# Patient Record
Sex: MALE | Race: WHITE
[De-identification: names, ages, dates, MRNs, and addresses within clinical notes are randomized per-mention and may not be internally consistent; named-entity substitution may affect disease eponyms.]

---

## 2017-01-20 NOTE — HP
HISTORY AND PHYSICAL:

 

DATE OF ADMISSION:  17

 

PROVIDER:  Padmini Bo NP

 

ATTENDING PHYSICIAN:  Dr. Maradiaga *(reported dictated by Padmini Bo NP).

 

PRIMARY CARE PHYSICIAN:  Dr. Bejarano.

 

CHIEF COMPLAINT:  Weakness and falls.

 

HISTORY OF PRESENT ILLNESS:  Mr. Johnson is an 83-year-old male with a past 
medical history of hypertension and GERD who presents to the emergency 
department with reports of falls and weakness starting yesterday.  The patient 
reports he fell 3 times a day due to lightheadedness.  He denies dizziness or 
numbness or tingling, but reports generalized weakness when he feels 
lightheaded.  The patient denies any speech difficulties or visual complaints.  
He denies arthralgias or myalgias.  The patient reports that yesterday, he had 
a headache and lightheadedness.  The patient reports he has a history of 
lightheadedness and when he goes from sitting to standing, he frequently gets 
dizzy.  He denies passing out.  Today, the patient reports that he went to see 
his primary care who sent him to the hospital for further evaluation.  The 
patient denies any fevers or chills.  No anorexia.  Denies cough, shortness of 
breath, or chest pain.  The patient now in the emergency department does feel 
like he has a slight fever for the first time.  No open wounds, rashes, or 
lesions.

 

Per wife, who is at the bedside, she reports that her  has had a long 
history of lightheadedness and falls, but he is not feeling well and this is 
not uncommon.

 

In the emergency department, the patient underwent a chest x-ray that is 
suspicious for bronchial pneumonia.  He is noted to be slightly tachycardic in 
the emergency department and he presents with the white blood cell count of 
27.2.  The patient is on prednisone at home.  However, he nor his wife know the 
reason why he is on the prednisone and states he thinks he has been on it for 
approximately 9 months.  His primary care provider has been tapering him down 
over the last several weeks.

 

PAST MEDICAL HISTORY:

1.  Hypertension.

2.  GERD.

3.  History of left hernia.

4.  Hip ORIF in .

5.  Laparoscopic Nissen fundoplication in .

 

HOME MEDICATIONS:

1.  Calcium plus D 600-200 mg-unit one tab p.o. daily.

2.  Aspirin 81 mg p.o. daily.

3.  Prednisone 10 mg p.o. b.i.d.

4.  Multivitamin/mineral one tab p.o. daily.

5.  Omeprazole 40 mg p.o. b.i.d.

6.  Losartan potassium and hydrochlorothiazide 50-12.5 mg one tab p.o. b.i.d.

 

ALLERGIES:  No known allergies.

 

FAMILY HISTORY:  His father  at a young age of a brain aneurysm.  His 
mother  at age 85 he believes of alcoholism and she was a heavy smoker.

 

SOCIAL HISTORY:  Distant history of smoking, quitting over 40 years ago.  No 
alcohol use.  He currently lives at home with his wife, who is his healthcare 
proxy.  He has 3 grown children.

 

REVIEW OF SYSTEMS:  A 14-point review of systems were performed.  All the 
pertinent positives and negatives are mentioned in the history of present 
illness.  All the remaining systems are negative.

 

                               PHYSICAL EXAMINATION

 

APPEARANCE:  An 83-year-old male, sitting on the emergency department stretcher
, alert and oriented x3, in no acute distress.  Appears to have a flushed face.

 

VITAL SIGNS:  Temperature 100.0, heart rate 102, respirations 18, O2 sat 96% on 
room air, blood pressure 132/67.

 

HEENT:  Head is normocephalic, atraumatic.  Pupils are equal and reactive to 
light. Oropharynx is clear.  No oropharyngeal edema or erythema noted.  No 
exudate noted. Dentures noted.

 

NECK:  No cervical or supraclavicular lymphadenopathy.

 

RESPIRATORY:  No accessory muscle use.  Right lower lobe crackles and it is 
diminished.  Left lung is clear throughout.  No accessory muscle use.

 

CARDIAC:  S1, S2.  No murmurs, rubs or gallops appreciated.  No lower extremity 
edema.  2+ DP pulses bilaterally.

 

ABDOMEN:  Soft, nontender, nondistended.  Normal bowel sounds x4.

 

MUSCULOSKELETAL:  Strength is 5/5 throughout.  No abnormalities.  Full range of 
motion in all extremities.

 

SKIN:  No rashes, lesions, or open wounds noted.  Warm, pink, dry.

 

NEUROLOGIC:  Cranial nerves II through XII are intact.  Moves all extremities 
equally.  Sensation to lower extremities is intact to light touch.  Tongue is 
midline.  No facial droop, no pronator drift.

 

PSYCH:  Alert and oriented x3, appropriate to situation.

 

 DIAGNOSTIC STUDIES/LABORATORY DATA:  WBC is 27.2, RBC is 6.08, Hgb 15.2, HCT 47
, MCV 77, MCH 25, MCHC 32, RDW 18, platelet count 389.  INR 1.02.  Sodium 132, 
potassium 3.7, chloride 98, carbon dioxide 26, anion gap 8, BUN 21, creatinine 
1.09, glucose 91, lactic acid 1.2, calcium 9.8, total bilirubin 0.70, AST 20, 
ALT 16, alkaline phosphatase 64, troponin 0.03, total protein 7.8, albumin 3.9.
  TSH 2.87.

 

Urinalysis negative.

 

Chest x-ray, impression.  The constellation of findings suspicious for 
bronchial pneumonia.

 

Brain CT, no evidence for gross acute infarct, mass effect, or hemorrhage. 
Findings consistent with mild chronic small vessel ischemic changes.

 

EKG:  Sinus rhythm with the rate of 86.  In comparison to prior EKG, no acute 
ischemic changes noted.

 

ASSESSMENT AND PLAN:  Mr. Johnson is an 83-year-old male with a past medical 
history of hypertension and gastroesophageal reflux disease who presents to the 
emergency department today with complaint of lightheadedness, headache, and 
falls, who was found to have a suspicion for right bronchial pneumonia.

 

1.  Weakness, falls, and lightheadedness.  I suspect this is secondary to 
infection.  It appears that he does have pneumonia as evidenced of the chest x-
ray as well as clinically, he does appear to possibly be mildly ill with a 
flushed face, mild tachycardia and presents with a leukocytosis of 27,000.  The 
patient does not have other symptoms of pneumonia such as coughing or sputum 
production.  I will add on procalcitonin level.  He received Zosyn in the 
emergency department. We will continue ceftriaxone, azithromycin.  We will 
continue gentle IV fluids overnight.  Per the patient and wife, the patient's 
symptoms of lightheadedness and dizzy and falls are not new for him.  Could 
consider Neuro consult.  We will add on neuro checks.  PT consult, check 
orthostatics.  Monitor on telemetry.  Obtain echocardiogram.

2.  Abnormal peripheral smear with noted metamyelocytes.  Please call 
Hematology tomorrow to review smear.

3.  Hypertension.  Controlled, continue losartan, hold hydrochlorothiazide.

4.  Prednisone therapy.  Unclear why the patient is chronically on prednisone, 
obtain records from PCP.  It may be difficulty to do that as it is the weekend.

5.  DVT prophylaxis.  Heparin subcu.

6.  Code status.  DNR.  MOLST has been signed and is on the chart.

 

TIME SPENT:  Approximately 60 minutes were spent on this admission.

 

This case is reviewed with Dr. Maradiaga, attending physician, who agrees with 
plan of care.

 

 ____________________________________ PADMINI BO NP



CC:  Dr. Bejarano *

 

[id number] #: [id number]

LAURA

## 2017-01-20 NOTE — RAD
INDICATION:  Dizziness, trauma.



COMPARISON:  Comparison is made with prior CT of the brain from November 19, 2003.



TECHNIQUE: Contiguous axial sections of the brain were obtained from the skull base to the

vertex without contrast.



FINDINGS: The ventricles, cisterns and sulci are enlarged consistent with age-related

atrophy.  There are small areas of decreased density in the subcortical and

periventricular white matter suggestive of mild chronic small vessel ischemic changes. In

addition there is a focal area of decreased density in the medial right temporal lobe

which is unchanged from the prior exam consistent with an old lacunar infarct or prominent

perivascular space. There is no evidence for hemorrhage.



There is a extra-axial calcification adjacent to the right frontal lobe suggestive of an

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



No significant focal osseous abnormality is seen. The visualized portion of the paranasal

sinuses and mastoid air cells appear clear.



IMPRESSION:  

1. NO EVIDENCE FOR GROSS ACUTE INFARCT, MASS EFFECT OR HEMORRHAGE.

2. FINDINGS CONSISTENT WITH MILD CHRONIC SMALL VESSEL ISCHEMIC CHANGES.

## 2017-01-20 NOTE — RAD
Indication: Multiple falls. Tachycardia, weakness. Assess for pneumonia.



Comparison: March 21, 2007



Technique: Upright AP 1542 hours



Report: Patchy bilateral pulmonary opacities greatest in the RIGHT infrahilar region

suspicious for bronchopneumonia given the clinical context. Mild linear RIGHT basilar

atelectasis. Costochondral calcification superimposed at the level of the LEFT lower lung

zone. Potential small LEFT pleural effusion. Negative for pneumothorax. The heart,

pulmonary vasculature, and mediastinal contours are unremarkable.



IMPRESSION: The constellation of findings is suspicious for bronchopneumonia.

## 2017-01-21 NOTE — PN
Subjective


Date of Service: 01/21/17


Interval History: 





Patient seen and examined at bedside. He still reports some lightheadedness but 

is feeling better. Denies fever/chills, chest pain, SOB, abd pain, n/v. He 

reports that he was started on prednisone for generalized muscle pain. His PCP 

has reportedly attempted to wean him in the past but his symptoms returned. He 

is now in the weaning process again, and he feels this may be contributing to 

his lightheadedness.





Telemetry: SR with PVCs, 80s


Family History: Unchanged from Admission


Social History: Unchanged from Admission


Past Medical History: Unchanged from Admission





Objective


Active Medications: 








Albuterol (Ventolin 2.5 Mg/3 Ml Neb.Sol*)  2.5 mg INH Q4H PRN


   PRN Reason: SOB/WHEEZING


Aspirin (Aspirin Low Dose Tab*)  81 mg PO DAILY [facility]


   Last Admin: 01/21/17 09:08 Dose:  81 mg


Sodium Chloride (Ns 0.9% 1000 Ml*)  1,000 mls @ 100 mls/hr IV PER RATE [facility]


   Stop: 01/22/17 04:29


   Last Admin: 01/21/17 09:04 Dose:  100 mls/hr


Ceftriaxone Sodium 1,000 mg/ (Sodium Chloride)  50 mls @ 200 mls/hr IVPB Q24H 

[facility]


   Last Admin: 01/20/17 21:18 Dose:  200 mls/hr


Azithromycin 500 mg/ Sodium (Chloride)  250 mls @ 250 mls/hr IVPB Q24H [facility]


   Last Admin: 01/20/17 21:56 Dose:  250 mls/hr


Losartan Potassium (Cozaar Tab*)  50 mg PO DAILY [facility]


   Last Admin: 01/21/17 09:08 Dose:  50 mg


Multivitamins/Minerals (Theragran/Minerals Tab*)  1 tab PO DAILY [facility]


   Last Admin: 01/21/17 09:07 Dose:  1 tab


Omeprazole (Prilosec Cap*)  40 mg PO BID@0730,1630 [facility]


   Last Admin: 01/21/17 09:07 Dose:  40 mg


Prednisone (Deltasone Tab*)  10 mg PO BID [facility]


   Last Admin: 01/21/17 09:08 Dose:  10 mg








 Vital Signs











  01/20/17 01/20/17 01/20/17





  19:55 22:50 23:38


 


Temperature 98.4 F  


 


Pulse Rate 83  


 


Respiratory 16 18 





Rate   


 


Blood Pressure 145/70  





(mmHg)   


 


O2 Sat by Pulse 93  96





Oximetry   














  01/21/17 01/21/17 01/21/17





  00:32 04:04 06:27


 


Temperature 98.3 F 98.0 F 


 


Pulse Rate 71 66 77


 


Respiratory 16 16 





Rate   


 


Blood Pressure 116/44 101/55 131/64





(mmHg)   


 


O2 Sat by Pulse 93 93 





Oximetry   














  01/21/17 01/21/17 01/21/17





  08:00 08:26 11:08


 


Temperature   98.0 F


 


Pulse Rate  76 64


 


Respiratory  14 16





Rate   


 


Blood Pressure   115/58





(mmHg)   


 


O2 Sat by Pulse 96 96 93





Oximetry   











Oxygen Devices in Use Now: None


Appearance: Male patient, lying in bed, in NAD


Eyes: PERRLA


Ears/Nose/Mouth/Throat: Clear Oropharnyx, Mucous Membranes Moist


Neck: NL Appearance and Movements; NL JVP


Respiratory: Symmetrical Chest Expansion and Respiratory Effort, Clear to 

Auscultation - RLL rales


Cardiovascular: NL Sounds; No Murmurs; No JVD, RRR


Abdominal: NL Sounds; No Tenderness; No Distention


Extremities: No Edema


Skin: No Rash or Ulcers


Neurological: Alert and Oriented x 3


Lines/Tubes/Other Access: Clean, Dry and Intact Peripheral IV


Nutrition: Taking PO's


Result Diagrams: 


 01/21/17 05:44





 01/21/17 05:44


Additional Lab and Data: 


 Lab Results











  01/20/17 01/20/17 01/20/17 Range/Units





  16:00 16:00 16:00 


 


WBC  27.2 H    (3.5-10.8)  10^3/ul


 


RBC  6.08 H    (4.0-5.4)  10^6/ul


 


Hgb  15.2    (14.0-18.0)  g/dl


 


Hct  47    (42-52)  %


 


MCV  77 L    (80-94)  fL


 


MCH  25 L    (27-31)  pg


 


MCHC  32    (31-36)  g/dl


 


RDW  18 H    (10.5-15)  %


 


Plt Count  389    (150-450)  10^3/ul


 


MPV  9    (7.4-10.4)  um3


 


Immature Gran % (Auto)  7    (0-9)  %


 


Neut % (Auto)  80.0    (38-83)  %


 


Lymph % (Auto)  5.5 L    (25-47)  %


 


Mono % (Auto)  13.1 H    (1-9)  %


 


Eos % (Auto)  0.7    (0-6)  %


 


Baso % (Auto)  0.7    (0-2)  %


 


Absolute Neuts (auto)  21.8 H    (1.5-7.7)  10^3/ul


 


Absolute Lymphs (auto)  1.5    (1.0-4.8)  10^3/ul


 


Absolute Monos (auto)  3.6 H    (0-0.8)  10^3/ul


 


Absolute Eos (auto)  0.2    (0-0.6)  10^3/ul


 


Absolute Basos (auto)  0.2    (0-0.2)  10^3/ul


 


Absolute Nucleated RBC  0    10^3/ul


 


Neutrophils %  81    (38-83)  %


 


Band Neutrophils %  3    (0-8)  %


 


Lymphocytes %  7 L    (25-47)  %


 


Monocytes %  5    (0-13)  %


 


Metamyelocytes %  3 H    (0-2)  %


 


Myelocytes %  1    (0-1)  %


 


Nucleated RBC %  0    


 


Normal RBC Morphology  Normal    (Normal)  


 


INR (Anticoag Therapy)   1.02   (0.89-1.11)  


 


APTT   26.8   (26.0-36.3)  seconds


 


Sodium     (133-145)  mmol/L


 


Potassium     (3.5-5.0)  mmol/L


 


Chloride     (101-111)  mmol/L


 


Carbon Dioxide     (22-32)  mmol/L


 


Anion Gap     (2-11)  mmol/L


 


BUN     (6-24)  mg/dL


 


Creatinine     (0.67-1.17)  mg/dL


 


Est GFR ( Amer)     (>60)  


 


Est GFR (Non-Af Amer)     (>60)  


 


BUN/Creatinine Ratio     (8-20)  


 


Glucose     ()  mg/dL


 


Lactic Acid     (0.5-2.0)  mmol/L


 


Calcium     (8.6-10.3)  mg/dL


 


Total Bilirubin     (0.2-1.0)  mg/dL


 


AST     (13-39)  U/L


 


ALT     (7-52)  U/L


 


Alkaline Phosphatase     ()  U/L


 


Troponin I     (<0.04)  ng/mL


 


Total Protein     (6.4-8.9)  g/dL


 


Albumin     (3.2-5.2)  g/dL


 


Globulin     (2-4)  g/dL


 


Albumin/Globulin Ratio     (1-3)  


 


Procalcitonin     (<0.6)  ng/mL


 


TSH     (0.34-5.60)  mcIU/mL


 


Urine Color    Yellow  


 


Urine Appearance    Clear  


 


Urine pH    6.0  (5-9)  


 


Ur Specific Gravity    1.014  (1.010-1.030)  


 


Urine Protein    Negative  (Negative)  


 


Urine Ketones    Negative  (Negative)  


 


Urine Blood    Negative  (Negative)  


 


Urine Nitrate    Negative  (Negative)  


 


Urine Bilirubin    Negative  (Negative)  


 


Urine Urobilinogen    Negative  (Negative)  


 


Ur Leukocyte Esterase    Negative  (Negative)  


 


Urine Glucose    Negative  (Negative)  














  01/20/17 01/20/17 01/20/17 Range/Units





  16:00 16:00 16:00 


 


WBC     (3.5-10.8)  10^3/ul


 


RBC     (4.0-5.4)  10^6/ul


 


Hgb     (14.0-18.0)  g/dl


 


Hct     (42-52)  %


 


MCV     (80-94)  fL


 


MCH     (27-31)  pg


 


MCHC     (31-36)  g/dl


 


RDW     (10.5-15)  %


 


Plt Count     (150-450)  10^3/ul


 


MPV     (7.4-10.4)  um3


 


Immature Gran % (Auto)     (0-9)  %


 


Neut % (Auto)     (38-83)  %


 


Lymph % (Auto)     (25-47)  %


 


Mono % (Auto)     (1-9)  %


 


Eos % (Auto)     (0-6)  %


 


Baso % (Auto)     (0-2)  %


 


Absolute Neuts (auto)     (1.5-7.7)  10^3/ul


 


Absolute Lymphs (auto)     (1.0-4.8)  10^3/ul


 


Absolute Monos (auto)     (0-0.8)  10^3/ul


 


Absolute Eos (auto)     (0-0.6)  10^3/ul


 


Absolute Basos (auto)     (0-0.2)  10^3/ul


 


Absolute Nucleated RBC     10^3/ul


 


Neutrophils %     (38-83)  %


 


Band Neutrophils %     (0-8)  %


 


Lymphocytes %     (25-47)  %


 


Monocytes %     (0-13)  %


 


Metamyelocytes %     (0-2)  %


 


Myelocytes %     (0-1)  %


 


Nucleated RBC %     


 


Normal RBC Morphology     (Normal)  


 


INR (Anticoag Therapy)     (0.89-1.11)  


 


APTT     (26.0-36.3)  seconds


 


Sodium  132 L    (133-145)  mmol/L


 


Potassium  3.7    (3.5-5.0)  mmol/L


 


Chloride  98 L    (101-111)  mmol/L


 


Carbon Dioxide  26    (22-32)  mmol/L


 


Anion Gap  8    (2-11)  mmol/L


 


BUN  21    (6-24)  mg/dL


 


Creatinine  1.09    (0.67-1.17)  mg/dL


 


Est GFR ( Amer)  83.1    (>60)  


 


Est GFR (Non-Af Amer)  64.6    (>60)  


 


BUN/Creatinine Ratio  19.3    (8-20)  


 


Glucose  91    ()  mg/dL


 


Lactic Acid   1.2   (0.5-2.0)  mmol/L


 


Calcium  9.8    (8.6-10.3)  mg/dL


 


Total Bilirubin  0.70    (0.2-1.0)  mg/dL


 


AST  20    (13-39)  U/L


 


ALT  16    (7-52)  U/L


 


Alkaline Phosphatase  64    ()  U/L


 


Troponin I  0.03    (<0.04)  ng/mL


 


Total Protein  7.8    (6.4-8.9)  g/dL


 


Albumin  3.9    (3.2-5.2)  g/dL


 


Globulin  3.9    (2-4)  g/dL


 


Albumin/Globulin Ratio  1.0    (1-3)  


 


Procalcitonin    0.1  (<0.6)  ng/mL


 


TSH  2.87    (0.34-5.60)  mcIU/mL


 


Urine Color     


 


Urine Appearance     


 


Urine pH     (5-9)  


 


Ur Specific Gravity     (1.010-1.030)  


 


Urine Protein     (Negative)  


 


Urine Ketones     (Negative)  


 


Urine Blood     (Negative)  


 


Urine Nitrate     (Negative)  


 


Urine Bilirubin     (Negative)  


 


Urine Urobilinogen     (Negative)  


 


Ur Leukocyte Esterase     (Negative)  


 


Urine Glucose     (Negative)  











Microbiology and Other Data: 


 Microbiology











 01/21/17 00:40 Legionella Urinary Antigen - Final





 Urine    Negative Legionella





 Streptococcus pneumoniae Ag Screen - Final





    Negative S. pneumo Antigen














Assess/Plan/Problems-Billing


Assessment: 





Mr. Johnson is an 82 yo male with a PMH of HTN, GERD, left hernia, and Nissen 

fundoplication who presented to the ED on 1/20/17 with weakness and falls 

secondary to dizziness.





- Patient Problems


(1) Community acquired pneumonia


Code(s): J18.9 - PNEUMONIA, UNSPECIFIED ORGANISM   Comment: 


Improvement in WBC today, procalcitonin negative


CXR shows right bronchopneumonia


Continue IVF, ceftriaxone and azithromycin


Tmax 100.0 in ED


Urine legionella and strep pneumo antigens negative   





(2) Falls


Comment: 


Unclear etiology


Orthostatic VS negative


Echocardiogram pending


Telemetry WNL


CT brain negative for acute processes


Continue PT - PT recommends 1-2 additional inpatient days and work on stairs   





(3) Hematologic abnormality


Code(s): R79.9 - ABNORMAL FINDING OF BLOOD CHEMISTRY, UNSPECIFIED   Comment: 


I discussed the results with Dr. Urena, who will review the smear


Per hematology, suspect it may be secondary to acute illness


Plan to trend CBC, check FISH BCR abl


Appreciate hematology guidance and input   





(4) HTN (hypertension)


Code(s): I10 - ESSENTIAL (PRIMARY) HYPERTENSION   Comment: 


Normotensive, continue losartan   





(5) GERD (gastroesophageal reflux disease)


Code(s): K21.9 - GASTRO-ESOPHAGEAL REFLUX DISEASE WITHOUT ESOPHAGITIS   Comment

: 


Stable, continue omeprazole.   





(6) Chronic use of steroids


Code(s): FAD8338 -    Comment: 


Continue prednisone at 10 mg.


Unclear as to why patient taking medication; awaiting progress notes from PCP; 

unable to obtain until Monday


Patient states he takes prednisone for chronic muscle pain and inflammation


He states his symptoms worsened when prednisone was tapered.


CT brain negative for acute processes.   





(7) DVT prophylaxis


Comment: 


SQ heparin   


Status and Disposition: 





OBV admit. Plan for d/c to home when medically stable.

## 2017-01-22 NOTE — ECHO
Patient:      BERTHA SOLIS 

Med Rec#:     [id number]            :          1933          

Date:         2017            Age:          83y                 

Account#:     [id number]          Height:       182.88 cm / 72.0 in

Accession#:   [id number]           Weight:       80.74 kg / 178.0 lbs

Sex:          M                     BSA:          2.03

Room#:        451                   

Admit Date#:  2017          

Type:         Inpatient

 

Referring:    Janine Sena

Reading:      Fantasma Lamas DO

Reading:      Fantasma Lamas DO

Sonographer:  Noemi Méndez RDCS

CC:           Keegan Bejarano MD

______________________________________________________________________

 

Transthoracic Echocardiogram

 

Indication:

Resp. Abn

BP:           138/66

HR:           95

Rhythm:       NSR with PVCs

 

Findings     

History:

Admitted with right pneumonia.  Dizziness,HTN,GERD. 

 

Technical Comments:

The study is technically limited due to poor parasternal windows.  

Completed at 1250. 

 

Left Ventricle:

The left ventricular chamber size is normal. Mild concentric left

ventricular hypertrophy is observed. Global left ventricular wall motion

and contractility are within normal limits. There is normal left

ventricular systolic function. The estimated ejection fraction is

60-65%.  Abnormal left ventricular diastolic function is observed. 

 

Left Atrium:

The left atrium is mildly dilated. 

 

Right Ventricle:

The right ventricular chamber size and systolic function are within

normal limits. 

 

Right Atrium:

The right atrium is slightly dilated.  

 

Aortic Valve:

The aortic valve structure is not well visualized.with regards to

bicupsid vs. tricupsid valve The aortic valve leaflets are mildly

thickened. Mild aortic leaflet calcification is visualized. Systolic

excursion of the aortic valve cusps is reduced. There is mild aortic

regurgitation.  The highest aortic valve velocity was obtained with the

standard probe from the A5C view. 

 

Mitral Valve:

The mitral valve leaflets are mildly thickened. There is a trace of

mitral regurgitation. There is no evidence of mitral stenosis. 

 

Tricuspid Valve:

The tricuspid valve leaflets are normal.  There is trace tricuspid

regurgitation.  Unable to estimate the right ventricular systolic

pressure.   There is no tricuspid stenosis. 

 

Pulmonic Valve:

The pulmonic valve appears normal. There is no evidence of pulmonic

regurgitation. There is no pulmonic stenosis.  

 

Pericardium:

There is no significant pericardial effusion. 

 

Aorta:

The ascending aorta is not well visualized. There is no dilatation of

the aortic arch. There is no dilation of the aortic root. 

 

Pulmonary Artery:

The main pulmonary artery is not well visualized. 

 

Venous:

The inferior vena cava is dilated.  There is a greater than 50%

respiratory change in the inferior vena cava dimension. 

 

Conclusions

The left ventricular chamber size is normal.

Mild concentric left ventricular hypertrophy is observed.

There is normal left ventricular systolic function with an estimated

ejection fraction of 60-65%. 

The left atrium is mildly dilated.

There is mild aortic regurgitation. 

Study is non-diagnostic to evaluate for aortic stenosis

Unable to estimate the right ventricular systolic pressure.  

 

Compared to prior study from 10/2015, this study is non-diagnostic to

evaluate the severity of aortic stenosis.  Previous study with similar

LVEF had a peak velocity of 3.1 m/s (calculated mild-moderate AS), this

study peak velocity is 2.3 m/s with similar LVEF suggestive of

non-parallel doppler alignment 

 

Measurements     

Name                    Value         Normal Range            

RVIDd (AP) 2D           3.1 cm        (0.9 - 2.6)             

RVDdMajor (2D)          3.5 cm        (2.2 - 4.4)             

RAd ISD 4CH             5.2 cm        (3.4 - 4.9)             

RA (A4C)W               3.7 cm        (2.9 - 4.6)             

IVSd (2D)               1.2 cm        (0.6 - 1)               

LVPWd (2D)              1.2 cm        (0.6 - 1)               

LVIDd (2D)              4.8 cm        (3.6 - 5.4)             

LVIDs (2D)              3.9 cm        -                        

LV FS (2D)              19 %          (25 - 45)               

Aortic Annulus          2.2 cm        (1.4 - 2.6)             

Ao root diameter (2D)   3.2 cm        (2.1 - 3.5)             

Aortic arch             3.3 cm        (1.8 - 3.4)             

Descending Ao           1 cm          -                        

LA dimension (AP) 2D    5.4 cm        (2.3 - 3.8)             

LAd ISD 4CH             5.9 cm        (2.9 - 5.3)             

LA ISD 4CH W            4.4 cm        (2.5 - 4.5)             

 

Name                    Value         Normal Range            

LA ESV SP 4CH (A/L)     54 ml         -                        

LA ESV SP 2CH (A/L)     52 ml         -                        

LA ESV BP (A/L)         53 ml         -                        

LA ESV BP (A/L) index   26.03 ml/m2   -                        

LA ESV SP 4CH (MOD)     51 ml         -                        

LA ESV SP 2CH (MOD)     48 ml         -                        

 

Name                    Value         Normal Range            

MV E-wave Vmax          0.8 m/sec     -                        

MV deceleration time    210 msec      -                        

MV A-wave Vmax          1 m/sec       -                        

MV E:A ratio            0.81 ratio    -                        

LV septal e' Vmax       0.1 m/sec     -                        

LV lateral e' Vmax      0.07 m/sec    -                        

LV E:e' septal ratio    8 ratio       -                        

LV E:e' lateral ratio   11.42 ratio   -                        

 

Name                    Value         Normal Range            

AV VTI                  54.3 cm       -                        

LVOT diameter           1.9 cm        -                        

LVOT Vmax               1.3 m/sec     -                        

LVOT VTI                32.1 cm       -                        

LVOT peak gradient      6.36 mmHg     -                        

LVOT mean gradient      3.17 mmHg     -                        

AR PHT                  549 msec      -                        

AR peak gradient        55.33 mmHg    -                        

 

Name                    Value         Normal Range            

TR peak gradient        36 mmHg       -                        

RAP                     8 mmHg        -                        

IVC diameter            2.8 cm        -                        

 

Name                    Value         Normal Range            

PV Vmax                 0.7 m/sec     -                        

PV peak gradient        1.85 mmHg     -                        

 

Electronically signed by: Fantasma Lamas DO on 2017 14:10:26

## 2017-01-22 NOTE — PN
Subjective


Date of Service: 01/22/17


Interval History: 





Patient seen and examined at bedside. He reports feeling better this morning 

and denies any dizziness. He reports waking up with a headache that was "mild" 

and now it is gone. He states that he feels that his headaches and dizziness 

worsened 2-3 weeks ago as his prednisone dose was cut down. He cannot determine 

if the headache and dizziness present together. He reports a remote history of 

dizziness and vertigo maybe 40 years ago while skiing; he recalls that this 

incident was preceded by a headache. Denies fever/chills, vomiting, abd pain, 

chest pain, SOB. Reports minimally productive cough.





Telemetry: SR 80s with PVCs


Family History: Unchanged from Admission


Social History: Unchanged from Admission


Past Medical History: Unchanged from Admission





Objective


Active Medications: 








Albuterol (Ventolin 2.5 Mg/3 Ml Neb.Sol*)  2.5 mg INH Q4H PRN


   PRN Reason: SOB/WHEEZING


Aspirin (Aspirin Low Dose Tab*)  81 mg PO DAILY [facility]


   Last Admin: 01/22/17 07:41 Dose:  81 mg


Heparin Sodium (Porcine) (Heparin Vial(*))  5,000 units SUBCUT Q8HR [facility]


   Last Admin: 01/22/17 05:09 Dose:  5,000 units


Ceftriaxone Sodium 1,000 mg/ (Sodium Chloride)  50 mls @ 200 mls/hr IVPB Q24H 

[facility]


   Last Admin: 01/21/17 21:46 Dose:  200 mls/hr


Azithromycin 500 mg/ Sodium (Chloride)  250 mls @ 250 mls/hr IVPB Q24H [facility]


   Last Admin: 01/21/17 22:25 Dose:  250 mls/hr


Losartan Potassium (Cozaar Tab*)  50 mg PO DAILY [facility]


   Last Admin: 01/22/17 07:40 Dose:  50 mg


Multivitamins/Minerals (Theragran/Minerals Tab*)  1 tab PO DAILY [facility]


   Last Admin: 01/22/17 07:40 Dose:  1 tab


Omeprazole (Prilosec Cap*)  40 mg PO BID@0730,1630 [facility]


   Last Admin: 01/22/17 07:40 Dose:  40 mg


Prednisone (Deltasone Tab*)  10 mg PO BID [facility]


   Last Admin: 01/22/17 07:41 Dose:  10 mg








 Vital Signs











  01/21/17 01/21/17 01/21/17





  11:08 16:14 19:25


 


Temperature 98.0 F 98.3 F 98.0 F


 


Pulse Rate 64 63 74


 


Respiratory 16 16 16





Rate   


 


Blood Pressure 115/58 126/62 126/52





(mmHg)   


 


O2 Sat by Pulse 93 95 93





Oximetry   














  01/22/17 01/22/17 01/22/17





  00:08 00:44 03:55


 


Temperature 97.7 F  97.9 F


 


Pulse Rate 65 54 66


 


Respiratory 20 20 20





Rate   


 


Blood Pressure 120/59  119/54





(mmHg)   


 


O2 Sat by Pulse 96 97 96





Oximetry   














  01/22/17 01/22/17





  07:24 07:28


 


Temperature 97.6 F 


 


Pulse Rate 55 


 


Respiratory 16 





Rate  


 


Blood Pressure 138/66 





(mmHg)  


 


O2 Sat by Pulse 94 95





Oximetry  











Oxygen Devices in Use Now: None


Appearance: Male patient, OOB to chair, in NAD


Eyes: PERRLA


Ears/Nose/Mouth/Throat: Clear Oropharnyx, Mucous Membranes Moist


Neck: NL Appearance and Movements; NL JVP


Respiratory: Symmetrical Chest Expansion and Respiratory Effort, Clear to 

Auscultation - RLL rales


Cardiovascular: NL Sounds; No Murmurs; No JVD - soft, 1/6 systolic murmur, RRR


Abdominal: NL Sounds; No Tenderness; No Distention


Extremities: No Edema


Skin: No Rash or Ulcers


Neurological: Alert and Oriented x 3


Lines/Tubes/Other Access: Clean, Dry and Intact Peripheral IV


Nutrition: Taking PO's


Result Diagrams: 


 01/22/17 05:24





 01/21/17 05:44


Additional Lab and Data: 


 Lab Results











  01/20/17 01/20/17 01/20/17 Range/Units





  16:00 16:00 16:00 


 


WBC  27.2 H    (3.5-10.8)  10^3/ul


 


RBC  6.08 H    (4.0-5.4)  10^6/ul


 


Hgb  15.2    (14.0-18.0)  g/dl


 


Hct  47    (42-52)  %


 


MCV  77 L    (80-94)  fL


 


MCH  25 L    (27-31)  pg


 


MCHC  32    (31-36)  g/dl


 


RDW  18 H    (10.5-15)  %


 


Plt Count  389    (150-450)  10^3/ul


 


MPV  9    (7.4-10.4)  um3


 


Immature Gran % (Auto)  7    (0-9)  %


 


Neut % (Auto)  80.0    (38-83)  %


 


Lymph % (Auto)  5.5 L    (25-47)  %


 


Mono % (Auto)  13.1 H    (1-9)  %


 


Eos % (Auto)  0.7    (0-6)  %


 


Baso % (Auto)  0.7    (0-2)  %


 


Absolute Neuts (auto)  21.8 H    (1.5-7.7)  10^3/ul


 


Absolute Lymphs (auto)  1.5    (1.0-4.8)  10^3/ul


 


Absolute Monos (auto)  3.6 H    (0-0.8)  10^3/ul


 


Absolute Eos (auto)  0.2    (0-0.6)  10^3/ul


 


Absolute Basos (auto)  0.2    (0-0.2)  10^3/ul


 


Absolute Nucleated RBC  0    10^3/ul


 


Neutrophils %  81    (38-83)  %


 


Band Neutrophils %  3    (0-8)  %


 


Lymphocytes %  7 L    (25-47)  %


 


Monocytes %  5    (0-13)  %


 


Metamyelocytes %  3 H    (0-2)  %


 


Myelocytes %  1    (0-1)  %


 


Nucleated RBC %  0    


 


Normal RBC Morphology  Normal    (Normal)  


 


INR (Anticoag Therapy)   1.02   (0.89-1.11)  


 


APTT   26.8   (26.0-36.3)  seconds


 


Sodium     (133-145)  mmol/L


 


Potassium     (3.5-5.0)  mmol/L


 


Chloride     (101-111)  mmol/L


 


Carbon Dioxide     (22-32)  mmol/L


 


Anion Gap     (2-11)  mmol/L


 


BUN     (6-24)  mg/dL


 


Creatinine     (0.67-1.17)  mg/dL


 


Est GFR ( Amer)     (>60)  


 


Est GFR (Non-Af Amer)     (>60)  


 


BUN/Creatinine Ratio     (8-20)  


 


Glucose     ()  mg/dL


 


Lactic Acid     (0.5-2.0)  mmol/L


 


Calcium     (8.6-10.3)  mg/dL


 


Total Bilirubin     (0.2-1.0)  mg/dL


 


AST     (13-39)  U/L


 


ALT     (7-52)  U/L


 


Alkaline Phosphatase     ()  U/L


 


Troponin I     (<0.04)  ng/mL


 


Total Protein     (6.4-8.9)  g/dL


 


Albumin     (3.2-5.2)  g/dL


 


Globulin     (2-4)  g/dL


 


Albumin/Globulin Ratio     (1-3)  


 


Procalcitonin     (<0.6)  ng/mL


 


TSH     (0.34-5.60)  mcIU/mL


 


Urine Color    Yellow  


 


Urine Appearance    Clear  


 


Urine pH    6.0  (5-9)  


 


Ur Specific Gravity    1.014  (1.010-1.030)  


 


Urine Protein    Negative  (Negative)  


 


Urine Ketones    Negative  (Negative)  


 


Urine Blood    Negative  (Negative)  


 


Urine Nitrate    Negative  (Negative)  


 


Urine Bilirubin    Negative  (Negative)  


 


Urine Urobilinogen    Negative  (Negative)  


 


Ur Leukocyte Esterase    Negative  (Negative)  


 


Urine Glucose    Negative  (Negative)  














  01/20/17 01/20/17 01/20/17 Range/Units





  16:00 16:00 16:00 


 


WBC     (3.5-10.8)  10^3/ul


 


RBC     (4.0-5.4)  10^6/ul


 


Hgb     (14.0-18.0)  g/dl


 


Hct     (42-52)  %


 


MCV     (80-94)  fL


 


MCH     (27-31)  pg


 


MCHC     (31-36)  g/dl


 


RDW     (10.5-15)  %


 


Plt Count     (150-450)  10^3/ul


 


MPV     (7.4-10.4)  um3


 


Immature Gran % (Auto)     (0-9)  %


 


Neut % (Auto)     (38-83)  %


 


Lymph % (Auto)     (25-47)  %


 


Mono % (Auto)     (1-9)  %


 


Eos % (Auto)     (0-6)  %


 


Baso % (Auto)     (0-2)  %


 


Absolute Neuts (auto)     (1.5-7.7)  10^3/ul


 


Absolute Lymphs (auto)     (1.0-4.8)  10^3/ul


 


Absolute Monos (auto)     (0-0.8)  10^3/ul


 


Absolute Eos (auto)     (0-0.6)  10^3/ul


 


Absolute Basos (auto)     (0-0.2)  10^3/ul


 


Absolute Nucleated RBC     10^3/ul


 


Neutrophils %     (38-83)  %


 


Band Neutrophils %     (0-8)  %


 


Lymphocytes %     (25-47)  %


 


Monocytes %     (0-13)  %


 


Metamyelocytes %     (0-2)  %


 


Myelocytes %     (0-1)  %


 


Nucleated RBC %     


 


Normal RBC Morphology     (Normal)  


 


INR (Anticoag Therapy)     (0.89-1.11)  


 


APTT     (26.0-36.3)  seconds


 


Sodium  132 L    (133-145)  mmol/L


 


Potassium  3.7    (3.5-5.0)  mmol/L


 


Chloride  98 L    (101-111)  mmol/L


 


Carbon Dioxide  26    (22-32)  mmol/L


 


Anion Gap  8    (2-11)  mmol/L


 


BUN  21    (6-24)  mg/dL


 


Creatinine  1.09    (0.67-1.17)  mg/dL


 


Est GFR ( Amer)  83.1    (>60)  


 


Est GFR (Non-Af Amer)  64.6    (>60)  


 


BUN/Creatinine Ratio  19.3    (8-20)  


 


Glucose  91    ()  mg/dL


 


Lactic Acid   1.2   (0.5-2.0)  mmol/L


 


Calcium  9.8    (8.6-10.3)  mg/dL


 


Total Bilirubin  0.70    (0.2-1.0)  mg/dL


 


AST  20    (13-39)  U/L


 


ALT  16    (7-52)  U/L


 


Alkaline Phosphatase  64    ()  U/L


 


Troponin I  0.03    (<0.04)  ng/mL


 


Total Protein  7.8    (6.4-8.9)  g/dL


 


Albumin  3.9    (3.2-5.2)  g/dL


 


Globulin  3.9    (2-4)  g/dL


 


Albumin/Globulin Ratio  1.0    (1-3)  


 


Procalcitonin    0.1  (<0.6)  ng/mL


 


TSH  2.87    (0.34-5.60)  mcIU/mL


 


Urine Color     


 


Urine Appearance     


 


Urine pH     (5-9)  


 


Ur Specific Gravity     (1.010-1.030)  


 


Urine Protein     (Negative)  


 


Urine Ketones     (Negative)  


 


Urine Blood     (Negative)  


 


Urine Nitrate     (Negative)  


 


Urine Bilirubin     (Negative)  


 


Urine Urobilinogen     (Negative)  


 


Ur Leukocyte Esterase     (Negative)  


 


Urine Glucose     (Negative)  











Microbiology and Other Data: 


 Microbiology











 01/21/17 00:40 Legionella Urinary Antigen - Final





 Urine    Negative Legionella





 Streptococcus pneumoniae Ag Screen - Final





    Negative S. pneumo Antigen














Assess/Plan/Problems-Billing


Assessment: 





Mr. Johnson is an 82 yo male with a PMH of HTN, GERD, left hernia, and Nissen 

fundoplication who presented to the ED on 1/20/17 with weakness and falls 

secondary to dizziness.





- Patient Problems


(1) Community acquired pneumonia


Code(s): J18.9 - PNEUMONIA, UNSPECIFIED ORGANISM   Comment: 


WBC trending down, procalcitonin negative, afebrile


CXR shows right bronchopneumonia


Continue IVF, ceftriaxone and azithromycin


Urine legionella and strep pneumo antigens negative   





(2) Falls


Comment: 


Unclear etiology, pt reports headache and dizzines that has increased since 

weaning down his prednisone


Check ESR 


Orthostatic VS negative


Echocardiogram shows normal LV function with EF 60-65%, mild AR, unable to 

evaluate AS. Discussed with Dr. Lamas - will repeat echo as limited study for 

evaluation of AS


Telemetry WNL


CT brain negative for acute processes


Continue PT - PT recommends 1-2 additional inpatient days and work on stairs   





(3) Hematologic abnormality


Code(s): R79.9 - ABNORMAL FINDING OF BLOOD CHEMISTRY, UNSPECIFIED   Comment: 


I discussed the results with Dr. Urena, who will review the smear


Per hematology, suspect it may be secondary to acute illness


Plan to trend CBC, check FISH BCR abl


Appreciate hematology guidance and input   





(4) HTN (hypertension)


Code(s): I10 - ESSENTIAL (PRIMARY) HYPERTENSION   Comment: 


Normotensive, continue losartan   





(5) GERD (gastroesophageal reflux disease)


Code(s): K21.9 - GASTRO-ESOPHAGEAL REFLUX DISEASE WITHOUT ESOPHAGITIS   Comment

: 


Stable, continue omeprazole.   





(6) Chronic use of steroids


Code(s): URG3503 -    Comment: 


Continue prednisone at 10 mg.


Unclear as to why patient taking medication; awaiting progress notes from PCP; 

unable to obtain until Monday


Patient states he takes prednisone for chronic muscle pain and inflammation


He states his symptoms worsened when prednisone was tapered.


CT brain negative for acute processes, check ESR.   





(7) DVT prophylaxis


Comment: 


SQ heparin   


Status and Disposition: 





OBV to inpatient admit. Anticipate d/c in 1-2 days.

## 2017-01-23 NOTE — ECHO
Patient:      BERTHA SOLIS 

Med Rec#:     [id number]            :          1933          

Date:         2017            Age:          83y                 

Account#:     [id number]          Height:       183 cm / 72.0 in

Accession#:   [id number]           Weight:       80.7 kg / 177.9 lbs

Sex:          M                     BSA:          2.03

Room#:        [facility]                   

Admit Date#:  2017          

Type:         Inpatient

 

Referring:    Katie Sierra

Reading:      Félix Adam MD

Sonographer:  Brad Villafana RDCS

______________________________________________________________________

 

Transthoracic Echocardiogram

 

Indication:

AS

BP:           138/66

HR:           79

Rhythm:       NSR

 

Findings     

History:

PNEUMONIA 

 

Technical Comments:

The study quality is fair.  

 

Aortic Valve:

The aortic valve is trileaflet. There is trace to mild aortic

regurgitation. There is mild aortic stenosis. The mean gradient of the

aortic valve is 17 mmHg.  The peak instantaneous gradient of the aortic

valve is 29 mmHg.  The aortic valve area, by peak velocities, is

calculated at 1.4 cm2.  

 

Conclusions

The aortic valve is trileaflet.

There is trace to mild aortic regurgitation.

There is mild aortic stenosis.

The mean gradient of the aortic valve is 17 mmHg.

Peak velocity 2.04 m/sec

 

Measurements     

Name                    Value         Normal Range            

AV Vmax                 2.7 m/sec     -                        

AV VTI                  61 cm         -                        

AV peak gradient        29 mmHg       -                        

AV mean gradient        17 mmHg       -                        

LVOT diameter           2 cm          -                        

LVOT Vmax               0.31 m/sec    -                        

CHUCK (continuity Vmax)   1.4 cm2       -                        

CHUCK (continuity VTI)    1.4 cm2       -                        

 

Electronically signed by: Félix Adam MD on 2017 16:09:46

## 2017-01-23 NOTE — PN
Subjective


Date of Service: 01/23/17


Interval History: 





Patient seen and examined at bedside. He reports less headaches and states the 

acetaminophen helps. He denies minimal dizziness while here. He denies pain 

over the temporal arteries, scalp tenderness, jaw claudication, visual changes, 

fever/cough, chest pain, SOB, abd pain, n/v. He feels steadier and is hopeful 

for discharge.








Family History: Unchanged from Admission


Social History: Unchanged from Admission


Past Medical History: Unchanged from Admission





Objective


Active Medications: 








Acetaminophen (Tylenol Tab*)  650 mg PO Q4H PRN


   PRN Reason: FEVER/PAIN


   Last Admin: 01/22/17 16:22 Dose:  650 mg


Albuterol (Ventolin 2.5 Mg/3 Ml Neb.Sol*)  2.5 mg INH Q4H PRN


   PRN Reason: SOB/WHEEZING


Aspirin (Aspirin Low Dose Tab*)  81 mg PO DAILY On license of [facility]


   Last Admin: 01/23/17 10:14 Dose:  81 mg


Azithromycin (Zithromax Tab*)  500 mg PO DAILY On license of [facility]


   Stop: 01/26/17 09:01


   Last Admin: 01/23/17 10:15 Dose:  500 mg


Cefuroxime Axetil (Ceftin Tab(*))  500 mg PO BID On license of [facility]


   Last Admin: 01/23/17 10:15 Dose:  500 mg


Heparin Sodium (Porcine) (Heparin Vial(*))  5,000 units SUBCUT Q8HR On license of [facility]


   Last Admin: 01/23/17 12:58 Dose:  5,000 units


Losartan Potassium (Cozaar Tab*)  50 mg PO DAILY On license of [facility]


   Last Admin: 01/23/17 10:16 Dose:  50 mg


Multivitamins/Minerals (Theragran/Minerals Tab*)  1 tab PO DAILY On license of [facility]


   Last Admin: 01/23/17 10:17 Dose:  1 tab


Omeprazole (Prilosec Cap*)  40 mg PO BID@0730,1630 On license of [facility]


   Last Admin: 01/23/17 15:34 Dose:  40 mg


Prednisone (Deltasone Tab*)  10 mg PO BID On license of [facility]


   Last Admin: 01/23/17 10:17 Dose:  10 mg








 Vital Signs











  01/22/17 01/22/17 01/22/17





  19:44 20:00 23:12


 


Temperature 98.1 F  97.7 F


 


Pulse Rate 69  68


 


Respiratory 20 20 16





Rate   


 


Blood Pressure 129/51  123/68





(mmHg)   


 


O2 Sat by Pulse 96 96 94





Oximetry   














  01/23/17 01/23/17 01/23/17





  00:36 03:41 07:33


 


Temperature  97.3 F 97.9 F


 


Pulse Rate 55 56 54


 


Respiratory 20 16 20





Rate   


 


Blood Pressure  128/75 144/72





(mmHg)   


 


O2 Sat by Pulse 94 96 95





Oximetry   














  01/23/17 01/23/17





  08:00 15:07


 


Temperature  97.7 F


 


Pulse Rate  66


 


Respiratory 17 20





Rate  


 


Blood Pressure  143/78





(mmHg)  


 


O2 Sat by Pulse  94





Oximetry  











Oxygen Devices in Use Now: None


Appearance: Older male patient, lying in bed, in NAD


Eyes: PERRLA


Ears/Nose/Mouth/Throat: Clear Oropharnyx, Mucous Membranes Moist


Neck: NL Appearance and Movements; NL JVP


Respiratory: Symmetrical Chest Expansion and Respiratory Effort, Clear to 

Auscultation - RLL rales


Cardiovascular: NL Sounds; No Murmurs; No JVD, RRR - 1-2/6 systolic murmur


Abdominal: NL Sounds; No Tenderness; No Distention


Extremities: No Edema


Skin: No Rash or Ulcers


Neurological: Alert and Oriented x 3, NL Gait


Lines/Tubes/Other Access: Clean, Dry and Intact Peripheral IV


Nutrition: Taking PO's


Result Diagrams: 


 01/23/17 04:57





 01/21/17 05:44


Additional Lab and Data: 


 Lab Results











  01/20/17 01/20/17 01/20/17 Range/Units





  16:00 16:00 16:00 


 


WBC  27.2 H    (3.5-10.8)  10^3/ul


 


RBC  6.08 H    (4.0-5.4)  10^6/ul


 


Hgb  15.2    (14.0-18.0)  g/dl


 


Hct  47    (42-52)  %


 


MCV  77 L    (80-94)  fL


 


MCH  25 L    (27-31)  pg


 


MCHC  32    (31-36)  g/dl


 


RDW  18 H    (10.5-15)  %


 


Plt Count  389    (150-450)  10^3/ul


 


MPV  9    (7.4-10.4)  um3


 


Immature Gran % (Auto)  7    (0-9)  %


 


Neut % (Auto)  80.0    (38-83)  %


 


Lymph % (Auto)  5.5 L    (25-47)  %


 


Mono % (Auto)  13.1 H    (1-9)  %


 


Eos % (Auto)  0.7    (0-6)  %


 


Baso % (Auto)  0.7    (0-2)  %


 


Absolute Neuts (auto)  21.8 H    (1.5-7.7)  10^3/ul


 


Absolute Lymphs (auto)  1.5    (1.0-4.8)  10^3/ul


 


Absolute Monos (auto)  3.6 H    (0-0.8)  10^3/ul


 


Absolute Eos (auto)  0.2    (0-0.6)  10^3/ul


 


Absolute Basos (auto)  0.2    (0-0.2)  10^3/ul


 


Absolute Nucleated RBC  0    10^3/ul


 


Neutrophils %  81    (38-83)  %


 


Band Neutrophils %  3    (0-8)  %


 


Lymphocytes %  7 L    (25-47)  %


 


Monocytes %  5    (0-13)  %


 


Metamyelocytes %  3 H    (0-2)  %


 


Myelocytes %  1    (0-1)  %


 


Nucleated RBC %  0    


 


Normal RBC Morphology  Normal    (Normal)  


 


INR (Anticoag Therapy)   1.02   (0.89-1.11)  


 


APTT   26.8   (26.0-36.3)  seconds


 


Sodium     (133-145)  mmol/L


 


Potassium     (3.5-5.0)  mmol/L


 


Chloride     (101-111)  mmol/L


 


Carbon Dioxide     (22-32)  mmol/L


 


Anion Gap     (2-11)  mmol/L


 


BUN     (6-24)  mg/dL


 


Creatinine     (0.67-1.17)  mg/dL


 


Est GFR ( Amer)     (>60)  


 


Est GFR (Non-Af Amer)     (>60)  


 


BUN/Creatinine Ratio     (8-20)  


 


Glucose     ()  mg/dL


 


Lactic Acid     (0.5-2.0)  mmol/L


 


Calcium     (8.6-10.3)  mg/dL


 


Total Bilirubin     (0.2-1.0)  mg/dL


 


AST     (13-39)  U/L


 


ALT     (7-52)  U/L


 


Alkaline Phosphatase     ()  U/L


 


Troponin I     (<0.04)  ng/mL


 


Total Protein     (6.4-8.9)  g/dL


 


Albumin     (3.2-5.2)  g/dL


 


Globulin     (2-4)  g/dL


 


Albumin/Globulin Ratio     (1-3)  


 


Procalcitonin     (<0.6)  ng/mL


 


TSH     (0.34-5.60)  mcIU/mL


 


Urine Color    Yellow  


 


Urine Appearance    Clear  


 


Urine pH    6.0  (5-9)  


 


Ur Specific Gravity    1.014  (1.010-1.030)  


 


Urine Protein    Negative  (Negative)  


 


Urine Ketones    Negative  (Negative)  


 


Urine Blood    Negative  (Negative)  


 


Urine Nitrate    Negative  (Negative)  


 


Urine Bilirubin    Negative  (Negative)  


 


Urine Urobilinogen    Negative  (Negative)  


 


Ur Leukocyte Esterase    Negative  (Negative)  


 


Urine Glucose    Negative  (Negative)  














  01/20/17 01/20/17 01/20/17 Range/Units





  16:00 16:00 16:00 


 


WBC     (3.5-10.8)  10^3/ul


 


RBC     (4.0-5.4)  10^6/ul


 


Hgb     (14.0-18.0)  g/dl


 


Hct     (42-52)  %


 


MCV     (80-94)  fL


 


MCH     (27-31)  pg


 


MCHC     (31-36)  g/dl


 


RDW     (10.5-15)  %


 


Plt Count     (150-450)  10^3/ul


 


MPV     (7.4-10.4)  um3


 


Immature Gran % (Auto)     (0-9)  %


 


Neut % (Auto)     (38-83)  %


 


Lymph % (Auto)     (25-47)  %


 


Mono % (Auto)     (1-9)  %


 


Eos % (Auto)     (0-6)  %


 


Baso % (Auto)     (0-2)  %


 


Absolute Neuts (auto)     (1.5-7.7)  10^3/ul


 


Absolute Lymphs (auto)     (1.0-4.8)  10^3/ul


 


Absolute Monos (auto)     (0-0.8)  10^3/ul


 


Absolute Eos (auto)     (0-0.6)  10^3/ul


 


Absolute Basos (auto)     (0-0.2)  10^3/ul


 


Absolute Nucleated RBC     10^3/ul


 


Neutrophils %     (38-83)  %


 


Band Neutrophils %     (0-8)  %


 


Lymphocytes %     (25-47)  %


 


Monocytes %     (0-13)  %


 


Metamyelocytes %     (0-2)  %


 


Myelocytes %     (0-1)  %


 


Nucleated RBC %     


 


Normal RBC Morphology     (Normal)  


 


INR (Anticoag Therapy)     (0.89-1.11)  


 


APTT     (26.0-36.3)  seconds


 


Sodium  132 L    (133-145)  mmol/L


 


Potassium  3.7    (3.5-5.0)  mmol/L


 


Chloride  98 L    (101-111)  mmol/L


 


Carbon Dioxide  26    (22-32)  mmol/L


 


Anion Gap  8    (2-11)  mmol/L


 


BUN  21    (6-24)  mg/dL


 


Creatinine  1.09    (0.67-1.17)  mg/dL


 


Est GFR ( Amer)  83.1    (>60)  


 


Est GFR (Non-Af Amer)  64.6    (>60)  


 


BUN/Creatinine Ratio  19.3    (8-20)  


 


Glucose  91    ()  mg/dL


 


Lactic Acid   1.2   (0.5-2.0)  mmol/L


 


Calcium  9.8    (8.6-10.3)  mg/dL


 


Total Bilirubin  0.70    (0.2-1.0)  mg/dL


 


AST  20    (13-39)  U/L


 


ALT  16    (7-52)  U/L


 


Alkaline Phosphatase  64    ()  U/L


 


Troponin I  0.03    (<0.04)  ng/mL


 


Total Protein  7.8    (6.4-8.9)  g/dL


 


Albumin  3.9    (3.2-5.2)  g/dL


 


Globulin  3.9    (2-4)  g/dL


 


Albumin/Globulin Ratio  1.0    (1-3)  


 


Procalcitonin    0.1  (<0.6)  ng/mL


 


TSH  2.87    (0.34-5.60)  mcIU/mL


 


Urine Color     


 


Urine Appearance     


 


Urine pH     (5-9)  


 


Ur Specific Gravity     (1.010-1.030)  


 


Urine Protein     (Negative)  


 


Urine Ketones     (Negative)  


 


Urine Blood     (Negative)  


 


Urine Nitrate     (Negative)  


 


Urine Bilirubin     (Negative)  


 


Urine Urobilinogen     (Negative)  


 


Ur Leukocyte Esterase     (Negative)  


 


Urine Glucose     (Negative)  











Microbiology and Other Data: 


 Microbiology











 01/21/17 00:40 Legionella Urinary Antigen - Final





 Urine    Negative Legionella





 Streptococcus pneumoniae Ag Screen - Final





    Negative S. pneumo Antigen














Assess/Plan/Problems-Billing


Assessment: 





Mr. Johnson is an 84 yo male with a PMH of HTN, GERD, left hernia, and Nissen 

fundoplication who presented to the ED on 1/20/17 with weakness and falls 

secondary to dizziness.





- Patient Problems


(1) Community acquired pneumonia


Code(s): J18.9 - PNEUMONIA, UNSPECIFIED ORGANISM   Comment: 


WBC trending down, procalcitonin negative, afebrile


CXR shows right bronchopneumonia


Switch to PO ceftin, azithromycin


Urine legionella and strep pneumo antigens negative   





(2) Falls


Comment: 


Unclear etiology, pt reports headache and dizziness that has increased since 

weaning down his prednisone


I spoke with Dr. Bejarano, who states she was treating him with prednisone for 

suspected PMR


Pt denies jaw claudication, scalp tenderness, temporal artery pain


ESR, orthostatic VS negative


Echocardiogram shows normal LV function with EF 60-65%, mild AR, unable to 

evaluate AS. Repeat echo show mild aortic stenosis.


Telemetry WNL


CT brain negative for acute processes   





(3) Hematologic abnormality


Code(s): R79.9 - ABNORMAL FINDING OF BLOOD CHEMISTRY, UNSPECIFIED   Comment: 


Suspect it may be secondary to acute illness


Improving CBC, FISH BCR abl pending


Recommend outpatient CBC on Friday to monitor for resolution of bandemia   





(4) HTN (hypertension)


Code(s): I10 - ESSENTIAL (PRIMARY) HYPERTENSION   Comment: 


Normotensive, continue losartan   





(5) GERD (gastroesophageal reflux disease)


Code(s): K21.9 - GASTRO-ESOPHAGEAL REFLUX DISEASE WITHOUT ESOPHAGITIS   Comment

: 


Stable, continue omeprazole.   





(6) Chronic use of steroids


Code(s): QTU6748 -    Comment: 


Continue prednisone at 10 mg.


Patient being treated for suspected PMR


Patient states he takes prednisone for chronic muscle pain and inflammation


CT brain negative for acute processes, ESR WNL.   





(7) DVT prophylaxis


Comment: 


SQ heparin   


Status and Disposition: 





Inpatient admission. D/c to home.

## 2017-01-24 NOTE — DS
MEDICINE DISCHARGE SUMMARY:

 

DATE OF ADMISSION:  01/20/17

 

DATE OF DISCHARGE:  01/23/17

 

PRIMARY CARE PHYSICIAN:  Dr. Keegan Bejarano

 

PROVIDER:  Lucien Sanchez NP

 

ATTENDING PHYSICIAN:  Ken Fuller MD *(as dictated by Lucien Sanchez NP)

 

PRIMARY DISCHARGE DIAGNOSES:

1.  Community-acquired pneumonia.

2.  Dizziness and fall.

 

SECONDARY DISCHARGE DIAGNOSES:

1.  Hypertension.

2.  Gastroesophageal reflux disease.

3.  History of left hernia.

4.  History of laparoscopic Nissen fundoplication in 2001.

5.  History of hip ORIF in 2007.

6.  Concern of polymyalgia rheumatica, on prednisone therapy, now being tapered 
by PCP.

 

MEDICATIONS AT DISCHARGE:

1.  Calcium with vitamin D supplement 1 tab daily.

2.  Aspirin 81 mg daily.

3.  Prednisone 10 mg b.i.d.

4.  Multivitamin 1 tab daily.

5.  Omeprazole 40 mg b.i.d.

6.  Hyzaar 50/12.5 mg 1 tab daily.

7.  Ceftin 500 mg b.i.d.  This is a new medication.

8.  Azithromycin 500 mg daily.  This is a new medication.

9.  Cefuroxime and azithromycin prescribed to finish a 7-day course of 
antibiotics.

 

DIAGNOSTIC TESTING DONE DURING THIS ADMISSION:  Chest x-ray on 01/20/17 shows a 
constellation of findings suspicious for bronchial pneumonia.  CT brain on 01/20
/17 shows no evidence for gross acute infarcts, mass effects, or hemorrhage.  
Findings consistent with mild chronic small vessel ischemic changes.  
Transthoracic echocardiogram.  Conclusions:  The left ventricular chamber size 
is normal.  Mild concentric left ventricular hypertrophy is observed.  There is 
normal left ventricular systolic function with an estimated ejection fraction 
of 60% to 65%. The left atrium is mildly dilated.  There is mild aortic 
regurgitation.  Study is nondiagnostic to evaluate for aortic stenosis.  Unable 
to estimate the right ventricular systolic pressure.  This was compared to 
prior studies from October 2015 and the study is nondiagnostic to evaluate the 
severity of the aortic stenosis.  A repeat echo was done to focus on the aortic 
valve.  Conclusions:  The aortic valve is trileaflet.  There is trace-to-mild 
aortic regurgitation.  There is mild aortic stenosis.  The mean gradient of the 
aortic valve is 17 mmHg.  Peak velocity is 2.04 m/sec.

 

HOSPITAL COURSE OF STAY:  For full details, please refer to the H and P 
provided by Janine Sena NP, on 01/20/17.  In summary, Mr. Johnson is an 83-
year-old man who presented to the ED with reports of falls and weakness.  The 
patient was evaluated in the outpatient setting by his PCP and was sent to the 
ED for further evaluation. As a suspicion for pneumonia, the patient had a 
chest x-ray that was suspicious for bronchial pneumonia and was noted to be 
slightly tachycardic.  He also had an elevated white blood cell count of 27,
000.  It was noted on admission that the patient was on prednisone, but he was 
unable to tell the admitting provider as to why he was on prednisone.  Later on
, he states that it was for generalized aches and pains and this did resolve 
with prednisone.  He is currently being tapered off of his prednisone.  Per 
information, he continued to express concerns for headaches and dizziness.  He 
had dizziness at home that resulted in falls, although he could find no 
pattern.  He said it will happen when he is sitting or when he was walking and 
does feel dizzy.  Here in the hospital, the patient had Physical Therapy 
working with him and the patient was advised to continue to work with the PT.  
He did improve over his course of stay.  He was able to get on the stairs and 
safely ambulate.  The patient declined home services at this time, but I do 
recommend that he continue to follow up with his PCP as he may benefit from an 
outpatient or home physical therapy in the future.  In terms of his headaches 
and dizziness, the patient states that the headaches are mostly frontal and 
inconsistent in timing and severity.  They did resolve with Tylenol.  He denied 
any scalp tenderness, temporal pain, jaw claudication, hearing changes, or 
visual changes.  The patient states the headaches often happen in the morning.  
The patient had no falls or episodes of dizziness here in the hospital of 
concern.  Prior to discharge, he was able to ambulate safely in his room and 
was seen by the nursing and ancillary staff.  He had no increased oxygen 
requirements and has been afebrile.  His while blood cell count has improved 
during the course of his stay; however, the patient does still have some 
bandemia.  I did recommend that he have a followup CBC on Friday, 01/27/17, 
prior to his followup appointment with his PCP in order to monitor the 
resolution of the bandemia and his white blood cell count as well as the patient
's ESR is also negative when checked.

 

At the time discharge, the patient is alert, oriented.  His vital signs are 
stable with most recent vital signs posted with a temperature of 97.7, heart 
rate 66, respiratory rate 20, blood pressure 143/78, and O2 saturation 95% on 
room air.  The patient was without complaint and felt well and safe enough to 
go home.

 

CONCERNS AT DISCHARGE:  The patient will be discharged to home on 01/23/17 with 
a plan to followup with his PCP next week on Monday, 01/31/17.

 

DIET:  Heart-healthy diet.

 

ACTIVITY:  As tolerated.  The patient is advised to move slowly and carefully.

 

CONDITION:  Improved, stable.

 

DISPOSITION:  To home.

 

TIME SPENT:  Time spent on this discharge was approximately 45 minutes.

 

Again, this is only a brief summary of the patient's hospital course of stay.  
For full details, please refer to the full medical record.  If you have any 
further questions, please feel free to contact me at [phone].

 

____________________________________ LUCIEN SANCHEZ NP



CC:  Dr. Keegan Bejarano*

 

[id number]/CPS #: [id number]

LAURA

## 2017-06-09 NOTE — RAD
HISTORY: Weakness



COMPARISONS: January 20, 2017



TECHNIQUE: Multiple contiguous axial CT scans were obtained of the head without 

intravenous contrast. 



FINDINGS: 

HEMORRHAGE/INFARCT: There is no hemorrhage or acute infarct.

MASSES/SHIFT: There is no mass or shift.



EXTRA-AXIAL SPACES: There are no extra-axial fluid collections.

SULCI AND VENTRICLES: The sulci and ventricles are normal in size and position for the

patient's stated age.



CEREBRUM: There is hypoattenuation of the periventricular and subcortical white matter.

BRAINSTEM: There are no focal parenchymal abnormalities.

CEREBELLUM: There are no focal parenchymal abnormalities.



VESSELS: The vessels are grossly normal.

PARANASAL SINUSES: The paranasal sinuses are clear.

ORBITS: The orbits are unremarkable.

BONES AND SOFT TISSUE: No bone or soft tissue abnormalities are noted.



OTHER: None



IMPRESSION: 

NO ACUTE INTRACRANIAL PATHOLOGY.

CHRONIC SMALL VESSEL ISCHEMIC CHANGES.

## 2017-06-09 NOTE — RAD
INDICATION: Weakness



COMPARISON: Similar chest x-ray February 21, 2017



TECHNIQUE: PA and lateral views of the chest were obtained.



FINDINGS:



Similar the previous chest x-ray there is a mild degree of cardiomegaly. There is coarse

calcification overlying the arch of the aorta.



The pulmonary vasculature is slightly indistinct relative to the previous chest x-ray.

There is density overlying the lateral most aspect of the left lung base.



Visualized bones are normal for the patient's age.



There is no radiographic evidence of free air beneath the diaphragm



IMPRESSION: 

THERE IS POSSIBLY A SMALL AMOUNT OF ATELECTASIS AT THE LATERAL LEFT LUNG BASE BUT NO

DEFINITE ACUTE CARDIOPULMONARY ABNORMALITIES.

## 2017-06-09 NOTE — HP
HISTORY AND PHYSICAL:*

 

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  Today he presents with generalized weakness.
  His C-reactive protein is slightly elevated at 56.  There is a question of 
atelectasis was noted on his x-ray.  The patient is also hyponatremic with mild 
leukocytosis.  He is going to be admitted with a working diagnosis of possible 
pneumonia.  He is going to be placed on intravenous hydration.  I suspect that 
at least part of patient's presentation is to dehydration and this and age of 84
, he probably should not be working in hot weather for several hours without 
stopping, without air conditioning.

 

For further details of the patient's presentation and plan, please see history 
and physical dictated by Bobby Nunes on 06/09/17, with which I agree.

 

 

 

[id number]/CPS #: [id number]

MTDD

## 2017-06-09 NOTE — HP
*** ATTENDING PHYSICIAN'S ADDENDUM NOW INCLUDED ON THIS REPORT ***



CC:  Dr. Bejarano *

 

HISTORY AND PHYSICAL:

 

DATE OF ADMISSION:  06/09/17

 

PRIMARY CARE PROVIDER:  Dr. Bejarano.

 

ATTENDING PHYSICIAN:  Dr. Ro Nathan* (report being dictated by Bobby Nunes NP)

 

CHIEF COMPLAINT:  Weakness.

 

HISTORY OF PRESENT ILLNESS:  Mr. Johnson is an 84-year-old male patient.  He 
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 
day.  By the time he got off the tractor, he felt dizzy, weak.  He made it to 
home, but throughout the night, he was having difficulty.  He was so weak that 
he tried getting up to go to the bathroom but he could not make it and actually 
lost control of his bowels in the hallway.  He denied having any chest pain or 
shortness of breath.  He does state that he has a headache.  He feels like he 
is stuffed up.  He states he had a fever this morning.  He was in touch with 
his primary.  They were concerned because of the weakness and he came into the 
hospital. He denied having any chest pain or any shortness of breath. He states 
he did not have any episode of syncope.  He states he felt nauseous yesterday, 
but no vomiting or diarrhea, and he also stated that he did not have any change 
in his medications.  He states to his knowledge he thinks he drank enough 
fluids yesterday.  His wife was concerned because he was acting very similar to 
his previous episode of pneumonia earlier this year, so they came into the 
hospital.

 

PAST MEDICAL HISTORY:  Significant for:

1.  Hypertension.

2.  GERD.

3.  Hiatal hernia.

4.  Question of PMR, although he states he is not currently taking anymore 
steroids.

 

PAST SURGICAL HISTORY:

1.  He has had a hip ORIF.

2.  He has had a laparoscopic Nissen fundoplication.

3.  He has had a hernia repair.

 

HOME MEDICATIONS:  Include:

1.  Omeprazole 40 mg p.o. b.i.d.

2.  Multivitamin 1 tablet daily.

3.  Losartan/hydrochlorothiazide 1 tablet p.o. daily.

4.  Calcium with vitamin D 1 tablet p.o. daily.

5.  Aspirin 81 mg daily.

 

ALLERGIES TO MEDICATIONS:  No known drug allergies.

 

FAMILY HISTORY:  His mother had a history of alcohol and father had a history 
of brain aneurysm.

 

SOCIAL HISTORY:  He is a former smoker.  He does not drink alcohol.  Surrogate 
decision maker is his wife.

 

REVIEW OF SYSTEMS:  There is no documented fever here.  He does admit to having 
one at home.  He denied having any significant weight change.  There was no 
double vision.  He denies having any ear discharge.  He denied having any 
rhinorrhea.  No sore throat.  No thyroid enlargement.  Denied having any chest 
pain.  There was no orthopnea.  No current dyspnea.  There was no abdominal 
pain.  There was nausea, but no vomiting.  No dysuria.  No frequency.  There 
was no loss of consciousness. No seizures.  No pruritus and no skin 
ulcerations.  Review of 14 systems completed, all others negative.

 

                               PHYSICAL EXAMINATION

 

GENERAL:  At this time, Mr. Johnson is an 84-year-old male patient who appears to 
be well nourished, well developed.  He does not appear to be in any acute 
distress.

 

VITAL SIGNS:  Blood pressure 133/77, pulse 74, respirations 20, O2 sat 99%, and 
temperature 98.1.

 

HEENT:  Head is atraumatic, normocephalic.  Eyes, EOMs are intact.  Sclerae 
anicteric and not pale.  Throat, oral mucosa appears to be dry.  No 
oropharyngeal erythema.

 

NECK:  Supple.

 

LUNGS:  Clear to auscultation bilaterally.  He was diminished in the bases.  No 
wheezes, rales, or rhonchi.

 

HEART:  Heart sounds S1 and S2.  Regular rate and rhythm.  No murmurs, rubs, or 
gallops were appreciated.

 

ABDOMEN:  Soft.  It was flat, nontender.  Bowel sounds were present.

 

EXTREMITIES:  Pulses were 2+ throughout.  He was able to move all 4 extremities
, 5/5 strength.

 

SKIN:  Intact.

 

NEUROLOGIC:  He is awake.  He is alert.  He is oriented x3.  His tongue was 
midline.   were equal.  No gross focal deficits.

 

 LABORATORY DATA:  Labs today revealed a WBC of 14.6, RBC of 6.63, hemoglobin 
of 15.4, hematocrit of 48, platelet count of 419.  Sodium 127, potassium 3.9, 
chloride 95, bicarb of 25, BUN 14, creatinine 1.16, glucose 106, lactate 0.8, 
calcium 9.5, mag 1.8, total bili 0.9.  AST 20, ALT 12, alk phos 104, CK 68, 
troponin 0.01, albumin of 3.8.  Urine showed 1+ blood, 1+ rbc.

 

He had a chest x-ray obtained today, Radiology read as possibility of small 
amount of atelectasis in the lateral left lung base but no definite or acute 
cardiopulmonary abnormalities.  He had an EKG, which showed a normal sinus 
rhythm with a rate of 61.  No ST elevations or Q-wave inversions.  He had a 
brain CT obtained today as well, which showed no acute intracranial pathology, 
chronic small vessel ischemic changes.

 

Old medical records were reviewed.

 

ASSESSMENT AND PLAN:  Mr. Johnson is an 84-year-old male patient coming into the 
ER today with complaints of having weakness.  On evaluation, there was 
atelectasis noted on the left lateral lung field on chest x-ray and there was 
concern because of the weakness and elevated white count.  We were asked to 
evaluate for admission. He will be admitted under observation status.

 

1.  At this point, I am concerned that he may have underlying pneumonia again.  
The wife said that he presented similarly this way in the past and there is 
questionable atelectasis.  He has been very weak.  He had a fever reported at 
home. I think to be on the safe side I am going to put him on azithromycin and 
Rocephin. I will check legionella and Strep pneumo antigens.  I will go ahead 
and continue to follow him closely.  I have ordered a PT evaluation.  We will 
hydrate him.

2.  Hyponatremia, probably secondary to the hydrochlorothiazide that is in his 
blood pressure medication.  I will go ahead and hydrate him and repeat his BMP 
in the morning.  If it is still low, we may need to send off further labs.

3.  Hypertension.  Continue his losartan.

4.  Gastroesophageal reflux disease and hiatal hernia.  Continue PPI therapy.

5.  History of polymyalgia rheumatica.  The patient states he is no longer 
taking any prednisone.  We will hold off on this.

6.  DVT prophylaxis:  He is high risk.  He will be placed on heparin subcu.

7.  Code status:  Full code.

8.  Fluid, electrolyte, nutrition:  He can have a regular diet.

 

TIME SPENT:  Time spent on the admission is approximately 60 minutes, greater 
than half the time spent face-to-face with the patient obtaining my history and 
physical, other half time spent going over the plan of care with the patient 
and implementing the plan of care.  I discussed the plan of care with my 
attending, Dr. Nathan; she is in agreement.

 

 ____________________________________ BOBBY NUNES NP



ADDENDUM: 



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  Today he presents with generalized weakness.
  His C-reactive protein is slightly elevated at 56.  There is a question of 
atelectasis was noted on his x-ray.  The patient is also hyponatremic with mild 
leukocytosis.  He is going to be admitted with a working diagnosis of possible 
pneumonia.  He is going to be placed on intravenous hydration.  I suspect that 
at least part of patient's presentation is to dehydration and this and age of 84
, he probably should not be working in hot weather for several hours without 
stopping, without air conditioning.

 

For further details of the patient's presentation and plan, please see history 
and physical dictated by Bobby Nunes on 06/09/17, with which I agree.

 

__________________________________  RO NATHAN MD



 

 [id number] #: [id number]

[id number] #: [id number]

LAURA

## 2017-06-09 NOTE — ED
Harvey CASAS Auryana, scribed for Alcon Villatoro MD on 06/09/17 at 0755 .





Neurological HPI





- HPI Summary


HPI Summary: 





84 year old male presents with dizziness and general weakness starting 

yesterday afternoon. Wife states that he has felt unsteady for the last day. 

Patient also reports blurred vision but denies any HA, cough, CP, SOB, or 

palpitations. Patient reports fever but none on arrival vitals. Per wife - 

patient has had 1 episode prior - reports that he ended up here after this 

episode. Wife reports that he is at baseline. 





- History of Current Complaint


Chief Complaint: EDWeakness


Stated Complaint: GENERAL ILLNESS


Time Seen by Provider: 06/09/17 07:42


Last Known Well Date: 6/8/17


Hx Obtained From: Patient


Onset/Duration: Gradual Onset, Started days ago - yesterday afternoon


Timing: Constant


Onset Severity: Mild


Current Severity: Mild


Pain Intensity: 0


Pain Scale Used: 0-10 Numeric


Character: Dizzy, Visual Changes - blurred vision, Other: - general weakness


Associated Signs and Symptoms: Positive: Unsteady Gait - per wife, Visual 

Changes - blurred vision, Weakness - general, Dizziness.  Negative: Headache, 

Fever - patient reports fever but none on arrival, Chest Pain, Shortness of 

Breath, Palpitations


Similar Episode/Dx as: YES SEE HPI





- Additional Pertinent History


Primary Care Physician: [id number]





- Allergy/Home Medications


Allergies/Adverse Reactions: 


 Allergies











Allergy/AdvReac Type Severity Reaction Status Date / Time


 


No Known Allergies Allergy   Verified 11/20/12 08:16











Home Medications: 


 Home Medications





Aspirin Low Dose CHEW TAB* [Aspirin Low Dose TAB*] 81 mg PO DAILY 06/09/17 [

History Confirmed 06/09/17]


Losartan Potassium & Hydrochlo [Hyzaar 50/12.5 mg] 1 tab PO DAILY 06/09/17 [

History Confirmed 06/09/17]











PMH/Surg Hx/FS Hx/Imm Hx


Cardiovascular History: Reports: Hx Hypertension - TAKING MEDICATION


GI History: Reports: Hx Gastroesophageal Reflux Disease - HAD SURGERY NOT SURE 

WHAT WAS DONE. ON NEXIUM.


Musculoskeletal History: Reports: Hx Arthritis


Sensory History: Reports: Hx Contacts or Glasses - GLASSES, Hx Hearing Aid


Opthamlomology History: Reports: Hx Contacts or Glasses - GLASSES





- Surgical History


Surgery Procedure, Year, and Place: 2001 LAP NISSEN FUNDOPLICATION [facility].  2001 L 

HIP FRACTURE [facility].  6/10 RIGHT INGUINAL HERNAI REPAIR


Hx Anesthesia Reactions: No


Infectious Disease History: No


Infectious Disease History: 


   Denies: Traveled Outside the [place] in Last 30 Days





- Family History


Known Family History: Positive: Other - aneurysm





- Social History


Occupation: Employed Full-time - Farmer


Lives: With Family - WIFE


Alcohol Use: None


Hx Substance Use: No


Substance Use Type: Reports: None


Smoking Status (MU): Former Smoker





Review of Systems


Positive: Other - general weakness, dizziness.  Negative: Fever


Positive: Blurred Vision


ENT: Negative


Cardiovascular: Negative


Negative: Palpitations, Chest Pain


Respiratory: Negative


Negative: Shortness Of Breath


Gastrointestinal: Negative


Genitourinary: Negative


Musculoskeletal: Negative


Skin: Negative


Negative: Headache


Psychological: Normal


All Other Systems Reviewed And Are Negative: Yes





Physical Exam





- Summary


Physical Exam Summary: 





VITAL SIGNS: Reviewed. 


GENERAL: Patient is a well-developed, nourished, elderly male who is lying 

comfortable in the stretcher. Patient is not in any acute respiratory distress. 


HEAD AND FACE: No signs of trauma. ~No ecchymosis, hematomas or skull 

depressions. No sinus tenderness. 


EYES: PERRLA, EOMI x 2, No injected conjunctiva, no nystagmus. No photophobia.


EARS: Hearing grossly intact. Ear canals and tympanic membranes are within 

normal limits. 


MOUTH: Oropharynx within normal limits. 


NECK: Supple, trachea is midline, no adenopathy, no JVD, no carotid bruit, no c-

spine tenderness, neck with full ROM. No meningeal signs, no Kernig's or 

brudzinskis signs. 


CHEST: Symmetric, no tenderness at palpation 


LUNGS: Clear to auscultation bilaterally. No wheezing or crackles.


CVS: Regular rate and rhythm, S1 and S2 present, no murmurs or gallops 

appreciated. 


ABDOMEN: Soft, non-tender. No signs of distention. No rebound no guarding, and 

no masses palpated. Bowel sounds are normal. 


EXTREMITIES: FROM in all major joints, no edema, no cyanosis or clubbing.


NEURO: Alert and oriented x 3. No acute neurological deficits. Speech is normal 

and follows commands. 


SKIN: Dry and warm


Triage Information Reviewed: Yes


Vital Signs On Initial Exam: 


 Initial Vitals











Temp Pulse Resp BP Pulse Ox


 


 97.7 F   68   20   124/54   96 


 


 06/09/17 06:51  06/09/17 06:51  06/09/17 06:51  06/09/17 06:51  06/09/17 06:51











Vital Signs Reviewed: Yes





- Teresa Coma Scale


Coma Scale Total: 15





Diagnostics





- Vital Signs


 Vital Signs











  Temp Pulse Resp BP Pulse Ox


 


 06/09/17 06:53  97.7 F  68  20  124/54  96


 


 06/09/17 06:51  97.7 F  68  20  124/54  96














- Laboratory


Lab Results: 


 Lab Results











  06/09/17 06/09/17 06/09/17 Range/Units





  07:55 07:55 07:55 


 


WBC  14.6 H    (3.5-10.8)  10^3/ul


 


RBC  6.63 H    (4.0-5.4)  10^6/ul


 


Hgb  15.4    (14.0-18.0)  g/dl


 


Hct  48    (42-52)  %


 


MCV  73 L    (80-94)  fL


 


MCH  23 L    (27-31)  pg


 


MCHC  32    (31-36)  g/dl


 


RDW  17 H    (10.5-15)  %


 


Plt Count  419    (150-450)  10^3/ul


 


MPV  8    (7.4-10.4)  um3


 


Neut % (Auto)  77.4    (38-83)  %


 


Lymph % (Auto)  3.7 L    (25-47)  %


 


Mono % (Auto)  16.5 H    (1-9)  %


 


Eos % (Auto)  1.5    (0-6)  %


 


Baso % (Auto)  0.9    (0-2)  %


 


Absolute Neuts (auto)  11.3 H    (1.5-7.7)  10^3/ul


 


Absolute Lymphs (auto)  0.5 L    (1.0-4.8)  10^3/ul


 


Absolute Monos (auto)  2.4 H    (0-0.8)  10^3/ul


 


Absolute Eos (auto)  0.2    (0-0.6)  10^3/ul


 


Absolute Basos (auto)  0.1    (0-0.2)  10^3/ul


 


Absolute Nucleated RBC  0.01    10^3/ul


 


Nucleated RBC %  0.1    


 


Sodium   127 L   (133-145)  mmol/L


 


Potassium   3.9   (3.5-5.0)  mmol/L


 


Chloride   95 L   (101-111)  mmol/L


 


Carbon Dioxide   25   (22-32)  mmol/L


 


Anion Gap   7   (2-11)  mmol/L


 


BUN   14   (6-24)  mg/dL


 


Creatinine   1.16   (0.67-1.17)  mg/dL


 


Est GFR ( Amer)   77.1   (>60)  


 


Est GFR (Non-Af Amer)   60.0   (>60)  


 


BUN/Creatinine Ratio   12.1   (8-20)  


 


Glucose   106 H   ()  mg/dL


 


Lactic Acid    0.8  (0.5-2.0)  mmol/L


 


Calcium   9.5   (8.6-10.3)  mg/dL


 


Magnesium   1.8 L   (1.9-2.7)  mg/dL


 


Total Bilirubin   0.90   (0.2-1.0)  mg/dL


 


AST   22   (13-39)  U/L


 


ALT   12   (7-52)  U/L


 


Alkaline Phosphatase   104   ()  U/L


 


Total Creatine Kinase   68   ()  U/L


 


Troponin I   0.01   (<0.04)  ng/mL


 


C-Reactive Protein   56.34 H   (< 5.00)  mg/L


 


B-Natriuretic Peptide    ( - 100) pg/mL


 


Total Protein   8.4   (6.4-8.9)  g/dL


 


Albumin   3.8   (3.2-5.2)  g/dL


 


Globulin   4.6 H   (2-4)  g/dL


 


Albumin/Globulin Ratio   0.8 L   (1-3)  


 


TSH   1.83   (0.34-5.60)  mcIU/mL


 


Urine Color     


 


Urine Appearance     


 


Urine pH     (5-9)  


 


Ur Specific Gravity     (1.010-1.030)  


 


Urine Protein     (Negative)  


 


Urine Ketones     (Negative)  


 


Urine Blood     (Negative)  


 


Urine Nitrate     (Negative)  


 


Urine Bilirubin     (Negative)  


 


Urine Urobilinogen     (Negative)  


 


Ur Leukocyte Esterase     (Negative)  


 


Urine WBC (Auto)     (Absent)  


 


Urine RBC (Auto)     (Absent)  


 


Urine Bacteria     (Absent)  


 


Urine Glucose     (Negative)  














  06/09/17 06/09/17 Range/Units





  07:55 10:55 


 


WBC    (3.5-10.8)  10^3/ul


 


RBC    (4.0-5.4)  10^6/ul


 


Hgb    (14.0-18.0)  g/dl


 


Hct    (42-52)  %


 


MCV    (80-94)  fL


 


MCH    (27-31)  pg


 


MCHC    (31-36)  g/dl


 


RDW    (10.5-15)  %


 


Plt Count    (150-450)  10^3/ul


 


MPV    (7.4-10.4)  um3


 


Neut % (Auto)    (38-83)  %


 


Lymph % (Auto)    (25-47)  %


 


Mono % (Auto)    (1-9)  %


 


Eos % (Auto)    (0-6)  %


 


Baso % (Auto)    (0-2)  %


 


Absolute Neuts (auto)    (1.5-7.7)  10^3/ul


 


Absolute Lymphs (auto)    (1.0-4.8)  10^3/ul


 


Absolute Monos (auto)    (0-0.8)  10^3/ul


 


Absolute Eos (auto)    (0-0.6)  10^3/ul


 


Absolute Basos (auto)    (0-0.2)  10^3/ul


 


Absolute Nucleated RBC    10^3/ul


 


Nucleated RBC %    


 


Sodium    (133-145)  mmol/L


 


Potassium    (3.5-5.0)  mmol/L


 


Chloride    (101-111)  mmol/L


 


Carbon Dioxide    (22-32)  mmol/L


 


Anion Gap    (2-11)  mmol/L


 


BUN    (6-24)  mg/dL


 


Creatinine    (0.67-1.17)  mg/dL


 


Est GFR ( Amer)    (>60)  


 


Est GFR (Non-Af Amer)    (>60)  


 


BUN/Creatinine Ratio    (8-20)  


 


Glucose    ()  mg/dL


 


Lactic Acid    (0.5-2.0)  mmol/L


 


Calcium    (8.6-10.3)  mg/dL


 


Magnesium    (1.9-2.7)  mg/dL


 


Total Bilirubin    (0.2-1.0)  mg/dL


 


AST    (13-39)  U/L


 


ALT    (7-52)  U/L


 


Alkaline Phosphatase    ()  U/L


 


Total Creatine Kinase    ()  U/L


 


Troponin I    (<0.04)  ng/mL


 


C-Reactive Protein    (< 5.00)  mg/L


 


B-Natriuretic Peptide  82  ( - 100) pg/mL


 


Total Protein    (6.4-8.9)  g/dL


 


Albumin    (3.2-5.2)  g/dL


 


Globulin    (2-4)  g/dL


 


Albumin/Globulin Ratio    (1-3)  


 


TSH    (0.34-5.60)  mcIU/mL


 


Urine Color   Yellow  


 


Urine Appearance   Clear  


 


Urine pH   6.0  (5-9)  


 


Ur Specific Gravity   1.013  (1.010-1.030)  


 


Urine Protein   Negative  (Negative)  


 


Urine Ketones   Negative  (Negative)  


 


Urine Blood   1+ H  (Negative)  


 


Urine Nitrate   Negative  (Negative)  


 


Urine Bilirubin   Negative  (Negative)  


 


Urine Urobilinogen   Negative  (Negative)  


 


Ur Leukocyte Esterase   Negative  (Negative)  


 


Urine WBC (Auto)   Absent  (Absent)  


 


Urine RBC (Auto)   1+(3-5/hpf) H  (Absent)  


 


Urine Bacteria   Absent  (Absent)  


 


Urine Glucose   Negative  (Negative)  











Result Diagrams: 


 06/09/17 07:55





 06/09/17 07:55


Lab Statement: Any lab studies that have been ordered have been reviewed, and 

results considered in the medical decision making process.





- Radiology


  ** CXR


Xray Interpretation: Positive (See Comments) - IMPRESSION:  THERE IS POSSIBLY A 

SMALL AMOUNT OF ATELECTASIS AT THE LATERAL LEFT LUNG BASE BUT NO DEFINITE ACUTE 

CARDIOPULMONARY ABNORMALITIES.


Radiology Interpretation Completed By: Radiologist





- CT


  ** BRAIN


CT Interpretation: No Acute Changes - IMPRESSION:  NO ACUTE INTRACRANIAL 

PATHOLOGY. CHRONIC SMALL VESSEL ISCHEMIC CHANGES.


CT Interpretation Completed By: Radiologist





- EKG


  ** 08:02


EKG Interpretation: NSR @61 BPM, no ST elevation





Course/Dx





- Course


Course Of Treatment: 84 year old male presents with dizziness and general 

weakness starting yesterday afternoon. Wife states that he has felt unsteady 

for the last day. Patient also reports blurred vision but denies any HA, cough, 

CP, SOB, or palpitations. Patient reports fever but none on arrival vitals. Per 

wife - patient has had 1 episode prior - reports that he ended up here after 

this episode. Wife reports that he is at baseline.


Assessment/Plan: Test results WNL except WBC 14.6 without bands, Sodium 127, 

glucose 106, Mg 1.8 - given Mg PO, CRP 56. UA- negative for U.T.I.  CXR 

IMPRESSION-IMPRESSION:  THERE IS POSSIBLY A SMALL AMOUNT OF ATELECTASIS AT THE 

LATERAL LEFT LUNG BASE BUT NO DEFINITE ACUTE CARDIOPULMONARY ABNORMALITIES.  CT 

BRAIN IMPRESSION-NO ACUTE INTRACRANIAL PATHOLOGY. CHRONIC SMALL VESSEL ISCHEMIC 

CHANGES.  EKG - NSR @61 BPM, no ST elevation.  Patient is still weak and has 

difficulty getting up from bed. Patient is occasionally confused - possibly 

secondary to hypernatremia. I discussed my physical exam and findings with Dr. Nathan who accepts the patient for admission. Patient is hemodynamically stable 

and A&O x3.





- Differential Dx


Differential Diagnoses Neuro: Positive: Cerebrovascular Accident, Medication 

Reaction, Seizure Disorder, Transient Ischemic Attack





- Diagnoses


Provider Diagnoses: 


 Weakness, Confusion








- Physician Notifications


Discussed Care Of Patient With: Ro Nathan - agrees to admit


Time Discussed With Above Provider: 10:50





Discharge





- Discharge Plan


Condition: Stable


Disposition: ADMITTED TO [facility]





The documentation as recorded by the Harvey leos Auryana accurately 

reflects the service I personally performed and the decisions made by me, Alcon Villatoro MD.

## 2017-06-10 NOTE — PN
Subjective


Date of Service: 06/10/17


Interval History: 





Patient seen this morning. Says he is still not feeling well. Still feels weak. 

Had chills overnight, also a few episodes of N/V when attempting to eat. No 

diarrhea, no chest pain, not coughing much. 


Family History: Unchanged from Admission


Social History: Unchanged from Admission


Past Medical History: Unchanged from Admission





Objective


Active Medications: 








Acetaminophen (Tylenol Tab*)  650 mg PO Q4H PRN


Aspirin (Aspirin Low Dose Tab*)  81 mg PO DAILY AYLIN


Heparin Sodium (Porcine) (Heparin Vial(*))  5,000 units SUBCUT Q8HR AYLIN


Sodium Chloride (Ns 0.9% 1000 Ml*)  1,000 mls @ 100 mls/hr IV PER RATE AYLIN


Ceftriaxone Sodium 1,000 mg/ (Sodium Chloride)  50 mls @ 200 mls/hr IVPB Q24H 

AYLIN


Azithromycin 500 mg/ Sodium (Chloride)  250 mls @ 250 mls/hr IVPB Q24H AYLIN


Losartan Potassium (Cozaar Tab*)  50 mg PO DAILY AYLIN


Omeprazole (Prilosec Cap*)  40 mg PO 0730,1630 AYLIN


Ondansetron HCl (Zofran Inj*)  4 mg IV Q6H PRN





 Vital Signs











  06/09/17 06/09/17 06/09/17





  11:30 12:00 12:30


 


Temperature   


 


Pulse Rate 73 73 68


 


Respiratory 23 17 





Rate   


 


Blood Pressure 133/77 133/63 125/71





(mmHg)   


 


O2 Sat by Pulse 99 98 97





Oximetry   














  06/10/17 06/10/17





  04:07 07:28


 


Temperature 99.2 F 98.1 F


 


Pulse Rate 83 73


 


Respiratory 16 18





Rate  


 


Blood Pressure 129/68 118/59





(mmHg)  


 


O2 Sat by Pulse 95 96





Oximetry  











Oxygen Devices in Use Now: None


Appearance: Elderly,  M, appears younger than stated age, laying in 

bed in NAD


Eyes: No Scleral Icterus


Ears/Nose/Mouth/Throat: Mucous Membranes Moist


Neck: NL Appearance and Movements; NL JVP


Respiratory: Symmetrical Chest Expansion and Respiratory Effort, - - LLL rales


Cardiovascular: NL Sounds; No Murmurs; No JVD, RRR


Abdominal: NL Sounds; No Tenderness; No Distention


Lymphatic: No Cervical Adenopathy


Extremities: No Edema


Skin: No Rash or Ulcers


Neurological: Alert and Oriented x 3


Result Diagrams: 


 06/10/17 06:47





 06/10/17 06:47


Microbiology and Other Data: 


 Microbiology











 06/09/17 15:10 Legionella Urinary Antigen - Final





 Urine    Negative Legionella





 Streptococcus pneumoniae Ag Screen - Final





    Negative S. pneumo Antigen














Assess/Plan/Problems-Billing


Assessment: 


CAP, hyponatremia in an 85 yo M with hx of HTN, GERD, PMR








- Patient Problems


(1) Community acquired pneumonia


Current Visit: No   Comment: Continue IV CTX and Azithromycin. Not requiring 

O2. WBC up a bit today.


Urine legionella and strep pneumo antigens negative   





(2) Hyponatremia


Current Visit: Yes   Comment: Trending back up. With continued N/V will 

continue NS at 100 cc/hr.   





(3) HTN (hypertension)


Current Visit: No   Comment: Continue losartan   





(4) GERD (gastroesophageal reflux disease)


Current Visit: No   Comment: Continue omeprazole.   





(5) PMR (polymyalgia rheumatica)


Current Visit: Yes   Comment: Seems stable. No longer on outpatient steroids.  

  





(6) Weakness


Current Visit: Yes   Comment: Likely 2/2 PNA. PT eval pending .   





(7) DVT prophylaxis


Current Visit: No   Comment: 


SQ heparin   


Status and Disposition: 





Inpatient for CAP, persistent N/V

## 2017-06-11 NOTE — PN
Subjective


Date of Service: 06/11/17


Interval History: 





Patient seen this morning, states he is feeling better this morning. He is 

eating breakfast. No fever overnight. Chronic cough unchanged. Ambulated in the 

hallway yesterday. Still complaining of intermittent headache. 


Family History: Unchanged from Admission


Social History: Unchanged from Admission


Past Medical History: Unchanged from Admission





Objective


Active Medications: 








Acetaminophen (Tylenol Tab*)  650 mg PO Q4H PRN


Aspirin (Aspirin Low Dose Tab*)  81 mg PO DAILY AYLIN


Heparin Sodium (Porcine) (Heparin Vial(*))  5,000 units SUBCUT Q8HR AYLIN


Ceftriaxone Sodium 1,000 mg/ (Sodium Chloride)  50 mls @ 200 mls/hr IVPB Q24H 

AYLIN


Azithromycin 500 mg/ Sodium (Chloride)  250 mls @ 250 mls/hr IVPB Q24H AYLIN


Sodium Chloride (Ns 0.9% 1000 Ml*)  1,000 mls @ 100 mls/hr IV PER RATE AYLIN


Ibuprofen (Motrin Tab*)  400 mg PO Q6H PRN


Losartan Potassium (Cozaar Tab*)  50 mg PO DAILY AYLIN


Omeprazole (Prilosec Cap*)  40 mg PO 0730,1630 AYLIN


Ondansetron HCl (Zofran Inj*)  4 mg IV Q6H PRN





 Vital Signs











  06/10/17 06/10/17 06/10/17





  11:26 15:37 19:51


 


Temperature 98.2 F 97.6 F 97.6 F


 


Pulse Rate 56 60 69


 


Respiratory 16 20 20





Rate   


 


Blood Pressure 116/54 109/67 146/67





(mmHg)   


 


O2 Sat by Pulse 96 100 95





Oximetry   














  06/10/17 06/10/17 06/11/17





  20:00 23:59 03:53


 


Temperature  97.8 F 98.4 F


 


Pulse Rate  68 64


 


Respiratory 18 16 18





Rate   


 


Blood Pressure  125/61 156/73





(mmHg)   


 


O2 Sat by Pulse  93 97





Oximetry   











Oxygen Devices in Use Now: None


Appearance: Elderly,  M, sitting in bed in NAD


Eyes: No Scleral Icterus


Ears/Nose/Mouth/Throat: Mucous Membranes Moist


Neck: NL Appearance and Movements; NL JVP


Respiratory: Symmetrical Chest Expansion and Respiratory Effort, - - LLL rales, 

otherwise clear


Cardiovascular: NL Sounds; No Murmurs; No JVD, RRR


Abdominal: NL Sounds; No Tenderness; No Distention


Lymphatic: No Cervical Adenopathy


Extremities: No Edema


Skin: No Rash or Ulcers


Neurological: Alert and Oriented x 3, - - CN II-XII intact, strength 5/5 

throughtout B/L UEs and LEs, sensation intact and symmetric


Result Diagrams: 


 06/11/17 06:19





 06/11/17 06:20


Additional Lab and Data: 


 








Assess/Plan/Problems-Billing


Assessment: 


CAP, hyponatremia in an 85 yo M with hx of HTN, GERD, PMR








- Patient Problems


(1) Community acquired pneumonia


Current Visit: No   Comment: Continue IV CTX and Azithromycin for now. Not 

requiring O2. WBC trending back down, slight bandemia.


Urine legionella and strep pneumo antigens negative   





(2) Hyponatremia


Current Visit: Yes   Comment: Improved. Taking PO. Stop IVF   





(3) HTN (hypertension)


Current Visit: No   Comment: Continue losartan, holding HCTZ.   





(4) GERD (gastroesophageal reflux disease)


Current Visit: No   Comment: Continue omeprazole.   





(5) PMR (polymyalgia rheumatica)


Current Visit: Yes   Comment: Seems stable. No longer on outpatient steroids.  

  





(6) Weakness


Current Visit: Yes   Comment: 2/2 PNA. PT cleared patient for home.   





(7) DVT prophylaxis


Current Visit: No   Comment: SQ heparin   


Status and Disposition: 





Inpatient for CAP, potential discharge today or tomorrow

## 2017-06-11 NOTE — PN
Hospitalist Progress Note





Patient reported some difficulty with coordination this morning with breakfast 

and then had some problems with balance while ambulating, no significant 

findings on neuro exam although maybe some difficulty with finger-to-nose. ?

vestibular neuritis but will r/o CVA with MRI.

## 2017-06-12 NOTE — PN
Subjective


Date of Service: 06/12/17


Interval History: 





Patient seen this morning. Says he is feeling better overall. No HA. No further 

N/V although did not eat much yesterday, finished his breakfast this morning. 

Has not been ambulating much again since yesterday.


Family History: Unchanged from Admission


Social History: Unchanged from Admission


Past Medical History: Unchanged from Admission





Objective


Active Medications: 








Acetaminophen (Tylenol Tab*)  650 mg PO Q4H PRN


Aspirin (Aspirin Low Dose Tab*)  81 mg PO DAILY AYLIN


Heparin Sodium (Porcine) (Heparin Vial(*))  5,000 units SUBCUT Q8HR AYLIN


Ceftriaxone Sodium 1,000 mg/ (Sodium Chloride)  50 mls @ 200 mls/hr IVPB Q24H 

AYLIN


Azithromycin 500 mg/ Sodium (Chloride)  250 mls @ 250 mls/hr IVPB Q24H AYLIN


Ibuprofen (Motrin Tab*)  400 mg PO Q6H PRN


Losartan Potassium (Cozaar Tab*)  50 mg PO DAILY AYLIN


Meclizine HCl (Antivert Tab*)  12.5 mg PO Q8HR PRN


Omeprazole (Prilosec Cap*)  40 mg PO 0730,1630 AYLIN


Ondansetron HCl (Zofran Inj*)  4 mg IV Q6H PRN





 Vital Signs











  06/11/17 06/11/17 06/11/17





  13:39 15:41 19:39


 


Temperature  98.1 F 100.9 F


 


Pulse Rate  71 81


 


Respiratory  20 20





Rate   


 


Blood Pressure  135/62 145/72





(mmHg)   


 


O2 Sat by Pulse 92 100 96





Oximetry   














  06/11/17 06/11/17 06/11/17





  20:00 22:33 23:50


 


Temperature  99.2 F 99.0 F


 


Pulse Rate   57


 


Respiratory 20  18





Rate   


 


Blood Pressure   111/50





(mmHg)   


 


O2 Sat by Pulse   97





Oximetry   














  06/12/17 06/12/17 06/12/17





  01:20 03:46 07:32


 


Temperature  98.6 F 99.4 F


 


Pulse Rate  56 78


 


Respiratory  20 22





Rate   


 


Blood Pressure  120/58 138/69





(mmHg)   


 


O2 Sat by Pulse 97 97 95





Oximetry   











Oxygen Devices in Use Now: None


Appearance: Elderly,  M, sitting in chair in NAD


Eyes: No Scleral Icterus


Ears/Nose/Mouth/Throat: Mucous Membranes Moist


Neck: NL Appearance and Movements; NL JVP


Respiratory: Symmetrical Chest Expansion and Respiratory Effort, - - LLL rales


Cardiovascular: NL Sounds; No Murmurs; No JVD, RRR


Abdominal: NL Sounds; No Tenderness; No Distention


Lymphatic: No Cervical Adenopathy


Extremities: No Edema


Skin: No Rash or Ulcers


Neurological: Alert and Oriented x 3, - - Some lateral nystagmus on exam, head 

thrust negative, some difficulty with finger to nose on L>R


Result Diagrams: 


 06/12/17 05:57





 06/12/17 05:57


Additional Lab and Data: 


 








Assess/Plan/Problems-Billing


Assessment: 


CAP, hyponatremia in an 85 yo M with hx of HTN, GERD, PMR








- Patient Problems


(1) Community acquired pneumonia


Current Visit: No   Comment: Continue IV CTX and Azithromycin for now. Not 

requiring O2. WBC stable, bandemia resolved. Low grade fever yesterday evening.


Urine legionella and strep pneumo antigens negative   





(2) Dizziness


Current Visit: Yes   Comment: nausea. 


?vertigo, vestibluar neuritis. Meclizine started yesterday. Will get MRI/MRA to 

eval for possible posterior CVA. Continue zofran.


Ambulate patient again today.   





(3) Hyponatremia


Current Visit: Yes   Comment: Improving, taking PO today   





(4) HTN (hypertension)


Current Visit: No   Comment: Continue losartan, holding HCTZ.   





(5) GERD (gastroesophageal reflux disease)


Current Visit: No   Comment: Continue omeprazole.   





(6) PMR (polymyalgia rheumatica)


Current Visit: Yes   Comment: Seems stable. No longer on outpatient steroids.  

  





(7) DVT prophylaxis


Current Visit: No   Comment: SQ heparin   


Status and Disposition: 





Inpatient for CAP, N/V/dizziness

## 2017-06-12 NOTE — RAD
Indication: Dizziness. Clinical concern for posterior distribution CVA.



Comparison: MRI brain of the same date negative for restricted diffusion to indicate acute

or subacute ischemia.



Technique: "[employer]a 1.5 [place] SW470M with GEM suite.  MR angiography 3-D time-of-flight

data was obtained with rotational display of the "[employer] of [place] and posterior fossa

arteries.



Report: Patent bilateral intracranial internal carotid arteries as well as the M1 and M2

middle cerebral artery segments. The bilateral A2 anterior to artery segments are supplied

by a unilateral dominant LEFT A1 segment with patent anterior communicating artery.



RIGHT dominant vertebral artery with both vertebral arteries contributing to the basilar

artery. 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

to 90% and extending over approximately 5 mm length.



Only limited flow appreciated in the bilateral posterior cerebral arteries.  Associated

hypoperfusion at the bilateral posterior cerebral arteries. The RIGHT posterior cerebral

artery is supplied primarily by a RIGHT posterior communicating artery and the LEFT

posterior cerebral artery is supplied primarily by the P1 segment from the posterior

circulation.



No intracranial aneurysms evident.



IMPRESSION: 

1. 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

90%.

2. Associated hypoperfusion at the bilateral posterior cerebral arteries. The RIGHT

posterior cerebral artery is supplied primarily by a RIGHT posterior communicating artery

and the LEFT posterior cerebral artery is supplied primarily by the P1 segment from the

posterior circulation.

## 2017-06-12 NOTE — ECHO
Amended Report

 

Patient:      BERTHA SOLIS 

Med Rec#:     [id number]            :          1933          

Date:         2017            Age:          84y                 

Account#:     [id number]          Height:       177.8 cm / 70.0 in

Accession#:   [id number]           Weight:       81.65 kg / 180.0 lbs

Sex:          M                     BSA:          2

Room#:        420 2                 

Admit Date#:  2017          

Type:         Inpatient

 

Referring:    Noemi Suarez MD

Reading:      Félix Adam MD

Sonographer:  Noemi Lala,ELLIECS,RDMS

CC:           Keegan Bejarano MD

______________________________________________________________________

 

Transthoracic Echocardiogram

 

Indication:

AOV disorder, TIA

BP:           138/67

HR:           60

Rhythm:       NSR

 

Findings     

History:

GERD, HTN 

 

Technical Comments:

The study quality is fair. Completed 1600 

 

Left Ventricle:

The left ventricular chamber size is normal. Mild concentric left

ventricular hypertrophy is observed. There is normal left ventricular

systolic function. The estimated ejection fraction is 55-60%.  Abnormal

left ventricular diastolic function is observed. The left ventricular

diastolic filling pattern is consistent with pseudonormalization.  

 

Left Atrium:

The left atrium is moderately dilated. 

 

Right Ventricle:

The right ventricular chamber size and systolic function are within

normal limits. 

 

Right Atrium:

The right atrium is mild to moderately dilated.  

 

Aortic Valve:

The aortic valve leaflets are moderately thickened. Systolic excursion

of the aortic valve cusps is reduced. There is mild aortic

regurgitation.  There is moderate aortic stenosis. The mean gradient of

the aortic valve is 21 mmHg.  The aortic valve area, by peak velocities,

is calculated at 1.3 cm2.  Highest aortic valve velocity was acquired

with Pedoff in apical position. 

 

Mitral Valve:

The mitral valve leaflets are mildly thickened. There is a trace of

mitral regurgitation. There is no evidence of mitral stenosis. 

 

Tricuspid Valve:

The tricuspid valve leaflets are normal.  There is trace tricuspid

regurgitation.  Unable to estimate the right ventricular systolic

pressure.   

 

Pulmonic Valve:

The pulmonic valve structure is not well visualized. There is a trace

pulmonic regurgitation.  

 

Pericardium:

There is no significant pericardial effusion. 

 

Aorta:

The aortic root appears normal. There is no dilatation of the aortic

arch. 

 

Pulmonary Artery:

The main pulmonary artery is not well visualized. 

 

Venous:

The inferior vena cava is dilated.  There is less than 50% respiratory

change in the inferior vena cava dimension. 

 

Conclusions

Mild concentric left ventricular hypertrophy is observed.

The estimated ejection fraction is 55-60%. 

The left ventricular diastolic filling pattern is consistent with

pseudonormalization. 

The left atrium is moderately dilated.

Systolic excursion of the aortic valve cusps is reduced.

There is moderate aortic stenosis.

The mean gradient of the aortic valve is 21 mmHg. 

There is a trace of mitral regurgitation.

There is trace tricuspid regurgitation. 

Unable to estimate the right ventricular systolic pressure.  

There is no significant pericardial effusion.

 

Measurements     

Name                    Value         Normal Range            

RVIDd (AP) 2D           2.7 cm        (0.9 - 2.6)             

RVDdMajor (2D)          4.2 cm        (2.2 - 4.4)             

RAd ISD 4CH             5.7 cm        (3.4 - 4.9)             

RA (A4C)W               5.8 cm        (2.9 - 4.6)             

IVSd (2D)               1.4 cm        (0.6 - 1)               

LVPWd (2D)              1.2 cm        (0.6 - 1)               

LVIDd (2D)              4.4 cm        (3.6 - 5.4)             

LVIDs (2D)              3 cm          -                        

LV FS (2D)              32 %          (25 - 45)               

Aortic Annulus          2 cm          (1.4 - 2.6)             

Ao root diameter (2D)   2.9 cm        (2.1 - 3.5)             

Ascending Ao            2.7 cm        (2.1 - 3.4)             

Aortic arch             3.1 cm        (1.8 - 3.4)             

LA dimension (AP) 2D    3.9 cm        (2.3 - 3.8)             

LAd ISD 4CH             5.8 cm        (2.9 - 5.3)             

LA ISD 4CH W            5.3 cm        (2.5 - 4.5)             

 

Name                    Value         Normal Range            

LA ESV SP 4CH (A/L)     100.16 ml     -                        

LA ESV SP 2CH (A/L)     91.94 ml      -                        

LA ESV BP (A/L)         99.54 ml      -                        

LA ESV BP (A/L) index   50 ml/m2      -                        

LA ESV SP 4CH (MOD)     90.57 ml      -                        

LA ESV SP 2CH (MOD)     84.53 ml      -                        

 

Name                    Value         Normal Range            

MV E-wave Vmax          0.9 m/sec     -                        

MV deceleration time    189 msec      -                        

MV A-wave Vmax          0.8 m/sec     -                        

MV E:A ratio            1.1 ratio     -                        

P. vein S-wave Vmax     0.5 m/sec     -                        

P. vein D-wave Vmax     0.3 m/sec     -                        

P. vein S:D Vmax ratio  1.8 ratio     -                        

P. vein A-wave duration 133 msec      -                        

 

Name                    Value         Normal Range            

AV Vmax                 3 m/sec       -                        

AV VTI                  67.4 cm       -                        

AV peak gradient        36 mmHg       -                        

AV mean gradient        21 mmHg       -                        

LVOT diameter           1.9 cm        -                        

LVOT Vmax               1.4 m/sec     -                        

LVOT VTI                31.2 cm       -                        

LVOT peak gradient      8 mmHg        -                        

LVOT mean gradient      3.7 mmHg      -                        

DOI (VTI)               0.5 ratio     -                        

SV LVOT                 91.66 ml      -                        

CHUCK (continuity Vmax)   1.3 cm2       -                        

CHUCK (continuity VTI)    1.3 cm2       -                        

AR PHT                  466.85 msec   -                        

AR peak gradient        62.85 mmHg    -                        

CHLOE Vmax                1.2 m/sec     -                        

 

Name                    Value         Normal Range            

RAP                     8 mmHg        -                        

IVC diameter            3.3 cm        -                        

 

Name                    Value         Normal Range            

PV Vmax                 0.9 m/sec     -                        

PV peak gradient        3.2 mmHg      -                        

 

Electronically signed by: Félix Adam MD on 2017 19:21:17

## 2017-06-12 NOTE — CONS
CC:  Dr. Bejarano *

 

CONSULTATION NOTE:

 

DATE OF CONSULT:



DATE OF DICTATION:  06/12/17

 

REQUESTING PHYSICIAN:  Dr. Neville Maradiaga.

 

HISTORY OF PRESENT ILLNESS:  Mr. Jorge Johnson is an 84-year-old gentleman with 
history of hypertension who was admitted to the hospital on 06/10/17 when he 
felt weak and dizzy after being on a tractor.  In further review of his history
, he was previously admitted in January 2017 with similar symptoms, at which 
point he was diagnosed with pneumonia.  He had an elevated white count.  At 
that time, he had been on a pickup truck and he had not been feeling well and 
his friend had driven the pickup truck.  When he tried to get out of the pickup 
truck, he felt weak. After discharge, it took him 2 months before he felt back 
to normal.  His white count did normalize according to records in the hospital.
  



This time, he was fine until the day prior to this admission.  He had not felt 
well and gone to bed at 7 o'clock in the evening.  The day of admission, he was 
on a tractor and felt thickheaded and faint, was so weak he had to crawl back 
to the house.  He felt like his arms and legs did work.  There was no numbness.
  No lack of vision or double vision.  When he got to the house, he crawled to 
get to the bathroom, but did not make it there in time.  At baseline, he does 
take baby aspirin on occasion, but not on a regular basis because he feels like 
when he cuts himself, it takes longer to stop the bleeding.  He has a remote 
history of smoking, with last smoking 25 years ago.  At home, he is on losartan 
and hydrochlorothiazide and indicates he has been on this for 15 years.

 

Since in hospital, he has noted that he has had difficulty knowing where his 
feet are in space when he was walking and difficulty reaching toward objects on 
his tray, feeling he could not  distances.  This has improved.  He went 
from having difficulty walking just to the bathroom yesterday to walking around 
the floor today.  His initial blood pressure was noted to be 113/55; today is 
138/68. He indicates at home his systolic blood pressure is often in the 150s, 
160s when he records it.  In workup of his symptoms, he had an MRI/MRA of the 
brain.  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 
Neurology consult was called.

 

PAST MEDICAL HISTORY:  Mr. Johnson's past medical history includes:

1.  Hypertension.

2.  GERD.

3.  Hiatal hernia.

4.  Question of PMR, on steroids up to 1 month before his January admission.

5.  History of hip surgery.

6.  Laparoscopic surgery for Nissen fundoplication.

7.  Hiatal hernia.

 

ALLERGIES:  He has no known drug allergies.

 

FAMILY HISTORY:  Includes mother with a history of alcohol use.  Father who had 
a brain aneurysm.

 

SOCIAL HISTORY:  Mr. Johnson is .  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 
employee who works with him in his 70s.  He stopped smoking age 25.  He does 
not drink alcohol.

 

REVIEW OF SYSTEMS:  When asked about double vision, only time he felt he had 
double vision was when he was first admitted and it was dark in the room and he 
thought he saw two faces when he was looking at someone.  Otherwise, there has 
been no diplopia.  He does have some decline in his vision over years and his 
glasses do not seem to be working as well, and he has been told he has 
cataracts and needs to have repair.  There has been no new numbness or weakness 
of arms or legs, change in bowel or bladder habits, chest pain, chest pressure, 
palpitations, shortness of breath, or recent rashes.  He denies any change in 
cognition or mood.

 

PHYSICAL EXAM:  On examination today, Mr. Johnson is a very pleasant gentleman 
who is sitting in his chair next to his bed.  His most recent blood pressure 
was 138/68, his pulse was 78, respiratory rate was 22, saturation was 95%, 
temperature was 99.4 degrees Fahrenheit.  He had a regular cardiac rhythm.  His 
lungs were clear to auscultation.  There was no carotid bruit.  He was awake, 
alert, showed appropriate concern.  He knew he was in hospital.  He knew 
details regarding his history.  No rashes were noted.  Some toe nail changes 
were noted.  He had full extraocular movements with full fields to 
confrontation.  His facial expression, sensation, and hearing were equal.  
Palate was upgoing.  Tongue was midline.  Sternocleidomastoid and trapezius 
were 5/5 in strength.  There was normal bulk and tone.  No pronator drift.  
Full strength in the upper and lower extremities with normal finger-to-nose and 
heel-to-shin movements.  Reflexes were 2+ in the upper extremities, absent in 
the lower extremities.  Vibration was absent at the toes and ankles, decreased 
at the knees and distal interphalangeal joints in the second finger of the 
hands at 15 seconds.  Proprioception was intact.  He denied any asymmetries to 
cold, light touch, or sharp.  His toes were flexor response.  He walked with a 
walker with increased stance at approximately 6 inches.  He was able to do so 
independently. No evidence of dysmetria was noted.

 

DIAGNOSTIC STUDIES/LAB DATA:  Data includes white count with persistent 
elevated white count at 15.5, hemoglobin and hematocrit 13.4 and 42 respectively
, platelet count was 332,000.  His monocytes remain elevated at 29.1%.  His 
basic metabolic panel shows sodium of 131 which is improved from admission labs 
when Sodium was 127.  His urinalysis on admission did not show white count, 
esterase or nitrites.  His MRA of the brain and neck showed 90% stenosis of the 
proximal basilar artery.  Please see report for details including variance 
noted on blood vessels.  MRI Brain showed old periventricular subcortical white 
matter changes.  These films were both reviewed directly.

 

IMPRESSION:  Mr. Johnson is an 84-year-old gentleman with repeat admission with a 
thick fuzzy feeling in the head, dizziness, and feeling faint and weak in 
January in the setting of a diagnosed pneumonia and now in the setting of low-
grade fever, elevated white count, increased monocytes.  He is found to have 
basilar stenosis which is questioned to be symptomatic.  One must question if 
it is symptomatic in the setting of hypoperfusion and the setting of illness, 
as well as continuing to work.  His blood pressure was 113/55.  He is on 
losartan combined with hydrochlorothiazide and I agree with stopping the 
diuretic.  He reports that he usually runs a higher systolic blood pressure of 
150 to 160 at home which is appropriate in the setting of a significant 
stenosis.  



We talked about conservative and interventional approaches to basilar stenosis.
  No clear focal findings are noted on examination or MRI Brain at this time. 
The history of difficulty being able to control his hands and feet does raise 
the question of being symptomatic with hypoperfusion.  It is essential to keep 
the basilar artery open, and I have talked with him about the importance of a 
minimum baby aspirin a day; however, we might have to progress to combination 
therapy with Plavix.  We will check a fasting lipid profile and consider 
statin.  We will need to optimize his blood pressure by at minimum stop 
hydrochlorothiazide.  Would allow systolic to run up to 150-160, and avoid 
hypotension.  We will continue on losartan at 50 mg for now.  We will check an 
echocardiogram and optimize medication accordingly.  The patient should avoid 
being out in the heat in the day and focus on hydration and blood pressure 
should be monitored closely.

 

Certainly, if there is progression of symptoms, intervention would need to be 
considered and at which point, I would refer to Thomas.  There are significant 
risks with basilar artery intervention including stroke.  According to symptoms
, we will consider outpatient consultation.

 

At this point, I do suspect a multifactorial cause for decline, as Mr. Johnson 
was quite fatigued with a low-grade fever and elevated white count noted 
suggesting superimposed infection in both January and now, this may have 
contributed to his overall status.  Of note, his white count did normalize 
between the admissions.

 

TIME SPENT:  Over an hour and a half was spent in direct face-to-face patient 
care and review of records.  All questions were answered.

 

 [id number] #: [id number]

LAURA

## 2017-06-12 NOTE — RAD
HISTORY: Dizziness. Clinical concern for posterior circulation CVA.



COMPARISONS: CT brain June 09, 2017



TECHNIQUE: The following sequences were obtained of the head: Sagittal T1-weighted images,

axial T2-weighted images, axial FLAIR images, axial susceptibility weighted images, axial

T1-weighted images. Additionally, axial diffusion-weighted images were obtained with

calculated apparent diffusion coefficients..    



FINDINGS: 



HEMORRHAGE/INFARCT: There is no hemorrhage or acute infarct.

MASSES/SHIFT: There is no mass or shift.

EXTRA-AXIAL SPACES/MENINGES: There are no extra-axial fluid collections.

SULCI AND VENTRICLES: The sulci and ventricles exhibit appropriate and symmetric

involutional changes for the patient's stated age.



CEREBRUM: There is widespread subcortical and periventricular white matter T2 bright foci

as well as more confluent areas adjacent to the ventricles. More peripherally the

gray-white matter differentiation appears to be adequately maintained. At the left frontal

parietal white matter tracts there is a 4 mm T2 bright focus (image 23 of 32) that

corresponds to dark signal on diffusion-weighted imaging and bright signal on ADC. This is

most consistent with an old lacunar infarction or enlarged perivascular space.

BRAINSTEM: There are no focal parenchymal abnormalities.

CEREBELLUM: There are no focal parenchymal abnormalities. The cerebellar tonsils are

normal in size and position.



SELLA: The sella is normal.

PINEAL: The pineal region is clear.

CP ANGLE/TEMPORAL BONES: The labyrinthine structures are grossly normal.



VESSELS: Normal flow-voids are noted within the visualized vertebral vasculature.

DIFFUSION ABNORMALITIES: There are no diffusion abnormalities. 



PARANASAL SINUSES/MASTOIDS: The paranasal sinuses are clear.

ORBITS: The orbits are unremarkable.

BONES AND SOFT TISSUE: No bone or soft tissue abnormalities are noted.





IMPRESSION: 



1. NO MRI EVIDENCE OF ACUTE TERRITORIAL INFARCTION.

2. FINDINGS ARE MOST CONSISTENT WITH CHRONIC MICROVASCULAR DISEASE AND AGE-APPROPRIATE

INVOLUTIONAL CHANGES. IF CLINICALLY WARRANTED FURTHER CHARACTERIZATION CAN BE MADE WITH

CONTRAST-ENHANCED MRI OF THE BRAIN TO EVALUATE FOR POTENTIAL DEMYELINATING DISEASE.

## 2017-06-12 NOTE — RAD
Indication: Dizziness.



MRA of the neck was performed utilizing no intravenous contrast.



The cervical carotid arteries and intracranial carotid arteries were visualized are

unremarkable. No evidence of occlusion or stenosis is noted. Vertebral artery demonstrates

a dominant right vertebral artery. The visualized portions of the basilar artery are

grossly unremarkable.



IMPRESSION: The internal carotid artery within the neck are grossly unremarkable with no

significant stenosis. Dominant right vertebral artery is noted.

## 2017-06-12 NOTE — RAD
Indication: Shoulder, .



2 views of the orbits demonstrates no radiopaque foreign body. Paranasal sinuses are

otherwise unremarkable.



IMPRESSION: No radiopaque foreign body is identified.

## 2017-06-13 NOTE — PN
NEUROLOGICAL FOLLOWUP:

 

DATE OF SERVICE/DICTATION:  06/13/17

 

PATIENT OF:  Dr. Neville Maradiaga.

 

HISTORY:  This 84-year-old man, who I am doing neurological followup for his 
weakness and dizziness.  His blood pressure has been running somewhat low and 
he has had basilar artery stenosis of 90%.  This has been addressed by Dr. Suarez, who wanted me to see in followup.

 

MEDICATIONS:  Included:

1.  Aspirin 81 mg daily.

2.  Ceftriaxone IV push.

3.  Meclizine 12.5 mg q.8 hours p.r.n.

4.  Prilosec 40 mg daily.

5.  No losartan today.

 

PAST MEDICAL HISTORY:  He has had a history of hypertension and PMR.  Unchanged.

 

FAMILY HISTORY:  Unchanged.

 

SOCIAL HISTORY:  Unchanged.

 

PHYSICAL EXAM:  Temperature 98, pulse 57, respirations 16, blood pressure 123/
56. He was alert and oriented with normal speech.  Cranial nerves II through 
XII are intact.  Motor exam revealed normal tone, strength, coordination.  
Sensation intact to light touch.  Chest:  Clear.  Cardiovascular:  Regular rate 
and rhythm. Reflexes were 1 and equal.

 

DIAGNOSTIC DATA/LABORATORY DATA:  His transthoracic echo showed a moderate 
dilated left atrium, otherwise no significant findings.

 

There were no labs done today.

 

IMPRESSION:  It is by no means proved but it is very possible that basilar 
artery stenosis is causing brainstem hypoperfusion and causing his symptoms of 
dizziness and I agree it is reasonable to back off his blood pressure 
medications and with blood pressure of 123/56, I would probably hold his 
antihypertensives and see if he needs them at all.  This will need to be 
followed as an outpatient because it may be different as an outpatient as well.
  He will need to see his primary care doctor in close followup.  If he 
continues to be symptomatic, it would be appropriate that Dr. Suarez see him 
in followup, but I am not sure if there are no further symptoms this needs to 
happen.

 

Thank you for sharing his case.

 

 [id number] #: [id number]

LAURA

## 2017-06-13 NOTE — PN
Subjective


Date of Service: 06/13/17


Interval History: 





Patient seen this morning. Says he is feeling alright this AM. Walked around 

yesterday, still feels a bit weak. No fever or chills. No cough. 


Family History: Unchanged from Admission


Social History: Unchanged from Admission


Past Medical History: Unchanged from Admission





Objective


Active Medications: 








Acetaminophen (Tylenol Tab*)  650 mg PO Q4H PRN


Aspirin (Aspirin Low Dose Tab*)  81 mg PO DAILY AYLIN


Heparin Sodium (Porcine) (Heparin Vial(*))  5,000 units SUBCUT Q8HR AYLIN


Ceftriaxone Sodium 1,000 mg/ (Sodium Chloride)  50 mls @ 200 mls/hr IVPB Q24H 

AYLIN


Ibuprofen (Motrin Tab*)  400 mg PO Q6H PRN


Meclizine HCl (Antivert Tab*)  12.5 mg PO Q8HR PRN


Omeprazole (Prilosec Cap*)  40 mg PO 0730,1630 AYLIN


Ondansetron HCl (Zofran Inj*)  4 mg IV Q6H PRN





 Vital Signs











  06/12/17 06/12/17 06/12/17





  12:40 15:22 20:04


 


Temperature 97.5 F 98.2 F 98.3 F


 


Pulse Rate 61 69 64


 


Respiratory 20 18 16





Rate   


 


Blood Pressure 136/51 129/57 142/77





(mmHg)   


 


O2 Sat by Pulse 98 95 95





Oximetry   














  06/13/17





  03:29


 


Temperature 


 


Pulse Rate 59


 


Respiratory 17





Rate 


 


Blood Pressure 158/66





(mmHg) 


 


O2 Sat by Pulse 96





Oximetry 











Oxygen Devices in Use Now: None


Appearance: Elderly,  M, sitting in chair in NAD


Eyes: No Scleral Icterus


Ears/Nose/Mouth/Throat: Mucous Membranes Moist


Neck: NL Appearance and Movements; NL JVP


Respiratory: Symmetrical Chest Expansion and Respiratory Effort, Clear to 

Auscultation


Cardiovascular: NL Sounds; No Murmurs; No JVD, RRR


Abdominal: NL Sounds; No Tenderness; No Distention


Lymphatic: No Cervical Adenopathy


Extremities: No Edema


Skin: No Rash or Ulcers


Neurological: Alert and Oriented x 3, - - No focal deficits, did not ambulate 

patient today


Result Diagrams: 


 06/12/17 05:57





 06/12/17 05:57


Additional Lab and Data: 


 








Assess/Plan/Problems-Billing


Assessment: 


CAP, hyponatremia, basilar artery stenosis in an 83 yo M with hx of HTN, GERD, 

PMR








- Patient Problems


(1) Basilar artery stenosis


Current Visit: Yes   Comment: Likely contributing to his symptoms. Appreciate 

Neuro assistance. Continue ASA daily for now. Will hold Losartan today in 

addition to home HCTZ to see what his BPs do. Echo with some moderate aortic 

stenosis. Will discuss further with Neuro today.   





(2) Community acquired pneumonia


Current Visit: No   Comment: Continue IV CTX. Procalcitonin 0.9 yesterday after 

a number of days of ABx. Completed Azithromycin. CBC pending.    





(3) Hyponatremia


Current Visit: Yes   Comment: Improving, recheck BMP tomorrow   





(4) HTN (hypertension)


Current Visit: No   Comment: Hold Losartan, holding HCTZ.   





(5) GERD (gastroesophageal reflux disease)


Current Visit: No   Comment: Continue omeprazole.   





(6) PMR (polymyalgia rheumatica)


Current Visit: Yes   Comment: Seems stable. No longer on outpatient steroids.  

  





(7) DVT prophylaxis


Current Visit: No   Comment: SQ heparin   


Status and Disposition: 





Inpatient for CAP, N/V/dizziness

## 2017-06-15 NOTE — DS
CC:  Dr. Keegan Bejarano *

 

DISCHARGE SUMMARY:

 

DATE OF ADMISSION:  06/09/17

 

DATE OF DISCHARGE:  06/14/17

 

PRIMARY CARE PHYSICIAN:  Dr. Keegan Bejarano.

 

PRINCIPAL DISCHARGE DIAGNOSES:

1.  Community-acquired pneumonia.

2.  Basilar artery stenosis.

 

SECONDARY DIAGNOSES:

1.  Hypertension.

2.  GERD.

3.  Hiatal hernia.

4.  Possible PMR.

 

DISCHARGE MEDICATIONS REGIMEN:

1.  Cefpodoxime 200 mg by mouth 2 times daily.

2.  Omeprazole 40 mg by mouth 2 times daily.

3.  Calcium carbonate and vitamin D 1 tablet by mouth daily.

4.  Multivitamin 1 tablet by mouth daily.

5.  Aspirin 81 mg by mouth daily.

 

STUDIES DURING HOSPITALIZATION:

1.  CT of the brain, impression:  No acute intracranial pathology, chronic 
small vessel ischemic changes.

2.  Chest x-ray, impression:  There is possibly a small amount of atelectasis 
to the lateral left lung base but no definite acute cardiopulmonary 
abnormalities.

3.  Screening orbit x-ray, impression:  No radiopaque foreign body has been 
identified.

4.  MRI of the brain without contrast, impression:  No MRI findings of acute 
territorial infarction.  Findings are most consistent with chronic 
microvascular disease and age appropriate involutional changes.

5.  MRI of the head, impression:  High-grade stenosis of the proximal segment 
of the basilar artery estimated up to 90%, associated hyperperfusion of the 
bilateral posterior cerebral arteries, the right posterior cerebral artery 
supplied primarily by a right posterior communicating artery and the left 
posterior cerebral artery supplied primarily by the P1 segment from the 
posterior circulation.

6.  MRA of the neck, impression:  Internal carotid artery within the neck are 
grossly unremarkable with no significant stenosis, dominant right vertebral 
artery is noted.

7. Transthoracic echocardiogram, impression: Mild concentric LVH observed, 
estimated ejection fraction of 55% to 60%, left ventricular diastolic filling 
pattern consistent with pseudonormalization.  Left atrium was moderately 
dilated. Systolic excursion of the aortic valve cusps is reduced, moderate 
aortic stenosis, trace mitral regurgitation, trace tricuspid regurgitation,  
There is no significant pericardial effusion.

 

HISTORY OF PRESENT ILLNESS AND HOSPITAL SUMMARY:  Please see the full history 
and physical by NELLY Sanchez for full details.  Briefly, Mr. Johnson is an 84
-year- old man with past medical history as above, who presented to the 
hospital with weakness and dizziness as well as some headache and nausea, these 
symptoms came out after the patient was working out in the sun all day.  He was 
brought to the hospital and these symptoms were similar to the previous episode 
of pneumonia.  He did have an elevated white blood cell count and developed a 
low-grade fever. During the admission he was started on antibiotics, over the 
following days he continued to have some symptoms of nausea and dizziness and 
had difficulty ambulating.  This prompted a workup of arteries in his head and 
neck, which showed significant basilar artery stenosis.  Neurology was 
consulted.  They recommended us going back on the patient's antihypertensive 
medications.  Over the following days he had significant improvement in his 
symptoms.  He had no further nausea or vomiting, was having good p.o. intake, 
was able to ambulate around the unit.  His leukocytosis resolved.  He will be 
discharged home off of any antihypertensive medications.  He was encouraged to 
take his aspirin daily and he will complete a course of oral antibiotics at 
home for his possible pneumonia.

 

TIME SPENT:  Total time spent on this discharge 45 minutes.

 

This is a summary of the hospitalization.  Please see the full medical record 
for further details.

 

 [id number]/CPS #: [id number]

LAURA

## 2017-07-13 NOTE — OP
DATE OF OPERATION:  07/12/17 - [facility]

 

DATE OF BIRTH:  [birth year]

 

SURGEON:  James Montague MD

 

PREOPERATIVE DIAGNOSIS:  Cataract, right eye.

 

POSTOPERATIVE DIAGNOSIS:  Cataract, right eye.

 

OPERATIVE PROCEDURE:  Phacoemulsification, right eye with IOL.

 

DESCRIPTIO OF PROCEDURE:  The patient was brought to the operating room after 
being given 1/2% Alcaine with epinephrine drops in the preoperative area.  The 
eye was prepped and draped in the usual sterile fashion.  Sterile drape and 
eyelid speculum were placed.  Again, topical 1/2% Alcaine with epinephrine was 
given.  A paracentesis incision was made at the 9 o'clock position with the 
No.75 blade.  Clear cornea incision 2.2 x 2.2-mm was created at the 12 o'clock 
position starting at the anterior limbus using the 2.2-mm keratome.  The 
anterior chamber was irrigated with 0.4 mL of 1% non-preservative intracameral 
lidocaine and filled with DisCoVisc.  A capsulorrhexis was completed using the 
cystotome and the Utrata forceps. Hydrodissection was performed with balanced 
salt solution.  The lens nucleus was removed with the Phacoemulsification 
handpiece without incident.  Cortex was removed with the irrigation-aspiration 
handpiece.  The capsular bag was re-inflated using DisCoVisc and an SN60WF 19.5 
implant was inserted with the shooter. The irrigation-aspiration handpiece was 
used to remove all residual DisCoVisc.  The eye was refilled with balanced salt 
solution and the wound checked and found to be watertight.  Topical Maxitrol 
drops were given.

 

 [id number]/CPS #: [id number]

[facility]

## 2017-07-20 NOTE — OP
DATE OF OPERATION:  07/19/17 [facility]

 

DATE OF BIRTH:  [birth year]

 

SURGEON:  James Montague MD

 

PREOPERATIVE DIAGNOSIS:  Cataract, left eye.

 

POSTOPERATIVE DIAGNOSIS:  Cataract, left eye.

 

OPERATIVE PROCEDURE:  Phacoemulsification, left eye with IOL.

 

DESCRIPTION OF PROCEDURE:  The patient was brought to the operating room after 
being given 1/2% Alcaine with epinephrine drops in the preoperative area.  The 
eye was prepped and draped in the usual sterile fashion.  Sterile drape and 
eyelid speculum were placed.  Again, topical 1/2% Alcaine with epinephrine was 
given.  A paracentesis incision was made at the 3 o'clock position with the 
No.75 blade.  Clear cornea incision 2.2 x 2.2-mm was created at the 6 o'clock 
position starting at the anterior limbus using the 2.2-mm keratome.  The 
anterior chamber was irrigated with 0.4 mL of 1% non-preservative intracameral 
lidocaine and filled with DisCoVisc.  A capsulorrhexis was completed using the 
cystotome and the Utrata forceps. Hydrodissection was performed with balanced 
salt solution.  The lens nucleus was removed with the Phacoemulsification 
handpiece without incident.  Cortex was removed with the irrigation-aspiration 
handpiece.  The capsular bag was re-inflated using DisCoVisc and an [id number] 20 
implant was inserted with the shooter.  The irrigation-aspiration handpiece was 
used to remove all residual DisCoVisc.  The eye was refilled with balanced salt 
solution and the wound checked and found to be watertight.  Topical Maxitrol 
drops were given.

 

 [id number] #: [id number]

[facility]

## 2017-07-21 NOTE — UC
Lower Extremity/Ankle HPI





- History of Current Complaint


Chief Complaint: EDExtremityLower


Stated Complaint: LT LEG SWELLING / PAIN


Time Seen by Provider: 07/21/17 11:40


Pain Intensity: 0





- Allergies/Home Medications


Allergies/Adverse Reactions: 


 Allergies











Allergy/AdvReac Type Severity Reaction Status Date / Time


 


No Known Allergies Allergy   Verified 07/19/17 10:02














PMH/Surg Hx/FS Hx/Imm Hx





- Surgical History


Surgical History: Yes


Surgery Procedure, Year, and Place: 2001 LAP NISSEN FUNDOPLICATION [facility].  2001 L 

HIP FRACTURE [facility].  6/10 RIGHT INGUINAL HERNAI REPAIR





- Family History


Known Family History: Positive: Other - aneurysm





- Social History


Alcohol Use: None


Substance Use Type: None


Smoking Status (MU): Former Smoker


Amount Used/How Often: light smoker for approx 6 yrs


When Did the Patient Quit Smoking/Using Tobacco: quit when pt was 25 yrs old





- Immunization History


Most Recent Influenza Vaccination: Not this year


Most Recent Pneumonia Vaccination: Never





Physical Exam


Vital Signs: 


 Initial Vital Signs











Temp  97.6 F   07/21/17 11:30


 


Pulse  67   07/21/17 11:30


 


Resp  20   07/21/17 11:30


 


BP  128/61   07/21/17 11:30


 


Pulse Ox  96   07/21/17 11:30

## 2017-07-21 NOTE — RAD
INDICATION:  Left femur swelling and pain.



COMPARISON: Comparison is made with a prior x-ray study of the left hip from April 03, 2007.



TECHNIQUE: 2 views of the left femur were obtained.



FINDINGS: The patient is status post post remote operative reduction and internal fixation

of an intertrochanteric fracture. Note is made of a femoral head nail and sideplate

transfixed with multiple screws. The fracture is healed. The bones are in normal

alignment.



There is soft tissue swelling present along the lateral aspect of the thigh at the level

of the mid and distal diaphysis of the femur. No focal osseous abnormality is seen.



IMPRESSION:  POSTSURGICAL CHANGES AND SOFT TISSUE SWELLING. NO FOCAL OSSEOUS ABNORMALITY

IS SEEN.

## 2017-07-21 NOTE — RAD
HISTORY: Left lower extremity pain



COMPARISONS: None relevant



TECHNIQUE: Multiple transverse and longitudinal ultrasound images were obtained of the

left lower extremity from the level of the common femoral vein inferiorly through to the

infrapopliteal veins  using grayscale, color Doppler, and spectral Doppler imaging with

and without compression and with augmentation. Comparison images were obtained of the

contralateral common femoral vein.



FINDINGS:

VEINS: The venous system of the left lower extremity is compressible throughout its

course, with normal flow on color Doppler imaging and normal response to augmentation on

spectral Doppler imaging. Reflux is noted into the popliteal vein.



SOFT TISSUES: Unremarkable.



OTHER FINDINGS: None.



IMPRESSION: 

NO LEFT LOWER EXTREMITY DEEP VEIN THROMBOSIS

## 2017-07-21 NOTE — ED
Lower Extremity





- HPI Summary


HPI Summary: 





71 male presents with complaints of left upper leg pain that began last night 

and got much worse today 7/21. Patient states it is swollen, hard and painful 

with movement or when bearing weight/walking. Denies any known recent trauma or 

injury. Was seen at PCP who suggested he be seen in ED for further evaluation. 

Patient had hip surgery 10 years ago for a fractured hip, PCP thought it had to 

do with that. Denies any medication use. States pain feels like pressure and 

aching. Denies redness or bruising. No other complaints or injuries.  NO 

numbness or tingling. No pain in groin, normal genitalia. Denies SOB, no chest 

pain. Non smoker, no recent travel. Denies nausea or vomiting. Has history of 

DVT.





- History of Current Complaint


Chief Complaint: EDExtremityLower


Stated Complaint: LT LEG SWELLING / PAIN


Time Seen by Provider: 07/21/17 11:40


Hx Obtained From: Patient, Family/Caretaker - wife


Mechanism Of Injury: Unknown


Onset of Pain: Days - 2


Onset/Duration: Days - 2


Severity Initially: Severe


Severity Currently: Mild


Pain Intensity: 3


Pain Scale Used: 0-10 Numeric


Timing: Intermittent - with movement and walking


Location: Is Discrete @ - left thigh


Character Of Pain: Sharp, Aching, Throbbing


Associated Signs And Symptoms: Positive: Swelling


Aggravating Factor(s): Standing, Ambulation, Weight Bearing


Alleviating Factor(s): Rest, Elevation


Able to Bear Weight: No - due to pain, was ablle prior to arrival without choice





- Risk Factors


Gout Risk Factors: Age Over 40, Male, Hypertension


DVT Risk Factors: Prior DVT


Septic Arthritis Risk Factor: Negative





- Allergies/Home Medications


Allergies/Adverse Reactions: 


 Allergies











Allergy/AdvReac Type Severity Reaction Status Date / Time


 


No Known Allergies Allergy   Verified 07/19/17 10:02














PMH/Surg Hx/FS Hx/Imm Hx


Endocrine/Hematology History: Reports: Hx Anemia - iron def., taking supplement


Cardiovascular History: Reports: Hx Coronary Artery Disease, Hx Hypertension - 

no longer taking BP medication, Other Cardiovascular Problems/Disorders - mild 

aortic stenosis


   Denies: Hx Pacemaker/ICD


Respiratory History: Reports: Other Respiratory Problems/Disorders - RECENT 

PNEUMONIA 6/2017, and also in Jan. 2017, DR. MORALES DAN


GI History: Reports: Hx Gastroesophageal Reflux Disease - FUNDOPLICATION 

SURGERY 2001, ON NEXIUM.


 History: Reports: Other  Problems/Disorders - BPH


Musculoskeletal History: Reports: Hx Arthritis


Sensory History: Reports: Hx Cataracts, Hx Contacts or Glasses, Hx Hearing Aid


Opthamlomology History: Reports: Hx Cataracts, Hx Contacts or Glasses


Neurological History: Reports: Hx Headaches - HA once a day, "poor circulation 

back of head", Other Neuro Impairments/Disorders - basilar artery occlusion & 

stenosis


Psychiatric History: 


   Denies: Hx Panic Disorder





- Surgical History


Surgery Procedure, Year, and Place: 2001 LAP NISSEN FUNDOPLICATION [facility].  2001 L 

[place] [facility] [place]  6/10 RIGHT INGUINAL HERNAI REPAIR


Hx Anesthesia Reactions: No





- Immunization History


Immunizations Up to Date: Yes


Infectious Disease History: No


Infectious Disease History: 


   Denies: Traveled Outside the [place] in Last 30 Days





- Family History


Known Family History: Positive: None, Other - aneurysm





- Social History


Alcohol Use: None


Hx Substance Use: No


Substance Use Type: Reports: None


Smoking Status (MU): Former Smoker


Amount Used/How Often: light smoker for approx 6 yrs





Review of Systems


Constitutional: Negative


Cardiovascular: Negative


Respiratory: Negative


Gastrointestinal: Negative


Positive: Arthralgia - left leg , Myalgia, Decreased ROM, Edema


Skin: Negative


Neurological: Negative


All Other Systems Reviewed And Are Negative: Yes





Physical Exam


Triage Information Reviewed: Yes


Vital Signs On Initial Exam: 


 Initial Vitals











Temp Pulse Resp BP Pulse Ox


 


 97.6 F   67   20   128/61   96 


 


 07/21/17 11:30  07/21/17 11:30  07/21/17 11:30  07/21/17 11:30  07/21/17 11:30











Vital Signs Reviewed: Yes


Appearance: Positive: Well-Appearing, Well-Nourished, Pain Distress - moderate 

with movement or weight bearing. however laying comortable minimal pain when 

resting and in stretcher


Skin: Positive: Warm, Skin Color Reflects Adequate Perfusion, Dry, Other - very 

firm to touch upper entire thigh of left LE. no erythema, ecchymosis, obvious 

deformity or signs of trauma, step off or crepitus. not hot to touch..  Negative

: Cold, Numb, Cyanosis @, Diaphoretic, Pale


Head/Face: Positive: Normal Head/Face Inspection


Eyes: Positive: Conjunctiva Clear


ENT: Positive: Hearing grossly normal


Neck: Positive: Supple, Nontender, No Lymphadenopathy


Respiratory/Lung Sounds: Positive: Clear to Auscultation, Breath Sounds 

Present.  Negative: Rales, Rhonchi, Wheezes


Cardiovascular: Positive: Normal, RRR, Pulses are Symmetrical in both Upper and 

Lower Extremities - pedal weak and difficult to palpate however dorsalis pedis 2

+ b/l.  Negative: Murmur, Rub, Leg Edema Left, Leg Edema Right


Bowel Sounds: Positive: Present


Musculoskeletal: Positive: Limited @ - left leg with flexion of hip and knee 

due to pain and swelling, Pain @ - left thigh with movement, Edema Left, Other 

- 5 P's not present for compartment syndrome rule out


Neurological: Positive: Normal, Sensory/Motor Intact - sensation intact, Alert, 

Oriented to Person Place, Time, CN Intact II-III, Reflexes Intact, NV Bundle 

Intact Distally, Unable to Assess Gait - due to pain in left leg


Psychiatric: Positive: Affect/Mood Appropriate





Diagnostics





- Vital Signs


 Vital Signs











  Temp Pulse Resp BP Pulse Ox


 


 07/21/17 12:00   60   115/48  94


 


 07/21/17 11:47  98 F  64  16  149/66  95


 


 07/21/17 11:45     149/66 


 


 07/21/17 11:30  97.6 F  67  20  128/61  96














- Laboratory


Lab Statement: Any lab studies that have been ordered have been reviewed, and 

results considered in the medical decision making process.





- Radiology


  ** femur


Xray Interpretation: No Acute Changes


Radiology Interpretation Completed By: Radiologist





- CT


  ** lower extremity


CT Interpretation: Positive (See Comments) - ILL-DEFINED AREA OF INCREASED 

DENSITY ADJACENT TO THE ANTEROLATERAL ASPECT OF THE MID DIAPHYSIS OF THE FEMUR 

SUSPICIOUS FOR A HEMATOMA LESS LIKELY SOFT TISSUE DENSITY MASS. RECOMMEND 

CLINICAL FOLLOW-UP TO RESOLUTION. IF THIS DOES NOT RESOLVE RECOMMEND AN MRI OF 

THE THIGH FOR FURTHER EVALUATION.


CT Interpretation Completed By: Radiologist





- Ultrasound


  ** No standard instances


Ultrasound Interpretation: No Acute Changes - NO LEFT LOWER EXTREMITY DEEP VEIN 

THROMBOSIS


Ultrasound Interpretation Completed By: Radiologist





Lower Extremity Course/Dx





- Course


Course Of Treatment: patient was comfortable and did not want pain management 

at this time. obtained U/S to rule out DVT and was negative. X-ray and CT which 

showed possible hematoma of left thigh area. Dr Villatoro also evaluated patient 

and is aware with current plan of action. Tylenol for pain management and 

follow up with orthopedics on Monday. Not on blood thinners. Aware of worsening 

signs and symptoms. Rest and do not bear weight. Elevate. ice





- Diagnoses


Differential Diagnosis/HQI/PQRI: Positive: Contusion, Dislocation, DVT, 

Fracture (Closed), Gout, Infection, Sprain, Strain, Tendonitis


Provider Diagnoses: 


 Hematoma of left thigh








- Physician Notifications


Discussed Care Of Patient With: Dr Villatoro





Discharge





- Discharge Plan


Condition: Stable


Disposition: HOME


Patient Education Materials:  Leg Pain (ED), Hematoma (ED)


Referrals: 


Keegan Bejarano MD [Primary Care Provider] - 


Jaelyn Duarte MD [Medical Doctor] - 


Additional Instructions: 


Please make an appointment to follow up with orthopedics and primary care for 

further evaluation and imaging.


If symptoms worsen or new symptoms develop such as increased pain, pale colored 

skin or redness please seek medical attention promptly.


Rest, elevate and take tylenol for pain.

## 2017-07-21 NOTE — RAD
INDICATION:  Left thigh swelling and pain.



COMPARISON:  Comparison is made of a fracture study of the left femur from July 21, 2017.



TECHNIQUE: Contiguous axial sections were obtained of the left thigh.  Images were

reconstructed in the sagittal and coronal planes.



FINDINGS:  There is a focal ill-defined area of increased density present adjacent to the

anterolateral aspect of the mid diaphysis of the femur in the quadriceps muscle. The

borders are not well-defined. This is nonspecific finding likely represents hematoma

although a soft tissue mass cannot be excluded. This appears to measure at least 8.8 x 5.8

x 4.2 cm.



Postsurgical changes are noted in the proximal humeral left femur from a prior

intertrochanteric fracture reduction. The fracture is no longer visualized. No significant

focal osseous abnormality is seen.



IMPRESSION:  ILL-DEFINED AREA OF INCREASED DENSITY ADJACENT TO THE ANTEROLATERAL ASPECT OF

THE MID DIAPHYSIS OF THE FEMUR SUSPICIOUS FOR A HEMATOMA LESS LIKELY SOFT TISSUE DENSITY

MASS. RECOMMEND CLINICAL FOLLOW-UP TO RESOLUTION. IF THIS DOES NOT RESOLVE RECOMMEND AN

MRI OF THE THIGH FOR FURTHER EVALUATION.

## 2018-08-17 ENCOUNTER — HOSPITAL ENCOUNTER (EMERGENCY)
Dept: HOSPITAL 25 - ED | Age: 83
Discharge: HOME | End: 2018-08-17
Payer: MEDICARE

## 2018-08-17 VITALS — DIASTOLIC BLOOD PRESSURE: 76 MMHG | SYSTOLIC BLOOD PRESSURE: 145 MMHG

## 2018-08-17 DIAGNOSIS — R51: ICD-10-CM

## 2018-08-17 DIAGNOSIS — K21.9: ICD-10-CM

## 2018-08-17 DIAGNOSIS — I10: Primary | ICD-10-CM

## 2018-08-17 DIAGNOSIS — I51.7: ICD-10-CM

## 2018-08-17 DIAGNOSIS — D72.829: ICD-10-CM

## 2018-08-17 DIAGNOSIS — Z87.891: ICD-10-CM

## 2018-08-17 DIAGNOSIS — D47.3: ICD-10-CM

## 2018-08-17 LAB
BASOPHILS # BLD AUTO: 0.3 10^3/UL (ref 0–0.2)
BASOPHILS # BLD: 1.8 10^3/UL (ref 0–0.2)
EOSINOPHIL # BLD AUTO: 0.6 10^3/UL (ref 0–0.6)
HCT VFR BLD AUTO: 43 % (ref 42–52)
HGB BLD-MCNC: 13 G/DL (ref 14–18)
LYMPHOCYTES # BLD AUTO: 1.7 10^3/UL (ref 1–4.8)
MCH RBC QN AUTO: 20 PG (ref 27–31)
MCHC RBC AUTO-ENTMCNC: 31 G/DL (ref 31–36)
MCV RBC AUTO: 66 FL (ref 80–94)
MONOCYTES # BLD AUTO: 2.8 10^3/UL (ref 0–0.8)
NEUTROPHILS # BLD AUTO: 30.8 10^3/UL (ref 1.5–7.7)
NEUTROPHILS # BLD: 28.7 10^3/UL (ref 1.5–7.7)
PLATELET # BLD AUTO: 508 10^3/UL (ref 150–450)
RBC # BLD AUTO: 6.48 10^6/UL (ref 4–5.4)
WBC # BLD AUTO: 36.3 10^3/UL (ref 3.5–10.8)

## 2018-08-17 PROCEDURE — 71045 X-RAY EXAM CHEST 1 VIEW: CPT

## 2018-08-17 PROCEDURE — 84443 ASSAY THYROID STIM HORMONE: CPT

## 2018-08-17 PROCEDURE — 36415 COLL VENOUS BLD VENIPUNCTURE: CPT

## 2018-08-17 PROCEDURE — 85025 COMPLETE CBC W/AUTO DIFF WBC: CPT

## 2018-08-17 PROCEDURE — 83735 ASSAY OF MAGNESIUM: CPT

## 2018-08-17 PROCEDURE — 99283 EMERGENCY DEPT VISIT LOW MDM: CPT

## 2018-08-17 PROCEDURE — 84484 ASSAY OF TROPONIN QUANT: CPT

## 2018-08-17 PROCEDURE — 80053 COMPREHEN METABOLIC PANEL: CPT

## 2018-08-17 PROCEDURE — 93005 ELECTROCARDIOGRAM TRACING: CPT

## 2018-08-17 PROCEDURE — 85060 BLOOD SMEAR INTERPRETATION: CPT

## 2018-08-17 NOTE — ED
Hypertension





- HPI Summary


HPI Summary: 


[facility] attending [facility]con Villatoro.





Pt is an 84 y/o M c/o HTN and HA onset ~3 days ago. He notes that he has 

measured his BP at home as high as "180" but sits around "160" usually. Per 

triage, pain of HA is rated a 4/10. Assoc. Sx: HA, back pain. Denies: SOB. 

Patient has a Hx of HTN and notes regulating it with the medication, Verapamil. 

Per RN assessment, the patient fell last week and has associated upper back 

pain that radiates to R-side of chest sometimes.





I, Dr. Villatoro, personally performed the services described in this documentation 

as scribed in my presence and it is both accurate and complete.





- History of Current Complaint


Chief Complaint: EDHypertension


Stated Complaint: HIGH BP,DIZZINESS


Time Seen by Provider: 08/17/18 10:17


Hx Obtained From: Patient


Onset/Duration: Started Days Ago - 3 days


Timing: Intermittent


Aggravating Factor(s): Nothing


Alleviating Factor(s): Other - POS: medication.


Associated Signs & Symptoms: Headaches, Other: - NEG: SOB       POS: back pain





- Allergies/Home Medications


Allergies/Adverse Reactions: 


 Allergies











Allergy/AdvReac Type Severity Reaction Status Date / Time


 


No Known Allergies Allergy   Verified 08/17/18 10:15














PMH/Surg Hx/FS Hx/Imm Hx


Endocrine/Hematology History: Reports: Hx Anemia - iron def., taking supplement


   Denies: Hx Diabetes


Cardiovascular History: Reports: Hx Hypertension - no longer taking BP 

medication, Other Cardiovascular Problems/Disorders - mild aortic stenosis


   Denies: Hx Angina, Hx Coronary Artery Disease, Hx Hypercholesterolemia, Hx 

Myocardial Infarction, Hx Pacemaker/ICD


Respiratory History: Reports: Other Respiratory Problems/Disorders - RECENT 

PNEUMONIA 6/2017, and also in Jan. 2017, DR. NIELSEN AWARE


   Denies: Hx Asthma, Hx Chronic Obstructive Pulmonary Disease (COPD)


GI History: Reports: Hx Gastroesophageal Reflux Disease - FUNDOPLICATION 

SURGERY 2001, ON NEXIUM.


 History: Reports: Other  Problems/Disorders - BPH


Musculoskeletal History: Reports: Hx Arthritis


Sensory History: Reports: Hx Cataracts, Hx Contacts or Glasses, Hx Hearing Aid


Opthamlomology History: Reports: Hx Cataracts, Hx Contacts or Glasses


Neurological History: Reports: Hx Headaches - HA once a day, "poor circulation 

back of head", Other Neuro Impairments/Disorders - basilar artery occlusion & 

stenosis


Psychiatric History: 


   Denies: Hx Panic Disorder





- Surgical History


Surgery Procedure, Year, and Place: 2001 LAP NISSEN FUNDOPLICATION [facility].  2001 L 

HIP FRACTURE [facility] – [place].  6/10 RIGHT INGUINAL HERNAI REPAIR


Hx Anesthesia Reactions: No


Infectious Disease History: No


Infectious Disease History: 


   Denies: Traveled Outside the [place] in Last 30 Days





- Family History


Known Family History: Positive: Other - aneurysm





- Social History


Occupation: Retired


Lives: With Family


Alcohol Use: None


Hx Substance Use: No


Substance Use Type: Reports: None


Smoking Status (MU): Former Smoker


Amount Used/How Often: light smoker for approx 6 yrs





Review of Systems


Negative: Shortness Of Breath


Positive: Other - POS: back pain


Positive: Headache


All Other Systems Reviewed And Are Negative: Yes





Physical Exam





- Summary


Physical Exam Summary: 


VITAL SIGNS: Reviewed.


GENERAL: Patient is an obese Male who is lethargic, unable to answer questions 

in the stretcher. Patient is not in any acute respiratory distress.


HEAD AND FACE: No signs of trauma. No ecchymosis, hematomas or skull 

depressions. No sinus tenderness.


EYES: Pupils are "pinpoint"


EARS: Hearing grossly intact. Ear canals and tympanic membranes are within 

normal limits.


MOUTH: Oropharynx within normal limits.


NECK: Supple, trachea is midline, no adenopathy, no JVD, no carotid bruit, no c-

spine tenderness, neck with full ROM.


CHEST: Symmetric, no tenderness at palpation


LUNGS: Clear to auscultation bilaterally. No wheezing or crackles.


CVS: Regular rate and rhythm, S1 and S2 present, no murmurs or gallops 

appreciated.


ABDOMEN: Soft, non-tender. No signs of distention. No rebound no guarding, and 

no masses palpated. Bowel sounds are normal.


EXTREMITIES: RLE posterior erythema, swelling and warm.


NEURO: Alert and oriented x 3. No acute neurological deficits. Speech is normal 

and follows commands.


SKIN: Dry and warm


GCS: 14


Triage Information Reviewed: Yes


Vital Signs On Initial Exam: 


 Initial Vitals











Temp Pulse Resp BP Pulse Ox


 


 97.3 F   74   16   163/92   96 


 


 08/17/18 10:13  08/17/18 10:13  08/17/18 10:13  08/17/18 10:13  08/17/18 10:13











Vital Signs Reviewed: Yes





Diagnostics





- Vital Signs


 Vital Signs











  Temp Pulse Resp BP Pulse Ox


 


 08/17/18 10:13  97.3 F  74  16  163/92  96














- Laboratory


Result Diagrams: 


 08/17/18 11:01





 08/17/18 11:01


Lab Statement: Any lab studies that have been ordered have been reviewed, and 

results considered in the medical decision making process.





- Radiology


  ** CXR


Xray Interpretation: Positive (See Comments) - IMPRESSION: Stigmata of chronic 

obstructive pulmonary disease with probable associated interstitial fibrosis. 

Relative low lung volumes for this patient compared with the prior exam with 

mild subsegmental atelectasis. Cardiomegaly without evidence for pulmonary 

edema.


Radiology Interpretation Completed By: Radiologist - Report has been reviewed 

by ED provider and Radiologist.





- EKG


  ** 1024


Cardiac Rate: NL - 69 bpm


EKG Rhythm: Sinus Rhythm


EKG Interpretation: Hyper QT waves V2-V3, evidence for LVH, nml axis





Re-Evaluation





- Re-Evaluation


  ** First Eval


Re-Evaluation Time: 11:30


Change: Improved


Comment: Patient reports feeling better and BP has been lowered to 129/69





Hypertension Course/Dx





- Course


Assessment/Plan: This patient is an 85-year-old male who presents to the 

emergency department with a chief complaint of having headaches and increased 

blood pressure.  Patient reports that his blood pressure is more than 160/90.  

Patient has past medical history significant for community acquired pneumonia, 

hypertension, GERD, history of chronic use of esterase, hyponatremia, 

polymyalgia rheumatica, and dizziness.  Blood test results shows a white blood 

cell count of 36.3, hemoglobin of 13 hematocrit 43 MCV is 66.  These numbers 

are similar to previous baseline secondary to polycythemia vera.  He is 

following with Dr. Urena from oncology for the seizures.  Creatinine is 1.26 

which is improved from a previous on July 17, 2018.  Alkaline phosphatase is 

159.  Initially I decided to give labetalol for better control the blood 

pressure however after approximately 15 minutes while the patient was lying in 

the stretcher the blood pressure is only 126/69.  Subsequent blood pressures 

continues to be in the 120s over 60s range.  Therefore I will discharge the 

patient home with follow-up with the primary care physician and Dr. Urena.  

I discussed all the findings and test results with the patient. Patient was 

instructed to return to the emergency room immediately if any of the symptoms 

return or worsens. Plan of care was discussed with the patient and understands 

and agrees. All questions were answered at patient satisfaction.  There were no 

further complaints or concerns.  Lung exam before discharge: CTA B/L. Good air 

exchange. No wheezing or crackles heard. CVS: S1 and S2 present. No murmurs 

appreciated. Patient is alert and oriented x 3. Patient is hemodynamically 

stable. Patient will be discharged home with follow up PCP in the next 2-3 days





- Diagnoses


Differential Diagnosis/HQI PQRI: Hypertension, Hypertensive Crisis, 

Hypertensive Urgency


Provider Diagnoses: 


 Uncontrolled hypertension, Leukocytosis








Discharge





- Sign-Out/Discharge


Documenting (check all that apply): Patient Departure





- Discharge Plan


Condition: Stable


Disposition: HOME


Patient Education Materials:  Chronic Hypertension (ED)


Referrals: 


Keegan Bejarano MD [Primary Care Provider] - 3 Days


Additional Instructions: 


FOLLOW UP WITH YOUR PRIMARY CARE PROVIDER WITHIN ONE WEEK FOR HIGH BLOOD 

PRESSURE NOTED TODAY.


RETURN TO THE ED FOR ANY WORSENING OR NEW SYMPTOMS.





- Billing Disposition and Condition


Condition: STABLE


Disposition: Home





Attestation Statement


Scribe Attestation: 





I, Dr. Villatoro personally performed the services described in this documentation 

as scribed in my presence and it is both accurate and complete.

## 2018-08-17 NOTE — RAD
Indication: Hypertension. Dizziness. Mild aortic stenosis. Previous pneumonia.



Comparison: April 17, 2018



Technique: Upright AP 1030 hours



Report: Moderately severe prominence of the interstitial markings as on the prior exam.

Decreased lung volume compared with the prior exam with associated mild crowding of the

pulmonary markings and minimal subsegmental atelectasis. No compelling alveolar

inflammatory infiltrate, suspicious focal pulmonary lesion, pleural effusion,

pneumothorax. Cardiomegaly. Unremarkable central pulmonary vasculature. Healed LEFT fifth

rib fracture posteriorly. 



IMPRESSION: 

#. Stigmata of chronic obstructive pulmonary disease with probable associated interstitial

fibrosis. 

#. Relative low lung volumes for this patient compared with the prior exam with mild

subsegmental atelectasis. 

#. Cardiomegaly without evidence for pulmonary edema.

## 2018-08-25 ENCOUNTER — HOSPITAL ENCOUNTER (INPATIENT)
Dept: HOSPITAL 25 - ED | Age: 83
LOS: 4 days | Discharge: HOME | DRG: 603 | End: 2018-08-29
Attending: HOSPITALIST | Admitting: HOSPITALIST
Payer: MEDICARE

## 2018-08-25 ENCOUNTER — HOSPITAL ENCOUNTER (EMERGENCY)
Dept: HOSPITAL 25 - UCEAST | Age: 83
Discharge: HOME | End: 2018-08-25
Payer: MEDICARE

## 2018-08-25 VITALS — DIASTOLIC BLOOD PRESSURE: 77 MMHG | SYSTOLIC BLOOD PRESSURE: 172 MMHG

## 2018-08-25 DIAGNOSIS — D72.0: ICD-10-CM

## 2018-08-25 DIAGNOSIS — Z79.82: ICD-10-CM

## 2018-08-25 DIAGNOSIS — K21.9: ICD-10-CM

## 2018-08-25 DIAGNOSIS — Z87.891: ICD-10-CM

## 2018-08-25 DIAGNOSIS — I10: ICD-10-CM

## 2018-08-25 DIAGNOSIS — M35.3: ICD-10-CM

## 2018-08-25 DIAGNOSIS — Z82.49: ICD-10-CM

## 2018-08-25 DIAGNOSIS — M19.90: ICD-10-CM

## 2018-08-25 DIAGNOSIS — Z81.1: ICD-10-CM

## 2018-08-25 DIAGNOSIS — L03.116: Primary | ICD-10-CM

## 2018-08-25 DIAGNOSIS — H26.9: ICD-10-CM
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
## 2018-08-25 DIAGNOSIS — Z97.4: ICD-10-CM

## 2018-08-25 DIAGNOSIS — S80.812A: ICD-10-CM
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
## 2018-08-25 DIAGNOSIS — D45: ICD-10-CM

## 2018-08-25 DIAGNOSIS — S80.12XA: ICD-10-CM

## 2018-08-25 DIAGNOSIS — K44.9: ICD-10-CM

## 2018-08-25 DIAGNOSIS — I35.0: ICD-10-CM

## 2018-08-25 DIAGNOSIS — Z87.01: ICD-10-CM

## 2018-08-25 PROCEDURE — 83605 ASSAY OF LACTIC ACID: CPT

## 2018-08-25 PROCEDURE — 99212 OFFICE O/P EST SF 10 MIN: CPT

## 2018-08-25 PROCEDURE — 99284 EMERGENCY DEPT VISIT MOD MDM: CPT

## 2018-08-25 PROCEDURE — 85652 RBC SED RATE AUTOMATED: CPT

## 2018-08-25 PROCEDURE — 86140 C-REACTIVE PROTEIN: CPT

## 2018-08-25 PROCEDURE — 80048 BASIC METABOLIC PNL TOTAL CA: CPT

## 2018-08-25 PROCEDURE — 87040 BLOOD CULTURE FOR BACTERIA: CPT

## 2018-08-25 PROCEDURE — 90715 TDAP VACCINE 7 YRS/> IM: CPT

## 2018-08-25 PROCEDURE — G0463 HOSPITAL OUTPT CLINIC VISIT: HCPCS

## 2018-08-25 PROCEDURE — 85025 COMPLETE CBC W/AUTO DIFF WBC: CPT

## 2018-08-25 PROCEDURE — 36415 COLL VENOUS BLD VENIPUNCTURE: CPT

## 2018-08-25 PROCEDURE — 85060 BLOOD SMEAR INTERPRETATION: CPT

## 2018-08-25 PROCEDURE — 80053 COMPREHEN METABOLIC PANEL: CPT

## 2018-08-25 NOTE — UC
Skin Complaint HPI





- HPI Summary


HPI Summary: 





injured left lower anterior leg on a fence post about one week ago---has had 

progressive redness swelling and pain





- History of Current Complaint


Chief Complaint: UCLowerExtremity


Time Seen by Provider: 08/25/18 21:16


Stated Complaint: L LEG COMPLAINT


Hx Obtained From: Patient


Onset/Duration: Gradual Onset, Lasting Weeks - 1, Still Present


Timing: Constant


Onset Severity: Mild


Current Severity: Moderate


Location: Discrete - left lower leg


Character: Swelling, Pain, Redness


Aggravating Factor(s): Nothing


Alleviating Factor(s): Nothing


Associated Signs & Symptoms: Positive: Tenderness





- Allergy/Home Medications


Allergies/Adverse Reactions: 


 Allergies











Allergy/AdvReac Type Severity Reaction Status Date / Time


 


No Known Allergies Allergy   Verified 08/25/18 21:12











Home Medications: 


 Home Medications





Acetaminophen [Acetaminophen Extra Strength] 500 mg PO PRN 08/25/18 [History]











Review of Systems


Constitutional: Negative


Skin: Negative, Other - open are arnterior lower leg


Eyes: Negative


ENT: Negative


Respiratory: Negative


Cardiovascular: Negative


Gastrointestinal: Negative


Genitourinary: Negative


Motor: Negative


Neurovascular: Negative


Musculoskeletal: Negative, Edema, Myalgia


Neurological: Negative


Psychological: Negative


Is Patient Immunocompromised?: No


All Other Systems Reviewed And Are Negative: Yes





PMH/Surg Hx/FS Hx/Imm Hx


Previously Healthy: No - polycythema vera


Cardiovascular History: Hypertension


GI/ History: Gastroesophageal Reflux





- Surgical History


Surgical History: Yes


Surgery Procedure, Year, and Place: 2001 LAP NISSEN FUNDOPLICATION [facility].  2001 L 

HIP FRACTURE [facility].  6/10 RIGHT INGUINAL HERNAI REPAIR





- Family History


Known Family History: Positive: None, Other - aneurysm





- Social History


Occupation: Retired


Lives: With Family


Alcohol Use: None


Substance Use Type: None


Smoking Status (MU): Former Smoker


Amount Used/How Often: light smoker for approx 6 yrs


When Did the Patient Quit Smoking/Using Tobacco: quit when pt was 25 yrs old





- Immunization History


Most Recent Influenza Vaccination: Not this year


Most Recent Pneumonia Vaccination: Never





Physical Exam


Triage Information Reviewed: Yes


Appearance: Well-Appearing, No Pain Distress, Well-Nourished


Vital Signs: 


 Initial Vital Signs











Temp  98.5 F   08/25/18 21:06


 


Pulse  74   08/25/18 21:06


 


Resp  16   08/25/18 21:06


 


BP  172/77   08/25/18 21:06


 


Pulse Ox  97   08/25/18 21:06











Eye Exam: Normal


ENT Exam: Normal


Dental Exam: Normal


Neck exam: Normal


Neck: Positive: 1


Respiratory Exam: Normal


Cardiovascular Exam: Normal


Abdominal Exam: Normal


Musculoskeletal Exam: Normal


Musculoskeletal: Positive: Edema @ - left lower leg


Neurological Exam: Normal


Psychological Exam: Normal


Skin Exam: Normal


Skin: Positive: Other - skin avulsion left lower leg





Course/Dx





- Course


Course Of Treatment: to [facility] by private car for further evaluation and 

treatment





- Diagnoses


Provider Diagnoses: cellulitis left lower leg





Discharge





- Sign-Out/Discharge


Documenting (check all that apply): Patient Departure


All imaging exams completed and their final reports reviewed: No Studies





- Discharge Plan


Condition: Stable


Disposition: HOME


Patient Education Materials:  Cellulitis (ED)


Referrals: 


Keegan Bejarano MD [Primary Care Provider] - 


Additional Instructions: 


You are being discharged from the urgent care to go directly to the emergency 

department at [facility] for further treatment of you leg





- Billing Disposition and Condition


Condition: STABLE


Disposition: Home

## 2018-08-26 LAB
BASOPHILS # BLD AUTO: 0.5 10^3/UL (ref 0–0.2)
EOSINOPHIL # BLD AUTO: 0.6 10^3/UL (ref 0–0.6)
HCT VFR BLD AUTO: 41 % (ref 42–52)
HGB BLD-MCNC: 12.3 G/DL (ref 14–18)
LYMPHOCYTES # BLD AUTO: 2 10^3/UL (ref 1–4.8)
MCH RBC QN AUTO: 20 PG (ref 27–31)
MCHC RBC AUTO-ENTMCNC: 30 G/DL (ref 31–36)
MCV RBC AUTO: 66 FL (ref 80–94)
MONOCYTES # BLD AUTO: 2.8 10^3/UL (ref 0–0.8)
NEUTROPHILS # BLD AUTO: 31.5 10^3/UL (ref 1.5–7.7)
NRBC # BLD AUTO: 0 10^3/UL
NRBC BLD QL AUTO: 0
PLATELET # BLD AUTO: 576 10^3/UL (ref 150–450)
RBC # BLD AUTO: 6.23 10^6/UL (ref 4–5.4)
WBC # BLD AUTO: 37.2 10^3/UL (ref 3.5–10.8)

## 2018-08-26 RX ADMIN — SODIUM CHLORIDE SCH MLS/HR: 900 IRRIGANT IRRIGATION at 11:00

## 2018-08-26 RX ADMIN — ACETAMINOPHEN PRN MG: 325 TABLET ORAL at 10:58

## 2018-08-26 RX ADMIN — VERAPAMIL HYDROCHLORIDE SCH MG: 240 TABLET, FILM COATED, EXTENDED RELEASE ORAL at 10:58

## 2018-08-26 RX ADMIN — OMEPRAZOLE SCH MG: 20 CAPSULE, DELAYED RELEASE ORAL at 20:22

## 2018-08-26 RX ADMIN — METRONIDAZOLE SCH MLS/HR: 5 INJECTION, SOLUTION INTRAVENOUS at 17:56

## 2018-08-26 RX ADMIN — HEPARIN SODIUM SCH UNITS: 5000 INJECTION INTRAVENOUS; SUBCUTANEOUS at 14:26

## 2018-08-26 RX ADMIN — THERA TABS SCH TAB: TAB at 10:58

## 2018-08-26 RX ADMIN — METRONIDAZOLE SCH MLS/HR: 5 INJECTION, SOLUTION INTRAVENOUS at 12:07

## 2018-08-26 RX ADMIN — OMEPRAZOLE SCH MG: 20 CAPSULE, DELAYED RELEASE ORAL at 10:58

## 2018-08-26 RX ADMIN — MAGNESIUM OXIDE TAB 400 MG (241.3 MG ELEMENTAL MG) SCH MG: 400 (241.3 MG) TAB at 10:59

## 2018-08-26 RX ADMIN — HEPARIN SODIUM SCH UNITS: 5000 INJECTION INTRAVENOUS; SUBCUTANEOUS at 21:49

## 2018-08-26 NOTE — RAD
HISTORY: Left lower extremity pain



TECHNIQUE: Multiple transverse and longitudinal ultrasound images were obtained of the

veins of the left lower extremity using grayscale, color Doppler, and spectral Doppler

imaging with and without compression and with augmentation. 



FINDINGS:



VEINS: The common femoral vein, deep femoral vein, femoral vein and popliteal vein are

compressible throughout their course, with normal flow on color Doppler imaging and normal

response to augmentation on spectral Doppler imaging.



SOFT TISSUES: There is a mild degree of subcutaneous edema at the subcutaneous tissue of

the left lower leg.



IMPRESSION: 

No sonographic evidence of deep vein thrombosis.

## 2018-08-26 NOTE — HP
CC:  Dr. Bejarano

 

HISTORY AND PHYSICAL:

 

DATE OF ADMISSION:  18

 

TIME OF EVALUATION:  07:55 a.m.

 

PRIMARY CARE PROVIDER:  Dr. Bejarano.

 

CHIEF COMPLAINT:  "My leg is swollen."

 

HISTORY OF PRESENT ILLNESS:  Mr. Johnson is a an 85-year-old male with a past medical history of hypert
ension, GERD, hiatal hernia, questionable PMR, who presents to the emergency room with complaints of 
left lower extremity pain, swelling, and redness.

 

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
craped his leg on the metal horseman fence.  He states that he cleansed the wound, applied some antib
iotic ointment with no improvement.  He then decided to use a horse poultice, but the leg continued t
o swell and the redness continued to spread.  He states he only has pain when he bears weight on that
 leg, but otherwise, he feels well.  He denies fever, chills, nausea, vomiting, shortness of breath, 
palpitations, chest pain, or any other complaints.

 

He states that the leg is not really bothering him a lot, but his wife told him to come to the emerge
ncy room, reason why he presented today.

 

PAST MEDICAL HISTORY:

1.  Hypertension.

2.  GERD.

3.  Hiatal hernia.

4.  Question of history of PMR.

5.  Moderate aortic stenosis.

6.  Status post hip ORIF.

7.  Status post hernia repair.

8.  Status post Nissen fundoplication.

9.  Polycythemia vera followed by Dr. Urena.

 

MEDICATION LIST:

1.  Acetaminophen Extra Strength 500 mg p.o. q.4 hours.

2.  Aspirin 81 mg p.o. twice a week.  The dose was reduced after the patient had the GI bleed.

3.  Nexium 40 mg p.o. b.i.d.

4.  Magnesium oxide 1 tablet 400 mg p.o. daily.

5.  Multivitamin 1 tablet p.o. daily.

6.  Verapamil 120 mg p.o. daily.

 

ALLERGIES:  No known drug allergies.

 

FAMILY HISTORY:  His mother had a history of alcohol abuse.  Father  of brain aneurysm.

 

SOCIAL HISTORY:  The patient is a former smoker.  No history of alcohol or drug use. Surrogate decisi
on maker is his wife, Kathy Johnson, phone number is [phone].

 

REVIEW OF SYSTEMS:  A 14-point review of systems was performed and all the pertinent negative and pos
itive findings are in the HPI.

 

                               PHYSICAL EXAMINATION

 

GENERAL:  The patient is a pleasant gentleman, lying in the ED stretcher, in no acute distress.

 

VITAL SIGNS:  Temperature 97.6, heart rate is 77, respiratory rate is 17, oxygen saturation is 97% on
 room air, blood pressure is 157/78.

 

HEENT:  Pupils are equal.  Moist mucous membranes.

 

CHEST:  Breath sounds present bilaterally with no added sounds.

 

CVS:  Normal S1, S2.  Regular rate and rhythm with a systolic murmur.

 

ABDOMEN:  Soft, nontender.  Bowel sounds are present.

 

EXTREMITIES:  There is edema of the left lower extremity with significant circumferential erythema ex
tending from the foot all the way up to his knee. He has multiple excoriations to his shin.  At this 
point, there is no area of fluctuation or drainage.  The area is tender to palpation.  He has good pu
lses and good capillary refill.

 

NEURO:  He is alert and oriented x3.  Able to move all 4 extremities.

 

 LABORATORY DATA:  The patient had a CBC that showed WBC of 37.2, hemoglobin of 12.3, hematocrit 41, 
platelets 576 with 84% neutrophils.  ESR is 31. Chemistry showed sodium of 136, potassium of 3.9, chl
oride of 104, bicarb was 25, BUN was 25, creatinine of 1.38, glucose of 109, lactic acid of 1, calciu
m of 8.9.  LFTs are normal, except for alk phos of 163.  CRP is 18.8.

 

ASSESSMENT AND PLAN:  Mr. Johnson is an 85-year-old male with a past medical history that includes hype
rtension, gastroesophageal reflux disease, hiatal hernia, possible polymyalgia rheumatica, polycythem
ia vera, moderate aortic stenosis that presents to the emergency room with complaints of left leg vani
ma, erythema after sustaining a fall with multiple shin excoriations 10 days ago.

 

1.  Left lower extremity cellulitis.

 

The patient will receive broad-spectrum antibiotics as it started with his excoriation against a meta
l fence and the patient has used different home remedies on it.  He does not remember when he last to
ok a tetanus vaccination, so he received a Tdap booster as he does not recall when he last took it.  
He will receive broad antibiotics therapy with vancomycin, cefepime, and metronidazole.  I am going t
o request Infectious Disease evaluation.

 

The patient will have an ultrasound of his lower extremity to rule out an abscess and DVT.

 

The patient has significant leukocytosis, but this is a chronic finding, likely related to his polycy
themia.

 

2.  Hypertension.  We will continue verapamil.

3.  Gastroesophageal reflux disease.  We will continue PPI.

4.  DVT prophylaxis.  The patient has a score of 3 on the DVT Prophylaxis Risk Assessment Guide and h
e will be started on subcutaneous heparin.

5.  Code status.  The patient is full code.

 

TIME SPENT:  Approximately 55 minutes was spent with the patient's interview, medical records review,
 physical examination to complete the admission, more than half of this time was spent face-to-face w
ith the patient and coordination of care.

 

 

 

[id number] #: [id number]

## 2018-08-26 NOTE — RAD
INDICATION:  Erythematous and palpable lump overlying the proximal anterior left tibia



COMPARISON:  None



TECHNIQUE: Real time ultrasound images of the subcutaneous tissue overlying the anterior

left lower leg were acquired with grey scale and Doppler color flow imaging. 



FINDINGS:



Corresponding to the site of erythema and palpation, there is a subcutaneous mostly

anechoic and avascular fluid collection measuring approximately 4.6 x 1.0 cm in the axial

plane and 6.1 cm in length.



IMPRESSION:  

There is a mostly anechoic fluid collection within the subcutaneous tissue of the anterior

left lower leg. Please correspond to recent trauma to the area with subsequent hematoma.

## 2018-08-26 NOTE — ED
Skin Complaint





- HPI Summary


HPI Summary: 


Patient is an 85-year-old male presenting to the ED with wife with concern of 

left lower extremity erythema with the past week.  He is currently being 

treated for polycythemia vera by Dr. Urena.  He states he has been otherwise 

healthy.  Denies any fevers, sweats, chills.  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  The fence was very dirty, 

and he does not recall cleaning it right away.  Over the past week the area 

became more red and indurated.  He endorses now the area is very swollen very 

red, warm to the touch and is extending up just below the knee and just 

superiorly to the ankle with significant foot swelling and pitting edema.  He 

endorses pain with ambulation, although mild.  Painful to touch.  He states he 

is feeling otherwise well.








- History of Current Complaint


Chief Complaint: EDExtremityLower


Time Seen by Provider: 08/25/18 23:08


Stated Complaint: LT LEG SWELLING


Hx Obtained From: Patient


Onset/Duration: Started Hours Ago


Skin Exposure Onset/Duration: Hours Ago


Timing: Constant


Onset Severity: Moderate


Current Severity: Moderate


Pain Intensity: 5


Pain Scale Used: 0-10 Numeric


Skin Location: Discrete - left lower extremity erythema


Character: Swelling, Pain, Redness


Aggravating Symptom(s): Touch


Alleviating Symptom(s): Nothing


Associated Signs & Symptoms: Negative


Related History: Trauma





- Additional Pertinent History


Primary Care Physician: [id number]





- Allergy/Home Medications


Allergies/Adverse Reactions: 


 Allergies











Allergy/AdvReac Type Severity Reaction Status Date / Time


 


No Known Allergies Allergy   Verified 08/25/18 21:12














PMH/Surg Hx/FS Hx/Imm Hx


Previously Healthy: Yes


Endocrine/Hematology History: Reports: Hx Anemia - iron def., taking supplement


   Denies: Hx Diabetes


Cardiovascular History: Reports: Hx Hypertension, Other Cardiovascular Problems/

Disorders - mild aortic stenosis


   Denies: Hx Angina, Hx Coronary Artery Disease, Hx Hypercholesterolemia, Hx 

Myocardial Infarction, Hx Pacemaker/ICD


Respiratory History: Reports: Other Respiratory Problems/Disorders - RECENT 

PNEUMONIA 6/2017, and also in Jan. 2017, DR. NIELSEN AWARE


   Denies: Hx Asthma, Hx Chronic Obstructive Pulmonary Disease (COPD)


GI History: Reports: Hx Gastroesophageal Reflux Disease - FUNDOPLICATION 

SURGERY 2001, ON NEXIUM.


 History: Reports: Other  Problems/Disorders - BPH


Musculoskeletal History: Reports: Hx Arthritis


Sensory History: Reports: Hx Cataracts, Hx Contacts or Glasses, Hx Hearing Aid


Opthamlomology History: Reports: Hx Cataracts, Hx Contacts or Glasses


Neurological History: Reports: Hx Headaches - HA once a day, "poor circulation 

back of head", Other Neuro Impairments/Disorders - basilar artery occlusion & 

stenosis


Psychiatric History: 


   Denies: Hx Panic Disorder





- Surgical History


Surgery Procedure, Year, and Place: 2001 LAP NISSEN FUNDOPLICATION [facility].  2001 L 

HIP FRACTURE [facility].  6/10 RIGHT INGUINAL HERNAI REPAIR


Hx Anesthesia Reactions: No





- Immunization History


Date of Tetanus Vaccine: utd


Date of Influenza Vaccine: fall 2017


Hx Pertussis Vaccination: No


Immunizations Up to Date: Unable to Obtain/Confirm


Infectious Disease History: No


Infectious Disease History: 


   Denies: Traveled Outside the [place] in Last 30 Days





- Family History


Known Family History: Positive: None, Other - aneurysm





- Social History


Occupation: Unemployed


Lives: With Family


Alcohol Use: None


Hx Substance Use: No


Substance Use Type: Reports: None


Smoking Status (MU): Former Smoker


Amount Used/How Often: light smoker for approx 6 yrs





Review of Systems


Constitutional: Negative


Negative: Fever, Chills, Fatigue, Skin Diaphoresis


Negative: Palpitations, Chest Pain


Negative: Shortness Of Breath, Cough


Genitourinary: Negative


Positive: no symptoms reported, see HPI


Negative: Arthralgia


Positive: Other - erythema and warmth to the L lower ext


Neurological: Negative


All Other Systems Reviewed And Are Negative: Yes





Physical Exam


Triage Information Reviewed: Yes


Vital Signs On Initial Exam: 


 Initial Vitals











Temp Pulse Resp BP Pulse Ox


 


 97.6 F   70   15   187/75   96 


 


 08/25/18 22:08  08/25/18 22:08  08/25/18 22:08  08/25/18 22:08  08/25/18 22:08











Vital Signs Reviewed: Yes


Appearance: Positive: Well-Appearing, Well-Nourished


Skin: Positive: Warm, Skin Color Reflects Adequate Perfusion, Other - erythema 

and warmth to L lower ext


Eyes: Positive: EOMI, AR, Conjunctiva Clear


Neck: Positive: Supple


Respiratory/Lung Sounds: Positive: Clear to Auscultation, Breath Sounds Present


Cardiovascular: Positive: RRR, Pulses are Symmetrical in both Upper and Lower 

Extremities


Musculoskeletal: Positive: Normal, Strength/ROM Intact


Neurological: Positive: Speech Normal


Psychiatric: Positive: Normal, Affect/Mood Appropriate


AVPU Assessment: Alert





Diagnostics





- Vital Signs


 Vital Signs











  Temp Pulse Resp BP Pulse Ox


 


 08/26/18 02:18    18  151/68 


 


 08/26/18 02:00   69  11   94


 


 08/26/18 01:47   71  13  151/84  95


 


 08/26/18 01:17   69  24  157/79  94


 


 08/26/18 01:00   69  19   95


 


 08/26/18 00:47   67  24  161/73  94


 


 08/26/18 00:17   68  19  161/81  95


 


 08/26/18 00:00   68  15   95


 


 08/25/18 23:51    24  


 


 08/25/18 23:47   71  23  178/87  96


 


 08/25/18 22:08  97.6 F  70  15  187/75  96














- Laboratory


Lab Results: 


 Lab Results











  08/26/18 08/26/18 08/26/18 Range/Units





  00:56 00:56 00:56 


 


WBC  37.2 H    (3.5-10.8)  10^3/ul


 


RBC  6.23 H    (4.00-5.40)  10^6/ul


 


Hgb  12.3 L    (14.0-18.0)  g/dl


 


Hct  41 L    (42-52)  %


 


MCV  66 L    (80-94)  fL


 


MCH  20 L    (27-31)  pg


 


MCHC  30 L    (31-36)  g/dl


 


RDW  20 H    (10.5-15)  %


 


Plt Count  576 H D    (150-450)  10^3/ul


 


MPV  8.6    (7.4-10.4)  um3


 


Neut % (Auto)  84.7 H    (38-83)  %


 


Lymph % (Auto)  5.2 L    (25-47)  %


 


Mono % (Auto)  7.4 H    (0-7)  %


 


Eos % (Auto)  1.5    (0-6)  %


 


Baso % (Auto)  1.2    (0-2)  %


 


Absolute Neuts (auto)  31.5 H    (1.5-7.7)  10^3/ul


 


Absolute Lymphs (auto)  2.0    (1.0-4.8)  10^3/ul


 


Absolute Monos (auto)  2.8 H    (0-0.8)  10^3/ul


 


Absolute Eos (auto)  0.6    (0-0.6)  10^3/ul


 


Absolute Basos (auto)  0.5 H    (0-0.2)  10^3/ul


 


Absolute Nucleated RBC  0    10^3/ul


 


Nucleated RBC %  0    


 


ESR  Pending    


 


Sodium   136   (135-145)  mmol/L


 


Potassium   3.9   (3.5-5.0)  mmol/L


 


Chloride   104   (101-111)  mmol/L


 


Carbon Dioxide   25   (22-32)  mmol/L


 


Anion Gap   7   (2-11)  mmol/L


 


BUN   25 H   (6-24)  mg/dL


 


Creatinine   1.38 H   (0.67-1.17)  mg/dL


 


Est GFR ( Amer)   59.3   (>60)  


 


Est GFR (Non-Af Amer)   49.0   (>60)  


 


BUN/Creatinine Ratio   18.1   (8-20)  


 


Glucose   109 H   ()  mg/dL


 


Lactic Acid    1.0  (0.5-2.0)  mmol/L


 


Calcium   8.9   (8.6-10.3)  mg/dL


 


Total Bilirubin   0.40   (0.2-1.0)  mg/dL


 


AST   25   (13-39)  U/L


 


ALT   14   (7-52)  U/L


 


Alkaline Phosphatase   163 H   ()  U/L


 


C-Reactive Protein   18.89 H   (<8.01)  mg/L


 


Total Protein   7.8   (6.4-8.9)  g/dL


 


Albumin   3.6   (3.2-5.2)  g/dL


 


Globulin   4.2 H   (2-4)  g/dL


 


Albumin/Globulin Ratio   0.9 L   (1-3)  











Result Diagrams: 


 08/26/18 00:56





 08/26/18 00:56


Lab Statement: Any lab studies that have been ordered have been reviewed, and 

results considered in the medical decision making process.





Course/Dx





- Course


Course Of Treatment: During the course of treatment, the patient's evaluated 

for left lower extremity cellulitis.  Blood cultures obtained.  Labs obtained 

which are all abnormal and consistent with polycythemia vera versus CML.  

Discussed with patient his past medical history and he states he has had high 

blood pressure, and is being treated for the polycythemia, however denies any 

other diagnoses through Dr. Urena.  There is a dusky erythema extending just 

inferior to the left knee and just superior to the left ankle to the dorsum of 

the left lower extremity, sparing the calf.  Endorses painful to touch.  Very 

warm to the touch with open wounds exposed with overlying scabs.  Ipsilateral 

foot with significant swelling and +3 pitting edema.  Patient denies any fevers

, sweats, chills.  Discussed the case with Dr. Frankenburg, hospitalist, who 

agrees to admit to his service.  Fluids given as well as cefepime, 

metronidazole and vancomycin to cover all pathogens related with open wound 

cellulitis.





- Differential Diagnoses - Skin Complaint


Differential Diagnoses: Cellulitis





- Diagnoses


Provider Diagnoses: 


 Cellulitis








Discharge





- Sign-Out/Discharge


Documenting (check all that apply): Patient Departure





- Discharge Plan


Condition: Stable


Disposition: ADMITTED TO [facility]


Referrals: 


Keegan Bejarano MD [Primary Care Provider] - 





- Billing Disposition and Condition


Condition: STABLE


Disposition: Admitted to [facility]

## 2018-08-27 LAB
BASOPHILS # BLD AUTO: 0.6 10^3/UL (ref 0–0.2)
EOSINOPHIL # BLD AUTO: 0.7 10^3/UL (ref 0–0.6)
HCT VFR BLD AUTO: 41 % (ref 42–52)
HGB BLD-MCNC: 12.5 G/DL (ref 14–18)
LYMPHOCYTES # BLD AUTO: 2.2 10^3/UL (ref 1–4.8)
MCH RBC QN AUTO: 20 PG (ref 27–31)
MCHC RBC AUTO-ENTMCNC: 31 G/DL (ref 31–36)
MCV RBC AUTO: 65 FL (ref 80–94)
MONOCYTES # BLD AUTO: 2.6 10^3/UL (ref 0–0.8)
NEUTROPHILS # BLD AUTO: 34 10^3/UL (ref 1.5–7.7)
NRBC # BLD AUTO: 0 10^3/UL
NRBC BLD QL AUTO: 0.1
PLATELET # BLD AUTO: 538 10^3/UL (ref 150–450)
RBC # BLD AUTO: 6.24 10^6/UL (ref 4–5.4)
WBC # BLD AUTO: 40.1 10^3/UL (ref 3.5–10.8)

## 2018-08-27 RX ADMIN — ACETAMINOPHEN PRN MG: 325 TABLET ORAL at 02:32

## 2018-08-27 RX ADMIN — METRONIDAZOLE SCH MLS/HR: 5 INJECTION, SOLUTION INTRAVENOUS at 02:40

## 2018-08-27 RX ADMIN — THERA TABS SCH TAB: TAB at 08:19

## 2018-08-27 RX ADMIN — HEPARIN SODIUM SCH UNITS: 5000 INJECTION INTRAVENOUS; SUBCUTANEOUS at 22:06

## 2018-08-27 RX ADMIN — METRONIDAZOLE SCH MLS/HR: 5 INJECTION, SOLUTION INTRAVENOUS at 18:32

## 2018-08-27 RX ADMIN — METRONIDAZOLE SCH MLS/HR: 5 INJECTION, SOLUTION INTRAVENOUS at 11:10

## 2018-08-27 RX ADMIN — SODIUM CHLORIDE SCH MLS/HR: 900 IRRIGANT IRRIGATION at 20:40

## 2018-08-27 RX ADMIN — HEPARIN SODIUM SCH UNITS: 5000 INJECTION INTRAVENOUS; SUBCUTANEOUS at 05:20

## 2018-08-27 RX ADMIN — VERAPAMIL HYDROCHLORIDE SCH MG: 240 TABLET, FILM COATED, EXTENDED RELEASE ORAL at 08:18

## 2018-08-27 RX ADMIN — CEFEPIME HYDROCHLORIDE SCH MLS/HR: 2 INJECTION, SOLUTION INTRAVENOUS at 00:39

## 2018-08-27 RX ADMIN — HEPARIN SODIUM SCH UNITS: 5000 INJECTION INTRAVENOUS; SUBCUTANEOUS at 15:24

## 2018-08-27 RX ADMIN — OMEPRAZOLE SCH MG: 20 CAPSULE, DELAYED RELEASE ORAL at 08:17

## 2018-08-27 RX ADMIN — OMEPRAZOLE SCH MG: 20 CAPSULE, DELAYED RELEASE ORAL at 20:57

## 2018-08-27 RX ADMIN — MAGNESIUM OXIDE TAB 400 MG (241.3 MG ELEMENTAL MG) SCH MG: 400 (241.3 MG) TAB at 08:18

## 2018-08-27 RX ADMIN — SODIUM CHLORIDE SCH MLS/HR: 900 IRRIGANT IRRIGATION at 05:15

## 2018-08-27 NOTE — CONS
CONSULTATION REPORT:

 

DATE OF CONSULT:  18

 

REQUESTING PHYSICIAN:  Madhavi Rico MD

 

CONSULTING SERVICE:  Infectious Disease.

 

REASON FOR CONSULTATION:  Cellulitis.

 

IMPRESSION:

1.  Left lower leg cellulitis and non-pressure related wounds, improving on 
broad spectrum antibiotics.

2.  Polycythemia vera.

3.  Chronic leukocytosis, mostly neutrophils, had a bone marrow biopsy.

4.  Moderate aortic stenosis.

 

RECOMMENDATIONS:

1.  Stop vancomycin.

2.  Continue cefepime and Flagyl.

3.  Follow his leg here.  As long as he is continued to improve each day, we 
can change him to an oral antibiotic.  His left ankle is not tender and he is 
able to bear weight without pain.

 

HISTORY OF PRESENT ILLNESS:  This is an 85-year-old man admitted with left leg 
cellulitis. About a week to 10 days ago, he fell, scraped his leg on a fence 
and had a gash in his anterior left leg.  He was put in his some topical 
treatments on it to no avail.  Because of persistent redness, worsening swelling
, and pain, he came to the hospital on .  He had a white blood cell count 
of 37,000, 40,000 today.  His white count in mid August was 36,000.  He had 
blood cultures taken that are negative.  He was started on vancomycin, cefepime
, and Flagyl.  The intensity of the redness in his leg is easing off a bit.  He 
has some scabs on the anterior lower leg, which he thinks has been getting 
small over the last couple of weeks. He does not have any fevers, chills or 
sweats.

 

PAST MEDICAL HISTORY:

1.  Polycythemia vera with regular phlebotomy.

2.  Chronic leukocytosis.

3.  Hypertension.

4.  Gastroesophageal reflux disease.

5.  Hiatal hernia.

6.  Moderate aortic stenosis.

7.  Status post open reduction internal fixation of the hip.

8.  Status post hernia repair.

9.  Status post Nissen fundoplication.

 

ALLERGIES:  No known drug allergies.

 

MEDICATIONS:

1.  Tylenol.

2.  Aspirin.

3.  Cefepime 2 g daily.

4.  Heparin subcutaneous injection.

5.  Magnesium.

6.  Flagyl 500 mg IV every 8 hours.

7.  Multivitamin.

8.  Omeprazole.

9.  Vancomycin 1250 mg daily.

 

SOCIAL HISTORY:  He was in [place].  He has no travel or sick contact.

 

FAMILY HISTORY:  No recurrent infections.  Father  from brain aneurysm.

 

REVIEW OF SYSTEMS:  All negative in 14-point review of systems except as noted 
above in the history of present illness.

 

PHYSICAL EXAM:  Vital Signs:  Temperature 36.4, heart rate 60, respiratory rate 
16, blood pressure 140/75, oxygen saturation 96% on room air.  In general, he 
is awake, not in distress.  Neurologic:  He is oriented x3.  Follows all 
commands.  HEENT: There is no conjunctival hemorrhage.  Oropharynx without 
lesions.  Neck is supple without mass.  Heart is regular rate and rhythm 
without murmurs, rubs or gallops. Lungs are clear to auscultation bilaterally.  
Abdomen:  Soft, nontender, and nondistended.  There are bowel sounds present.  
Skin:  There is no splinter hemorrhage.  He has mild blanching erythema that 
extends from the knee to just above the ankle on the left.  There is diffuse 
edema.  There is anterior small eschar.  Musculoskeletal:  There is no left 
ankle tenderness to palpation or pain with range of motion.

 

LABORATORY DATA:  White blood cell count 40, hemoglobin 12.5, platelets 538, 
MCV 65.  Creatinine 1.3, CRP 19. Please see impressions and recommendations as 
outlined above, which I have discussed with Dr. Rico.

 

Thanks for asking me to see Mr. Johnson in consultation.

 

 [id number]/CPS #: [id number]

LAURA

## 2018-08-27 NOTE — PN
Subjective


Date of Service: 18


Interval History: 





Patient was seen and examined earlier today. Reports doing well, denies any 

complaints. Able to bear weight on his left leg, denies fever or chills. Labs 

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 Seen 

earlier by Dr. Johnson for consultation. He has no new complaints today.








Family History: Unchanged from Admission


Social History: Unchanged from Admission


Past Medical History: Unchanged from Admission





Objective


Active Medications: 








Acetaminophen (Tylenol Tab*)  650 mg PO Q6H PRN


   PRN Reason: pain/fever


   Last Admin: 18 02:32 Dose:  650 mg


Aspirin (Aspirin 81 Mg Chew Tab*)  81 mg PO MoTh [facility]


   Last Admin: 18 08:18 Dose:  81 mg


Heparin Sodium (Porcine) (Heparin Vial(*))  5,000 units SUBCUT Q8HR [facility]


   Last Admin: 18 15:24 Dose:  5,000 units


Cefepime HCl (Maxipime 2 Gm In Dextrose Duplex (*))  2 gm in 50 mls @ 100 mls/

hr IV Q24H [facility]


   Last Admin: 18 00:39 Dose:  100 mls/hr


Metronidazole/Sodium Chloride (Flagyl 500 Mg Ivpb*)  500 mg in 100 mls @ 100 mls

/hr IVPB Q8H [facility]


   Last Admin: 18 11:10 Dose:  100 mls/hr


Sodium Chloride (Ns 0.9% 1000 Ml*)  1,000 mls @ 100 mls/hr IV PER RATE [facility]


   Last Admin: 18 05:15 Dose:  100 mls/hr


Magnesium Oxide (Magox 400 Tab*)  400 mg PO DAILY [facility]


   Last Admin: 18 08:18 Dose:  400 mg


Multivitamins/Minerals (Theragran/Minerals Tab*)  1 tab PO QAM [facility]


   Last Admin: 18 08:19 Dose:  1 tab


Omeprazole (Prilosec Cap*)  20 mg PO BID [facility]; Protocol


   Last Admin: 18 08:17 Dose:  20 mg


Verapamil HCl (Calan Sr Tab*)  120 mg PO DAILY [facility]


   Last Admin: 18 08:18 Dose:  120 mg








Oxygen Devices in Use Now: None


Appearance: Appears comfortable and in NAD.


Eyes: No Scleral Icterus, PERRLA


Ears/Nose/Mouth/Throat: Clear Oropharnyx, Mucous Membranes Moist


Neck: NL Appearance and Movements; NL JVP, Trachea Midline


Respiratory: Symmetrical Chest Expansion and Respiratory Effort, Clear to 

Auscultation


Cardiovascular: NL Sounds; No Murmurs; No JVD, RRR


Abdominal: NL Sounds; No Tenderness; No Distention


Extremities: No Edema, - - Left lower extreminty with large area of erythema 

over anterior and lateral aspects of tibia, from just above the ankle to just 

below the knee. It appears to be diminished based on prior ink outline. No 

tenderness, No warmth or induration. Clean and dry linear scabs with no 

bleeding or discharge noted. N/V intact distally.


Neurological: Alert and Oriented x 3


Nutrition: Taking PO's


Result Diagrams: 


 18 04:50





 18 04:50


Additional Lab and Data: 


 Lab Results











  18 Range/Units





  00:56 00:56 00:56 


 


WBC  37.2 H    (3.5-10.8)  10^3/ul


 


RBC  6.23 H    (4.00-5.40)  10^6/ul


 


Hgb  12.3 L    (14.0-18.0)  g/dl


 


Hct  41 L    (42-52)  %


 


MCV  66 L    (80-94)  fL


 


MCH  20 L    (27-31)  pg


 


MCHC  30 L    (31-36)  g/dl


 


RDW  20 H    (10.5-15)  %


 


Plt Count  576 H D    (150-450)  10^3/ul


 


MPV  8.6    (7.4-10.4)  um3


 


Neut % (Auto)  84.7 H    (38-83)  %


 


Lymph % (Auto)  5.2 L    (25-47)  %


 


Mono % (Auto)  7.4 H    (0-7)  %


 


Eos % (Auto)  1.5    (0-6)  %


 


Baso % (Auto)  1.2    (0-2)  %


 


Absolute Neuts (auto)  31.5 H    (1.5-7.7)  10^3/ul


 


Absolute Lymphs (auto)  2.0    (1.0-4.8)  10^3/ul


 


Absolute Monos (auto)  2.8 H    (0-0.8)  10^3/ul


 


Absolute Eos (auto)  0.6    (0-0.6)  10^3/ul


 


Absolute Basos (auto)  0.5 H    (0-0.2)  10^3/ul


 


Absolute Nucleated RBC  0    10^3/ul


 


Nucleated RBC %  0    


 


ESR  Pending    


 


Sodium   136   (135-145)  mmol/L


 


Potassium   3.9   (3.5-5.0)  mmol/L


 


Chloride   104   (101-111)  mmol/L


 


Carbon Dioxide   25   (22-32)  mmol/L


 


Anion Gap   7   (2-11)  mmol/L


 


BUN   25 H   (6-24)  mg/dL


 


Creatinine   1.38 H   (0.67-1.17)  mg/dL


 


Est GFR ( Amer)   59.3   (>60)  


 


Est GFR (Non-Af Amer)   49.0   (>60)  


 


BUN/Creatinine Ratio   18.1   (8-20)  


 


Glucose   109 H   ()  mg/dL


 


Lactic Acid    1.0  (0.5-2.0)  mmol/L


 


Calcium   8.9   (8.6-10.3)  mg/dL


 


Total Bilirubin   0.40   (0.2-1.0)  mg/dL


 


AST   25   (13-39)  U/L


 


ALT   14   (7-52)  U/L


 


Alkaline Phosphatase   163 H   ()  U/L


 


C-Reactive Protein   18.89 H   (<8.01)  mg/L


 


Total Protein   7.8   (6.4-8.9)  g/dL


 


Albumin   3.6   (3.2-5.2)  g/dL


 


Globulin   4.2 H   (2-4)  g/dL


 


Albumin/Globulin Ratio   0.9 L   (1-3)  











Microbiology and Other Data: 


 Microbiology











 18 00:56 Aerobic Blood Culture - Preliminary





 Blood Venous    No Growth Day 1





 Anaerobic Blood Culture - Preliminary





    No Growth Day 1


 


 18 00:56 Aerobic Blood Culture - Preliminary





 Blood Venous    No Growth Day 1





 Anaerobic Blood Culture - Preliminary





    No Growth Day 1











Diagnostic Imaging: 





Patient Name:         BERTHA SOLIS                                          

                        Medical Record#: [id number]


Ordering Physician: Madhavi Ardon MD                                       

                        Acct.#: [id number]


:     1933         Age: 85   Sex: M                                   

                        Location: [address] [place] [address] - MEDICAL


Exam Date: 18                                                       

                        ADM Status: ADM IN


Order Information:                         US SOFT TISSUE LIMITED EXT-LT


Accession Number:                          [id number]


CPT:                                       59470


INDICATION:  Erythematous and palpable lump overlying the proximal anterior 

left tibia











IMPRESSION:  


There is a mostly anechoic fluid collection within the subcutaneous tissue of 

the anterior


left lower leg. Please correspond to recent trauma to the area with subsequent 

hematoma.





____________________________________________________________


<Electronically signed by Sean Castro MD in OV>  18 1016











Order Information:                         VL LOWER EXT VEINS LEFT


Accession Number:                          [id number]


CPT:                                       16001


HISTORY: Left lower extremity pain











IMPRESSION: 


No sonographic evidence of deep vein thrombosis.





____________________________________________________________


<Electronically signed by Sean Castro MD in OV>  18 1014














EKG Data: 


.





Assess/Plan/Problems-Billing


Assessment: 





An 86 y/o male with PMH HTN, GERD, PMR and polycythemia vera, who sustained a 

fall with multiple skin excoriations of the left leg 10 days ago, presents to 

the ED with increasing left lower extremity swelling and redness.





 





- Patient Problems


(1) Cellulitis of left lower extremity


Current Visit: Yes   Status: Acute   Comment: 


- Appears to be clinically improving on IV antibiotics


- ID consult appreciated, will d/c Vanco and continue Cefepime and Metronidazole


- Tdap was given in ED


- Chronic leukocytosis likely secondary to polycythemia vera


   





(2) Hypertension


Current Visit: Yes   Status: Acute   Comment: 


- Continue verapamil


   





(3) Hx of polycythemia vera


Current Visit: Yes   Status: Acute   Comment: 


- Likely causing secondary leukocytosis


- Stable


   





(4) GERD (gastroesophageal reflux disease)


Current Visit: No   Status: Chronic   Comment: 


- Continue PPI coverage


   





(5) DVT prophylaxis


Current Visit: Yes   Status: Acute   Comment: 


- SubQ heparin


   





(6) Full code status


Current Visit: Yes   Status: Acute   


Status and Disposition: 





Inpatient for IV Antibiotics. Anticipate discharge to home when medically 

stable.

## 2018-08-28 LAB
BASOPHILS # BLD AUTO: 0.4 10^3/UL (ref 0–0.2)
BASOPHILS # BLD: 1.3 10^3/UL (ref 0–0.2)
EOSINOPHIL # BLD AUTO: 0.6 10^3/UL (ref 0–0.6)
HCT VFR BLD AUTO: 42 % (ref 42–52)
HGB BLD-MCNC: 13 G/DL (ref 14–18)
LYMPHOCYTES # BLD AUTO: 1.5 10^3/UL (ref 1–4.8)
MCH RBC QN AUTO: 20 PG (ref 27–31)
MCHC RBC AUTO-ENTMCNC: 31 G/DL (ref 31–36)
MCV RBC AUTO: 65 FL (ref 80–94)
MONOCYTES # BLD AUTO: 4.4 10^3/UL (ref 0–0.8)
NEUTROPHILS # BLD AUTO: 37.9 10^3/UL (ref 1.5–7.7)
NEUTROPHILS # BLD: 35.9 10^3/UL (ref 1.5–7.7)
NRBC # BLD AUTO: 0.1 10^3/UL
NRBC BLD QL AUTO: 0.1
PLATELET # BLD AUTO: 635 10^3/UL (ref 150–450)
RBC # BLD AUTO: 6.41 10^6/UL (ref 4–5.4)
WBC # BLD AUTO: 44.9 10^3/UL (ref 3.5–10.8)

## 2018-08-28 RX ADMIN — METRONIDAZOLE SCH MLS/HR: 5 INJECTION, SOLUTION INTRAVENOUS at 12:09

## 2018-08-28 RX ADMIN — HEPARIN SODIUM SCH UNITS: 5000 INJECTION INTRAVENOUS; SUBCUTANEOUS at 14:49

## 2018-08-28 RX ADMIN — OMEPRAZOLE SCH MG: 20 CAPSULE, DELAYED RELEASE ORAL at 08:08

## 2018-08-28 RX ADMIN — METRONIDAZOLE SCH MLS/HR: 5 INJECTION, SOLUTION INTRAVENOUS at 02:54

## 2018-08-28 RX ADMIN — SODIUM CHLORIDE SCH MLS/HR: 900 IRRIGANT IRRIGATION at 21:18

## 2018-08-28 RX ADMIN — OMEPRAZOLE SCH MG: 20 CAPSULE, DELAYED RELEASE ORAL at 21:19

## 2018-08-28 RX ADMIN — HEPARIN SODIUM SCH UNITS: 5000 INJECTION INTRAVENOUS; SUBCUTANEOUS at 21:19

## 2018-08-28 RX ADMIN — VERAPAMIL HYDROCHLORIDE SCH MG: 240 TABLET, FILM COATED, EXTENDED RELEASE ORAL at 08:08

## 2018-08-28 RX ADMIN — HEPARIN SODIUM SCH UNITS: 5000 INJECTION INTRAVENOUS; SUBCUTANEOUS at 05:21

## 2018-08-28 RX ADMIN — MAGNESIUM OXIDE TAB 400 MG (241.3 MG ELEMENTAL MG) SCH MG: 400 (241.3 MG) TAB at 08:09

## 2018-08-28 RX ADMIN — SODIUM CHLORIDE SCH MLS/HR: 900 IRRIGANT IRRIGATION at 07:58

## 2018-08-28 RX ADMIN — CEFEPIME HYDROCHLORIDE SCH MLS/HR: 2 INJECTION, SOLUTION INTRAVENOUS at 01:21

## 2018-08-28 RX ADMIN — THERA TABS SCH TAB: TAB at 08:09

## 2018-08-28 NOTE — PN
Subjective


Date of Service: 08/28/18


Interval History: 





Mr. Johnson is reports doing well today. Has been ambulatory with minimal pain to 

left leg. Denies fever or chills. He has no complaints today.








Family History: Unchanged from Admission


Social History: Unchanged from Admission


Past Medical History: Unchanged from Admission





Objective


Active Medications: 








Acetaminophen (Tylenol Tab*)  650 mg PO Q6H PRN


   PRN Reason: pain/fever


   Last Admin: 08/27/18 02:32 Dose:  650 mg


Aspirin (Aspirin 81 Mg Chew Tab*)  81 mg PO MoTh [facility]


   Last Admin: 08/27/18 08:18 Dose:  81 mg


Heparin Sodium (Porcine) (Heparin Vial(*))  5,000 units SUBCUT Q8HR [facility]


   Last Admin: 08/28/18 05:21 Dose:  5,000 units


Cefepime HCl (Maxipime 2 Gm In Dextrose Duplex (*))  2 gm in 50 mls @ 100 mls/

hr IV Q24H [facility]


   Last Admin: 08/28/18 01:21 Dose:  100 mls/hr


Metronidazole/Sodium Chloride (Flagyl 500 Mg Ivpb*)  500 mg in 100 mls @ 100 mls

/hr IVPB Q8H [facility]


   Last Admin: 08/28/18 12:09 Dose:  100 mls/hr


Sodium Chloride (Ns 0.9% 1000 Ml*)  1,000 mls @ 100 mls/hr IV PER RATE [facility]


   Last Admin: 08/28/18 07:58 Dose:  100 mls/hr


Magnesium Oxide (Magox 400 Tab*)  400 mg PO DAILY [facility]


   Last Admin: 08/28/18 08:09 Dose:  400 mg


Multivitamins/Minerals (Theragran/Minerals Tab*)  1 tab PO QAM [facility]


   Last Admin: 08/28/18 08:09 Dose:  1 tab


Omeprazole (Prilosec Cap*)  20 mg PO BID [facility]; Protocol


   Last Admin: 08/28/18 08:08 Dose:  20 mg


Verapamil HCl (Calan Sr Tab*)  120 mg PO DAILY [facility]


   Last Admin: 08/28/18 08:08 Dose:  120 mg








 Vital Signs - 8 hr











  08/28/18 08/28/18





  07:26 08:00


 


Temperature 99.0 F 


 


Pulse Rate 76 


 


Respiratory 20 20





Rate  


 


Blood Pressure 141/65 





(mmHg)  


 


O2 Sat by Pulse 94 





Oximetry  











Oxygen Devices in Use Now: None


Appearance: Appears comfortable and in NAD


Eyes: No Scleral Icterus, PERRLA


Ears/Nose/Mouth/Throat: Clear Oropharnyx, Mucous Membranes Moist


Neck: NL Appearance and Movements; NL JVP, Trachea Midline


Respiratory: Symmetrical Chest Expansion and Respiratory Effort, Clear to 

Auscultation


Cardiovascular: RRR, - - Moderate systolic murmur noted


Abdominal: NL Sounds; No Tenderness; No Distention


Extremities: - - LLE with improved erythema to anterior and lateral tibia 

compared to prior exam. Minimal tenderess, no induration noted. 


Neurological: Alert and Oriented x 3


Nutrition: Taking PO's


Result Diagrams: 


 08/28/18 06:31





 08/28/18 06:31


Additional Lab and Data: 


 Lab Results











  08/26/18 08/26/18 08/26/18 Range/Units





  00:56 00:56 00:56 


 


WBC  37.2 H    (3.5-10.8)  10^3/ul


 


RBC  6.23 H    (4.00-5.40)  10^6/ul


 


Hgb  12.3 L    (14.0-18.0)  g/dl


 


Hct  41 L    (42-52)  %


 


MCV  66 L    (80-94)  fL


 


MCH  20 L    (27-31)  pg


 


MCHC  30 L    (31-36)  g/dl


 


RDW  20 H    (10.5-15)  %


 


Plt Count  576 H D    (150-450)  10^3/ul


 


MPV  8.6    (7.4-10.4)  um3


 


Neut % (Auto)  84.7 H    (38-83)  %


 


Lymph % (Auto)  5.2 L    (25-47)  %


 


Mono % (Auto)  7.4 H    (0-7)  %


 


Eos % (Auto)  1.5    (0-6)  %


 


Baso % (Auto)  1.2    (0-2)  %


 


Absolute Neuts (auto)  31.5 H    (1.5-7.7)  10^3/ul


 


Absolute Lymphs (auto)  2.0    (1.0-4.8)  10^3/ul


 


Absolute Monos (auto)  2.8 H    (0-0.8)  10^3/ul


 


Absolute Eos (auto)  0.6    (0-0.6)  10^3/ul


 


Absolute Basos (auto)  0.5 H    (0-0.2)  10^3/ul


 


Absolute Nucleated RBC  0    10^3/ul


 


Nucleated RBC %  0    


 


ESR  Pending    


 


Sodium   136   (135-145)  mmol/L


 


Potassium   3.9   (3.5-5.0)  mmol/L


 


Chloride   104   (101-111)  mmol/L


 


Carbon Dioxide   25   (22-32)  mmol/L


 


Anion Gap   7   (2-11)  mmol/L


 


BUN   25 H   (6-24)  mg/dL


 


Creatinine   1.38 H   (0.67-1.17)  mg/dL


 


Est GFR ( Amer)   59.3   (>60)  


 


Est GFR (Non-Af Amer)   49.0   (>60)  


 


BUN/Creatinine Ratio   18.1   (8-20)  


 


Glucose   109 H   ()  mg/dL


 


Lactic Acid    1.0  (0.5-2.0)  mmol/L


 


Calcium   8.9   (8.6-10.3)  mg/dL


 


Total Bilirubin   0.40   (0.2-1.0)  mg/dL


 


AST   25   (13-39)  U/L


 


ALT   14   (7-52)  U/L


 


Alkaline Phosphatase   163 H   ()  U/L


 


C-Reactive Protein   18.89 H   (<8.01)  mg/L


 


Total Protein   7.8   (6.4-8.9)  g/dL


 


Albumin   3.6   (3.2-5.2)  g/dL


 


Globulin   4.2 H   (2-4)  g/dL


 


Albumin/Globulin Ratio   0.9 L   (1-3)  











Microbiology and Other Data: 


 Microbiology











 08/26/18 00:56 Aerobic Blood Culture - Preliminary





 Blood Venous    No Growth Day 1





 Anaerobic Blood Culture - Preliminary





    No Growth Day 1


 


 08/26/18 00:56 Aerobic Blood Culture - Preliminary





 Blood Venous    No Growth Day 1





 Anaerobic Blood Culture - Preliminary





    No Growth Day 1











Diagnostic Imaging: 


.


EKG Data: 


.





Assess/Plan/Problems-Billing


Assessment: 





An 84 y/o male with PMH HTN, GERD, PMR and polycythemia vera, who sustained a 

fall with multiple skin excoriations of the left leg 10 days ago, presents to 

the ED with increasing left lower extremity swelling and redness.





 





- Patient Problems


(1) Cellulitis of left lower extremity


Current Visit: Yes   Status: Acute   Comment: 


- Appears to be clinically improving on IV antibiotics


- ID consult appreciated, will d/c Vanco and continue Cefepime and Metronidazole


- Tdap was given in ED


- Chronic leukocytosis likely secondary to polycythemia vera


- Likely to convert to PO Abx tomorrow in anticipation for discharge to home 





   





(2) Hypertension


Current Visit: Yes   Status: Acute   Comment: 


- Continue verapamil


   





(3) Hx of polycythemia vera


Current Visit: Yes   Status: Acute   Comment: 


- Likely causing secondary leukocytosis


- Stable


   





(4) GERD (gastroesophageal reflux disease)


Current Visit: No   Status: Chronic   Comment: 


- Continue PPI coverage


   





(5) DVT prophylaxis


Current Visit: Yes   Status: Acute   Comment: 


- SubQ heparin


   





(6) Full code status


Current Visit: Yes   Status: Acute   


Status and Disposition: 





Inpatient for IV Antibiotics. Anticipate discharge to home when medically 

stable.

## 2018-08-28 NOTE — PN
Progress Note





- Progress Note


Date of Service: 08/28/18


SOAP: 


Subjective:


CC: cellulitis


HPI: 85 year old man with recent left leg abrasion and now cellulitis which is 

improving since he's been here.  No fever, rash, or diarrhea. 








Objective:





 Vital Signs











Temp  37.2 C   08/28/18 07:26


 


Pulse  76   08/28/18 07:26


 


Resp  20   08/28/18 08:00


 


BP  141/65   08/28/18 07:26


 


Pulse Ox  94   08/28/18 07:26








 Intake & Output











 08/27/18 08/28/18 08/28/18





 18:59 06:59 18:59


 


Intake Total 2520 2742 660


 


Output Total 400 2150 400


 


Balance 2120 592 260


 


Intake:   


 


  IV Fluids 600 2742 


 


    ABX - FLAGYL 100  


 


     2742 


 


  Oral 1920 0 660


 


Output:   


 


  Urine 400 2150 400


 


Other:   


 


  Estimated Void  Large 


 


  Date of Last Bowel 08/26/18  





  Movement   


 


  # Bowel Movements 0 0 


 


  # Voids  1 








Gen:awake


HEENT: no thrush


Heart:RRR  no murmur


Lungs:CTA BL


Abd:+BS NTND soft


Skin: Left lower leg diffuse blanching erythema; central eschar


 Laboratory Results - last 24 hr











  08/28/18 08/28/18





  06:31 06:31


 


WBC  44.9 H 


 


RBC  6.41 H 


 


Hgb  13.0 L 


 


Hct  42 


 


MCV  65 L 


 


MCH  20 L 


 


MCHC  31 


 


RDW  20 H 


 


Plt Count  635 H D 


 


MPV  9.2 


 


Neut % (Auto)  Not Reportable 


 


Lymph % (Auto)  Not Reportable 


 


Mono % (Auto)  Not Reportable 


 


Eos % (Auto)  Not Reportable 


 


Baso % (Auto)  Not Reportable 


 


Absolute Neuts (auto)  37.9 H 


 


Absolute Lymphs (auto)  1.5 


 


Absolute Monos (auto)  4.4 H 


 


Absolute Eos (auto)  0.6 


 


Absolute Basos (auto)  0.4 H 


 


Absolute Nucleated RBC  0.1 


 


Immature Gran %  3 


 


Neutrophils %  80 


 


Lymphocytes %  3 L 


 


Monocytes %  10 H 


 


Eosinophils %  1 


 


Basophils %  3 H 


 


Metamyelocytes %  3 H 


 


Nucleated RBC %  0.1 


 


Abs Neuts (Manual)  35.9 H 


 


Abs Lymphs (Manual)  1.3 


 


Abs Monocytes (Manual)  4.5 H 


 


Absolute Eos (Manual)  0.4 


 


Abs Basophils (Manual)  1.3 H 


 


Normal RBC Morphology  Not Reportable 


 


Microcytosis  1+ 


 


Sodium   136


 


Potassium   3.7


 


Chloride   103


 


Carbon Dioxide   24


 


Anion Gap   9


 


BUN   19


 


Creatinine   1.28 H


 


Est GFR ( Amer)   64.6


 


Est GFR (Non-Af Amer)   53.4


 


BUN/Creatinine Ratio   14.8


 


Glucose   87


 


Calcium   8.8











Assessment:


1. left leg cellulitis, improving


2. Polycthemia vera with neutrophilia








Plan:


1. keflex 500 mg po tid for 10 days on discharge, keep leg elevated

## 2018-08-29 VITALS — SYSTOLIC BLOOD PRESSURE: 157 MMHG | DIASTOLIC BLOOD PRESSURE: 72 MMHG

## 2018-08-29 RX ADMIN — MAGNESIUM OXIDE TAB 400 MG (241.3 MG ELEMENTAL MG) SCH MG: 400 (241.3 MG) TAB at 09:16

## 2018-08-29 RX ADMIN — THERA TABS SCH TAB: TAB at 09:16

## 2018-08-29 RX ADMIN — OMEPRAZOLE SCH MG: 20 CAPSULE, DELAYED RELEASE ORAL at 09:16

## 2018-08-29 RX ADMIN — CEFEPIME HYDROCHLORIDE SCH MLS/HR: 2 INJECTION, SOLUTION INTRAVENOUS at 01:18

## 2018-08-29 RX ADMIN — HEPARIN SODIUM SCH UNITS: 5000 INJECTION INTRAVENOUS; SUBCUTANEOUS at 06:29

## 2018-08-29 RX ADMIN — ACETAMINOPHEN PRN MG: 325 TABLET ORAL at 09:15

## 2018-08-29 RX ADMIN — VERAPAMIL HYDROCHLORIDE SCH MG: 240 TABLET, FILM COATED, EXTENDED RELEASE ORAL at 09:17

## 2018-08-30 NOTE — DS
CC:  Dr. Bejarano; Dr. Johnson; Dr. Urena *

 

DISCHARGE SUMMARY:

 

DATE OF ADMISSION:  08/26/18

 

DATE OF DISCHARGE:  08/29/18

 

PATIENT OF:  Admitting hospitalist, Dr. Madhavi Rico.

 

ATTENDING HOSPITALIST:  Dr. Hattie Zuniga.* (DICTATED BY NELLY MALHOTRA)

 

PRIMARY CARE PROVIDER:  Dr. Bejarano.

 

CONSULTATION:  Dr. Johnson, ID

 

CHIEF COMPLAINT:  Left leg swelling and redness.

 

ADMISSION DIAGNOSES:

1.  Left leg swelling and erythema.

2.  Hypertension.

3.  Gastroesophageal reflux disease.

4.  Hiatal hernia.

5.  Questionable history of polymyalgia rheumatica.

6.  Polycythemia vera, which had been followed by Dr. Urena.

7.  History of moderate aortic stenosis.

 

DISCHARGE DIAGNOSES:

1.  Left lower extremity cellulitis.

2.  Hypertension.

3.  Gastroesophageal reflux disease.

4.  Hiatal hernia.

5.  Questionable history of polymyalgia rheumatica.

6.  Polycythemia vera, which had been followed by Dr. Urena.

7.  History of moderate aortic stenosis.

 

ADMITTING PHYSICIAN:  Dr. Madhavi Rico.

 

ATTENDING HOSPITALIST:  Dr. Hattie Zuniga.

 

CONSULTATION:  Dr. Johnson.

 

PROCEDURE:  None.

 

HISTORY OF PRESENT ILLNESS:  Mr. Johnson is an 85-year-old male with past medical 
history of hypertension, GERD, hiatal hernia, who presented to the emergency 
room on 08/26/18 with complaints of left lower extremity pain, swelling, and 
redness. 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 
yard.  He states that about 10 days ago, he was working on his field when he 
fell and scraped his left leg on a middle horse fence.  He had multiple small 
abrasions and excoriation of the left leg that was cleansed at home and he 
applied some antibiotic ointment that he occasionally use for horses with no 
improvement.  His leg continued to have some swelling and redness that has 
gotten worse every day. He started to have some pain that appears to be worse 
with weightbearing, for which he presented to the emergency room for further 
evaluation.  He denied any fever, chills, calf pain, or history of similar 
complaints in the past.  He had laboratory workup that revealed significant 
leukocytosis; however, compared to his baseline from prior laboratory workup, 
it appears to be normal.  He also had a stable hemoglobin and hematocrit of 
12.3 and 41 respectively.  His CRP was elevated at 18. He had a left lower 
extremity x-ray that showed soft tissue swelling and possibly a small 
subcutaneous hematoma without evidence of abscess or bony involvement.  He also 
had a Doppler ultrasound that revealed no evidence of DVT.  Given his clinical 
presentation of cellulitis and leukocytosis as well as worsening erythema, we 
were asked to see the patient to consider admission.

 

HOSPITAL COURSE:  The patient was admitted under hospitalist services and 
started on IV antibiotics.  He initially had vancomycin, cefepime, and 
metronidazole on board.  He started to improve immediately after the first dose 
of antibiotics with some improvement of his lower extremity cellulitis and 
swelling.  Consultation by Dr. Johnson was obtained for further recommendation 
regarding outpatient treatment.  His vancomycin eventually stopped and he 
continued to receive cephalosporin treatment that appeared to be covering well 
with clinical improvement of his cellulitis.  He had blood cultures that 
revealed no evidence of any growth for 3 days.  He had laboratory workup 
repeated while he stayed in the hospital that revealed consistent leukocytosis 
that appears to be at baseline for the patient ranging around 40,000 for white 
count.  He tells me that he missed his appointment with Dr. Urena the day 
prior to discharge, for which we contacted her office and he will be seen later 
this week or early next week for a followup for his missed appointment.  The 
patient frequently gets venipuncture for treatment of his polycythemia vera.  I 
also arranged for him to be [facility] for a [facility]up.  At 
discharge day, he appeared to be comfortable and his vitals were stable.  His 
left lower extremity showed decreased amount of erythema.  He was ambulatory, 
out of bed.  His lungs were clear to auscultation bilaterally.  His heart was 
regular rate and rhythm without rubs, murmurs, or gallops.  We went through the 
discharge instructions and he will be sent home today and will follow up with 
[facility] next week with Dr. Urena later this week or early next week, as 
well as primary care provider too.

 

DISCHARGE MEDICATIONS:

1.  Tylenol 500 mg p.o. q.4 hours as needed for pain or fever.

2.  Aspirin 81 mg p.o. daily.

3.  Nexium 40 mg p.o. b.i.d.

4.  Magnesium 400 mg 1 tablet daily.

5.  Multivitamin with minerals 1 tablet p.o. daily.

6.  Verapamil 120 mg p.o. daily.

7.  Keflex 500 mg p.o. t.i.d. x10 days.

 

____________________________________ NELLY MALHOTRA

 

[id number]/[facility] #: [id number]

MTDD

## 2018-10-25 ENCOUNTER — HOSPITAL ENCOUNTER (EMERGENCY)
Dept: HOSPITAL 25 - ED | Age: 83
LOS: 1 days | Discharge: HOME | End: 2018-10-26
Payer: MEDICARE

## 2018-10-25 DIAGNOSIS — K91.840: Primary | ICD-10-CM

## 2018-10-25 DIAGNOSIS — Z87.891: ICD-10-CM

## 2018-10-25 DIAGNOSIS — R13.10: ICD-10-CM

## 2018-10-25 DIAGNOSIS — Z98.818: ICD-10-CM

## 2018-10-25 DIAGNOSIS — I10: ICD-10-CM

## 2018-10-25 PROCEDURE — 99282 EMERGENCY DEPT VISIT SF MDM: CPT

## 2018-10-26 VITALS — SYSTOLIC BLOOD PRESSURE: 131 MMHG | DIASTOLIC BLOOD PRESSURE: 66 MMHG

## 2018-10-26 NOTE — ED
Throat Pain/Nasal Congestion





- HPI Summary


HPI Summary: 





Patient complains of persistent bleeding from site of tooth extraction on 10/

24.  Patient has tried applying gauze but is unable to control bleeding.  

History patient has history of polycythema vera.  Patient has no anti-coag, but 

was on aspirin but has stopped aspirin for 2 days.  Patient currently on 

penicillin status post dental surgery.  Denies fever, cough, sore throat, CP, 

SOB, N/V/D, bowel pain, change in urine, change in BM.  Medical history is HTN.





- History of Current Complaint


Chief Complaint: EDDentalPain


Time Seen by Provider: 10/26/18 00:50


Hx Obtained From: Patient


Onset/Duration: Lasting Days


Severity: Moderate


Associated Signs And Symptoms: Positive: Dysphagia


Cough: None





- Allergies/Home Medications


Allergies/Adverse Reactions: 


 Allergies











Allergy/AdvReac Type Severity Reaction Status Date / Time


 


No Known Allergies Allergy   Verified 08/25/18 21:12











Home Medications: 


 Home Medications





Penicillin V Potassium 500 mg PO TID 10/26/18 [History Confirmed 10/26/18]











PMH/Surg Hx/FS Hx/Imm Hx


Endocrine/Hematology History: Reports: Hx Anemia - iron def., taking supplement


   Denies: Hx Anticoagulant Therapy, Hx Diabetes


Cardiovascular History: Reports: Hx Hypertension, Other Cardiovascular Problems/

Disorders - mild aortic stenosis


   Denies: Hx Angina, Hx Coronary Artery Disease, Hx Hypercholesterolemia, Hx 

Myocardial Infarction, Hx Pacemaker/ICD


Respiratory History: Reports: Other Respiratory Problems/Disorders - RECENT 

PNEUMONIA 6/2017, and also in Jan. 2017, DR. NIELSEN AWARE


   Denies: Hx Asthma, Hx Chronic Obstructive Pulmonary Disease (COPD)


GI History: Reports: Hx Gastroesophageal Reflux Disease - FUNDOPLICATION 

SURGERY 2001, ON NEXIUM.


 History: Reports: Other  Problems/Disorders - BPH


Musculoskeletal History: Reports: Hx Arthritis


Sensory History: Reports: Hx Cataracts, Hx Contacts or Glasses, Hx Hearing Aid 

- No hearing aides in the hospital. Pt left @ home, Hx Hearing Problem


   Denies: Other Sensory Impairments


Opthamlomology History: Reports: Hx Cataracts, Hx Contacts or Glasses


   Denies: Other Sensory Impairments


Neurological History: Reports: Hx Headaches - HA once a day, "poor circulation 

back of head", Other Neuro Impairments/Disorders - basilar artery occlusion & 

stenosis


Psychiatric History: 


   Denies: Hx Panic Disorder





- Surgical History


Surgery Procedure, Year, and Place: 2001 LAP NISSEN FUNDOPLICATION [facility].  [place] 

[place] [facility] [place] RIGHT INGUINAL HERNAI REPAIR


Hx Anesthesia Reactions: No





- Immunization History


Date of Tetanus Vaccine: utd


Date of Influenza Vaccine: fall 2017


Infectious Disease History: No


Infectious Disease History: 


   Denies: Traveled Outside the [place] in Last 30 Days





- Family History


Known Family History: Positive: None, Other - aneurysm





- Social History


Alcohol Use: Occasionally


Hx Substance Use: No


Substance Use Type: Reports: None


Smoking Status (MU): Former Smoker


Amount Used/How Often: light smoker for approx 6 yrs





Review of Systems


Constitutional: Negative


Eyes: Negative


Positive: Other


Cardiovascular: Negative


Respiratory: Negative


Gastrointestinal: Negative


Genitourinary: Negative


Musculoskeletal: Negative


Skin: Negative


Neurological: Negative


Psychological: Normal


All Other Systems Reviewed And Are Negative: Yes





Physical Exam





- Summary


Physical Exam Summary: 





Persistent bleeding from tooth extraction site on right lower gum.  No evidence 

of infection.


Triage Information Reviewed: Yes


Vital Signs On Initial Exam: 


 Initial Vitals











Temp Pulse Resp BP Pulse Ox


 


 97.0 F   88   15   167/82   96 


 


 10/25/18 23:16  10/25/18 23:16  10/25/18 23:16  10/25/18 23:16  10/25/18 23:16











Vital Signs Reviewed: Yes


Appearance: Positive: Well-Appearing


Skin: Positive: Warm


Head/Face: Positive: Normal Head/Face Inspection


Eyes: Positive: Normal


ENT: Positive: Normal ENT inspection


Neck: Positive: Supple


Respiratory/Lung Sounds: Positive: Clear to Auscultation


Cardiovascular: Positive: Normal


Abdomen Description: Positive: Nontender


Musculoskeletal: Positive: Normal


Neurological: Positive: Normal


Psychiatric: Positive: Normal


AVPU Assessment: Alert





- Teresa Coma Scale


Best Eye Response: 4 - Spontaneous


Best Motor Response: 6 - Obeys Commands


Best Verbal Response: 5 - Oriented


Coma Scale Total: 15





Diagnostics





- Vital Signs


 Vital Signs











  Temp Pulse Resp BP Pulse Ox


 


 10/26/18 02:14   74   131/66  94


 


 10/26/18 02:00   78    94


 


 10/26/18 01:44   72   136/71  92


 


 10/26/18 01:14   74   143/74  93


 


 10/26/18 01:00   83    93


 


 10/26/18 00:44   85   161/88  95


 


 10/26/18 00:43   81    96


 


 10/25/18 23:16  97.0 F  88  15  167/82  96














- Laboratory


Lab Statement: Any lab studies that have been ordered have been reviewed, and 

results considered in the medical decision making process.





EENT Course/Dx





- Course


Course Of Treatment: Patient complains of persistent bleeding from site of 

tooth extraction on 10/24.  Patient has tried applying gauze but is unable to 

control bleeding.  History patient has history of polycythema vera.  Patient 

has no anti-coag, but was on aspirin but has stopped aspirin for 2 days.  

Patient currently on penicillin status post dental surgery.  Denies fever, cough

, sore throat, CP, SOB, N/V/D, bowel pain, change in urine, change in BM.  

Medical history is HTN.  physical exam: Persistent bleeding from tooth 

extraction site on right lower gum.  No evidence of infection.  Vital signs 

within normal limits.  Figure-of-eight suture using 4. 0 chromic placed in 

surgical site.  Bleeding is controlled.  Follow-up with her dental surgeon.





- Diagnoses


Provider Diagnoses: 


 Surgical wound hemorrhage after dental procedure








Discharge





- Sign-Out/Discharge


Documenting (check all that apply): Patient Departure





- Discharge Plan


Condition: Stable


Disposition: HOME


Patient Education Materials:  Toothache (ED)


Referrals: 


Keegan Bejarano MD [Primary Care Provider] - 


Additional Instructions: 


Follow-up with your dentist as soon as possible.  Return to the ED for any new 

or worsening symptoms





- Billing Disposition and Condition


Condition: STABLE


Disposition: Home

## 2019-04-15 ENCOUNTER — HOSPITAL ENCOUNTER (INPATIENT)
Dept: HOSPITAL 25 - ED | Age: 84
LOS: 2 days | Discharge: HOME | DRG: 871 | End: 2019-04-17
Attending: INTERNAL MEDICINE | Admitting: INTERNAL MEDICINE
Payer: MEDICARE

## 2019-04-15 DIAGNOSIS — N40.0: ICD-10-CM

## 2019-04-15 DIAGNOSIS — I10: ICD-10-CM

## 2019-04-15 DIAGNOSIS — I24.8: ICD-10-CM

## 2019-04-15 DIAGNOSIS — Z81.1: ICD-10-CM

## 2019-04-15 DIAGNOSIS — I44.7: ICD-10-CM

## 2019-04-15 DIAGNOSIS — R42: ICD-10-CM

## 2019-04-15 DIAGNOSIS — M19.90: ICD-10-CM

## 2019-04-15 DIAGNOSIS — Z86.73: ICD-10-CM

## 2019-04-15 DIAGNOSIS — Z82.49: ICD-10-CM

## 2019-04-15 DIAGNOSIS — E87.6: ICD-10-CM

## 2019-04-15 DIAGNOSIS — Z72.89: ICD-10-CM

## 2019-04-15 DIAGNOSIS — A41.9: Primary | ICD-10-CM

## 2019-04-15 DIAGNOSIS — I35.0: ICD-10-CM

## 2019-04-15 DIAGNOSIS — I49.1: ICD-10-CM

## 2019-04-15 DIAGNOSIS — M35.3: ICD-10-CM

## 2019-04-15 DIAGNOSIS — Z87.891: ICD-10-CM

## 2019-04-15 DIAGNOSIS — D45: ICD-10-CM

## 2019-04-15 DIAGNOSIS — Z97.4: ICD-10-CM

## 2019-04-15 DIAGNOSIS — E83.42: ICD-10-CM

## 2019-04-15 DIAGNOSIS — J18.9: ICD-10-CM

## 2019-04-15 DIAGNOSIS — H26.9: ICD-10-CM

## 2019-04-15 LAB
ALBUMIN SERPL BCG-MCNC: 4 G/DL (ref 3.2–5.2)
ALBUMIN/GLOB SERPL: 0.8 {RATIO} (ref 1–3)
ALP SERPL-CCNC: 153 U/L (ref 34–104)
ALT SERPL W P-5'-P-CCNC: 13 U/L (ref 7–52)
ANION GAP SERPL CALC-SCNC: 10 MMOL/L (ref 2–11)
ANION GAP SERPL CALC-SCNC: 9 MMOL/L (ref 2–11)
AST SERPL-CCNC: 25 U/L (ref 13–39)
BASOPHILS # BLD AUTO: 0.4 10^3/UL (ref 0–0.2)
BUN SERPL-MCNC: 21 MG/DL (ref 6–24)
BUN SERPL-MCNC: 23 MG/DL (ref 6–24)
BUN/CREAT SERPL: 16.3 (ref 8–20)
BUN/CREAT SERPL: 16.8 (ref 8–20)
CALCIUM SERPL-MCNC: 8.9 MG/DL (ref 8.6–10.3)
CALCIUM SERPL-MCNC: 9.7 MG/DL (ref 8.6–10.3)
CHLORIDE SERPL-SCNC: 101 MMOL/L (ref 101–111)
CHLORIDE SERPL-SCNC: 102 MMOL/L (ref 101–111)
EOSINOPHIL # BLD AUTO: 0 10^3/UL (ref 0–0.6)
FLUAV RNA SPEC QL NAA+PROBE: NEGATIVE
FLUBV RNA SPEC QL NAA+PROBE: NEGATIVE
GLOBULIN SER CALC-MCNC: 4.9 G/DL (ref 2–4)
GLUCOSE SERPL-MCNC: 86 MG/DL (ref 70–100)
GLUCOSE SERPL-MCNC: 97 MG/DL (ref 70–100)
HCO3 SERPL-SCNC: 24 MMOL/L (ref 22–32)
HCO3 SERPL-SCNC: 24 MMOL/L (ref 22–32)
HCT VFR BLD AUTO: 44 % (ref 36–46)
HGB BLD-MCNC: 13.8 G/DL (ref 14–18)
INR PPP/BLD: 1.14 (ref 0.77–1.02)
LYMPHOCYTES # BLD AUTO: 0.4 10^3/UL (ref 1–4.8)
MAGNESIUM SERPL-MCNC: 1.4 MG/DL (ref 1.9–2.7)
MAGNESIUM SERPL-MCNC: 1.6 MG/DL (ref 1.9–2.7)
MCH RBC QN AUTO: 24 PG (ref 27–31)
MCHC RBC AUTO-ENTMCNC: 32 G/DL (ref 31–36)
MCV RBC AUTO: 77 FL (ref 80–94)
MONOCYTES # BLD AUTO: 3.9 10^3/UL (ref 0–0.8)
NEUTROPHILS # BLD AUTO: 39.7 10^3/UL (ref 1.5–7.7)
NRBC # BLD AUTO: 0 10^3/UL
NRBC BLD QL AUTO: 0
PLATELET # BLD AUTO: 318 10^3/UL (ref 150–450)
POTASSIUM SERPL-SCNC: 3.4 MMOL/L (ref 3.5–5)
POTASSIUM SERPL-SCNC: 3.9 MMOL/L (ref 3.5–5)
PROT SERPL-MCNC: 8.9 G/DL (ref 6.4–8.9)
RBC # BLD AUTO: 5.72 10^6 /UL (ref 4.18–5.48)
RBC UR QL AUTO: (no result)
SODIUM SERPL-SCNC: 135 MMOL/L (ref 135–145)
SODIUM SERPL-SCNC: 135 MMOL/L (ref 135–145)
TROPONIN I SERPL-MCNC: 0.04 NG/ML (ref ?–0.04)
TROPONIN I SERPL-MCNC: 0.07 NG/ML (ref ?–0.04)
TSH SERPL-ACNC: 3.38 MCIU/ML (ref 0.34–5.6)
WBC # BLD AUTO: 44.5 10^3/UL (ref 3.5–10.8)
WBC UR QL AUTO: (no result)

## 2019-04-15 PROCEDURE — 93306 TTE W/DOPPLER COMPLETE: CPT

## 2019-04-15 PROCEDURE — 93005 ELECTROCARDIOGRAM TRACING: CPT

## 2019-04-15 PROCEDURE — 87086 URINE CULTURE/COLONY COUNT: CPT

## 2019-04-15 PROCEDURE — 85060 BLOOD SMEAR INTERPRETATION: CPT

## 2019-04-15 PROCEDURE — 84100 ASSAY OF PHOSPHORUS: CPT

## 2019-04-15 PROCEDURE — 83605 ASSAY OF LACTIC ACID: CPT

## 2019-04-15 PROCEDURE — 70450 CT HEAD/BRAIN W/O DYE: CPT

## 2019-04-15 PROCEDURE — 71045 X-RAY EXAM CHEST 1 VIEW: CPT

## 2019-04-15 PROCEDURE — 83735 ASSAY OF MAGNESIUM: CPT

## 2019-04-15 PROCEDURE — 85610 PROTHROMBIN TIME: CPT

## 2019-04-15 PROCEDURE — 87899 AGENT NOS ASSAY W/OPTIC: CPT

## 2019-04-15 PROCEDURE — 87205 SMEAR GRAM STAIN: CPT

## 2019-04-15 PROCEDURE — 85025 COMPLETE CBC W/AUTO DIFF WBC: CPT

## 2019-04-15 PROCEDURE — 83880 ASSAY OF NATRIURETIC PEPTIDE: CPT

## 2019-04-15 PROCEDURE — 84484 ASSAY OF TROPONIN QUANT: CPT

## 2019-04-15 PROCEDURE — 99284 EMERGENCY DEPT VISIT MOD MDM: CPT

## 2019-04-15 PROCEDURE — 86140 C-REACTIVE PROTEIN: CPT

## 2019-04-15 PROCEDURE — 81015 MICROSCOPIC EXAM OF URINE: CPT

## 2019-04-15 PROCEDURE — 87040 BLOOD CULTURE FOR BACTERIA: CPT

## 2019-04-15 PROCEDURE — 81003 URINALYSIS AUTO W/O SCOPE: CPT

## 2019-04-15 PROCEDURE — 80053 COMPREHEN METABOLIC PANEL: CPT

## 2019-04-15 PROCEDURE — 80048 BASIC METABOLIC PNL TOTAL CA: CPT

## 2019-04-15 PROCEDURE — 36415 COLL VENOUS BLD VENIPUNCTURE: CPT

## 2019-04-15 PROCEDURE — 84443 ASSAY THYROID STIM HORMONE: CPT

## 2019-04-15 RX ADMIN — ENOXAPARIN SODIUM SCH MG: 40 INJECTION SUBCUTANEOUS at 15:36

## 2019-04-15 NOTE — ED
Dizziness





- HPI Summary


HPI Summary: 


Pt is an 87 y/o male brought in by EMS who presents to the ED c/o dizziness. He 

was sent here by Dr. Schneider for a cardiac workup. As per EMS pt went to the 

cardiologist this morning because he thought he had an appointment, and he was 

too weak to get out of his car. An EKG at the cardiologist revealed LBBB and 

ectopy, however an EKG done by EMS revealed NSR. As per patient, he was laying 

down gravel in his driveway yesterday afternoon and felt dizzy, weak, and 

nauseated. Pt then went to bed and was not able to get out of bed. The nausea 

has resolved but he still feels weak and dizzy. As per Dr. Lamas pt was also 

confused. Pt also reports mild right-sided abdominal pain but this is due to a 

recent fall 2 months ago. He denies any vomiting, diaphoresis, CP, or HA. Pt is 

a former smoker. He is mildly tachycardic in the room with a rate of 102 bpm. 

PMHx aortic stenosis, HTN, TIA, dizziness. Pt takes daily ASA.





- History Of Current Complaint


Stated Complaint: WEAKNESS PER EMS


Time Seen by Provider: 04/15/19 09:52


Hx Obtained From: Patient, EMS


Onset/Duration: Still Present


Timing: Constant - Yesterday afternoon


Character: Weak, Dizzy


Aggravating Factor(s): Exertion - laying down gravel


Associated Signs And Symptoms: Positive: Nausea.  Negative: Vomiting, 

Diaphoresis, Chest Pain


Related History: Similar Episode/Dx as - dizziness





- Allergies/Home Medications


Allergies/Adverse Reactions: 


 Allergies











Allergy/AdvReac Type Severity Reaction Status Date / Time


 


No Known Allergies Allergy   Verified 08/25/18 21:12











Home Medications: 


 Home Medications





Acetaminophen [Acetaminophen Extra Strength] 500 mg PO BID PRN 04/15/19 [

History Confirmed 04/15/19]


Calcium Carbonate/Vitamin D3 [Calcium 600-Vit D3 800 Caplet] 1 tab PO DAILY 04/

15/19 [History Confirmed 04/15/19]


Magnesium Oxide TAB* [MagOx 400 TAB*] 400 mg PO DAILY 04/15/19 [History 

Confirmed 04/15/19]


Omeprazole (Nf) [Prilosec (NF)] 40 mg PO DAILY 04/15/19 [History Confirmed 04/15

/19]


Verapamil HCl [Verapamil ER] 60 mg PO DAILY 04/15/19 [History Confirmed 04/15/19

]


amLODIPine TAB* [Norvasc 5 mg TAB*] 2.5 mg PO DAILY 04/15/19 [History Confirmed 

04/15/19]











PMH/Surg Hx/FS Hx/Imm Hx


Endocrine/Hematology History: Reports: Hx Anemia - iron def., taking supplement


   Denies: Hx Anticoagulant Therapy, Hx Diabetes


Cardiovascular History: Reports: Hx Hypertension, Other Cardiovascular Problems/

Disorders - mild aortic stenosis


   Denies: Hx Angina, Hx Coronary Artery Disease, Hx Hypercholesterolemia, Hx 

Myocardial Infarction, Hx Pacemaker/ICD


Respiratory History: Reports: Other Respiratory Problems/Disorders - RECENT 

PNEUMONIA 6/2017, and also in Jan. 2017, DR. NIELSEN AWARE


   Denies: Hx Asthma, Hx Chronic Obstructive Pulmonary Disease (COPD)


GI History: Reports: Hx Gastroesophageal Reflux Disease - FUNDOPLICATION 

SURGERY 2001, ON NEXIUM., Hx Hiatal Hernia


 History: Reports: Other  Problems/Disorders - BPH


Musculoskeletal History: Reports: Hx Arthritis, Other Musculoskeletal History - 

polymyalgia rheumatica


Sensory History: Reports: Hx Cataracts, Hx Contacts or Glasses, Hx Hearing Aid 

- No hearing aides in the hospital. Pt left @ home, Hx Hearing Problem


   Denies: Other Sensory Impairments


Opthamlomology History: Reports: Hx Cataracts, Hx Contacts or Glasses


   Denies: Other Sensory Impairments


Neurological History: Reports: Hx Headaches - HA once a day, "poor circulation 

back of head", Hx Transient Ischemic Attacks (TIA), Other Neuro Impairments/

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


Psychiatric History: 


   Denies: Hx Panic Disorder





- Surgical History


Surgery Procedure, Year, and Place: 2001 LAP NISSEN FUNDOPLICATION [facility].  2001 L 

HIP FRACTURE [facility] [place].  6/10 RIGHT INGUINAL HERNAI REPAIR


Hx Anesthesia Reactions: No





- Immunization History


Date of Tetanus Vaccine: utd


Date of Influenza Vaccine: fall 2017


Infectious Disease History: No


Infectious Disease History: 


   Denies: Traveled Outside the [place] in Last 30 Days





- Family History


Known Family History: Positive: Other - aneurysm, alcoholism





- Social History


Alcohol Use: Occasionally


Hx Substance Use: No


Substance Use Type: Reports: None


Hx Tobacco Use: Yes


Smoking Status (MU): Former Smoker


Amount Used/How Often: light smoker for approx 6 yrs





Review of Systems


Negative: Skin Diaphoresis


Negative: Chest Pain


Positive: Abdominal Pain, Nausea - resolved.  Negative: Vomiting


Neurological: Other - Dizziness, confusion


Positive: Weakness - generalized.  Negative: Headache


All Other Systems Reviewed And Are Negative: Yes





Physical Exam





- Summary


Physical Exam Summary: 


Appearance: well appearing, no pain distress


Skin: warm, dry, reflects adequate perfusion


Head/face: normal


Eyes: EOMI, AR


ENT: mucous membranes moist


Neck: supple, non-tender


Respiratory: CTA, breath sounds present


Cardiovascular: tachycardic but regular rhythm, pulses symmetrical, systolic 

murmur


Abdomen: no RUQ tenderness, soft


Bowel Sounds: present


Musculoskeletal: normal, strength/ROM intact


Neuro: sensory motor intact, A&Ox3, mildly hard of hearing, difficult to 

understand speech


Triage Information Reviewed: Yes


Vital Signs On Initial Exam: 


 Initial Vitals











Temp Pulse Resp BP Pulse Ox


 


 100.4 F   100   23   147/76   93 


 


 04/15/19 09:58  04/15/19 09:58  04/15/19 09:58  04/15/19 09:58  04/15/19 09:58











Vital Signs Reviewed: Yes





- Rome Coma Scale


Best Eye Response: 4 - Spontaneous


Best Motor Response: 6 - Obeys Commands


Best Verbal Response: 5 - Oriented


Coma Scale Total: 15





Diagnostics





- Vital Signs


 Vital Signs











  Temp Pulse Resp BP Pulse Ox


 


 04/15/19 09:58  100.4 F  100  23  147/76  93














- Laboratory


Result Diagrams: 


 04/15/19 10:15





 04/15/19 10:15


Lab Statement: Any lab studies that have been ordered have been reviewed, and 

results considered in the medical decision making process.





- Radiology


  ** CXR


Radiology Interpretation Completed By: Radiologist


Summary of Radiographic Findings: RIGHT LUNG INFILTRATE. ED physician reviewed 

radiology report.





- CT


  ** Brain CT


CT Interpretation Completed By: Radiologist


Summary of CT Findings: NO ACUTE INTRACRANIAL PATHOLOGY. CHRONIC SMALL VESSEL 

ISCHEMIC CHANGES. ED physician reviewed radiology report.





- Ultrasound


  ** No standard instances


Ultrasound Interpretation Completed By: ED Physician


Summary of Ultrasound Findings: US performed at bedside by ED physician: 

gallbladder mildly distended, gallstone seen but wall not thickened, no 

sonographic Maria's sign, right kidney normal





- EKG


  ** 9:55


Cardiac Rate: NL - 99 bpm


EKG Rhythm: Sinus Rhythm


Summary of EKG Findings: Nl axis, tall T waves anteriorly, ST depressions 

laterally





Dizzy Course/Dx





- Course


Course Of Treatment: Nurse's notes reviewed.  Patient with delirium and ectopy.

  His bundle-branch block that was witnessed prehospital has now resolved.  He 

has a lobar infiltrate seen on x-ray.  He also has grossly elevated WBC which 

is consistent with history of the same.  IV antibiotics started, IV fluids.  

Admit to the hospitalist.





- Diagnoses


Differential Diagnosis/HQI/PQRI: Hypovolemia, Metabolic Abnormality, Seizure, 

Other - ACS, sepsis, infection (UTI/pneumonia)


Provider Diagnoses: 


 Pneumonia, Delirium, History of leukocytosis, Sepsis








- Provider Notifications


Discussed Care Of Patient With: Fantasma Lamas


Time Discussed With Above Provider: 10:02


Instructed by Provider To: Other - Discussed pt's history. He recommends a 

brain CT because of the confusion. They will not take him to the cardiac cath 

lab right now. At 11:00 Dr. Montes accepted pt for admission.





Discharge





- Sign-Out/Discharge


Documenting (check all that apply): Patient Departure - Admit


Patient Received Moderate/Deep Sedation with Procedure: No





- Discharge Plan


Condition: Guarded


Disposition: ADMITTED TO [facility]





- Billing Disposition and Condition


Condition: GUARDED


Disposition: Admitted to [facility]





- Attestation Statements


Document Initiated by Scribe: Yes


Documenting Scribe: Maritza Salazar


Provider For Whom Scribe is Documenting (Include Credential): Alexis Campos MD


Scribe Attestation: 


Maritza CASAS, scribed for Alexis Campos MD on 04/15/19 at 1456. 


Scribe Documentation Reviewed: Yes


Provider Attestation: 


The documentation as recorded by the Maritza leos accurately reflects the 

service I personally performed and the decisions made by me, Alexis Campos MD


Status of Scribe Document: Viewed

## 2019-04-15 NOTE — HP
CC:  Dr. Keegan Bejarano; Dr. Sheba Urena; Dr. Imtiaz Schneider

 

HISTORY AND PHYSICAL:

 

DATE OF ADMISSION:  04/15/19

 

PRIMARY CARE PHYSICIAN:  Dr. Keegan Bejarano.

ONCOLOGIST:  Dr. Sheba Urena.

CARDIOLOGIST:  Dr. Imtiaz Schneider.

 

HEALTHCARE PROXY:  Kathy, wife.

CODE STATUS:  Full code.

 

CHIEF COMPLAINT:  Acute weakness, chills, nausea, and headache.

 

HISTORY OF PRESENT ILLNESS:  Mr. Glasgow is an 86-year-old male with history of 
aortic stenosis, hypertension, GERD, possible polymyalgia rheumatica, 
polycythemia vera, who is presenting to the emergency room from Cardiology 
Clinic with weakness and chills.  The patient states that since last night, he 
has been experiencing worsening of his baseline weakness associated with new 
chills, nausea, and worsening of his chronic headache.  He denies chest pain, 
cough.  He did not check his temperature at home.  Of note, he and his wife do 
report that he has baseline weakness and headaches for years associated with 
falls for which a workup has been unrevealing.  Today, he was going to see his 
cardiologist, Dr. Schneider.  In the parking lot, he had difficulty getting out of 
his car secondary to weakness.  He was assisted into the clinic from his car 
and noted to be tachycardic and hypertensive.  His EKG in the clinic revealed 
sinus tachycardia at 111 beats per minute with a new LBBB and APCs.  He was 
transferred to the emergency room for further evaluation. He report decreased 
appetite but denies chest pain, SOB, light headedness, n/v/c/d,abdominal pain.  
Other than above, 10-point ROS was negative.

 

In the emergency room, a repeat EKG revealed normal sinus rhythm.  He was noted 
to have a heart rate of 102.  He had a chest x-ray done, which showed patchy 
infiltrates present in the right upper lobe and at the right lung base without 
associated pleural effusion.  A brain CT was also performed given history of 
headache, acutely worsening recently, although it showed no acute intracranial 
pathology.  He was given ceftriaxone and azithromycin, and admitted to Medicine.

 

PAST MEDICAL HISTORY:

1.  Aortic stenosis.

2.  Hypertension.

3.  GERD and hiatal hernia.

4.  Polycythemia vera, followed by Dr. Urena.

5.  Possible history of PMR.

6.  Status post ORIF.

7.  Status post hernia repair.

8.  Status post Nissen fundoplication.

 

HOME MEDICATIONS: 

1.  Amlodipine 2.5 mg daily.

2.  Verapamil ER 60 mg daily.

3.  Omeprazole 40 mg daily.

4.  Multivitamin.

5.  Magnesium, calcium, and vitamin D supplements.

 

ALLERGIES:  He has no known drug allergies.

 

FAMILY HISTORY:  Reviewed and noncontributory.

 

SOCIAL HISTORY:  He recently retired as a farmer.  He lives with his wife.  He 
quit smoking tobacco in his 30s, rarely drinks alcohol, never any other drugs.

 

                               PHYSICAL EXAMINATION

 

GENERAL:  A chronically ill-appearing elderly man, in no acute distress.  Alert 
and interactive.

 

VITAL SIGNS:  T-max 100.4, heart rate 70, blood pressure 110/58, respiratory 
rate 13, SaO2 95% on room air.



HEENT: OP clear, moist mucous membranes.

 

NECK:  No JVD.

 

LUNGS:  Clear to auscultation bilaterally.

 

HEART:  Regular rate and rhythm.  Systolic murmur loudest at RUSB.

 

ABDOMEN:  Soft, nontender, nondistended.

 

EXTREMITIES:  Warm and well perfused.  No edema.

 

 DIAGNOSTIC STUDIES/LAB DATA:  

Labs reviewed and significant for WBC 44, hemoglobin of 13.8, microcytic at 
baseline.  Potassium 3.4, creatinine 1.25 at baseline, magnesium 1.4.  Troponin 
is 0.04.  BNP is 365.  CRP is 11.  UA is clear. Flu negative.

 

EKG with NSR 99, LVH with repol abnormalities unchanged from priors



Chest x-ray with patchy infiltrates in the right upper lobe and at the right 
lung base.

 

Head CT showed no acute intracranial pathology, chronic small vessel ischemic 
changes.

 

ASSESSMENT AND PLAN:  An 86-year-old man with history of hypertension, AS, 
polycythemia vera, and gastroesophageal reflux disease, is presenting with 
acute weakness, chills, and nausea, found with fever, elevated WBC, and chest x-
ray concerning for right lung infiltrate.

 

1.  Pneumonia, complicated by sepsis. Significantly elevated WBC likely 
leukemoid reaction from infection on top of known history of myeloproliferative 
disorder.  We will continue the patient on empiric coverage for community-
acquired pneumonia: ceftriaxone and azithromycin.  We will continue to monitor 
vital signs closely. Follow up blood and sputum cultures and follow up 
legionella and Streptococcus pneumoniae antigens.

2. Troponin elevation. In the setting of no chest pain and no concerning EKG 
changes, likely that this is demand ischemia in the setting of tachycardia from 
sepsis. However, given new LBBB seen in clinic (not on repeat in ER), will 
order TTE, although likely rate-related LBBB.

3. .  Polycythemia vera.  We will hold hydroxyurea while the patient is on 
antibiotics.  The patient has appointment with Dr. Urena on 04/22/19 at 9 
a.m.

4.  Hypertension.  Continue home amlodipine 2.5 and switch verapamil to 
formulary 30 mg b.i.d.

5.  Gastroesophageal reflux disease.  Continue home PPI.

6.  Hypokalemia, hypomagnesemia.  Repeat electrolytes.  Follow up BMP.

7. H/o falls and deconditioning: PT

8.  DVT prophylaxis.  Start Lovenox daily.

9.  The patient is a full code.

 

TIME SPENT:  Approximately 60 minutes was spent on admission of this patient, 
over half of which was spent at bedside for interview and exam.

 

 

 

[id number]/CPS #: [id number]

LAURA

## 2019-04-16 LAB
ANION GAP SERPL CALC-SCNC: 6 MMOL/L (ref 2–11)
BASOPHILS # BLD AUTO: 0.2 10^3/UL (ref 0–0.2)
BUN SERPL-MCNC: 22 MG/DL (ref 6–24)
BUN/CREAT SERPL: 15.4 (ref 8–20)
CALCIUM SERPL-MCNC: 8.9 MG/DL (ref 8.6–10.3)
CHLORIDE SERPL-SCNC: 105 MMOL/L (ref 101–111)
EOSINOPHIL # BLD AUTO: 0.2 10^3/UL (ref 0–0.6)
GLUCOSE SERPL-MCNC: 85 MG/DL (ref 70–100)
HCO3 SERPL-SCNC: 26 MMOL/L (ref 22–32)
HCT VFR BLD AUTO: 39 % (ref 36–46)
HGB BLD-MCNC: 11.8 G/DL (ref 14–18)
LYMPHOCYTES # BLD AUTO: 1.7 10^3/UL (ref 1–4.8)
MAGNESIUM SERPL-MCNC: 2.5 MG/DL (ref 1.9–2.7)
MCH RBC QN AUTO: 24 PG (ref 27–31)
MCHC RBC AUTO-ENTMCNC: 31 G/DL (ref 31–36)
MCV RBC AUTO: 78 FL (ref 80–94)
MONOCYTES # BLD AUTO: 3.7 10^3/UL (ref 0–0.8)
NEUTROPHILS # BLD AUTO: 27.6 10^3/UL (ref 1.5–7.7)
NRBC # BLD AUTO: 0 10^3/UL
NRBC BLD QL AUTO: 0
PLATELET # BLD AUTO: 281 10^3/UL (ref 150–450)
POTASSIUM SERPL-SCNC: 4.5 MMOL/L (ref 3.5–5)
RBC # BLD AUTO: 4.93 10^6 /UL (ref 4.18–5.48)
SODIUM SERPL-SCNC: 137 MMOL/L (ref 135–145)
TROPONIN I SERPL-MCNC: 0.05 NG/ML (ref ?–0.04)
WBC # BLD AUTO: 33.3 10^3/UL (ref 3.5–10.8)

## 2019-04-16 RX ADMIN — CEFTRIAXONE SODIUM SCH MLS/HR: 1 INJECTION, POWDER, FOR SOLUTION INTRAVENOUS at 10:03

## 2019-04-16 RX ADMIN — AMLODIPINE BESYLATE SCH MG: 5 TABLET ORAL at 10:03

## 2019-04-16 RX ADMIN — AZITHROMYCIN SCH MG: 250 TABLET, FILM COATED ORAL at 10:03

## 2019-04-16 RX ADMIN — VERAPAMIL HYDROCHLORIDE SCH MG: 120 TABLET ORAL at 21:09

## 2019-04-16 RX ADMIN — VERAPAMIL HYDROCHLORIDE SCH MG: 120 TABLET ORAL at 10:51

## 2019-04-16 RX ADMIN — THERA TABS SCH TAB: TAB at 10:03

## 2019-04-16 RX ADMIN — ENOXAPARIN SODIUM SCH MG: 40 INJECTION SUBCUTANEOUS at 15:45

## 2019-04-16 RX ADMIN — PANTOPRAZOLE SODIUM SCH MG: 40 TABLET, DELAYED RELEASE ORAL at 10:03

## 2019-04-16 NOTE — ECHO
Patient:      BERTHA SOLIS  

[facility] Rec#:     [id number]            :          1933          

Date:         2019            Age:          86y                 

Account#:     [id number]          Height:       183 cm / 72.0 in

Accession#:   [id number]           Weight:       78 kg / 171.9 lbs

Sex:          M                     BSA:          2

Room#:        [facility]                 

Admit Date#:  04/15/2019          

Type:         Inpatient

 

Referring:    MARCUS

Reading:      Imtiaz Schneider MD

Sonographer:  Diane Green RDCS

CC:           Keegan Bejarano MD

CC:           Imtiaz Schneider MD

______________________________________________________________________

 

Transthoracic Echocardiogram

 

Indication:

Abnormal EKG

BP:           124/47

HR:           70

Rhythm:       NSR

 

Findings     

History:

Aortic stenosis, HTN, TIA, polycythemia vera. 

 

Technical Comments:

The study quality is fair.  Completed at 0945. 

 

Left Ventricle:

The left ventricular chamber size is normal. Mild concentric left

ventricular hypertrophy is observed. Global left ventricular wall motion

and contractility are within normal limits. There is normal left

ventricular systolic function. The estimated ejection fraction is

60-65%.  There is a left ventricular septal wall motion abnormality

observed, possibly due to the presence of a left bundle branch block.

The assessment of diastolic function is non-diagnostic.for grade.

Probable diastolic dysfunction. 

 

Left Atrium:

The left atrium is mildly dilated. 

 

Right Ventricle:

Moderator Band present. The right ventricle is mild to moderately

dilated.  The right ventricular global systolic function is normal. 

 

Right Atrium:

The right atrium is mildly dilated.  

 

Aortic Valve:

The aortic valve is trileaflet. Moderate aortic leaflet calcification is

visualized. There is moderate aortic regurgitation. There is moderate

aortic stenosis. The mean gradient of the aortic valve is 21 mmHg.  The

peak instantaneous gradient of the aortic valve is 38 mmHg.  The aortic

valve area, by peak velocities, is calculated at 1.3 cm2.  The aortic

valve area, by VTI's, is calculated at 1.2 cm2.  The measured aortic

regurgitation pressure half-time is 286 msec.  

 

Mitral Valve:

The mitral valve leaflets are mildly thickened. There is a trace of

mitral regurgitation. There is no evidence of mitral stenosis. 

 

Tricuspid Valve:

The tricuspid valve leaflets are normal.  There is trace tricuspid

regurgitation.  Unable to estimate the right ventricular systolic

pressure.   There is no tricuspid stenosis. 

 

Pulmonic Valve:

The pulmonic valve appears normal. There is a trace pulmonic

regurgitation.  There is no pulmonic stenosis.  

 

Pericardium:

There is no significant pericardial effusion. 

 

Aorta:

There is no dilatation of the ascending aorta. There is no dilatation of

the aortic arch. The aortic root is normal in size. 

 

Pulmonary Artery:

The main pulmonary artery appears normal. 

 

Venous:

The inferior vena cava is dilated.  There is a greater than 50%

respiratory change in the inferior vena cava dimension. 

 

Conclusions

Mild concentric left ventricular hypertrophy is observed.

The estimated ejection fraction is 60-65%. 

There is a left ventricular septal wall motion abnormality observed,

possibly due to the presence of a left bundle branch block.

Probable diastolic dysfunction

The assessment of diastolic function is non-diagnostic.for grade of

diastolic dysfunction.

The left atrium is mildly dilated.

The right ventricle is mild to moderately dilated. 

The right ventricular global systolic function is normal.

The right atrium is mildly dilated. 

There is moderate aortic stenosis.

There is moderate aortic regurgitation.

There is a trace of mitral regurgitation.

There is trace tricuspid regurgitation. 

Interval mild progression in aortic stenosis c/t 2018.

 

Measurements     

Name                    Value         Normal Range            

RVIDd (AP) 2D           4.1 cm        (0.9 - 2.6)             

RVDdMajor (2D)          5 cm          (2.2 - 4.4)             

RAd ISD 4CH             5.3 cm        (3.4 - 4.9)             

RA (A4C)W               4.8 cm        (2.9 - 4.6)             

IVSd (2D)               1.1 cm        (0.6 - 1)               

LVPWd (2D)              1.2 cm        (0.6 - 1)               

LVIDd (2D)              4.2 cm        (3.6 - 5.4)             

LVIDs (2D)              3.1 cm        -                        

LV FS (2D)              26 %          (25 - 45)               

Aortic Annulus          2.2 cm        (1.4 - 2.6)             

Ao root diameter (2D)   3.4 cm        (2.1 - 3.5)             

Ascending Ao            3.3 cm        (2.1 - 3.4)             

Aortic arch             2.3 cm        (1.8 - 3.4)             

LA dimension (AP) 2D    4.4 cm        (2.3 - 3.8)             

LAd ISD 4CH             6.4 cm        (2.9 - 5.3)             

LA ISD 4CH W            5.3 cm        (2.5 - 4.5)             

 

Name                    Value         Normal Range            

LA ESV BP (A/L) index   39 ml/m2      -                        

 

Name                    Value         Normal Range            

MV E-wave Vmax          0.6 m/sec     -                        

MV deceleration time    254 msec      -                        

MV A-wave Vmax          1 m/sec       -                        

MV E:A ratio            0.6 ratio     -                        

LV septal e' Vmax       0.05 m/sec    -                        

LV lateral e' Vmax      0.05 m/sec    -                        

 

Name                    Value         Normal Range            

AV Vmax                 3.1 m/sec     -                        

AV VTI                  72 cm         -                        

AV peak gradient        38 mmHg       -                        

AV mean gradient        21 mmHg       -                        

LVOT diameter           2 cm          -                        

LVOT Vmax               1.3 m/sec     -                        

LVOT VTI                28 cm         -                        

LVOT peak gradient      7 mmHg        -                        

LVOT mean gradient      3 mmHg        -                        

DOI (VTI)               0.39 ratio    -                        

CHUCK (continuity Vmax)   1.3 cm2       -                        

CHUCK (continuity VTI)    1.2 cm2       -                        

AR PHT                  286 msec      -                        

CHLOE Vmax                1.2 m/sec     -                        

 

Name                    Value         Normal Range            

IVC diameter            2.6 cm        -                        

 

Name                    Value         Normal Range            

PV Vmax                 1.2 m/sec     -                        

PV peak gradient        5 mmHg        -                        

 

Electronically signed by: Imtiaz Schneider MD on 2019 12:22:23

## 2019-04-16 NOTE — PN
Subjective


Interval History: 





No events overnight. Pt feeling significantly improved and stronger, although 

with dizziness on standing. 











Objective


Active Medications: 








Acetaminophen (Tylenol Tab*)  650 mg PO Q4H PRN


   PRN Reason: FEVER/PAIN


Amlodipine Besylate (Norvasc Tab*)  2.5 mg PO DAILY [facility]


   Last Admin: 04/16/19 10:03 Dose:  2.5 mg


Azithromycin (Zithromax Tab*)  250 mg PO DAILY [facility]


   Stop: 04/20/19 08:59


   Last Admin: 04/16/19 10:03 Dose:  250 mg


Enoxaparin Sodium (Lovenox(*))  40 mg SUBCUT Q24H [facility]


   Last Admin: 04/16/19 15:45 Dose:  40 mg


Ceftriaxone Sodium 1 gm/ (Sodium Chloride)  50 mls @ 200 mls/hr IVPB Q24H [facility]


   Last Admin: 04/16/19 10:03 Dose:  200 mls/hr


Multivitamins/Minerals (Theragran/Minerals Tab*)  1 tab PO QAM [facility]


   Last Admin: 04/16/19 10:03 Dose:  1 tab


Pantoprazole Sodium (Protonix Tab*)  40 mg PO DAILY [facility]


   Last Admin: 04/16/19 10:03 Dose:  40 mg


Verapamil HCl (Calan Tab*)  30 mg PO BID [facility]


   Last Admin: 04/16/19 10:51 Dose:  30 mg








 Vital Signs - 8 hr











  04/16/19





  15:20


 


Temperature 97.7 F


 


Pulse Rate 57


 


Respiratory 16





Rate 


 


Blood Pressure 130/62





(mmHg) 


 


O2 Sat by Pulse 96





Oximetry 











Oxygen Devices in Use Now: None


Appearance: well appearing, making jokes


Ears/Nose/Mouth/Throat: Mucous Membranes Moist


Neck: NL Appearance and Movements; NL JVP


Respiratory: Clear to Auscultation


Cardiovascular: RRR


Extremities: No Edema


Result Diagrams: 


 04/16/19 06:46





 04/16/19 06:46


Microbiology and Other Data: 


 Microbiology











 04/15/19 10:48 Urine Culture - Final





 Urine    No Growth (<1,000 CFU/mL)


 


 04/15/19 10:15 Aerobic Blood Culture - Preliminary





 Blood Venous    No Growth Day 1





 Anaerobic Blood Culture - Preliminary





    No Growth Day 1


 


 04/15/19 10:14 Aerobic Blood Culture - Preliminary





 Blood Venous    No Growth Day 1





 Anaerobic Blood Culture - Preliminary





    No Growth Day 1


 


 04/15/19 20:00 Gram Stain - Final





 Sputum Expectorated 


 


 04/15/19 10:48 Legionella Urinary Antigen - Final





 Urine    Negative Legionella Antigen





 Streptococcus pneumoniae Ag Screen - Final





    Negative S. pneumo Antigen














Assess/Plan/Problems-Billing


Assessment: 





86M with HTN, AS, polycythemia vera, and GERD, presents with weakness, chills, 

nausea, found with fever, leukocytosis, and CXR concerning for PNA. 





- Patient Problems


(1) Community acquired pneumonia


Comment: Continue IV CTX/azithro (4/15 - ). Responded significantly.   





(2) Hypertension


Comment: 


- Continue verapamil, low-dose amlodipine   


Status and Disposition: 





To home on discharge - possibly tomorrow. Would benefit from home PT.

## 2019-04-17 VITALS — SYSTOLIC BLOOD PRESSURE: 135 MMHG | DIASTOLIC BLOOD PRESSURE: 48 MMHG

## 2019-04-17 LAB
ANION GAP SERPL CALC-SCNC: 8 MMOL/L (ref 2–11)
BASOPHILS # BLD AUTO: 0.1 10^3/UL (ref 0–0.2)
BUN SERPL-MCNC: 24 MG/DL (ref 6–24)
BUN/CREAT SERPL: 16.8 (ref 8–20)
CALCIUM SERPL-MCNC: 9 MG/DL (ref 8.6–10.3)
CHLORIDE SERPL-SCNC: 104 MMOL/L (ref 101–111)
EOSINOPHIL # BLD AUTO: 0.2 10^3/UL (ref 0–0.6)
GLUCOSE SERPL-MCNC: 87 MG/DL (ref 70–100)
HCO3 SERPL-SCNC: 24 MMOL/L (ref 22–32)
HCT VFR BLD AUTO: 40 % (ref 36–46)
HGB BLD-MCNC: 12.3 G/DL (ref 14–18)
LYMPHOCYTES # BLD AUTO: 1.5 10^3/UL (ref 1–4.8)
MCH RBC QN AUTO: 24 PG (ref 27–31)
MCHC RBC AUTO-ENTMCNC: 31 G/DL (ref 31–36)
MCV RBC AUTO: 78 FL (ref 80–94)
MONOCYTES # BLD AUTO: 2.4 10^3/UL (ref 0–0.8)
NEUTROPHILS # BLD AUTO: 23.2 10^3/UL (ref 1.5–7.7)
NRBC # BLD AUTO: 0 10^3/UL
NRBC BLD QL AUTO: 0
PLATELET # BLD AUTO: 330 10^3/UL (ref 150–450)
POTASSIUM SERPL-SCNC: 4 MMOL/L (ref 3.5–5)
RBC # BLD AUTO: 5.19 10^6 /UL (ref 4.18–5.48)
SODIUM SERPL-SCNC: 136 MMOL/L (ref 135–145)
WBC # BLD AUTO: 27.4 10^3/UL (ref 3.5–10.8)

## 2019-04-17 RX ADMIN — AZITHROMYCIN SCH MG: 250 TABLET, FILM COATED ORAL at 09:29

## 2019-04-17 RX ADMIN — PANTOPRAZOLE SODIUM SCH MG: 40 TABLET, DELAYED RELEASE ORAL at 09:32

## 2019-04-17 RX ADMIN — CEFTRIAXONE SODIUM SCH MLS/HR: 1 INJECTION, POWDER, FOR SOLUTION INTRAVENOUS at 10:55

## 2019-04-17 RX ADMIN — AMLODIPINE BESYLATE SCH MG: 5 TABLET ORAL at 09:29

## 2019-04-17 RX ADMIN — THERA TABS SCH TAB: TAB at 09:29

## 2019-04-17 RX ADMIN — VERAPAMIL HYDROCHLORIDE SCH MG: 120 TABLET ORAL at 09:31

## 2019-04-18 NOTE — DS
CC:  Dr. Keegan Bejarano; Dr. Fantasma Lamas *

 

DISCHARGE SUMMARY:

 

DATE OF ADMISSION:  04/15/19

 

DATE OF DISCHARGE:  04/17/19

 

PRIMARY CARE PHYSICIAN:  Dr. Keegan Bejarano.

 

CARDIOLOGIST:  Fantasma Lamas MD.

 

DISPOSITION:  To home.

 

CONDITION:  Good.

 

PRIMARY DIAGNOSIS:  Pneumonia with sepsis.

 

SECONDARY DIAGNOSES:

1.  Hypertension.

2.  Aortic stenosis.

3.  Polycythemia vera.

 

PERTINENT STUDIES AND LABS:  EKG with normal sinus rhythm with LVH and 
secondary repolarization changes, otherwise unchanged from baseline.

 

Chest x-ray with right lung infiltrate.

 

Transthoracic echocardiogram with mild concentric LVH, estimated EF 60% to 65%, 
left ventricular septal wall motion abnormality observed, probable diastolic 
dysfunction but is nondiagnostic for grade of dysfunction, LA mildly dilated, 
RV mild to moderately dilated, RV global systolic function is normal, RA mildly 
dilated, moderate aortic stenosis and regurgitation, interval change with mild 
progression in aortic stenosis.

 

Brain CT without contrast without acute intracranial pathology.  Chronic small 
vessel ischemic changes.

 

HISTORY OF PRESENT ILLNESS:  This is an 86-year-old man with history of aortic 
stenosis, hypertension, GERD, polycythemia vera, possible polymyalgia rheumatica
, presented to the emergency room from [facility] with weakness and 
chills. The patient states that since one day prior to admission, he was 
experiencing worsening of his baseline weakness associated with new chills, 
nausea and worsening of his chronic headache.  He did deny chest pain or  cough
, although he states he has had pneumonia in the past without cough.  He did 
not check his temperature at home, although he felt feverish.  Today, he went 
to see his outpatient cardiologist, Dr. Lamas, 1 week before scheduled 
appointment and in the parking lot, he was having difficulty getting out of his 
car secondary to weakness. Another patron in the parking lot assisted him to 
the clinic where he was noted to be tachycardic and hypertensive with EKG 
revealing sinus tachycardia at 110 beats per minute with new left bundle-branch 
block, so he was transferred to the emergency room for further evaluation.

 

HOSPITAL COURSE:  In the emergency room, repeat EKG revealed normal sinus 
rhythm unchanged from prior.  He had a chest x-ray performed which showed 
patchy infiltrates in the right upper and lower lobes.  He was started on 
empiric for community-acquired pneumonia treatment and given sepsis dose fluids 
and admitted to the medical service.  His fever quickly resolved and his 
symptoms quickly reversed with antibiotics and fluids.  He was noted to have a 
troponin peak of 0.07, which was deemed due to demand ischemia in the setting 
of sepsis.  The patient remained without chest pain throughout admission or 
concerning EKG findings.  The patient had repeat echo, which was without focal 
wall motion abnormalities but did show progression of aortic stenosis.  
Discussed ordering inpatient exercise stress test but patient preferred to 
defer.  Further cardiology workup to outpatient.  He reports he has an 
appointment with his cardiologist next week and his heart "feels fine."  He 
updated his MOLST form with medical team, which is signed in his chart. On day 
2 of admission, he did report mild dizziness on standing, so he was evaluated 
by physical therapy, who recommended outpatient physical therapy for endurance 
and balance training, which patient was amenable to.  On day of discharge, he 
reports feeling back to his normal level of strength.  He denied chills, nausea
, or cough.  He reports being able to walk around the floor without dyspnea on 
exertion.  He does report mild chronic headache unchanged from previous, which 
he was told was related to his hematologic disorder.  Otherwise, 10-point 
review of systems was negative.

 

PHYSICAL EXAMINATION:  Afebrile.  Heart rate 60s, blood pressure 136/68, 
respiratory rate 12, oxygen saturation 97% on room air.  General:  Elderly 
appearing gentleman in good spirits, alert and interactive.  Heart:  Regular 
rate and rhythm.  No murmurs, gallops, or rubs.  Lungs:  Clear to auscultation 
bilaterally.  Abdomen:  Soft, nontender, nondistended.  Lower Extremities:  
Without evidence of edema.  Warm and well perfused.

 

DISCHARGE PLAN:  The patient has appointment with his oncologist, Dr. Sheba Urena, this Monday and has an appointment with his cardiologist, Dr. Fantasma Lamas, later next week.  He reports not realizing that he should still 
followup with the primary care physician, but he was educated on importance of 
having a PCP, so he plans to follow up in that office as well.  He will 
complete an oral antibiotic course for community-acquired pneumonia.

 

DISCHARGE MEDICATIONS:

1.  Cefdinir 300 mg b.i.d. for 4 more days.

2.  Verapamil 60 mg daily ER.

3.  Amlodipine 2.5 mg daily.

4.  Omeprazole 40 mg daily.

5.  Multivitamins, magnesium, calcium and vitamin D3 supplements.

 

The patient was educated on return precautions.  Told to return to the hospital 
with recurrence of presenting symptoms, fevers, or chest pain.  He is to resume 
normal diet and level of activity.

 

TIME SPENT:  Approximately 60 minutes spent on discharge of this patient, more 
of than half of which was spent with care coordination at bedside for interview 
and exam.

 

 [id number]/[facility] #: [id number]

LAURA

## 2019-12-03 ENCOUNTER — HOSPITAL ENCOUNTER (EMERGENCY)
Dept: HOSPITAL 25 - ED | Age: 84
Discharge: HOME | End: 2019-12-03
Payer: MEDICARE

## 2019-12-03 VITALS — SYSTOLIC BLOOD PRESSURE: 129 MMHG | DIASTOLIC BLOOD PRESSURE: 72 MMHG

## 2019-12-03 DIAGNOSIS — Z86.73: ICD-10-CM

## 2019-12-03 DIAGNOSIS — I35.0: ICD-10-CM

## 2019-12-03 DIAGNOSIS — Z79.01: ICD-10-CM

## 2019-12-03 DIAGNOSIS — Z87.891: ICD-10-CM

## 2019-12-03 DIAGNOSIS — K21.9: ICD-10-CM

## 2019-12-03 DIAGNOSIS — I10: ICD-10-CM

## 2019-12-03 DIAGNOSIS — Z79.899: ICD-10-CM

## 2019-12-03 DIAGNOSIS — R51: Primary | ICD-10-CM

## 2019-12-03 DIAGNOSIS — D45: ICD-10-CM

## 2019-12-03 LAB
ANION GAP SERPL CALC-SCNC: 6 MMOL/L (ref 2–11)
BASOPHILS # BLD AUTO: 0.3 10^3/UL (ref 0–0.2)
BUN SERPL-MCNC: 29 MG/DL (ref 6–24)
BUN/CREAT SERPL: 16.5 (ref 8–20)
CALCIUM SERPL-MCNC: 9.2 MG/DL (ref 8.6–10.3)
CHLORIDE SERPL-SCNC: 105 MMOL/L (ref 101–111)
EOSINOPHIL # BLD AUTO: 0.3 10^3/UL (ref 0–0.6)
GLUCOSE SERPL-MCNC: 92 MG/DL (ref 70–100)
HCO3 SERPL-SCNC: 26 MMOL/L (ref 22–32)
HCT VFR BLD AUTO: 39 % (ref 42–52)
HGB BLD-MCNC: 12 G/DL (ref 14–18)
LYMPHOCYTES # BLD AUTO: 1.9 10^3/UL (ref 1–4.8)
MCH RBC QN AUTO: 22 PG (ref 27–31)
MCHC RBC AUTO-ENTMCNC: 30 G/DL (ref 31–36)
MCV RBC AUTO: 74 FL (ref 80–94)
MICROCYTES BLD QL SMEAR: (no result)
MONOCYTES # BLD AUTO: 4.8 10^3/UL (ref 0–0.8)
NEUTROPHILS # BLD AUTO: 38.8 10^3/UL (ref 1.5–7.7)
NRBC # BLD AUTO: 0 10^3/UL
NRBC BLD QL AUTO: 0
PLATELET # BLD AUTO: 647 10^3/UL (ref 150–450)
POLYCHROMASIA BLD QL SMEAR: (no result)
POTASSIUM SERPL-SCNC: 3.9 MMOL/L (ref 3.5–5)
RBC # BLD AUTO: 5.35 10^6 /UL (ref 4.18–5.48)
SODIUM SERPL-SCNC: 137 MMOL/L (ref 135–145)
WBC # BLD AUTO: 46 10^3/UL (ref 3.5–10.8)

## 2019-12-03 PROCEDURE — 70450 CT HEAD/BRAIN W/O DYE: CPT

## 2019-12-03 PROCEDURE — 80048 BASIC METABOLIC PNL TOTAL CA: CPT

## 2019-12-03 PROCEDURE — 36415 COLL VENOUS BLD VENIPUNCTURE: CPT

## 2019-12-03 PROCEDURE — 99283 EMERGENCY DEPT VISIT LOW MDM: CPT

## 2019-12-03 PROCEDURE — 85060 BLOOD SMEAR INTERPRETATION: CPT

## 2019-12-03 PROCEDURE — 93005 ELECTROCARDIOGRAM TRACING: CPT

## 2019-12-03 PROCEDURE — 85025 COMPLETE CBC W/AUTO DIFF WBC: CPT

## 2019-12-03 PROCEDURE — 70496 CT ANGIOGRAPHY HEAD: CPT

## 2019-12-03 NOTE — XMS REPORT
Patient Summary Document

 Created on:2019



Patient:BERTHA SOLIS

Sex:Male

:1933

External Reference #:[id number]





Demographics







 Address  [address] [place], [place] [address]

 

 Home Phone  [phone]

 

 Preferred Language  English

 

 Marital Status  Unknown

 

 Adventist Affiliation  Unknown

 

 Race  Unknown

 

 Ethnic Group  Unknown









Author







 Organization  [facility]









Support







 Name  Relationship  Address  Phone

 

 IRAIS SOLIS, Unknown Name  Suha  [address] [facility]  [phone]



     [place] [address]  









Care Team Providers







 Name  Role  Phone

 

 Unavailable  Unavailable  Unavailable









Problems

This patient has no known problems.



Allergies, Adverse Reactions, Alerts







 Allergy  Allergy  Status  Severity  Reaction(s)  Onset  Inactive  Treating  
Comments



 Name  Type        Date  Date  Clinician  

 

 Uncoded  Unknown  Active  Unknown  Reaction  2019    Interface  



 free-text        Unknown  -18      



 allergy                







Medications







 Ordered  Filled  Start  Stop  Current  Ordering  Indication  Dosage  Frequency
  Signature  Comments  Components



 Medication  Medication  Date  Date  Medication?  Clinician        (SIG)    



 Name  Name                    

 

 Multivitami  Multivitami  2012    No  Unknown    Unknown  Unknown      



 ns/Minerals  ns/Minerals  -                  



 Tab*  Tab*                    

 

 amLODIPine  amLODIPine      No  Unknown    Unknown  Unknown      



 5 mg tablet  5 mg tablet  4-15                  

 

 acetaminoph  acetaminoph      No  Unknown    Unknown  Unknown      



 en 500 mg  en 500 mg  4-15                  



 tablet  tablet                    

 

 magnesium  magnesium      No  Unknown    Unknown  Unknown      



 oxide 400  oxide 400  4-15                  



 mg (241.3  mg (241.3                    



 mg  mg                    



 magnesium)  magnesium)                    



 tablet  tablet                    

 

 omeprazole  omeprazole      No  Unknown    Unknown  Unknown      



 40 mg  40 mg  4-15                  



 capsule,del  capsule,del                    



 ayed  ayed                    



 release  release                    

 

 verapamil  verapamil      No  Unknown    Unknown  Unknown      



  mg   mg  4-15                  



 capsule,ext  capsule,ext                    



 ended  ended                    



 release  release                    

 

 Calcium  Calcium      No  Unknown    Unknown  Unknown      



 Carbonate/V  Carbonate/V  4-15                  



 itamin D3  itamin D3                    

 

 cefdinir  cefdinir      No  Unknown    Unknown  Unknown      



 300 mg  300 mg  4-17                  



 capsule  capsule                    







Vital Signs







 Vital Name  Observation Time  Observation Value  Comments

 

 SYSTOLIC mm[Hg]  2019 18:05:21  135 mm[Hg] mm[Hg]  Method: Sit

 

 DIASTOLIC mm[Hg]  2019 18:05:21  48 mm[Hg] mm[Hg]  Method: Sit

 

 PULSE  2019 18:05:21  63 /min /min  

 

 RESP RATE  2019 18:05:21  16 /min /min  

 

 TEMP  2019 18:05:21  97.4 [degF]  







Procedures

This patient has no known procedures.



Results







 Test Description  Test Time  Test Comments  Text Results  Atomic Results  
Result Comments









 Laboratory Studies  2019 08:13:00  Identifier [id number] Result Time  
Unknown



     2019 08:13:00  









   Test Item  Value  Reference Range  Comments









 Unknown (test code = 2951-2)  136 mmol/L  Unknown  135-145  F



Ordering Physician [facility] Hvcgwys2884-80-42 08:13:00Identifier [id number]
6 Result Time 2019 08:13:00Unknown





 Test Item  Value  Reference Range  Comments

 

 Unknown (test code = 2823-3)  4.0 mmol/L  Unknown  3.5-5.0  F



Ordering Physician [facility] Slizcbl7946-50-85 08:13:00Identifier [id number]-
6 Result Time 2019 08:13:00Unknown





 Test Item  Value  Reference Range  Comments

 

 Unknown (test code = 2345-7)  87 mg/dL  Unknown    F



Ordering Physician [facility] Zftyvvb3131-09-46 08:13:00Identifier [id number]-
6 Result Time 2019 08:13:00Unknown





 Test Item  Value  Reference Range  Comments

 

 Unknown (test code = 48642-3)  46.9   Unknown  Unknown  F



Ordering Physician [facility] Ldxgnxv4594-53-72 08:13:00Identifier [id number]-
6 Result Time 2019 08:13:00Unknown





 Test Item  Value  Reference Range  Comments

 

 Unknown (test code = NullTestCode)  56.7   Unknown  Unknown  F



Ordering Physician [facility] Krwfknq2568-08-12 08:13:00Identifier [id number]
6 Result Time 2019 08:13:00Unknown





 Test Item  Value  Reference Range  Comments

 

 Unknown (test code = 2160-0)  1.43 mg/dL  Unknown  0.67-1.17  F



Ordering Physician [facility] Nasuzwg8936-72-63 08:13:00Identifier [id number]-
6 Result Time 2019 08:13:00Unknown





 Test Item  Value  Reference Range  Comments

 

 Unknown (test code = 2075-0)  104 mmol/L  Unknown  101-111  F



Ordering Physician [facility] Giscrro1620-06-46 08:13:00Identifier [id number]-
6 Result Time 2019 08:13:00Unknown





 Test Item  Value  Reference Range  Comments

 

 Unknown (test code = 2028-9)  24 mmol/L  Unknown  22-32  F



Ordering Physician [facility] Mbbfgmi7612-44-13 08:13:00Identifier [id number]-
6 Result Time 2019 08:13:00Unknown





 Test Item  Value  Reference Range  Comments

 

 Unknown (test code = 80982-6)  9.0 mg/dL  Unknown  8.6-10.3  F



Ordering Physician [facility] Weyvjjv2119-38-41 08:13:00Identifier [id number]-
6 Result Time 2019 08:13:00Unknown





 Test Item  Value  Reference Range  Comments

 

 Unknown (test code = 3094-0)  24 mg/dL  Unknown  6-24  F



Ordering Physician [facility] Xzujflo7852-80-98 08:13:00Identifier [id number]
6 Result Time 2019 08:13:00Unknown





 Test Item  Value  Reference Range  Comments

 

 Unknown (test code = 3097-3)  16.8   Unknown  8-20  F



Ordering Physician [facility] Sseijfp8929-54-32 08:13:00Identifier [id number]-
6 Result Time 2019 08:13:00Unknown





 Test Item  Value  Reference Range  Comments

 

 Unknown (test code = 33037-3)  8 mmol/L  Unknown  2-11  F



Ordering Physician [facility] Zpbrceu7898-87-39 08:13:00Identifier [id number]-
6 Result Time 2019 08:13:00Unknown





 Test Item  Value  Reference Range  Comments

 

 Unknown (test code = 68871-0)  27.4 10^3/uL  Unknown  3.5-10.8  F



Ordering Physician [facility] Gmacapc5321-05-04 08:13:00Identifier [id number]-
6 Result Time 2019 08:13:00Unknown





 Test Item  Value  Reference Range  Comments

 

 Unknown (test code = 788-0)  24 %  Unknown  10.5-15  F



Ordering Physician [facility] Vqthgty7159-71-14 08:13:00Identifier [id number]-
6 Result Time 2019 08:13:00Unknown





 Test Item  Value  Reference Range  Comments

 

 Unknown (test code = 789-8)  5.19 10^6 /uL  Unknown  4.18-5.48  F



Ordering Physician [facility] Rdotpsi9548-95-39 08:13:00Identifier [id number]-
6 Result Time 2019 08:13:00Unknown





 Test Item  Value  Reference Range  Comments

 

 Unknown (test code = 777-3)  330 10^3/uL  Unknown  150-450  F



Ordering Physician [facility] Thoxdaw3490-48-28 08:13:00Identifier [id number]-
6 Result Time 2019 08:13:00Unknown





 Test Item  Value  Reference Range  Comments

 

 Unknown (test code = 42436-4)  0   Unknown  Unknown  F



Ordering Physician [facility] Hnxpkls8131-51-98 08:13:00Identifier [id number]-
6 Result Time 2019 08:13:00Unknown





 Test Item  Value  Reference Range  Comments

 

 Unknown (test code = 771-6)  0 10^3/ul  Unknown  Unknown  F



Ordering Physician [facility] Ffhbdcr2233-40-78 08:13:00Identifier [id number]-
6 Result Time 2019 08:13:00Unknown





 Test Item  Value  Reference Range  Comments

 

 Unknown (test code = 770-8)  84.7 %  Unknown  Unknown  F



Ordering Physician [facility] Qokwdhk4248-22-63 08:13:00Identifier [id number]-
6 Result Time 2019 08:13:00Unknown





 Test Item  Value  Reference Range  Comments

 

 Unknown (test code = 5905-5)  8.6 %  Unknown  Unknown  F



Ordering Physician [facility] Ixupsjw3352-01-30 08:13:00Identifier [id number]-
6 Result Time 2019 08:13:00Unknown





 Test Item  Value  Reference Range  Comments

 

 Unknown (test code = 23919-8)  8.6 fL  Unknown  7.4-10.4  F



Ordering Physician [facility] Zxnozds8562-94-51 08:13:00Identifier [id number]-
6 Result Time 2019 08:13:00Unknown





 Test Item  Value  Reference Range  Comments

 

 Unknown (test code = 787-2)  78 fL  Unknown  80-94  F



Ordering Physician [facility] Dqshfwz9369-03-57 08:13:00Identifier [id number]-
6 Result Time 2019 08:13:00Unknown





 Test Item  Value  Reference Range  Comments

 

 Unknown (test code = 786-4)  31 g/dL  Unknown  31-36  F



Ordering Physician [facility] Dvsjtwg3225-21-16 08:13:00Identifier [id number]-
6 Result Time 2019 08:13:00Unknown





 Test Item  Value  Reference Range  Comments

 

 Unknown (test code = 785-6)  24 pg  Unknown  27-31  F



Ordering Physician [facility] Odqinrm6706-95-22 08:13:00Identifier [id number]-
6 Result Time 2019 08:13:00Unknown





 Test Item  Value  Reference Range  Comments

 

 Unknown (test code = 736-9)  5.4 %  Unknown  Unknown  F



Ordering Physician [facility] Jztgqdu8132-33-70 08:13:00Identifier [id number]-
6 Result Time 2019 08:13:00Unknown





 Test Item  Value  Reference Range  Comments

 

 Unknown (test code = 718-7)  12.3 g/dL  Unknown  14.0-18.0  F



Ordering Physician [facility] Gwshotg8660-64-43 08:13:00Identifier [id number]-
6 Result Time 2019 08:13:00Unknown





 Test Item  Value  Reference Range  Comments

 

 Unknown (test code = 4544-3)  40 %  Unknown  36-46  F



Ordering Physician [facility] Gkpiagw8457-12-60 08:13:00Identifier [id number]-
6 Result Time 2019 08:13:00Unknown





 Test Item  Value  Reference Range  Comments

 

 Unknown (test code = 713-8)  0.8 %  Unknown  Unknown  F



Ordering Physician [facility] Tknvlwj9022-87-41 08:13:00Identifier [id number]-
6 Result Time 2019 08:13:00Unknown





 Test Item  Value  Reference Range  Comments

 

 Unknown (test code = 706-2)  0.5 %  Unknown  Unknown  F



Ordering Physician [facility] Brfbzmv6127-74-39 08:13:00Identifier [id number]-
6 Result Time 2019 08:13:00Unknown





 Test Item  Value  Reference Range  Comments

 

 Unknown (test code = VOV8419)  23.2 10^3/ul  Unknown  1.5-7.7  F



Ordering Physician [facility] Uldyjui6103-33-54 08:13:00Identifier [id number]-
6 Result Time 2019 08:13:00Unknown





 Test Item  Value  Reference Range  Comments

 

 Unknown (test code = 742-7)  2.4 10^3/ul  Unknown  0-0.8  F



Ordering Physician [facility] Vtttarn2504-44-38 08:13:00Identifier [id number]-
6 Result Time 2019 08:13:00Unknown





 Test Item  Value  Reference Range  Comments

 

 Unknown (test code = 731-0)  1.5 10^3/ul  Unknown  1.0-4.8  F



Ordering Physician [facility] Txborsw8509-46-21 08:13:00Identifier [id number]-
6 Result Time 2019 08:13:00Unknown





 Test Item  Value  Reference Range  Comments

 

 Unknown (test code = 711-2)  0.2 10^3/ul  Unknown  0-0.6  F



Ordering Physician [facility] Roaimdd1053-51-42 08:13:00Identifier [id number]-
6 Result Time 2019 08:13:00Unknown





 Test Item  Value  Reference Range  Comments

 

 Unknown (test code = 704-7)  0.1 10^3/ul  Unknown  0-0.2  F



Ordering Physician [facility] Lihegzs3337-31-41 06:46:00Identifier [id number]-
6 Result Time 2019 06:46:00Unknown





 Test Item  Value  Reference Range  Comments

 

 Unknown (test code = 71638-8)  0.05 ng/mL  Unknown  Unknown  F



Ordering Physician [facility] Zrnhlvb6557-72-67 06:46:00Identifier [id number]-
6 Result Time 2019 06:46:00Unknown





 Test Item  Value  Reference Range  Comments

 

 Unknown (test code = 2777-1)  2.8 mg/dL  Unknown  2.5-5.0  F



Ordering Physician [facility] Euquwls3683-62-95 06:46:00Identifier [id number]-
6 Result Time 2019 06:46:00Unknown





 Test Item  Value  Reference Range  Comments

 

 Unknown (test code = 08594-0)  2.5 mg/dL  Unknown  1.9-2.7  F



Ordering Physician [facility] Studies2019-04-15 10:48:00Identifier [id number]-
6 Result Time 2019-04-15 10:48:00Unknown





 Test Item  Value  Reference Range  Comments

 

 Unknown (test code = NullTestCode)  6.0   Unknown  5-9  F



Ordering Physician [facility] Studies2019-04-15 10:48:00Identifier [id number]-
6 Result Time 2019-04-15 10:48:00Unknown





 Test Item  Value  Reference Range  Comments

 

 Unknown (test code = 89062-7)  1.013   Unknown  1.010-1.030  F



Ordering Physician [facility] Studies2019-04-15 10:15:00Identifier [id number]
6 Result Time 2019-04-15 10:15:00Unknown





 Test Item  Value  Reference Range  Comments

 

 Unknown (test code = 3016-3)  3.38 mcIU/mL  Unknown  0.34-5.60  F



Ordering Physician [facility] Studies2019-04-15 10:15:00Identifier [id number]
6 Result Time 2019-04-15 10:15:00Unknown





 Test Item  Value  Reference Range  Comments

 

 Unknown (test code = 14897-0)  1.14   Unknown  0.77-1.02  F



Ordering Physician [facility] Studies2019-04-15 10:15:00Identifier [id number]
6 Result Time 2019-04-15 10:15:00Unknown





 Test Item  Value  Reference Range  Comments

 

 Unknown (test code = 03443-9)  365 pg/mL  Unknown  Unknown  F



Ordering Physician [facility] Studies2019-04-15 10:15:00Identifier [id number]-
6 Result Time 2019-04-15 10:15:00Unknown





 Test Item  Value  Reference Range  Comments

 

 Unknown (test code = 2885-2)  8.9 g/dL  Unknown  6.4-8.9  F



Ordering Physician [facility] Studies2019-04-15 10:15:00Identifier [id number]-
6 Result Time 2019-04-15 10:15:00Unknown





 Test Item  Value  Reference Range  Comments

 

 Unknown (test code = 1975-2)  0.60 mg/dL  Unknown  0.2-1.0  F



Ordering Physician [facility] Studies2019-04-15 10:15:00Identifier [id number]
6 Result Time 2019-04-15 10:15:00Unknown





 Test Item  Value  Reference Range  Comments

 

 Unknown (test code = NullTestCode)  4.9 g/dL  Unknown  2-4  F



Ordering Physician [facility] Studies2019-04-15 10:15:00Identifier [id number]-
6 Result Time 2019-04-15 10:15:00Unknown





 Test Item  Value  Reference Range  Comments

 

 Unknown (test code = 1988-5)  11.17 mg/L  Unknown  0-8.00  F



Ordering Physician [facility] Studies2019-04-15 10:15:00Identifier [id number]-
6 Result Time 2019-04-15 10:15:00Unknown





 Test Item  Value  Reference Range  Comments

 

 Unknown (test code = 1920-8)  25 U/L  Unknown  13-39  F



Ordering Physician [facility] Studies2019-04-15 10:15:00Identifier [id number]-
6 Result Time 2019-04-15 10:15:00Unknown





 Test Item  Value  Reference Range  Comments

 

 Unknown (test code = 6768-6)  153 U/L  Unknown    F



Ordering Physician [facility] Studies2019-04-15 10:15:00Identifier [id number]
6 Result Time 2019-04-15 10:15:00Unknown





 Test Item  Value  Reference Range  Comments

 

 Unknown (test code = 1759-0)  0.8   Unknown  1-3  F



Ordering Physician [facility] Studies2019-04-15 10:15:00Identifier [id number]-
6 Result Time 2019-04-15 10:15:00Unknown





 Test Item  Value  Reference Range  Comments

 

 Unknown (test code = 58870-5)  4.0 g/dL  Unknown  3.2-5.2  F



Ordering Physician [facility] Studies2019-04-15 10:15:00Identifier [id number]
6 Result Time 2019-04-15 10:15:00Unknown





 Test Item  Value  Reference Range  Comments

 

 Unknown (test code = 1742-6)  13 U/L  Unknown  7-52  F



Ordering Physician [facility] Studies2019-04-15 10:15:00Identifier [id number]
6 Result Time 2019-04-15 10:15:00Unknown





 Test Item  Value  Reference Range  Comments

 

 Unknown (test code = 2524-7)  0.9 mmol/L  Unknown  0.5-2.0  F



Ordering Physician Unknown

## 2019-12-03 NOTE — XMS REPORT
Patient Summary Document

 Created on:2019



Patient:BERTHA SOLIS

Sex:Male

:1933

External Reference #:[id number]





Demographics







 Address  [address] [place], [place] [address]

 

 Home Phone  [phone]

 

 Preferred Language  English

 

 Marital Status  Unknown

 

 Spiritism Affiliation  Unknown

 

 Race  Unknown

 

 Ethnic Group  Unknown









Author







 Organization  [facility]









Support







 Name  Relationship  Address  Phone

 

 IRAIS SOLIS, Unknown Name  Suha  [address] [facility]  [phone]



     [place] [address]  









Care Team Providers







 Name  Role  Phone

 

 Unavailable  Unavailable  Unavailable









Problems

This patient has no known problems.



Allergies, Adverse Reactions, Alerts







 Allergy  Allergy  Status  Severity  Reaction(s)  Onset  Inactive  Treating  
Comments



 Name  Type        Date  Date  Clinician  

 

 Uncoded  Unknown  Active  Unknown  Reaction  2019    Interface  



 free-text        Unknown  -18      



 allergy                







Medications







 Ordered  Filled  Start  Stop  Current  Ordering  Indication  Dosage  Frequency
  Signature  Comments  Components



 Medication  Medication  Date  Date  Medication?  Clinician        (SIG)    



 Name  Name                    

 

 Multivitami  Multivitami  2012    No  Unknown    Unknown  Unknown      



 ns/Minerals  ns/Minerals  -                  



 Tab*  Tab*                    

 

 amLODIPine  amLODIPine      No  Unknown    Unknown  Unknown      



 5 mg tablet  5 mg tablet  4-15                  

 

 acetaminoph  acetaminoph      No  Unknown    Unknown  Unknown      



 en 500 mg  en 500 mg  4-15                  



 tablet  tablet                    

 

 magnesium  magnesium      No  Unknown    Unknown  Unknown      



 oxide 400  oxide 400  4-15                  



 mg (241.3  mg (241.3                    



 mg  mg                    



 magnesium)  magnesium)                    



 tablet  tablet                    

 

 omeprazole  omeprazole      No  Unknown    Unknown  Unknown      



 40 mg  40 mg  4-15                  



 capsule,del  capsule,del                    



 ayed  ayed                    



 release  release                    

 

 verapamil  verapamil      No  Unknown    Unknown  Unknown      



  mg   mg  4-15                  



 capsule,ext  capsule,ext                    



 ended  ended                    



 release  release                    

 

 Calcium  Calcium      No  Unknown    Unknown  Unknown      



 Carbonate/V  Carbonate/V  4-15                  



 itamin D3  itamin D3                    

 

 cefdinir  cefdinir      No  Unknown    Unknown  Unknown      



 300 mg  300 mg  4-17                  



 capsule  capsule                    







Vital Signs







 Vital Name  Observation Time  Observation Value  Comments

 

 SYSTOLIC mm[Hg]  2019 18:05:21  135 mm[Hg] mm[Hg]  Method: Sit

 

 DIASTOLIC mm[Hg]  2019 18:05:21  48 mm[Hg] mm[Hg]  Method: Sit

 

 PULSE  2019 18:05:21  63 /min /min  

 

 RESP RATE  2019 18:05:21  16 /min /min  

 

 TEMP  2019 18:05:21  97.4 [degF]  







Procedures

This patient has no known procedures.



Results







 Test Description  Test Time  Test Comments  Text Results  Atomic Results  
Result Comments









 Laboratory Studies  2019 08:13:00  Identifier [id number] Result Time  
Unknown



     2019 08:13:00  









   Test Item  Value  Reference Range  Comments









 Unknown (test code = 2951-2)  136 mmol/L  Unknown  135-145  F



Ordering Physician [facility] Vhjshri3510-97-75 08:13:00Identifier [id number]
6 Result Time 2019 08:13:00Unknown





 Test Item  Value  Reference Range  Comments

 

 Unknown (test code = 2823-3)  4.0 mmol/L  Unknown  3.5-5.0  F



Ordering Physician [facility] Fkmkruz8629-11-59 08:13:00Identifier [id number]
6 Result Time 2019 08:13:00Unknown





 Test Item  Value  Reference Range  Comments

 

 Unknown (test code = 2345-7)  87 mg/dL  Unknown    F



Ordering Physician [facility] Vcnslfw0760-10-24 08:13:00Identifier [id number]-
6 Result Time 2019 08:13:00Unknown





 Test Item  Value  Reference Range  Comments

 

 Unknown (test code = 48642-3)  46.9   Unknown  Unknown  F



Ordering Physician [facility] Nsqysig1732-26-80 08:13:00Identifier [id number]
6 Result Time 2019 08:13:00Unknown





 Test Item  Value  Reference Range  Comments

 

 Unknown (test code = NullTestCode)  56.7   Unknown  Unknown  F



Ordering Physician [facility] Rygmuja4832-61-20 08:13:00Identifier [id number]-
6 Result Time 2019 08:13:00Unknown





 Test Item  Value  Reference Range  Comments

 

 Unknown (test code = 2160-0)  1.43 mg/dL  Unknown  0.67-1.17  F



Ordering Physician [facility] Rdunddz0484-31-75 08:13:00Identifier [id number]-
6 Result Time 2019 08:13:00Unknown





 Test Item  Value  Reference Range  Comments

 

 Unknown (test code = 2075-0)  104 mmol/L  Unknown  101-111  F



Ordering Physician [facility] Mvuxzrp7976-48-28 08:13:00Identifier [id number]-
6 Result Time 2019 08:13:00Unknown





 Test Item  Value  Reference Range  Comments

 

 Unknown (test code = 2028-9)  24 mmol/L  Unknown  22-32  F



Ordering Physician [facility] Mocmeij1984-49-65 08:13:00Identifier [id number]-
6 Result Time 2019 08:13:00Unknown





 Test Item  Value  Reference Range  Comments

 

 Unknown (test code = 25925-7)  9.0 mg/dL  Unknown  8.6-10.3  F



Ordering Physician [facility] Hexrsag3976-88-78 08:13:00Identifier [id number]-
6 Result Time 2019 08:13:00Unknown





 Test Item  Value  Reference Range  Comments

 

 Unknown (test code = 3094-0)  24 mg/dL  Unknown  6-24  F



Ordering Physician [facility] Htmnqfv4893-58-06 08:13:00Identifier [id number]
6 Result Time 2019 08:13:00Unknown





 Test Item  Value  Reference Range  Comments

 

 Unknown (test code = 3097-3)  16.8   Unknown  8-20  F



Ordering Physician [facility] Lfenfoq7161-25-98 08:13:00Identifier [id number]
6 Result Time 2019 08:13:00Unknown





 Test Item  Value  Reference Range  Comments

 

 Unknown (test code = 33037-3)  8 mmol/L  Unknown  2-11  F



Ordering Physician [facility] Ddaqwek2217-68-98 08:13:00Identifier [id number]-
6 Result Time 2019 08:13:00Unknown





 Test Item  Value  Reference Range  Comments

 

 Unknown (test code = 74206-9)  27.4 10^3/uL  Unknown  3.5-10.8  F



Ordering Physician [facility] Hnepsck4355-81-84 08:13:00Identifier [id number]-
6 Result Time 2019 08:13:00Unknown





 Test Item  Value  Reference Range  Comments

 

 Unknown (test code = 788-0)  24 %  Unknown  10.5-15  F



Ordering Physician [facility] Epganud8139-51-86 08:13:00Identifier [id number]-
6 Result Time 2019 08:13:00Unknown





 Test Item  Value  Reference Range  Comments

 

 Unknown (test code = 789-8)  5.19 10^6 /uL  Unknown  4.18-5.48  F



Ordering Physician [facility] Jskhoro1432-28-21 08:13:00Identifier [id number]-
6 Result Time 2019 08:13:00Unknown





 Test Item  Value  Reference Range  Comments

 

 Unknown (test code = 777-3)  330 10^3/uL  Unknown  150-450  F



Ordering Physician [facility] Jdncvcm9739-74-13 08:13:00Identifier [id number]
6 Result Time 2019 08:13:00Unknown





 Test Item  Value  Reference Range  Comments

 

 Unknown (test code = 09637-2)  0   Unknown  Unknown  F



Ordering Physician [facility] Nlqxlvb5823-35-20 08:13:00Identifier [id number]-
6 Result Time 2019 08:13:00Unknown





 Test Item  Value  Reference Range  Comments

 

 Unknown (test code = 771-6)  0 10^3/ul  Unknown  Unknown  F



Ordering Physician [facility] Efyfiwo3337-40-40 08:13:00Identifier [id number]
6 Result Time 2019 08:13:00Unknown





 Test Item  Value  Reference Range  Comments

 

 Unknown (test code = 770-8)  84.7 %  Unknown  Unknown  F



Ordering Physician [facility] Pdwijhg9744-59-31 08:13:00Identifier [id number]-
6 Result Time 2019 08:13:00Unknown





 Test Item  Value  Reference Range  Comments

 

 Unknown (test code = 5905-5)  8.6 %  Unknown  Unknown  F



Ordering Physician [facility] Hxtrfug2813-46-98 08:13:00Identifier [id number]-
6 Result Time 2019 08:13:00Unknown





 Test Item  Value  Reference Range  Comments

 

 Unknown (test code = 98518-8)  8.6 fL  Unknown  7.4-10.4  F



Ordering Physician [facility] Xiedqmk2796-63-81 08:13:00Identifier [id number]-
6 Result Time 2019 08:13:00Unknown





 Test Item  Value  Reference Range  Comments

 

 Unknown (test code = 787-2)  78 fL  Unknown  80-94  F



Ordering Physician [facility] Ijqfgjf9705-61-22 08:13:00Identifier [id number]-
6 Result Time 2019 08:13:00Unknown





 Test Item  Value  Reference Range  Comments

 

 Unknown (test code = 786-4)  31 g/dL  Unknown  31-36  F



Ordering Physician [facility] Xoxwkxs1050-51-11 08:13:00Identifier [id number]-
6 Result Time 2019 08:13:00Unknown





 Test Item  Value  Reference Range  Comments

 

 Unknown (test code = 785-6)  24 pg  Unknown  27-31  F



Ordering Physician [facility] Hidlkep8957-25-85 08:13:00Identifier [id number]-
6 Result Time 2019 08:13:00Unknown





 Test Item  Value  Reference Range  Comments

 

 Unknown (test code = 736-9)  5.4 %  Unknown  Unknown  F



Ordering Physician [facility] Ehvqdsq2058-10-28 08:13:00Identifier [id number]-
6 Result Time 2019 08:13:00Unknown





 Test Item  Value  Reference Range  Comments

 

 Unknown (test code = 718-7)  12.3 g/dL  Unknown  14.0-18.0  F



Ordering Physician [facility] Axzwazm4394-59-63 08:13:00Identifier [id number]-
6 Result Time 2019 08:13:00Unknown





 Test Item  Value  Reference Range  Comments

 

 Unknown (test code = 4544-3)  40 %  Unknown  36-46  F



Ordering Physician [facility] Ygjcvpp0914-80-97 08:13:00Identifier [id number]-
6 Result Time 2019 08:13:00Unknown





 Test Item  Value  Reference Range  Comments

 

 Unknown (test code = 713-8)  0.8 %  Unknown  Unknown  F



Ordering Physician [facility] Mizxncs1606-03-76 08:13:00Identifier [id number]-
6 Result Time 2019 08:13:00Unknown





 Test Item  Value  Reference Range  Comments

 

 Unknown (test code = 706-2)  0.5 %  Unknown  Unknown  F



Ordering Physician [facility] Btruagr8361-45-70 08:13:00Identifier [id number]-
6 Result Time 2019 08:13:00Unknown





 Test Item  Value  Reference Range  Comments

 

 Unknown (test code = XJM5091)  23.2 10^3/ul  Unknown  1.5-7.7  F



Ordering Physician [facility] Fcewmhe7074-43-70 08:13:00Identifier [id number]-
6 Result Time 2019 08:13:00Unknown





 Test Item  Value  Reference Range  Comments

 

 Unknown (test code = 742-7)  2.4 10^3/ul  Unknown  0-0.8  F



Ordering Physician [facility] Ygousww8271-13-49 08:13:00Identifier [id number]
6 Result Time 2019 08:13:00Unknown





 Test Item  Value  Reference Range  Comments

 

 Unknown (test code = 731-0)  1.5 10^3/ul  Unknown  1.0-4.8  F



Ordering Physician [facility] Cfmywjy7623-50-04 08:13:00Identifier [id number]-
6 Result Time 2019 08:13:00Unknown





 Test Item  Value  Reference Range  Comments

 

 Unknown (test code = 711-2)  0.2 10^3/ul  Unknown  0-0.6  F



Ordering Physician [facility] Xzchvbk6206-76-26 08:13:00Identifier [id number]-
6 Result Time 2019 08:13:00Unknown





 Test Item  Value  Reference Range  Comments

 

 Unknown (test code = 704-7)  0.1 10^3/ul  Unknown  0-0.2  F



Ordering Physician [facility] Vfprwzi1342-44-99 06:46:00Identifier [id number]-
6 Result Time 2019 06:46:00Unknown





 Test Item  Value  Reference Range  Comments

 

 Unknown (test code = 07723-4)  0.05 ng/mL  Unknown  Unknown  F



Ordering Physician [facility] Gisustl9426-54-23 06:46:00Identifier [id number]-
6 Result Time 2019 06:46:00Unknown





 Test Item  Value  Reference Range  Comments

 

 Unknown (test code = 2777-1)  2.8 mg/dL  Unknown  2.5-5.0  F



Ordering Physician [facility] Aseckdm5302-41-51 06:46:00Identifier [id number]-
6 Result Time 2019 06:46:00Unknown





 Test Item  Value  Reference Range  Comments

 

 Unknown (test code = 91693-1)  2.5 mg/dL  Unknown  1.9-2.7  F



Ordering Physician [facility] Studies2019-04-15 10:48:00Identifier [id number]-
6 Result Time 2019-04-15 10:48:00Unknown





 Test Item  Value  Reference Range  Comments

 

 Unknown (test code = NullTestCode)  6.0   Unknown  5-9  F



Ordering Physician [facility] Studies2019-04-15 10:48:00Identifier [id number]-
6 Result Time 2019-04-15 10:48:00Unknown





 Test Item  Value  Reference Range  Comments

 

 Unknown (test code = 98668-4)  1.013   Unknown  1.010-1.030  F



Ordering Physician [facility] Studies2019-04-15 10:15:00Identifier [id number]
6 Result Time 2019-04-15 10:15:00Unknown





 Test Item  Value  Reference Range  Comments

 

 Unknown (test code = 3016-3)  3.38 mcIU/mL  Unknown  0.34-5.60  F



Ordering Physician [facility] Studies2019-04-15 10:15:00Identifier [id number]
6 Result Time 2019-04-15 10:15:00Unknown





 Test Item  Value  Reference Range  Comments

 

 Unknown (test code = 43230-8)  1.14   Unknown  0.77-1.02  F



Ordering Physician [facility] Studies2019-04-15 10:15:00Identifier [id number]
6 Result Time 2019-04-15 10:15:00Unknown





 Test Item  Value  Reference Range  Comments

 

 Unknown (test code = 43226-9)  365 pg/mL  Unknown  Unknown  F



Ordering Physician [facility] Studies2019-04-15 10:15:00Identifier [id number]-
6 Result Time 2019-04-15 10:15:00Unknown





 Test Item  Value  Reference Range  Comments

 

 Unknown (test code = 2885-2)  8.9 g/dL  Unknown  6.4-8.9  F



Ordering Physician [facility] Studies2019-04-15 10:15:00Identifier [id number]-
6 Result Time 2019-04-15 10:15:00Unknown





 Test Item  Value  Reference Range  Comments

 

 Unknown (test code = 1975-2)  0.60 mg/dL  Unknown  0.2-1.0  F



Ordering Physician [facility] Studies2019-04-15 10:15:00Identifier [id number]-
6 Result Time 2019-04-15 10:15:00Unknown





 Test Item  Value  Reference Range  Comments

 

 Unknown (test code = NullTestCode)  4.9 g/dL  Unknown  2-4  F



Ordering Physician [facility] Studies2019-04-15 10:15:00Identifier [id number]-
6 Result Time 2019-04-15 10:15:00Unknown





 Test Item  Value  Reference Range  Comments

 

 Unknown (test code = 1988-5)  11.17 mg/L  Unknown  0-8.00  F



Ordering Physician [facility] Studies2019-04-15 10:15:00Identifier [id number]-
6 Result Time 2019-04-15 10:15:00Unknown





 Test Item  Value  Reference Range  Comments

 

 Unknown (test code = 1920-8)  25 U/L  Unknown  13-39  F



Ordering Physician [facility] Studies2019-04-15 10:15:00Identifier [id number]
6 Result Time 2019-04-15 10:15:00Unknown





 Test Item  Value  Reference Range  Comments

 

 Unknown (test code = 6768-6)  153 U/L  Unknown    F



Ordering Physician [facility] Studies2019-04-15 10:15:00Identifier [id number]-
6 Result Time 2019-04-15 10:15:00Unknown





 Test Item  Value  Reference Range  Comments

 

 Unknown (test code = 1759-0)  0.8   Unknown  1-3  F



Ordering Physician [facility] Studies2019-04-15 10:15:00Identifier [id number]
6 Result Time 2019-04-15 10:15:00Unknown





 Test Item  Value  Reference Range  Comments

 

 Unknown (test code = 87237-6)  4.0 g/dL  Unknown  3.2-5.2  F



Ordering Physician [facility] Studies2019-04-15 10:15:00Identifier [id number]-
6 Result Time 2019-04-15 10:15:00Unknown





 Test Item  Value  Reference Range  Comments

 

 Unknown (test code = 1742-6)  13 U/L  Unknown  7-52  F



Ordering Physician [facility] Studies2019-04-15 10:15:00Identifier [id number]-
6 Result Time 2019-04-15 10:15:00Unknown





 Test Item  Value  Reference Range  Comments

 

 Unknown (test code = 2524-7)  0.9 mmol/L  Unknown  0.5-2.0  F



Ordering Physician Unknown

## 2019-12-03 NOTE — XMS REPORT
Patient Summary Document

 Created on:2019



Patient:BERTHA SOLIS

Sex:Male

:1933

External Reference #:[id number]





Demographics







 Address  [address] [place], [place] [address]

 

 Home Phone  [phone]

 

 Preferred Language  English

 

 Marital Status  Unknown

 

 Anglican Affiliation  Unknown

 

 Race  Unknown

 

 Ethnic Group  Unknown









Author







 Organization  [facility]









Support







 Name  Relationship  Address  Phone

 

 IRAIS SOLIS, Unknown Name  Suha  [address] [facility]  [phone]



     [place] [address]  









Care Team Providers







 Name  Role  Phone

 

 Unavailable  Unavailable  Unavailable









Problems

This patient has no known problems.



Allergies, Adverse Reactions, Alerts







 Allergy  Allergy  Status  Severity  Reaction(s)  Onset  Inactive  Treating  
Comments



 Name  Type        Date  Date  Clinician  

 

 Uncoded  Unknown  Active  Unknown  Reaction  2019    Interface  



 free-text        Unknown  -18      



 allergy                







Medications







 Ordered  Filled  Start  Stop  Current  Ordering  Indication  Dosage  Frequency
  Signature  Comments  Components



 Medication  Medication  Date  Date  Medication?  Clinician        (SIG)    



 Name  Name                    

 

 Multivitami  Multivitami  2012    No  Unknown    Unknown  Unknown      



 ns/Minerals  ns/Minerals  -                  



 Tab*  Tab*                    

 

 amLODIPine  amLODIPine      No  Unknown    Unknown  Unknown      



 5 mg tablet  5 mg tablet  4-15                  

 

 acetaminoph  acetaminoph      No  Unknown    Unknown  Unknown      



 en 500 mg  en 500 mg  4-15                  



 tablet  tablet                    

 

 magnesium  magnesium      No  Unknown    Unknown  Unknown      



 oxide 400  oxide 400  4-15                  



 mg (241.3  mg (241.3                    



 mg  mg                    



 magnesium)  magnesium)                    



 tablet  tablet                    

 

 omeprazole  omeprazole      No  Unknown    Unknown  Unknown      



 40 mg  40 mg  4-15                  



 capsule,del  capsule,del                    



 ayed  ayed                    



 release  release                    

 

 verapamil  verapamil      No  Unknown    Unknown  Unknown      



  mg   mg  4-15                  



 capsule,ext  capsule,ext                    



 ended  ended                    



 release  release                    

 

 Calcium  Calcium      No  Unknown    Unknown  Unknown      



 Carbonate/V  Carbonate/V  4-15                  



 itamin D3  itamin D3                    

 

 cefdinir  cefdinir      No  Unknown    Unknown  Unknown      



 300 mg  300 mg  4-17                  



 capsule  capsule                    







Vital Signs







 Vital Name  Observation Time  Observation Value  Comments

 

 SYSTOLIC mm[Hg]  2019 18:05:21  135 mm[Hg] mm[Hg]  Method: Sit

 

 DIASTOLIC mm[Hg]  2019 18:05:21  48 mm[Hg] mm[Hg]  Method: Sit

 

 PULSE  2019 18:05:21  63 /min /min  

 

 RESP RATE  2019 18:05:21  16 /min /min  

 

 TEMP  2019 18:05:21  97.4 [degF]  







Procedures

This patient has no known procedures.



Results







 Test Description  Test Time  Test Comments  Text Results  Atomic Results  
Result Comments









 Laboratory Studies  2019 08:13:00  Identifier [id number] Result Time  
Unknown



     2019 08:13:00  









   Test Item  Value  Reference Range  Comments









 Unknown (test code = 2951-2)  136 mmol/L  Unknown  135-145  F



Ordering Physician [facility] Twvdubn9474-79-07 08:13:00Identifier [id number]
6 Result Time 2019 08:13:00Unknown





 Test Item  Value  Reference Range  Comments

 

 Unknown (test code = 2823-3)  4.0 mmol/L  Unknown  3.5-5.0  F



Ordering Physician [facility] Mrpqvfy0862-59-11 08:13:00Identifier [id number]
6 Result Time 2019 08:13:00Unknown





 Test Item  Value  Reference Range  Comments

 

 Unknown (test code = 2345-7)  87 mg/dL  Unknown    F



Ordering Physician [facility] Zvwmdnc3936-31-34 08:13:00Identifier [id number]
6 Result Time 2019 08:13:00Unknown





 Test Item  Value  Reference Range  Comments

 

 Unknown (test code = 48642-3)  46.9   Unknown  Unknown  F



Ordering Physician [facility] Uzqftcr3829-34-49 08:13:00Identifier [id number]
6 Result Time 2019 08:13:00Unknown





 Test Item  Value  Reference Range  Comments

 

 Unknown (test code = NullTestCode)  56.7   Unknown  Unknown  F



Ordering Physician [facility] Hrzfvmq6223-63-09 08:13:00Identifier [id number]-
6 Result Time 2019 08:13:00Unknown





 Test Item  Value  Reference Range  Comments

 

 Unknown (test code = 2160-0)  1.43 mg/dL  Unknown  0.67-1.17  F



Ordering Physician [facility] Rkerrgq6579-18-75 08:13:00Identifier [id number]
6 Result Time 2019 08:13:00Unknown





 Test Item  Value  Reference Range  Comments

 

 Unknown (test code = 2075-0)  104 mmol/L  Unknown  101-111  F



Ordering Physician [facility] Jpgezbl2335-12-44 08:13:00Identifier [id number]-
6 Result Time 2019 08:13:00Unknown





 Test Item  Value  Reference Range  Comments

 

 Unknown (test code = 2028-9)  24 mmol/L  Unknown  22-32  F



Ordering Physician [facility] Xnrbmoi4434-96-06 08:13:00Identifier [id number]-
6 Result Time 2019 08:13:00Unknown





 Test Item  Value  Reference Range  Comments

 

 Unknown (test code = 91287-6)  9.0 mg/dL  Unknown  8.6-10.3  F



Ordering Physician [facility] Acusmze5762-94-07 08:13:00Identifier [id number]-
6 Result Time 2019 08:13:00Unknown





 Test Item  Value  Reference Range  Comments

 

 Unknown (test code = 3094-0)  24 mg/dL  Unknown  6-24  F



Ordering Physician [facility] Izjvrmp3120-27-14 08:13:00Identifier [id number]
6 Result Time 2019 08:13:00Unknown





 Test Item  Value  Reference Range  Comments

 

 Unknown (test code = 3097-3)  16.8   Unknown  8-20  F



Ordering Physician [facility] Ftiowhp9276-43-42 08:13:00Identifier [id number]-
6 Result Time 2019 08:13:00Unknown





 Test Item  Value  Reference Range  Comments

 

 Unknown (test code = 33037-3)  8 mmol/L  Unknown  2-11  F



Ordering Physician [facility] Vvolaxf7497-46-64 08:13:00Identifier [id number]
6 Result Time 2019 08:13:00Unknown





 Test Item  Value  Reference Range  Comments

 

 Unknown (test code = 60940-6)  27.4 10^3/uL  Unknown  3.5-10.8  F



Ordering Physician [facility] Raqtcbq5044-03-75 08:13:00Identifier [id number]-
6 Result Time 2019 08:13:00Unknown





 Test Item  Value  Reference Range  Comments

 

 Unknown (test code = 788-0)  24 %  Unknown  10.5-15  F



Ordering Physician [facility] Hhbpwit1022-88-35 08:13:00Identifier [id number]-
6 Result Time 2019 08:13:00Unknown





 Test Item  Value  Reference Range  Comments

 

 Unknown (test code = 789-8)  5.19 10^6 /uL  Unknown  4.18-5.48  F



Ordering Physician [facility] Ghcdwho6654-82-11 08:13:00Identifier [id number]-
6 Result Time 2019 08:13:00Unknown





 Test Item  Value  Reference Range  Comments

 

 Unknown (test code = 777-3)  330 10^3/uL  Unknown  150-450  F



Ordering Physician [facility] Fiewioa8240-45-54 08:13:00Identifier [id number]-
6 Result Time 2019 08:13:00Unknown





 Test Item  Value  Reference Range  Comments

 

 Unknown (test code = 82939-8)  0   Unknown  Unknown  F



Ordering Physician [facility] Ieozfdg6489-40-40 08:13:00Identifier [id number]-
6 Result Time 2019 08:13:00Unknown





 Test Item  Value  Reference Range  Comments

 

 Unknown (test code = 771-6)  0 10^3/ul  Unknown  Unknown  F



Ordering Physician [facility] Sxnsrvq5777-78-56 08:13:00Identifier [id number]-
6 Result Time 2019 08:13:00Unknown





 Test Item  Value  Reference Range  Comments

 

 Unknown (test code = 770-8)  84.7 %  Unknown  Unknown  F



Ordering Physician [facility] Cjjbqbx2429-40-40 08:13:00Identifier [id number]-
6 Result Time 2019 08:13:00Unknown





 Test Item  Value  Reference Range  Comments

 

 Unknown (test code = 5905-5)  8.6 %  Unknown  Unknown  F



Ordering Physician [facility] Stacgfa3949-85-98 08:13:00Identifier [id number]-
6 Result Time 2019 08:13:00Unknown





 Test Item  Value  Reference Range  Comments

 

 Unknown (test code = 43824-3)  8.6 fL  Unknown  7.4-10.4  F



Ordering Physician [facility] Hbudxxy2793-36-55 08:13:00Identifier [id number]-
6 Result Time 2019 08:13:00Unknown





 Test Item  Value  Reference Range  Comments

 

 Unknown (test code = 787-2)  78 fL  Unknown  80-94  F



Ordering Physician [facility] Zjrgqjf2070-22-72 08:13:00Identifier [id number]-
6 Result Time 2019 08:13:00Unknown





 Test Item  Value  Reference Range  Comments

 

 Unknown (test code = 786-4)  31 g/dL  Unknown  31-36  F



Ordering Physician [facility] Bvestgh9334-57-05 08:13:00Identifier [id number]-
6 Result Time 2019 08:13:00Unknown





 Test Item  Value  Reference Range  Comments

 

 Unknown (test code = 785-6)  24 pg  Unknown  27-31  F



Ordering Physician [facility] Zvzplbt4858-24-90 08:13:00Identifier [id number]-
6 Result Time 2019 08:13:00Unknown





 Test Item  Value  Reference Range  Comments

 

 Unknown (test code = 736-9)  5.4 %  Unknown  Unknown  F



Ordering Physician [facility] Telptxt0109-16-17 08:13:00Identifier [id number]
6 Result Time 2019 08:13:00Unknown





 Test Item  Value  Reference Range  Comments

 

 Unknown (test code = 718-7)  12.3 g/dL  Unknown  14.0-18.0  F



Ordering Physician [facility] Pzeiwlh9186-55-25 08:13:00Identifier [id number]-
6 Result Time 2019 08:13:00Unknown





 Test Item  Value  Reference Range  Comments

 

 Unknown (test code = 4544-3)  40 %  Unknown  36-46  F



Ordering Physician [facility] Sngiprw4855-62-16 08:13:00Identifier [id number]-
6 Result Time 2019 08:13:00Unknown





 Test Item  Value  Reference Range  Comments

 

 Unknown (test code = 713-8)  0.8 %  Unknown  Unknown  F



Ordering Physician [facility] Ksuxtlw2092-14-20 08:13:00Identifier [id number]-
6 Result Time 2019 08:13:00Unknown





 Test Item  Value  Reference Range  Comments

 

 Unknown (test code = 706-2)  0.5 %  Unknown  Unknown  F



Ordering Physician [facility] Ithbeia0469-06-46 08:13:00Identifier [id number]-
6 Result Time 2019 08:13:00Unknown





 Test Item  Value  Reference Range  Comments

 

 Unknown (test code = GKY1745)  23.2 10^3/ul  Unknown  1.5-7.7  F



Ordering Physician [facility] Zeccnvq0830-13-92 08:13:00Identifier [id number]-
6 Result Time 2019 08:13:00Unknown





 Test Item  Value  Reference Range  Comments

 

 Unknown (test code = 742-7)  2.4 10^3/ul  Unknown  0-0.8  F



Ordering Physician [facility] Wiafkya5923-34-68 08:13:00Identifier [id number]-
6 Result Time 2019 08:13:00Unknown





 Test Item  Value  Reference Range  Comments

 

 Unknown (test code = 731-0)  1.5 10^3/ul  Unknown  1.0-4.8  F



Ordering Physician [facility] Qrlkhvn7091-70-82 08:13:00Identifier [id number]-
6 Result Time 2019 08:13:00Unknown





 Test Item  Value  Reference Range  Comments

 

 Unknown (test code = 711-2)  0.2 10^3/ul  Unknown  0-0.6  F



Ordering Physician [facility] Vwexuza4595-29-76 08:13:00Identifier [id number]-
6 Result Time 2019 08:13:00Unknown





 Test Item  Value  Reference Range  Comments

 

 Unknown (test code = 704-7)  0.1 10^3/ul  Unknown  0-0.2  F



Ordering Physician [facility] Jjlgway7879-51-45 06:46:00Identifier [id number]-
6 Result Time 2019 06:46:00Unknown





 Test Item  Value  Reference Range  Comments

 

 Unknown (test code = 27611-2)  0.05 ng/mL  Unknown  Unknown  F



Ordering Physician [facility] Cicgflu5204-20-97 06:46:00Identifier [id number]-
6 Result Time 2019 06:46:00Unknown





 Test Item  Value  Reference Range  Comments

 

 Unknown (test code = 2777-1)  2.8 mg/dL  Unknown  2.5-5.0  F



Ordering Physician [facility] Ubuqjla9198-72-25 06:46:00Identifier [id number]-
6 Result Time 2019 06:46:00Unknown





 Test Item  Value  Reference Range  Comments

 

 Unknown (test code = 51367-7)  2.5 mg/dL  Unknown  1.9-2.7  F



Ordering Physician [facility] Studies2019-04-15 10:48:00Identifier [id number]
6 Result Time 2019-04-15 10:48:00Unknown





 Test Item  Value  Reference Range  Comments

 

 Unknown (test code = NullTestCode)  6.0   Unknown  5-9  F



Ordering Physician [facility] Studies2019-04-15 10:48:00Identifier [id number]
6 Result Time 2019-04-15 10:48:00Unknown





 Test Item  Value  Reference Range  Comments

 

 Unknown (test code = 62985-7)  1.013   Unknown  1.010-1.030  F



Ordering Physician [facility] Studies2019-04-15 10:15:00Identifier [id number]-
6 Result Time 2019-04-15 10:15:00Unknown





 Test Item  Value  Reference Range  Comments

 

 Unknown (test code = 3016-3)  3.38 mcIU/mL  Unknown  0.34-5.60  F



Ordering Physician [facility] Studies2019-04-15 10:15:00Identifier [id number]-
6 Result Time 2019-04-15 10:15:00Unknown





 Test Item  Value  Reference Range  Comments

 

 Unknown (test code = 11157-2)  1.14   Unknown  0.77-1.02  F



Ordering Physician [facility] Studies2019-04-15 10:15:00Identifier [id number]
6 Result Time 2019-04-15 10:15:00Unknown





 Test Item  Value  Reference Range  Comments

 

 Unknown (test code = 31382-7)  365 pg/mL  Unknown  Unknown  F



Ordering Physician [facility] Studies2019-04-15 10:15:00Identifier [id number]
6 Result Time 2019-04-15 10:15:00Unknown





 Test Item  Value  Reference Range  Comments

 

 Unknown (test code = 2885-2)  8.9 g/dL  Unknown  6.4-8.9  F



Ordering Physician [facility] Studies2019-04-15 10:15:00Identifier [id number]
6 Result Time 2019-04-15 10:15:00Unknown





 Test Item  Value  Reference Range  Comments

 

 Unknown (test code = 1975-2)  0.60 mg/dL  Unknown  0.2-1.0  F



Ordering Physician [facility] Studies2019-04-15 10:15:00Identifier [id number]
6 Result Time 2019-04-15 10:15:00Unknown





 Test Item  Value  Reference Range  Comments

 

 Unknown (test code = NullTestCode)  4.9 g/dL  Unknown  2-4  F



Ordering Physician [facility] Studies2019-04-15 10:15:00Identifier [id number]-
6 Result Time 2019-04-15 10:15:00Unknown





 Test Item  Value  Reference Range  Comments

 

 Unknown (test code = 1988-5)  11.17 mg/L  Unknown  0-8.00  F



Ordering Physician [facility] Studies2019-04-15 10:15:00Identifier [id number]
6 Result Time 2019-04-15 10:15:00Unknown





 Test Item  Value  Reference Range  Comments

 

 Unknown (test code = 1920-8)  25 U/L  Unknown  13-39  F



Ordering Physician [facility] Studies2019-04-15 10:15:00Identifier [id number]-
6 Result Time 2019-04-15 10:15:00Unknown





 Test Item  Value  Reference Range  Comments

 

 Unknown (test code = 6768-6)  153 U/L  Unknown    F



Ordering Physician [facility] Studies2019-04-15 10:15:00Identifier [id number]
6 Result Time 2019-04-15 10:15:00Unknown





 Test Item  Value  Reference Range  Comments

 

 Unknown (test code = 1759-0)  0.8   Unknown  1-3  F



Ordering Physician [facility] Studies2019-04-15 10:15:00Identifier [id number]
6 Result Time 2019-04-15 10:15:00Unknown





 Test Item  Value  Reference Range  Comments

 

 Unknown (test code = 53798-8)  4.0 g/dL  Unknown  3.2-5.2  F



Ordering Physician [facility] Studies2019-04-15 10:15:00Identifier [id number]-
6 Result Time 2019-04-15 10:15:00Unknown





 Test Item  Value  Reference Range  Comments

 

 Unknown (test code = 1742-6)  13 U/L  Unknown  7-52  F



Ordering Physician [facility] Studies2019-04-15 10:15:00Identifier [id number]-
6 Result Time 2019-04-15 10:15:00Unknown





 Test Item  Value  Reference Range  Comments

 

 Unknown (test code = 2524-7)  0.9 mmol/L  Unknown  0.5-2.0  F



Ordering Physician Unknown

## 2019-12-03 NOTE — CONS
CC:  Dr. Almanzar; Dr. Rosa Andre; Dr. Dupont *

 

CONSULTATION REPORT:

 

DATE OF CONSULT:  12/03/19 - EMERGENCY DEPT

 

ATTENDING PHYSICIAN WHILE IN THE HOSPITAL:  Dr. Rafa Dupont (report 
dictated by Bobby Nunes NP).

 

REQUESTING PHYSICIAN IN CONSULT:  Dr. Almanzar.

 

PRIMARY CARE PROVIDER:  Dr. Rosa Andre.

 

REASON FOR NEUROLOGICAL CONSULTATION:  Evaluation of headache.

 

HISTORY OF PRESENT ILLNESS:  Mr. Johnson is an 86-year-old male patient who 
carries a history of basilar artery stenosis, polycythemia vera, GERD, 
essential hypertension, aortic stenosis, polymyalgia rheumatica, left bundle 
branch block, who comes into the ER today stating that over the last 6 to 7 
days he has had a worsening headache which he describes it as a pressure 
involving his entire head. He feels like his head is going to pop up.  He also 
has been complaining of feeling more dizzy.  He states that over the last 6 to 
7 days, he has been getting much worse with the headache.  He denies having any 
visual changes.  The patient states that he continued to have dizziness when he 
has a bad headache.  He states that since the last time I saw him in October he 
had been taking his Topamax 2 tablets at bedtime, 1 tablet in the morning and 
according to his wife and the patient he had been feeling better.  The wife 
notes that in addition to the worsening headache he has been having a little 
bit more difficulty with his speech and it seems a little bit more slurred.  
She also notes that he has been having episodes like this in the past and just 
normally that the episode does not last this long.  The wife notes and the 
patient notes that he did see Dr. Clayton today and according to them it was 
stated to them that from a neurosurgical standpoint there was nothing more that 
could be offered at this point.  He continues to take the Plavix for the 
basilar artery stenosis.  He denies having any loss of consciousness.  He has 
not fallen. He has not had any trauma.  He just notes that the headache is much 
worse.  He has really been unable to tolerate it.  The only new characteristic 
for this headache is it is lasting longer and is more intense in nature.  He 
overall is tolerating the Topamax well.  He was evaluated by Dr. Andre today.  
There was concern because his headache was worse and the fact that he was 
having slurred speech and he presented to our ER.  NIH Stroke Scale was 
performed.  His NIH Stroke Scale was 1 due to the fact that he did have some 
slurred speech.  The patient was evaluated by myself and Dr. Dupont.

 

PAST MEDICAL HISTORY:  Significant for:

1.  Basilar artery stenosis.

2.  Headaches.

3.  Aortic stenosis.

4.  Hypertension.

5.  GERD.

6.  Polycythemia vera.

7.  History of PMR.

8.  Left bundle branch block.

 

PAST SURGICAL HISTORY:  He has had:

1.  Hip surgery.

2.  Laparoscopy.

3.  Nissen fundoplication.

4.  Hernia repair.

 

HOME MEDICATIONS:  Include:

1.  Topamax.  He is taking 1 tablet in the morning, 2 tablets at night.

2.  Lipitor 40 mg daily.

3.  Plavix 75 mg daily.

4.  Verapamil 120 mg tablet.  He takes half a tablet daily.

5.  Multivitamin 1 tablet daily.

6.  Magnesium 400 mg daily.

7.  Hydroxyurea 500 mg daily.

8.  Protonix 40 mg daily.

9.  Calcium with vitamin D 1 tablet by mouth daily.

10.  Calcium, magnesium, and zinc with vitamin D3 one tablet daily.

 

ALLERGIES TO MEDICATIONS:  Include no known drug allergies.

 

FAMILY HISTORY:  His father had a brain aneurysm.  Mother was a drinker.

 

SOCIAL HISTORY:  He is .  He is a farmer.  He is a former smoker.

 

PHYSICAL EXAM:  Vital Signs:  Blood pressure 124/58 with a pulse of 65, 
respirations 16, O2 saturation 98%, temperature 98.4.  General:  At this time, 
Mr. Johnson is an 86-year-old male patient.  He is sitting in the ED stretcher.  
He does not appear to be in any acute distress.  He is well nourished and well 
developed. HEENT:  Head:  Atraumatic, normocephalic.  Eyes:  EOMs intact.  
Sclerae anicteric and not pale.  Throat:  Oral mucosa appears to be moist.  No 
oropharyngeal erythema.  Neck was supple.  Heart:  Sounds S1, S2.  Regular rate 
and rhythm.  He had a grade 3 murmur in the aortic listening area.  Lungs:  
Clear to auscultation bilaterally.  Abdomen was soft, flat, nontender.  Bowel 
sounds were present. Extremities:  Pulses were 2+ throughout.  He is moving all 
4 extremities with 5/5 strength.  Neurologically, he is awake, he is alert, he 
is oriented x3.  His speech was slightly slurred. There was mild dysarthria 
noted.  It was slightly slurred. He had no aphasia noted.  His pupils were 
equal and reactive to light.  EOMs were intact.  Visual fields were intact to 
confrontation.  Face was symmetric bilaterally.  Sensation intact.  Tongue 
midline.  Palate elevated symmetrically. Motor:  Again, he had 5/5 strength in 
the upper and lower extremities.  He is moving all 4 extremities.  No drift was 
noted.  Sensation was grossly intact to light touch.  No evidence of neglect 
was noted.  Finger-to-nose was intact bilaterally.  Heel-to-shin was intact 
bilaterally.  No tremors were noted.  Deep tendon reflexes were symmetric in 
the biceps, brachioradialis, patella 1+ bilaterally.  His gait was noted to be 
wide based with a shuffle, slightly unsteady.  Romberg, minimal sway with eyes 
open, moderate sway with eyes closed. No other gross focal neuro deficits were 
noted.  Skin:  Intact.

 

DIAGNOSTIC STUDIES/LAB DATA:  Labs are pending at this point.

 

Old medical records were reviewed.

 

ASSESSMENT AND PLAN:  Mr. Johnson is an 86-year-old male patient with a complex 
medical history, referred to the ER today for complaints of worsening headache.
  We were asked to evaluate in consult for recommendations.  Recommendations 
going forward at this point are:

 

Headache.  At this point, this could represent a recurrence of his typical 
headaches; however, given the complexity of the basilar artery stenosis and the 
fact that they have worsened, I do recommend imaging.  He does have a history 
of polycythemia vera and I do recommend a CTV to rule out clots.  I would like 
to get the records from Dr. Clayton.  He did see the patient today.  At this point
, I do not believe the headache is coming from the basilar artery stenosis.  I 
discussed this with Dr. Dupont and he did evaluate the patient today as well and 
he was in agreement.  We would like to increase his Topamax to 2 tablets twice 
a day for a total of 50 mg twice a day as it was helping previously.  We will 
hydrate him in the ER and reevaluate him to see how he does.  I have asked the 
wife to call me on Thursday to let me know how he is doing.  We want to see him 
next week.  I have asked them to have the records from Dr. Clayton sent over.  He 
knows to drink plenty of water with the Topamax.  He knows that it can cause 
kidney stones, paresthesias, and weight loss.  I have asked the ER to obtain a 
CT of the brain.  I have asked them to also obtain a CTV as well.  CT of the 
brain is actually back and it does show no acute intracranial pathology, 
chronic small vessel ischemic change noted. If CTV is negative and the patient 
does return to baseline, he can be discharged safely home with the increased 
Topamax.

 

I discussed the case today with Dr. Dupont.  Again, he was present for the 
evaluation and was in agreement with the plan.

 

____________________________________ BOBBY NUNES, NP

 

[id number]/CPS #: [id number]

LAURA

## 2019-12-03 NOTE — XMS REPORT
Patient Summary Document

 Created on:2019



Patient:BERTHA SOLIS

Sex:Male

:1933

External Reference #:[id number]





Demographics







 Address  [address] [place], [place] [address]

 

 Home Phone  [phone]

 

 Preferred Language  English

 

 Marital Status  Unknown

 

 Synagogue Affiliation  Unknown

 

 Race  Unknown

 

 Ethnic Group  Unknown









Author







 Organization  [facility]









Support







 Name  Relationship  Address  Phone

 

 IRAIS SOLIS, Unknown Name  Suha  [address]  [phone]



     [place] [address]  









Care Team Providers







 Name  Role  Phone

 

 Unavailable  Unavailable  Unavailable









Problems

This patient has no known problems.



Allergies, Adverse Reactions, Alerts







 Allergy  Allergy  Status  Severity  Reaction(s)  Onset  Inactive  Treating  
Comments



 Name  Type        Date  Date  Clinician  

 

 Uncoded  Unknown  Active  Unknown  Reaction  2019    Interface  



 free-text        Unknown  -18      



 allergy                







Medications







 Ordered  Filled  Start  Stop  Current  Ordering  Indication  Dosage  Frequency
  Signature  Comments  Components



 Medication  Medication  Date  Date  Medication?  Clinician        (SIG)    



 Name  Name                    

 

 Multivitami  Multivitami  2012    No  Unknown    Unknown  Unknown      



 ns/Minerals  ns/Minerals  -                  



 Tab*  Tab*                    

 

 amLODIPine  amLODIPine      No  Unknown    Unknown  Unknown      



 5 mg tablet  5 mg tablet  4-15                  

 

 acetaminoph  acetaminoph      No  Unknown    Unknown  Unknown      



 en 500 mg  en 500 mg  4-15                  



 tablet  tablet                    

 

 magnesium  magnesium      No  Unknown    Unknown  Unknown      



 oxide 400  oxide 400  4-15                  



 mg (241.3  mg (241.3                    



 mg  mg                    



 magnesium)  magnesium)                    



 tablet  tablet                    

 

 omeprazole  omeprazole      No  Unknown    Unknown  Unknown      



 40 mg  40 mg  4-15                  



 capsule,del  capsule,del                    



 ayed  ayed                    



 release  release                    

 

 verapamil  verapamil      No  Unknown    Unknown  Unknown      



  mg   mg  4-15                  



 capsule,ext  capsule,ext                    



 ended  ended                    



 release  release                    

 

 Calcium  Calcium      No  Unknown    Unknown  Unknown      



 Carbonate/V  Carbonate/V  4-15                  



 itamin D3  itamin D3                    

 

 cefdinir  cefdinir      No  Unknown    Unknown  Unknown      



 300 mg  300 mg  4-17                  



 capsule  capsule                    







Vital Signs







 Vital Name  Observation Time  Observation Value  Comments

 

 SYSTOLIC mm[Hg]  2019 18:05:21  135 mm[Hg] mm[Hg]  Method: Sit

 

 DIASTOLIC mm[Hg]  2019 18:05:21  48 mm[Hg] mm[Hg]  Method: Sit

 

 PULSE  2019 18:05:21  63 /min /min  

 

 RESP RATE  2019 18:05:21  16 /min /min  

 

 TEMP  2019 18:05:21  97.4 [degF]  







Procedures

This patient has no known procedures.



Results







 Test Description  Test Time  Test Comments  Text Results  Atomic Results  
Result Comments









 Laboratory Studies  2019 08:13:00  Identifier [id number] Result Time  
Unknown



     2019 08:13:00  









   Test Item  Value  Reference Range  Comments









 Unknown (test code = 2951-2)  136 mmol/L  Unknown  135-145  F



Ordering Physician [facility] Hnsdurp6595-56-09 08:13:00Identifier [id number]
6 Result Time 2019 08:13:00Unknown





 Test Item  Value  Reference Range  Comments

 

 Unknown (test code = 2823-3)  4.0 mmol/L  Unknown  3.5-5.0  F



Ordering Physician [facility] Mcomctz8532-77-54 08:13:00Identifier [id number]
6 Result Time 2019 08:13:00Unknown





 Test Item  Value  Reference Range  Comments

 

 Unknown (test code = 2345-7)  87 mg/dL  Unknown    F



Ordering Physician [facility] Lukziag0213-07-62 08:13:00Identifier [id number]-
6 Result Time 2019 08:13:00Unknown





 Test Item  Value  Reference Range  Comments

 

 Unknown (test code = 48642-3)  46.9   Unknown  Unknown  F



Ordering Physician [facility] Wxwndpq4940-15-02 08:13:00Identifier [id number]
6 Result Time 2019 08:13:00Unknown





 Test Item  Value  Reference Range  Comments

 

 Unknown (test code = NullTestCode)  56.7   Unknown  Unknown  F



Ordering Physician [facility] Dzyptmo2291-58-63 08:13:00Identifier [id number]-
6 Result Time 2019 08:13:00Unknown





 Test Item  Value  Reference Range  Comments

 

 Unknown (test code = 2160-0)  1.43 mg/dL  Unknown  0.67-1.17  F



Ordering Physician [facility] Jpqxufo8917-76-61 08:13:00Identifier [id number]-
6 Result Time 2019 08:13:00Unknown





 Test Item  Value  Reference Range  Comments

 

 Unknown (test code = 2075-0)  104 mmol/L  Unknown  101-111  F



Ordering Physician [facility] Alhbxry5505-32-86 08:13:00Identifier [id number]
6 Result Time 2019 08:13:00Unknown





 Test Item  Value  Reference Range  Comments

 

 Unknown (test code = 2028-9)  24 mmol/L  Unknown  22-32  F



Ordering Physician [facility] Zfkwnaw2341-53-33 08:13:00Identifier [id number]-
6 Result Time 2019 08:13:00Unknown





 Test Item  Value  Reference Range  Comments

 

 Unknown (test code = 71086-7)  9.0 mg/dL  Unknown  8.6-10.3  F



Ordering Physician [facility] Rxczxqf1571-22-76 08:13:00Identifier [id number]-
6 Result Time 2019 08:13:00Unknown





 Test Item  Value  Reference Range  Comments

 

 Unknown (test code = 3094-0)  24 mg/dL  Unknown  6-24  F



Ordering Physician [facility] Ehxbual6710-75-05 08:13:00Identifier [id number]-
6 Result Time 2019 08:13:00Unknown





 Test Item  Value  Reference Range  Comments

 

 Unknown (test code = 3097-3)  16.8   Unknown  8-20  F



Ordering Physician [facility] Oxnjhxa3828-51-59 08:13:00Identifier [id number]-
6 Result Time 2019 08:13:00Unknown





 Test Item  Value  Reference Range  Comments

 

 Unknown (test code = 33037-3)  8 mmol/L  Unknown  2-11  F



Ordering Physician [facility] Bbfhhxj9689-02-89 08:13:00Identifier [id number]-
6 Result Time 2019 08:13:00Unknown





 Test Item  Value  Reference Range  Comments

 

 Unknown (test code = 08754-0)  27.4 10^3/uL  Unknown  3.5-10.8  F



Ordering Physician [facility] Gjtyqch8802-41-02 08:13:00Identifier [id number]-
6 Result Time 2019 08:13:00Unknown





 Test Item  Value  Reference Range  Comments

 

 Unknown (test code = 788-0)  24 %  Unknown  10.5-15  F



Ordering Physician [facility] Kmwdpfa7936-35-17 08:13:00Identifier [id number]-
6 Result Time 2019 08:13:00Unknown





 Test Item  Value  Reference Range  Comments

 

 Unknown (test code = 789-8)  5.19 10^6 /uL  Unknown  4.18-5.48  F



Ordering Physician [facility] Ilhaifa9120-86-65 08:13:00Identifier [id number]-
6 Result Time 2019 08:13:00Unknown





 Test Item  Value  Reference Range  Comments

 

 Unknown (test code = 777-3)  330 10^3/uL  Unknown  150-450  F



Ordering Physician [facility] Dthhrjc9729-37-86 08:13:00Identifier [id number]-
6 Result Time 2019 08:13:00Unknown





 Test Item  Value  Reference Range  Comments

 

 Unknown (test code = 17468-3)  0   Unknown  Unknown  F



Ordering Physician [facility] Ogorlpx7633-83-83 08:13:00Identifier [id number]-
6 Result Time 2019 08:13:00Unknown





 Test Item  Value  Reference Range  Comments

 

 Unknown (test code = 771-6)  0 10^3/ul  Unknown  Unknown  F



Ordering Physician [facility] Jptypfj3857-28-03 08:13:00Identifier [id number]-
6 Result Time 2019 08:13:00Unknown





 Test Item  Value  Reference Range  Comments

 

 Unknown (test code = 770-8)  84.7 %  Unknown  Unknown  F



Ordering Physician [facility] Sloulmy1668-21-81 08:13:00Identifier [id number]-
6 Result Time 2019 08:13:00Unknown





 Test Item  Value  Reference Range  Comments

 

 Unknown (test code = 5905-5)  8.6 %  Unknown  Unknown  F



Ordering Physician [facility] Rwiumae1212-09-70 08:13:00Identifier [id number]-
6 Result Time 2019 08:13:00Unknown





 Test Item  Value  Reference Range  Comments

 

 Unknown (test code = 52190-8)  8.6 fL  Unknown  7.4-10.4  F



Ordering Physician [facility] Usexxkr1141-58-06 08:13:00Identifier [id number]-
6 Result Time 2019 08:13:00Unknown





 Test Item  Value  Reference Range  Comments

 

 Unknown (test code = 787-2)  78 fL  Unknown  80-94  F



Ordering Physician [facility] Voutpyr3690-06-89 08:13:00Identifier [id number]-
6 Result Time 2019 08:13:00Unknown





 Test Item  Value  Reference Range  Comments

 

 Unknown (test code = 786-4)  31 g/dL  Unknown  31-36  F



Ordering Physician [facility] Jyzvdxg7190-81-54 08:13:00Identifier [id number]-
6 Result Time 2019 08:13:00Unknown





 Test Item  Value  Reference Range  Comments

 

 Unknown (test code = 785-6)  24 pg  Unknown  27-31  F



Ordering Physician [facility] Wkinmwm7184-36-74 08:13:00Identifier [id number]-
6 Result Time 2019 08:13:00Unknown





 Test Item  Value  Reference Range  Comments

 

 Unknown (test code = 736-9)  5.4 %  Unknown  Unknown  F



Ordering Physician [facility] Qfabkyo4048-40-54 08:13:00Identifier [id number]-
6 Result Time 2019 08:13:00Unknown





 Test Item  Value  Reference Range  Comments

 

 Unknown (test code = 718-7)  12.3 g/dL  Unknown  14.0-18.0  F



Ordering Physician [facility] Holgobk2802-51-81 08:13:00Identifier [id number]-
6 Result Time 2019 08:13:00Unknown





 Test Item  Value  Reference Range  Comments

 

 Unknown (test code = 4544-3)  40 %  Unknown  36-46  F



Ordering Physician [facility] Ylhenad6184-52-52 08:13:00Identifier [id number]-
6 Result Time 2019 08:13:00Unknown





 Test Item  Value  Reference Range  Comments

 

 Unknown (test code = 713-8)  0.8 %  Unknown  Unknown  F



Ordering Physician [facility] Bqpeinz6359-78-20 08:13:00Identifier [id number]-
6 Result Time 2019 08:13:00Unknown





 Test Item  Value  Reference Range  Comments

 

 Unknown (test code = 706-2)  0.5 %  Unknown  Unknown  F



Ordering Physician [facility] Dzhelrl4495-19-13 08:13:00Identifier [id number]-
6 Result Time 2019 08:13:00Unknown





 Test Item  Value  Reference Range  Comments

 

 Unknown (test code = BUA6800)  23.2 10^3/ul  Unknown  1.5-7.7  F



Ordering Physician [facility] Dxsmltv7035-50-94 08:13:00Identifier [id number]-
6 Result Time 2019 08:13:00Unknown





 Test Item  Value  Reference Range  Comments

 

 Unknown (test code = 742-7)  2.4 10^3/ul  Unknown  0-0.8  F



Ordering Physician [facility] Xvofgpv2094-37-21 08:13:00Identifier [id number]-
6 Result Time 2019 08:13:00Unknown





 Test Item  Value  Reference Range  Comments

 

 Unknown (test code = 731-0)  1.5 10^3/ul  Unknown  1.0-4.8  F



Ordering Physician [facility] Kobkvrl8952-08-32 08:13:00Identifier [id number]-
6 Result Time 2019 08:13:00Unknown





 Test Item  Value  Reference Range  Comments

 

 Unknown (test code = 711-2)  0.2 10^3/ul  Unknown  0-0.6  F



Ordering Physician [facility] Hmvomwh5849-61-45 08:13:00Identifier [id number]-
6 Result Time 2019 08:13:00Unknown





 Test Item  Value  Reference Range  Comments

 

 Unknown (test code = 704-7)  0.1 10^3/ul  Unknown  0-0.2  F



Ordering Physician [facility] Idmerpp9462-33-44 06:46:00Identifier [id number]-
6 Result Time 2019 06:46:00Unknown





 Test Item  Value  Reference Range  Comments

 

 Unknown (test code = 98570-1)  0.05 ng/mL  Unknown  Unknown  F



Ordering Physician [facility] Gseuisc9225-68-76 06:46:00Identifier [id number]-
6 Result Time 2019 06:46:00Unknown





 Test Item  Value  Reference Range  Comments

 

 Unknown (test code = 2777-1)  2.8 mg/dL  Unknown  2.5-5.0  F



Ordering Physician [facility] Yicrvyu3724-87-61 06:46:00Identifier [id number]-
6 Result Time 2019 06:46:00Unknown





 Test Item  Value  Reference Range  Comments

 

 Unknown (test code = 69688-6)  2.5 mg/dL  Unknown  1.9-2.7  F



Ordering Physician [facility] Studies2019-04-15 10:48:00Identifier [id number]-
6 Result Time 2019-04-15 10:48:00Unknown





 Test Item  Value  Reference Range  Comments

 

 Unknown (test code = NullTestCode)  6.0   Unknown  5-9  F



Ordering Physician [facility] Studies2019-04-15 10:48:00Identifier [id number]-
6 Result Time 2019-04-15 10:48:00Unknown





 Test Item  Value  Reference Range  Comments

 

 Unknown (test code = 27213-7)  1.013   Unknown  1.010-1.030  F



Ordering Physician [facility] Studies2019-04-15 10:15:00Identifier [id number]-
6 Result Time 2019-04-15 10:15:00Unknown





 Test Item  Value  Reference Range  Comments

 

 Unknown (test code = 3016-3)  3.38 mcIU/mL  Unknown  0.34-5.60  F



Ordering Physician [facility] Studies2019-04-15 10:15:00Identifier [id number]-
6 Result Time 2019-04-15 10:15:00Unknown





 Test Item  Value  Reference Range  Comments

 

 Unknown (test code = 95423-3)  1.14   Unknown  0.77-1.02  F



Ordering Physician [facility] Studies2019-04-15 10:15:00Identifier [id number]-
6 Result Time 2019-04-15 10:15:00Unknown





 Test Item  Value  Reference Range  Comments

 

 Unknown (test code = 19448-8)  365 pg/mL  Unknown  Unknown  F



Ordering Physician [facility] Studies2019-04-15 10:15:00Identifier [id number]-
6 Result Time 2019-04-15 10:15:00Unknown





 Test Item  Value  Reference Range  Comments

 

 Unknown (test code = 2885-2)  8.9 g/dL  Unknown  6.4-8.9  F



Ordering Physician [facility] Studies2019-04-15 10:15:00Identifier [id number]-
6 Result Time 2019-04-15 10:15:00Unknown





 Test Item  Value  Reference Range  Comments

 

 Unknown (test code = 1975-2)  0.60 mg/dL  Unknown  0.2-1.0  F



Ordering Physician [facility] Studies2019-04-15 10:15:00Identifier [id number]-
6 Result Time 2019-04-15 10:15:00Unknown





 Test Item  Value  Reference Range  Comments

 

 Unknown (test code = NullTestCode)  4.9 g/dL  Unknown  2-4  F



Ordering Physician [facility] Studies2019-04-15 10:15:00Identifier [id number]-
6 Result Time 2019-04-15 10:15:00Unknown





 Test Item  Value  Reference Range  Comments

 

 Unknown (test code = 1988-5)  11.17 mg/L  Unknown  0-8.00  F



Ordering Physician [facility] Studies2019-04-15 10:15:00Identifier [id number]-
6 Result Time 2019-04-15 10:15:00Unknown





 Test Item  Value  Reference Range  Comments

 

 Unknown (test code = 1920-8)  25 U/L  Unknown  13-39  F



Ordering Physician [facility] Studies2019-04-15 10:15:00Identifier [id number]-
6 Result Time 2019-04-15 10:15:00Unknown





 Test Item  Value  Reference Range  Comments

 

 Unknown (test code = 6768-6)  153 U/L  Unknown    F



Ordering Physician [facility] Studies2019-04-15 10:15:00Identifier [id number]-
6 Result Time 2019-04-15 10:15:00Unknown





 Test Item  Value  Reference Range  Comments

 

 Unknown (test code = 1759-0)  0.8   Unknown  1-3  F



Ordering Physician [facility] Studies2019-04-15 10:15:00Identifier [id number]
6 Result Time 2019-04-15 10:15:00Unknown





 Test Item  Value  Reference Range  Comments

 

 Unknown (test code = 28460-8)  4.0 g/dL  Unknown  3.2-5.2  F



Ordering Physician [facility] Studies2019-04-15 10:15:00Identifier [id number]-
6 Result Time 2019-04-15 10:15:00Unknown





 Test Item  Value  Reference Range  Comments

 

 Unknown (test code = 1742-6)  13 U/L  Unknown  7-52  F



Ordering Physician [facility] Studies2019-04-15 10:15:00Identifier [id number]-
6 Result Time 2019-04-15 10:15:00Unknown





 Test Item  Value  Reference Range  Comments

 

 Unknown (test code = 2524-7)  0.9 mmol/L  Unknown  0.5-2.0  F



Ordering Physician Unknown

## 2019-12-03 NOTE — XMS REPORT
Patient Summary Document

 Created on:2019



Patient:BERTHA SOLIS

Sex:Male

:1933

External Reference #:[id number]





Demographics







 Address  [address] [place], [place] [address]

 

 Home Phone  [phone]

 

 Preferred Language  English

 

 Marital Status  Unknown

 

 Synagogue Affiliation  Unknown

 

 Race  Unknown

 

 Ethnic Group  Unknown









Author







 Organization  [facility]









Support







 Name  Relationship  Address  Phone

 

 IRAIS SOLIS, Unknown Name  Suha  [address]  [phone]



     [place] [address]  









Care Team Providers







 Name  Role  Phone

 

 Unavailable  Unavailable  Unavailable









Problems

This patient has no known problems.



Allergies, Adverse Reactions, Alerts







 Allergy  Allergy  Status  Severity  Reaction(s)  Onset  Inactive  Treating  
Comments



 Name  Type        Date  Date  Clinician  

 

 Uncoded  Unknown  Active  Unknown  Reaction  2019    Interface  



 free-text        Unknown  -18      



 allergy                







Medications







 Ordered  Filled  Start  Stop  Current  Ordering  Indication  Dosage  Frequency
  Signature  Comments  Components



 Medication  Medication  Date  Date  Medication?  Clinician        (SIG)    



 Name  Name                    

 

 Multivitami  Multivitami  2012    No  Unknown    Unknown  Unknown      



 ns/Minerals  ns/Minerals  -                  



 Tab*  Tab*                    

 

 amLODIPine  amLODIPine      No  Unknown    Unknown  Unknown      



 5 mg tablet  5 mg tablet  4-15                  

 

 acetaminoph  acetaminoph      No  Unknown    Unknown  Unknown      



 en 500 mg  en 500 mg  4-15                  



 tablet  tablet                    

 

 magnesium  magnesium      No  Unknown    Unknown  Unknown      



 oxide 400  oxide 400  4-15                  



 mg (241.3  mg (241.3                    



 mg  mg                    



 magnesium)  magnesium)                    



 tablet  tablet                    

 

 omeprazole  omeprazole      No  Unknown    Unknown  Unknown      



 40 mg  40 mg  4-15                  



 capsule,del  capsule,del                    



 ayed  ayed                    



 release  release                    

 

 verapamil  verapamil      No  Unknown    Unknown  Unknown      



  mg   mg  4-15                  



 capsule,ext  capsule,ext                    



 ended  ended                    



 release  release                    

 

 Calcium  Calcium      No  Unknown    Unknown  Unknown      



 Carbonate/V  Carbonate/V  4-15                  



 itamin D3  itamin D3                    

 

 cefdinir  cefdinir      No  Unknown    Unknown  Unknown      



 300 mg  300 mg  4-17                  



 capsule  capsule                    







Vital Signs







 Vital Name  Observation Time  Observation Value  Comments

 

 SYSTOLIC mm[Hg]  2019 18:05:21  135 mm[Hg] mm[Hg]  Method: Sit

 

 DIASTOLIC mm[Hg]  2019 18:05:21  48 mm[Hg] mm[Hg]  Method: Sit

 

 PULSE  2019 18:05:21  63 /min /min  

 

 RESP RATE  2019 18:05:21  16 /min /min  

 

 TEMP  2019 18:05:21  97.4 [degF]  







Procedures

This patient has no known procedures.



Results







 Test Description  Test Time  Test Comments  Text Results  Atomic Results  
Result Comments









 Laboratory Studies  2019 08:13:00  Identifier [id number] Result Time  
Unknown



     2019 08:13:00  









   Test Item  Value  Reference Range  Comments









 Unknown (test code = 2951-2)  136 mmol/L  Unknown  135-145  F



Ordering Physician [facility] Pjqjpgm0874-83-53 08:13:00Identifier [id number]
6 Result Time 2019 08:13:00Unknown





 Test Item  Value  Reference Range  Comments

 

 Unknown (test code = 2823-3)  4.0 mmol/L  Unknown  3.5-5.0  F



Ordering Physician [facility] Xdybbhi0249-20-44 08:13:00Identifier [id number]-
6 Result Time 2019 08:13:00Unknown





 Test Item  Value  Reference Range  Comments

 

 Unknown (test code = 2345-7)  87 mg/dL  Unknown    F



Ordering Physician [facility] Fmnafrz5567-73-76 08:13:00Identifier [id number]-
6 Result Time 2019 08:13:00Unknown





 Test Item  Value  Reference Range  Comments

 

 Unknown (test code = 48642-3)  46.9   Unknown  Unknown  F



Ordering Physician [facility] Rczzpmj3816-31-55 08:13:00Identifier [id number]
6 Result Time 2019 08:13:00Unknown





 Test Item  Value  Reference Range  Comments

 

 Unknown (test code = NullTestCode)  56.7   Unknown  Unknown  F



Ordering Physician [facility] Djobjfb1402-65-96 08:13:00Identifier [id number]-
6 Result Time 2019 08:13:00Unknown





 Test Item  Value  Reference Range  Comments

 

 Unknown (test code = 2160-0)  1.43 mg/dL  Unknown  0.67-1.17  F



Ordering Physician [facility] Znesanw1116-26-98 08:13:00Identifier [id number]-
6 Result Time 2019 08:13:00Unknown





 Test Item  Value  Reference Range  Comments

 

 Unknown (test code = 2075-0)  104 mmol/L  Unknown  101-111  F



Ordering Physician [facility] Alfekxp0180-38-33 08:13:00Identifier [id number]-
6 Result Time 2019 08:13:00Unknown





 Test Item  Value  Reference Range  Comments

 

 Unknown (test code = 2028-9)  24 mmol/L  Unknown  22-32  F



Ordering Physician [facility] Ojvyjua7624-10-14 08:13:00Identifier [id number]
6 Result Time 2019 08:13:00Unknown





 Test Item  Value  Reference Range  Comments

 

 Unknown (test code = 27732-3)  9.0 mg/dL  Unknown  8.6-10.3  F



Ordering Physician [facility] Wqtulfh0286-41-66 08:13:00Identifier [id number]-
6 Result Time 2019 08:13:00Unknown





 Test Item  Value  Reference Range  Comments

 

 Unknown (test code = 3094-0)  24 mg/dL  Unknown  6-24  F



Ordering Physician [facility] Lomitdr2182-79-47 08:13:00Identifier [id number]-
6 Result Time 2019 08:13:00Unknown





 Test Item  Value  Reference Range  Comments

 

 Unknown (test code = 3097-3)  16.8   Unknown  8-20  F



Ordering Physician [facility] Nhvkwep2826-35-33 08:13:00Identifier [id number]-
6 Result Time 2019 08:13:00Unknown





 Test Item  Value  Reference Range  Comments

 

 Unknown (test code = 33037-3)  8 mmol/L  Unknown  2-11  F



Ordering Physician [facility] Ktuydee6860-47-30 08:13:00Identifier [id number]-
6 Result Time 2019 08:13:00Unknown





 Test Item  Value  Reference Range  Comments

 

 Unknown (test code = 39494-7)  27.4 10^3/uL  Unknown  3.5-10.8  F



Ordering Physician [facility] Rvplhet7854-17-41 08:13:00Identifier [id number]-
6 Result Time 2019 08:13:00Unknown





 Test Item  Value  Reference Range  Comments

 

 Unknown (test code = 788-0)  24 %  Unknown  10.5-15  F



Ordering Physician [facility] Mvwiyst2655-62-75 08:13:00Identifier [id number]-
6 Result Time 2019 08:13:00Unknown





 Test Item  Value  Reference Range  Comments

 

 Unknown (test code = 789-8)  5.19 10^6 /uL  Unknown  4.18-5.48  F



Ordering Physician [facility] Aklwtib3350-21-35 08:13:00Identifier [id number]-
6 Result Time 2019 08:13:00Unknown





 Test Item  Value  Reference Range  Comments

 

 Unknown (test code = 777-3)  330 10^3/uL  Unknown  150-450  F



Ordering Physician [facility] Brhzdwf6370-51-95 08:13:00Identifier [id number]-
6 Result Time 2019 08:13:00Unknown





 Test Item  Value  Reference Range  Comments

 

 Unknown (test code = 89532-4)  0   Unknown  Unknown  F



Ordering Physician [facility] Pqzmmkd8061-90-79 08:13:00Identifier [id number]-
6 Result Time 2019 08:13:00Unknown





 Test Item  Value  Reference Range  Comments

 

 Unknown (test code = 771-6)  0 10^3/ul  Unknown  Unknown  F



Ordering Physician [facility] Cpiptwx3827-49-66 08:13:00Identifier [id number]-
6 Result Time 2019 08:13:00Unknown





 Test Item  Value  Reference Range  Comments

 

 Unknown (test code = 770-8)  84.7 %  Unknown  Unknown  F



Ordering Physician [facility] Scrbexa8266-97-97 08:13:00Identifier [id number]-
6 Result Time 2019 08:13:00Unknown





 Test Item  Value  Reference Range  Comments

 

 Unknown (test code = 5905-5)  8.6 %  Unknown  Unknown  F



Ordering Physician [facility] Qzkniaw0364-63-13 08:13:00Identifier [id number]-
6 Result Time 2019 08:13:00Unknown





 Test Item  Value  Reference Range  Comments

 

 Unknown (test code = 00711-5)  8.6 fL  Unknown  7.4-10.4  F



Ordering Physician [facility] Fnmdqnr1228-50-57 08:13:00Identifier [id number]-
6 Result Time 2019 08:13:00Unknown





 Test Item  Value  Reference Range  Comments

 

 Unknown (test code = 787-2)  78 fL  Unknown  80-94  F



Ordering Physician [facility] Bvcjptp9633-88-20 08:13:00Identifier [id number]-
6 Result Time 2019 08:13:00Unknown





 Test Item  Value  Reference Range  Comments

 

 Unknown (test code = 786-4)  31 g/dL  Unknown  31-36  F



Ordering Physician [facility] Tuunjcr9071-13-21 08:13:00Identifier [id number]-
6 Result Time 2019 08:13:00Unknown





 Test Item  Value  Reference Range  Comments

 

 Unknown (test code = 785-6)  24 pg  Unknown  27-31  F



Ordering Physician [facility] Uvvobbx6356-85-34 08:13:00Identifier [id number]-
6 Result Time 2019 08:13:00Unknown





 Test Item  Value  Reference Range  Comments

 

 Unknown (test code = 736-9)  5.4 %  Unknown  Unknown  F



Ordering Physician [facility] Cqnhdte5747-66-69 08:13:00Identifier [id number]-
6 Result Time 2019 08:13:00Unknown





 Test Item  Value  Reference Range  Comments

 

 Unknown (test code = 718-7)  12.3 g/dL  Unknown  14.0-18.0  F



Ordering Physician [facility] Rmnawyy5731-77-96 08:13:00Identifier [id number]-
6 Result Time 2019 08:13:00Unknown





 Test Item  Value  Reference Range  Comments

 

 Unknown (test code = 4544-3)  40 %  Unknown  36-46  F



Ordering Physician [facility] Yejltbn1440-62-52 08:13:00Identifier [id number]-
6 Result Time 2019 08:13:00Unknown





 Test Item  Value  Reference Range  Comments

 

 Unknown (test code = 713-8)  0.8 %  Unknown  Unknown  F



Ordering Physician [facility] Qwyqfkz6377-79-71 08:13:00Identifier [id number]-
6 Result Time 2019 08:13:00Unknown





 Test Item  Value  Reference Range  Comments

 

 Unknown (test code = 706-2)  0.5 %  Unknown  Unknown  F



Ordering Physician [facility] Kfzfvvr8147-57-14 08:13:00Identifier [id number]-
6 Result Time 2019 08:13:00Unknown





 Test Item  Value  Reference Range  Comments

 

 Unknown (test code = QBY4718)  23.2 10^3/ul  Unknown  1.5-7.7  F



Ordering Physician [facility] Zezhzjp5913-02-19 08:13:00Identifier [id number]-
6 Result Time 2019 08:13:00Unknown





 Test Item  Value  Reference Range  Comments

 

 Unknown (test code = 742-7)  2.4 10^3/ul  Unknown  0-0.8  F



Ordering Physician [facility] Chupvwe6260-05-40 08:13:00Identifier [id number]-
6 Result Time 2019 08:13:00Unknown





 Test Item  Value  Reference Range  Comments

 

 Unknown (test code = 731-0)  1.5 10^3/ul  Unknown  1.0-4.8  F



Ordering Physician [facility] Kaskgmv1201-96-40 08:13:00Identifier [id number]-
6 Result Time 2019 08:13:00Unknown





 Test Item  Value  Reference Range  Comments

 

 Unknown (test code = 711-2)  0.2 10^3/ul  Unknown  0-0.6  F



Ordering Physician [facility] Fuggyst5962-18-64 08:13:00Identifier [id number]-
6 Result Time 2019 08:13:00Unknown





 Test Item  Value  Reference Range  Comments

 

 Unknown (test code = 704-7)  0.1 10^3/ul  Unknown  0-0.2  F



Ordering Physician [facility] Ptdtyvv6999-99-64 06:46:00Identifier [id number]-
6 Result Time 2019 06:46:00Unknown





 Test Item  Value  Reference Range  Comments

 

 Unknown (test code = 70427-7)  0.05 ng/mL  Unknown  Unknown  F



Ordering Physician [facility] Rcgyixt9204-43-28 06:46:00Identifier [id number]-
6 Result Time 2019 06:46:00Unknown





 Test Item  Value  Reference Range  Comments

 

 Unknown (test code = 2777-1)  2.8 mg/dL  Unknown  2.5-5.0  F



Ordering Physician [facility] Oysfpcs9466-82-06 06:46:00Identifier [id number]-
6 Result Time 2019 06:46:00Unknown





 Test Item  Value  Reference Range  Comments

 

 Unknown (test code = 91351-6)  2.5 mg/dL  Unknown  1.9-2.7  F



Ordering Physician [facility] Studies2019-04-15 10:48:00Identifier [id number]-
6 Result Time 2019-04-15 10:48:00Unknown





 Test Item  Value  Reference Range  Comments

 

 Unknown (test code = NullTestCode)  6.0   Unknown  5-9  F



Ordering Physician [facility] Studies2019-04-15 10:48:00Identifier [id number]-
6 Result Time 2019-04-15 10:48:00Unknown





 Test Item  Value  Reference Range  Comments

 

 Unknown (test code = 36072-0)  1.013   Unknown  1.010-1.030  F



Ordering Physician [facility] Studies2019-04-15 10:15:00Identifier [id number]-
6 Result Time 2019-04-15 10:15:00Unknown





 Test Item  Value  Reference Range  Comments

 

 Unknown (test code = 3016-3)  3.38 mcIU/mL  Unknown  0.34-5.60  F



Ordering Physician [facility] Studies2019-04-15 10:15:00Identifier [id number]-
6 Result Time 2019-04-15 10:15:00Unknown





 Test Item  Value  Reference Range  Comments

 

 Unknown (test code = 76159-1)  1.14   Unknown  0.77-1.02  F



Ordering Physician [facility] Studies2019-04-15 10:15:00Identifier [id number]-
6 Result Time 2019-04-15 10:15:00Unknown





 Test Item  Value  Reference Range  Comments

 

 Unknown (test code = 27391-2)  365 pg/mL  Unknown  Unknown  F



Ordering Physician [facility] Studies2019-04-15 10:15:00Identifier [id number]-
6 Result Time 2019-04-15 10:15:00Unknown





 Test Item  Value  Reference Range  Comments

 

 Unknown (test code = 2885-2)  8.9 g/dL  Unknown  6.4-8.9  F



Ordering Physician [facility] Studies2019-04-15 10:15:00Identifier [id number]-
6 Result Time 2019-04-15 10:15:00Unknown





 Test Item  Value  Reference Range  Comments

 

 Unknown (test code = 1975-2)  0.60 mg/dL  Unknown  0.2-1.0  F



Ordering Physician [facility] Studies2019-04-15 10:15:00Identifier [id number]-
6 Result Time 2019-04-15 10:15:00Unknown





 Test Item  Value  Reference Range  Comments

 

 Unknown (test code = NullTestCode)  4.9 g/dL  Unknown  2-4  F



Ordering Physician [facility] Studies2019-04-15 10:15:00Identifier [id number]-
6 Result Time 2019-04-15 10:15:00Unknown





 Test Item  Value  Reference Range  Comments

 

 Unknown (test code = 1988-5)  11.17 mg/L  Unknown  0-8.00  F



Ordering Physician [facility] Studies2019-04-15 10:15:00Identifier [id number]-
6 Result Time 2019-04-15 10:15:00Unknown





 Test Item  Value  Reference Range  Comments

 

 Unknown (test code = 1920-8)  25 U/L  Unknown  13-39  F



Ordering Physician [facility] Studies2019-04-15 10:15:00Identifier [id number]-
6 Result Time 2019-04-15 10:15:00Unknown





 Test Item  Value  Reference Range  Comments

 

 Unknown (test code = 6768-6)  153 U/L  Unknown    F



Ordering Physician [facility] Studies2019-04-15 10:15:00Identifier [id number]-
6 Result Time 2019-04-15 10:15:00Unknown





 Test Item  Value  Reference Range  Comments

 

 Unknown (test code = 1759-0)  0.8   Unknown  1-3  F



Ordering Physician [facility] Studies2019-04-15 10:15:00Identifier [id number]
6 Result Time 2019-04-15 10:15:00Unknown





 Test Item  Value  Reference Range  Comments

 

 Unknown (test code = 96953-4)  4.0 g/dL  Unknown  3.2-5.2  F



Ordering Physician [facility] Studies2019-04-15 10:15:00Identifier [id number]-
6 Result Time 2019-04-15 10:15:00Unknown





 Test Item  Value  Reference Range  Comments

 

 Unknown (test code = 1742-6)  13 U/L  Unknown  7-52  F



Ordering Physician [facility] Studies2019-04-15 10:15:00Identifier [id number]
6 Result Time 2019-04-15 10:15:00Unknown





 Test Item  Value  Reference Range  Comments

 

 Unknown (test code = 2524-7)  0.9 mmol/L  Unknown  0.5-2.0  F



Ordering Physician Unknown

## 2019-12-03 NOTE — XMS REPORT
Patient Summary Document

 Created on:2019



Patient:BERTHA SOLIS

Sex:Male

:1933

External Reference #:[id number]





Demographics







 Address  [address] [place], [place] [address]

 

 Home Phone  [phone]

 

 Preferred Language  English

 

 Marital Status  Unknown

 

 Orthodox Affiliation  Unknown

 

 Race  Unknown

 

 Ethnic Group  Unknown









Author







 Organization  [facility]









Support







 Name  Relationship  Address  Phone

 

 IRAIS SOLIS, Unknown Name  Suha  [address] [facility]  [phone]



     [place] [address]  









Care Team Providers







 Name  Role  Phone

 

 Unavailable  Unavailable  Unavailable









Problems

This patient has no known problems.



Allergies, Adverse Reactions, Alerts







 Allergy  Allergy  Status  Severity  Reaction(s)  Onset  Inactive  Treating  
Comments



 Name  Type        Date  Date  Clinician  

 

 Uncoded  Unknown  Active  Unknown  Reaction  2019    Interface  



 free-text        Unknown  -18      



 allergy                







Medications







 Ordered  Filled  Start  Stop  Current  Ordering  Indication  Dosage  Frequency
  Signature  Comments  Components



 Medication  Medication  Date  Date  Medication?  Clinician        (SIG)    



 Name  Name                    

 

 Multivitami  Multivitami  2012    No  Unknown    Unknown  Unknown      



 ns/Minerals  ns/Minerals  -                  



 Tab*  Tab*                    

 

 amLODIPine  amLODIPine      No  Unknown    Unknown  Unknown      



 5 mg tablet  5 mg tablet  4-15                  

 

 acetaminoph  acetaminoph      No  Unknown    Unknown  Unknown      



 en 500 mg  en 500 mg  4-15                  



 tablet  tablet                    

 

 magnesium  magnesium      No  Unknown    Unknown  Unknown      



 oxide 400  oxide 400  4-15                  



 mg (241.3  mg (241.3                    



 mg  mg                    



 magnesium)  magnesium)                    



 tablet  tablet                    

 

 omeprazole  omeprazole      No  Unknown    Unknown  Unknown      



 40 mg  40 mg  4-15                  



 capsule,del  capsule,del                    



 ayed  ayed                    



 release  release                    

 

 verapamil  verapamil      No  Unknown    Unknown  Unknown      



  mg   mg  4-15                  



 capsule,ext  capsule,ext                    



 ended  ended                    



 release  release                    

 

 Calcium  Calcium      No  Unknown    Unknown  Unknown      



 Carbonate/V  Carbonate/V  4-15                  



 itamin D3  itamin D3                    

 

 cefdinir  cefdinir      No  Unknown    Unknown  Unknown      



 300 mg  300 mg  4-17                  



 capsule  capsule                    







Vital Signs







 Vital Name  Observation Time  Observation Value  Comments

 

 SYSTOLIC mm[Hg]  2019 18:05:21  135 mm[Hg] mm[Hg]  Method: Sit

 

 DIASTOLIC mm[Hg]  2019 18:05:21  48 mm[Hg] mm[Hg]  Method: Sit

 

 PULSE  2019 18:05:21  63 /min /min  

 

 RESP RATE  2019 18:05:21  16 /min /min  

 

 TEMP  2019 18:05:21  97.4 [degF]  







Procedures

This patient has no known procedures.



Results







 Test Description  Test Time  Test Comments  Text Results  Atomic Results  
Result Comments









 Laboratory Studies  2019 08:13:00  Identifier [id number] Result Time  
Unknown



     2019 08:13:00  









   Test Item  Value  Reference Range  Comments









 Unknown (test code = 2951-2)  136 mmol/L  Unknown  135-145  F



Ordering Physician [facility] Rsimvij3491-59-39 08:13:00Identifier [id number]
6 Result Time 2019 08:13:00Unknown





 Test Item  Value  Reference Range  Comments

 

 Unknown (test code = 2823-3)  4.0 mmol/L  Unknown  3.5-5.0  F



Ordering Physician [facility] Ybicxut4882-82-27 08:13:00Identifier [id number]
6 Result Time 2019 08:13:00Unknown





 Test Item  Value  Reference Range  Comments

 

 Unknown (test code = 2345-7)  87 mg/dL  Unknown    F



Ordering Physician [facility] Flfhtxg3483-55-79 08:13:00Identifier [id number]
6 Result Time 2019 08:13:00Unknown





 Test Item  Value  Reference Range  Comments

 

 Unknown (test code = 48642-3)  46.9   Unknown  Unknown  F



Ordering Physician [facility] Dovqpgm1560-13-65 08:13:00Identifier [id number]
6 Result Time 2019 08:13:00Unknown





 Test Item  Value  Reference Range  Comments

 

 Unknown (test code = NullTestCode)  56.7   Unknown  Unknown  F



Ordering Physician [facility] Bwlkmla2928-10-98 08:13:00Identifier [id number]
6 Result Time 2019 08:13:00Unknown





 Test Item  Value  Reference Range  Comments

 

 Unknown (test code = 2160-0)  1.43 mg/dL  Unknown  0.67-1.17  F



Ordering Physician [facility] Xhlkumq9545-04-92 08:13:00Identifier [id number]-
6 Result Time 2019 08:13:00Unknown





 Test Item  Value  Reference Range  Comments

 

 Unknown (test code = 2075-0)  104 mmol/L  Unknown  101-111  F



Ordering Physician [facility] Yjkecev8334-81-09 08:13:00Identifier [id number]-
6 Result Time 2019 08:13:00Unknown





 Test Item  Value  Reference Range  Comments

 

 Unknown (test code = 2028-9)  24 mmol/L  Unknown  22-32  F



Ordering Physician [facility] Nsbtbah0326-66-93 08:13:00Identifier [id number]
6 Result Time 2019 08:13:00Unknown





 Test Item  Value  Reference Range  Comments

 

 Unknown (test code = 57011-0)  9.0 mg/dL  Unknown  8.6-10.3  F



Ordering Physician [facility] Toyqhoy1201-34-59 08:13:00Identifier [id number]-
6 Result Time 2019 08:13:00Unknown





 Test Item  Value  Reference Range  Comments

 

 Unknown (test code = 3094-0)  24 mg/dL  Unknown  6-24  F



Ordering Physician [facility] Heafmtt6776-66-40 08:13:00Identifier [id number]-
6 Result Time 2019 08:13:00Unknown





 Test Item  Value  Reference Range  Comments

 

 Unknown (test code = 3097-3)  16.8   Unknown  8-20  F



Ordering Physician [facility] Pjhmxky5338-43-96 08:13:00Identifier [id number]
6 Result Time 2019 08:13:00Unknown





 Test Item  Value  Reference Range  Comments

 

 Unknown (test code = 33037-3)  8 mmol/L  Unknown  2-11  F



Ordering Physician [facility] Kvouwij8776-06-95 08:13:00Identifier [id number]
6 Result Time 2019 08:13:00Unknown





 Test Item  Value  Reference Range  Comments

 

 Unknown (test code = 07627-9)  27.4 10^3/uL  Unknown  3.5-10.8  F



Ordering Physician [facility] Yytwgvk2468-35-39 08:13:00Identifier [id number]-
6 Result Time 2019 08:13:00Unknown





 Test Item  Value  Reference Range  Comments

 

 Unknown (test code = 788-0)  24 %  Unknown  10.5-15  F



Ordering Physician [facility] Hmcptdg9414-02-79 08:13:00Identifier [id number]-
6 Result Time 2019 08:13:00Unknown





 Test Item  Value  Reference Range  Comments

 

 Unknown (test code = 789-8)  5.19 10^6 /uL  Unknown  4.18-5.48  F



Ordering Physician [facility] Ghlanni1211-81-77 08:13:00Identifier [id number]-
6 Result Time 2019 08:13:00Unknown





 Test Item  Value  Reference Range  Comments

 

 Unknown (test code = 777-3)  330 10^3/uL  Unknown  150-450  F



Ordering Physician [facility] Qraoewc3347-80-81 08:13:00Identifier [id number]-
6 Result Time 2019 08:13:00Unknown





 Test Item  Value  Reference Range  Comments

 

 Unknown (test code = 98003-5)  0   Unknown  Unknown  F



Ordering Physician [facility] Fchsxdh9441-86-43 08:13:00Identifier [id number]-
6 Result Time 2019 08:13:00Unknown





 Test Item  Value  Reference Range  Comments

 

 Unknown (test code = 771-6)  0 10^3/ul  Unknown  Unknown  F



Ordering Physician [facility] Ciuwocd2017-22-54 08:13:00Identifier [id number]-
6 Result Time 2019 08:13:00Unknown





 Test Item  Value  Reference Range  Comments

 

 Unknown (test code = 770-8)  84.7 %  Unknown  Unknown  F



Ordering Physician [facility] Twyrxbs7210-09-89 08:13:00Identifier [id number]-
6 Result Time 2019 08:13:00Unknown





 Test Item  Value  Reference Range  Comments

 

 Unknown (test code = 5905-5)  8.6 %  Unknown  Unknown  F



Ordering Physician [facility] Ptjqecw0105-23-81 08:13:00Identifier [id number]-
6 Result Time 2019 08:13:00Unknown





 Test Item  Value  Reference Range  Comments

 

 Unknown (test code = 03504-0)  8.6 fL  Unknown  7.4-10.4  F



Ordering Physician [facility] Rglzdso7512-46-35 08:13:00Identifier [id number]-
6 Result Time 2019 08:13:00Unknown





 Test Item  Value  Reference Range  Comments

 

 Unknown (test code = 787-2)  78 fL  Unknown  80-94  F



Ordering Physician [facility] Ipbymok5515-45-63 08:13:00Identifier [id number]-
6 Result Time 2019 08:13:00Unknown





 Test Item  Value  Reference Range  Comments

 

 Unknown (test code = 786-4)  31 g/dL  Unknown  31-36  F



Ordering Physician [facility] Wyzycph0829-30-44 08:13:00Identifier [id number]-
6 Result Time 2019 08:13:00Unknown





 Test Item  Value  Reference Range  Comments

 

 Unknown (test code = 785-6)  24 pg  Unknown  27-31  F



Ordering Physician [facility] Xwayrtv6304-22-28 08:13:00Identifier [id number]-
6 Result Time 2019 08:13:00Unknown





 Test Item  Value  Reference Range  Comments

 

 Unknown (test code = 736-9)  5.4 %  Unknown  Unknown  F



Ordering Physician [facility] Otjwasb8791-24-81 08:13:00Identifier [id number]-
6 Result Time 2019 08:13:00Unknown





 Test Item  Value  Reference Range  Comments

 

 Unknown (test code = 718-7)  12.3 g/dL  Unknown  14.0-18.0  F



Ordering Physician [facility] Gzafhae0012-33-63 08:13:00Identifier [id number]-
6 Result Time 2019 08:13:00Unknown





 Test Item  Value  Reference Range  Comments

 

 Unknown (test code = 4544-3)  40 %  Unknown  36-46  F



Ordering Physician [facility] Pgtqvqm1941-17-68 08:13:00Identifier [id number]-
6 Result Time 2019 08:13:00Unknown





 Test Item  Value  Reference Range  Comments

 

 Unknown (test code = 713-8)  0.8 %  Unknown  Unknown  F



Ordering Physician [facility] Iwctkjo8968-59-18 08:13:00Identifier [id number]-
6 Result Time 2019 08:13:00Unknown





 Test Item  Value  Reference Range  Comments

 

 Unknown (test code = 706-2)  0.5 %  Unknown  Unknown  F



Ordering Physician [facility] Urzhhhp7529-83-16 08:13:00Identifier [id number]-
6 Result Time 2019 08:13:00Unknown





 Test Item  Value  Reference Range  Comments

 

 Unknown (test code = QKE6128)  23.2 10^3/ul  Unknown  1.5-7.7  F



Ordering Physician [facility] Aedchur6107-27-26 08:13:00Identifier [id number]-
6 Result Time 2019 08:13:00Unknown





 Test Item  Value  Reference Range  Comments

 

 Unknown (test code = 742-7)  2.4 10^3/ul  Unknown  0-0.8  F



Ordering Physician [facility] Gmnylyp4943-21-61 08:13:00Identifier [id number]-
6 Result Time 2019 08:13:00Unknown





 Test Item  Value  Reference Range  Comments

 

 Unknown (test code = 731-0)  1.5 10^3/ul  Unknown  1.0-4.8  F



Ordering Physician [facility] Dlgsybi1296-02-08 08:13:00Identifier [id number]-
6 Result Time 2019 08:13:00Unknown





 Test Item  Value  Reference Range  Comments

 

 Unknown (test code = 711-2)  0.2 10^3/ul  Unknown  0-0.6  F



Ordering Physician [facility] Dlmsvlj5449-79-98 08:13:00Identifier [id number]-
6 Result Time 2019 08:13:00Unknown





 Test Item  Value  Reference Range  Comments

 

 Unknown (test code = 704-7)  0.1 10^3/ul  Unknown  0-0.2  F



Ordering Physician [facility] Snsnrjo8361-43-32 06:46:00Identifier [id number]-
6 Result Time 2019 06:46:00Unknown





 Test Item  Value  Reference Range  Comments

 

 Unknown (test code = 81530-5)  0.05 ng/mL  Unknown  Unknown  F



Ordering Physician [facility] Nkqwfkd3012-64-21 06:46:00Identifier [id number]-
6 Result Time 2019 06:46:00Unknown





 Test Item  Value  Reference Range  Comments

 

 Unknown (test code = 2777-1)  2.8 mg/dL  Unknown  2.5-5.0  F



Ordering Physician [facility] Tznydzr6266-31-58 06:46:00Identifier [id number]-
6 Result Time 2019 06:46:00Unknown





 Test Item  Value  Reference Range  Comments

 

 Unknown (test code = 22920-5)  2.5 mg/dL  Unknown  1.9-2.7  F



Ordering Physician [facility] Studies2019-04-15 10:48:00Identifier [id number]
6 Result Time 2019-04-15 10:48:00Unknown





 Test Item  Value  Reference Range  Comments

 

 Unknown (test code = NullTestCode)  6.0   Unknown  5-9  F



Ordering Physician [facility] Studies2019-04-15 10:48:00Identifier [id number]-
6 Result Time 2019-04-15 10:48:00Unknown





 Test Item  Value  Reference Range  Comments

 

 Unknown (test code = 13738-3)  1.013   Unknown  1.010-1.030  F



Ordering Physician [facility] Studies2019-04-15 10:15:00Identifier [id number]-
6 Result Time 2019-04-15 10:15:00Unknown





 Test Item  Value  Reference Range  Comments

 

 Unknown (test code = 3016-3)  3.38 mcIU/mL  Unknown  0.34-5.60  F



Ordering Physician [facility] Studies2019-04-15 10:15:00Identifier [id number]-
6 Result Time 2019-04-15 10:15:00Unknown





 Test Item  Value  Reference Range  Comments

 

 Unknown (test code = 51701-9)  1.14   Unknown  0.77-1.02  F



Ordering Physician [facility] Studies2019-04-15 10:15:00Identifier [id number]
6 Result Time 2019-04-15 10:15:00Unknown





 Test Item  Value  Reference Range  Comments

 

 Unknown (test code = 69893-4)  365 pg/mL  Unknown  Unknown  F



Ordering Physician [facility] Studies2019-04-15 10:15:00Identifier [id number]-
6 Result Time 2019-04-15 10:15:00Unknown





 Test Item  Value  Reference Range  Comments

 

 Unknown (test code = 2885-2)  8.9 g/dL  Unknown  6.4-8.9  F



Ordering Physician [facility] Studies2019-04-15 10:15:00Identifier [id number]-
6 Result Time 2019-04-15 10:15:00Unknown





 Test Item  Value  Reference Range  Comments

 

 Unknown (test code = 1975-2)  0.60 mg/dL  Unknown  0.2-1.0  F



Ordering Physician [facility] Studies2019-04-15 10:15:00Identifier [id number]-
6 Result Time 2019-04-15 10:15:00Unknown





 Test Item  Value  Reference Range  Comments

 

 Unknown (test code = NullTestCode)  4.9 g/dL  Unknown  2-4  F



Ordering Physician [facility] Studies2019-04-15 10:15:00Identifier [id number]-
6 Result Time 2019-04-15 10:15:00Unknown





 Test Item  Value  Reference Range  Comments

 

 Unknown (test code = 1988-5)  11.17 mg/L  Unknown  0-8.00  F



Ordering Physician [facility] Studies2019-04-15 10:15:00Identifier [id number]-
6 Result Time 2019-04-15 10:15:00Unknown





 Test Item  Value  Reference Range  Comments

 

 Unknown (test code = 1920-8)  25 U/L  Unknown  13-39  F



Ordering Physician [facility] Studies2019-04-15 10:15:00Identifier [id number]-
6 Result Time 2019-04-15 10:15:00Unknown





 Test Item  Value  Reference Range  Comments

 

 Unknown (test code = 6768-6)  153 U/L  Unknown    F



Ordering Physician [facility] Studies2019-04-15 10:15:00Identifier [id number]
6 Result Time 2019-04-15 10:15:00Unknown





 Test Item  Value  Reference Range  Comments

 

 Unknown (test code = 1759-0)  0.8   Unknown  1-3  F



Ordering Physician [facility] Studies2019-04-15 10:15:00Identifier [id number]
6 Result Time 2019-04-15 10:15:00Unknown





 Test Item  Value  Reference Range  Comments

 

 Unknown (test code = 43021-1)  4.0 g/dL  Unknown  3.2-5.2  F



Ordering Physician [facility] Studies2019-04-15 10:15:00Identifier [id number]-
6 Result Time 2019-04-15 10:15:00Unknown





 Test Item  Value  Reference Range  Comments

 

 Unknown (test code = 1742-6)  13 U/L  Unknown  7-52  F



Ordering Physician [facility] Studies2019-04-15 10:15:00Identifier [id number]
6 Result Time 2019-04-15 10:15:00Unknown





 Test Item  Value  Reference Range  Comments

 

 Unknown (test code = 2524-7)  0.9 mmol/L  Unknown  0.5-2.0  F



Ordering Physician Unknown

## 2019-12-03 NOTE — XMS REPORT
Patient Summary Document

 Created on:2019



Patient:BERTHA SOLIS

Sex:Male

:1933

External Reference #:[id number]





Demographics







 Address  [address] [place], [place] [address]

 

 Home Phone  [phone]

 

 Preferred Language  English

 

 Marital Status  Unknown

 

 Restorationist Affiliation  Unknown

 

 Race  Unknown

 

 Ethnic Group  Unknown









Author







 Organization  [facility]









Support







 Name  Relationship  Address  Phone

 

 IRAIS SOLIS, Unknown Name  Suha  [address] [facility]  [phone]



     [place] [address]  









Care Team Providers







 Name  Role  Phone

 

 Unavailable  Unavailable  Unavailable









Problems

This patient has no known problems.



Allergies, Adverse Reactions, Alerts







 Allergy  Allergy  Status  Severity  Reaction(s)  Onset  Inactive  Treating  
Comments



 Name  Type        Date  Date  Clinician  

 

 Uncoded  Unknown  Active  Unknown  Reaction  2019    Interface  



 free-text        Unknown  -18      



 allergy                







Medications







 Ordered  Filled  Start  Stop  Current  Ordering  Indication  Dosage  Frequency
  Signature  Comments  Components



 Medication  Medication  Date  Date  Medication?  Clinician        (SIG)    



 Name  Name                    

 

 Multivitami  Multivitami  2012    No  Unknown    Unknown  Unknown      



 ns/Minerals  ns/Minerals  -                  



 Tab*  Tab*                    

 

 amLODIPine  amLODIPine      No  Unknown    Unknown  Unknown      



 5 mg tablet  5 mg tablet  4-15                  

 

 acetaminoph  acetaminoph      No  Unknown    Unknown  Unknown      



 en 500 mg  en 500 mg  4-15                  



 tablet  tablet                    

 

 magnesium  magnesium      No  Unknown    Unknown  Unknown      



 oxide 400  oxide 400  4-15                  



 mg (241.3  mg (241.3                    



 mg  mg                    



 magnesium)  magnesium)                    



 tablet  tablet                    

 

 omeprazole  omeprazole      No  Unknown    Unknown  Unknown      



 40 mg  40 mg  4-15                  



 capsule,del  capsule,del                    



 ayed  ayed                    



 release  release                    

 

 verapamil  verapamil      No  Unknown    Unknown  Unknown      



  mg   mg  4-15                  



 capsule,ext  capsule,ext                    



 ended  ended                    



 release  release                    

 

 Calcium  Calcium      No  Unknown    Unknown  Unknown      



 Carbonate/V  Carbonate/V  4-15                  



 itamin D3  itamin D3                    

 

 cefdinir  cefdinir      No  Unknown    Unknown  Unknown      



 300 mg  300 mg  4-17                  



 capsule  capsule                    







Vital Signs







 Vital Name  Observation Time  Observation Value  Comments

 

 SYSTOLIC mm[Hg]  2019 18:05:21  135 mm[Hg] mm[Hg]  Method: Sit

 

 DIASTOLIC mm[Hg]  2019 18:05:21  48 mm[Hg] mm[Hg]  Method: Sit

 

 PULSE  2019 18:05:21  63 /min /min  

 

 RESP RATE  2019 18:05:21  16 /min /min  

 

 TEMP  2019 18:05:21  97.4 [degF]  







Procedures

This patient has no known procedures.



Results







 Test Description  Test Time  Test Comments  Text Results  Atomic Results  
Result Comments









 Laboratory Studies  2019 08:13:00  Identifier [id number] Result Time  
Unknown



     2019 08:13:00  









   Test Item  Value  Reference Range  Comments









 Unknown (test code = 2951-2)  136 mmol/L  Unknown  135-145  F



Ordering Physician [facility] Sfbdgpz1265-93-54 08:13:00Identifier [id number]
6 Result Time 2019 08:13:00Unknown





 Test Item  Value  Reference Range  Comments

 

 Unknown (test code = 2823-3)  4.0 mmol/L  Unknown  3.5-5.0  F



Ordering Physician [facility] Axihxlu1778-80-70 08:13:00Identifier [id number]
6 Result Time 2019 08:13:00Unknown





 Test Item  Value  Reference Range  Comments

 

 Unknown (test code = 2345-7)  87 mg/dL  Unknown    F



Ordering Physician [facility] Ukrliuo9775-47-50 08:13:00Identifier [id number]
6 Result Time 2019 08:13:00Unknown





 Test Item  Value  Reference Range  Comments

 

 Unknown (test code = 48642-3)  46.9   Unknown  Unknown  F



Ordering Physician [facility] Yhbinpi4395-63-36 08:13:00Identifier [id number]
6 Result Time 2019 08:13:00Unknown





 Test Item  Value  Reference Range  Comments

 

 Unknown (test code = NullTestCode)  56.7   Unknown  Unknown  F



Ordering Physician [facility] Hdcdiie8941-01-03 08:13:00Identifier [id number]-
6 Result Time 2019 08:13:00Unknown





 Test Item  Value  Reference Range  Comments

 

 Unknown (test code = 2160-0)  1.43 mg/dL  Unknown  0.67-1.17  F



Ordering Physician [facility] Nucfiyp6345-21-21 08:13:00Identifier [id number]-
6 Result Time 2019 08:13:00Unknown





 Test Item  Value  Reference Range  Comments

 

 Unknown (test code = 2075-0)  104 mmol/L  Unknown  101-111  F



Ordering Physician [facility] Qgmkpwt3351-26-91 08:13:00Identifier [id number]
6 Result Time 2019 08:13:00Unknown





 Test Item  Value  Reference Range  Comments

 

 Unknown (test code = 2028-9)  24 mmol/L  Unknown  22-32  F



Ordering Physician [facility] Xkvjvfv0036-79-54 08:13:00Identifier [id number]-
6 Result Time 2019 08:13:00Unknown





 Test Item  Value  Reference Range  Comments

 

 Unknown (test code = 16339-3)  9.0 mg/dL  Unknown  8.6-10.3  F



Ordering Physician [facility] Zfteide3732-87-82 08:13:00Identifier [id number]-
6 Result Time 2019 08:13:00Unknown





 Test Item  Value  Reference Range  Comments

 

 Unknown (test code = 3094-0)  24 mg/dL  Unknown  6-24  F



Ordering Physician [facility] Brjasfj9266-51-22 08:13:00Identifier [id number]-
6 Result Time 2019 08:13:00Unknown





 Test Item  Value  Reference Range  Comments

 

 Unknown (test code = 3097-3)  16.8   Unknown  8-20  F



Ordering Physician [facility] Plthvgi4456-23-34 08:13:00Identifier [id number]-
6 Result Time 2019 08:13:00Unknown





 Test Item  Value  Reference Range  Comments

 

 Unknown (test code = 33037-3)  8 mmol/L  Unknown  2-11  F



Ordering Physician [facility] Fyipzcl9663-02-21 08:13:00Identifier [id number]-
6 Result Time 2019 08:13:00Unknown





 Test Item  Value  Reference Range  Comments

 

 Unknown (test code = 09562-6)  27.4 10^3/uL  Unknown  3.5-10.8  F



Ordering Physician [facility] Crpgxnl4802-03-63 08:13:00Identifier [id number]-
6 Result Time 2019 08:13:00Unknown





 Test Item  Value  Reference Range  Comments

 

 Unknown (test code = 788-0)  24 %  Unknown  10.5-15  F



Ordering Physician [facility] Srscqei8147-79-35 08:13:00Identifier [id number]-
6 Result Time 2019 08:13:00Unknown





 Test Item  Value  Reference Range  Comments

 

 Unknown (test code = 789-8)  5.19 10^6 /uL  Unknown  4.18-5.48  F



Ordering Physician [facility] Trfvlfy5498-93-09 08:13:00Identifier [id number]
6 Result Time 2019 08:13:00Unknown





 Test Item  Value  Reference Range  Comments

 

 Unknown (test code = 777-3)  330 10^3/uL  Unknown  150-450  F



Ordering Physician [facility] Kvdmyle7028-46-51 08:13:00Identifier [id number]-
6 Result Time 2019 08:13:00Unknown





 Test Item  Value  Reference Range  Comments

 

 Unknown (test code = 36110-1)  0   Unknown  Unknown  F



Ordering Physician [facility] Kwaqvsp7819-03-53 08:13:00Identifier [id number]-
6 Result Time 2019 08:13:00Unknown





 Test Item  Value  Reference Range  Comments

 

 Unknown (test code = 771-6)  0 10^3/ul  Unknown  Unknown  F



Ordering Physician [facility] Ikewndi4024-12-24 08:13:00Identifier [id number]-
6 Result Time 2019 08:13:00Unknown





 Test Item  Value  Reference Range  Comments

 

 Unknown (test code = 770-8)  84.7 %  Unknown  Unknown  F



Ordering Physician [facility] Ogrejtp7521-33-21 08:13:00Identifier [id number]-
6 Result Time 2019 08:13:00Unknown





 Test Item  Value  Reference Range  Comments

 

 Unknown (test code = 5905-5)  8.6 %  Unknown  Unknown  F



Ordering Physician [facility] Iftlphe8363-82-94 08:13:00Identifier [id number]-
6 Result Time 2019 08:13:00Unknown





 Test Item  Value  Reference Range  Comments

 

 Unknown (test code = 81564-3)  8.6 fL  Unknown  7.4-10.4  F



Ordering Physician [facility] Dytlpdl2465-77-50 08:13:00Identifier [id number]-
6 Result Time 2019 08:13:00Unknown





 Test Item  Value  Reference Range  Comments

 

 Unknown (test code = 787-2)  78 fL  Unknown  80-94  F



Ordering Physician [facility] Cqqaucd4174-78-70 08:13:00Identifier [id number]-
6 Result Time 2019 08:13:00Unknown





 Test Item  Value  Reference Range  Comments

 

 Unknown (test code = 786-4)  31 g/dL  Unknown  31-36  F



Ordering Physician [facility] Fyitzhf7112-01-83 08:13:00Identifier [id number]-
6 Result Time 2019 08:13:00Unknown





 Test Item  Value  Reference Range  Comments

 

 Unknown (test code = 785-6)  24 pg  Unknown  27-31  F



Ordering Physician [facility] Nkohmpu3080-84-69 08:13:00Identifier [id number]-
6 Result Time 2019 08:13:00Unknown





 Test Item  Value  Reference Range  Comments

 

 Unknown (test code = 736-9)  5.4 %  Unknown  Unknown  F



Ordering Physician [facility] Vzvnfxv8786-40-50 08:13:00Identifier [id number]-
6 Result Time 2019 08:13:00Unknown





 Test Item  Value  Reference Range  Comments

 

 Unknown (test code = 718-7)  12.3 g/dL  Unknown  14.0-18.0  F



Ordering Physician [facility] Tdzxliv9203-67-23 08:13:00Identifier [id number]-
6 Result Time 2019 08:13:00Unknown





 Test Item  Value  Reference Range  Comments

 

 Unknown (test code = 4544-3)  40 %  Unknown  36-46  F



Ordering Physician [facility] Riezorq0170-29-61 08:13:00Identifier [id number]-
6 Result Time 2019 08:13:00Unknown





 Test Item  Value  Reference Range  Comments

 

 Unknown (test code = 713-8)  0.8 %  Unknown  Unknown  F



Ordering Physician [facility] Mgbxqsx5633-85-24 08:13:00Identifier [id number]-
6 Result Time 2019 08:13:00Unknown





 Test Item  Value  Reference Range  Comments

 

 Unknown (test code = 706-2)  0.5 %  Unknown  Unknown  F



Ordering Physician [facility] Nzzhfww5143-91-29 08:13:00Identifier [id number]-
6 Result Time 2019 08:13:00Unknown





 Test Item  Value  Reference Range  Comments

 

 Unknown (test code = HAP7075)  23.2 10^3/ul  Unknown  1.5-7.7  F



Ordering Physician [facility] Njyfrvc3660-75-08 08:13:00Identifier [id number]-
6 Result Time 2019 08:13:00Unknown





 Test Item  Value  Reference Range  Comments

 

 Unknown (test code = 742-7)  2.4 10^3/ul  Unknown  0-0.8  F



Ordering Physician [facility] Xcczwle6714-29-83 08:13:00Identifier [id number]-
6 Result Time 2019 08:13:00Unknown





 Test Item  Value  Reference Range  Comments

 

 Unknown (test code = 731-0)  1.5 10^3/ul  Unknown  1.0-4.8  F



Ordering Physician [facility] Csilbja1990-77-22 08:13:00Identifier [id number]-
6 Result Time 2019 08:13:00Unknown





 Test Item  Value  Reference Range  Comments

 

 Unknown (test code = 711-2)  0.2 10^3/ul  Unknown  0-0.6  F



Ordering Physician [facility] Qeuwkey3327-19-19 08:13:00Identifier [id number]-
6 Result Time 2019 08:13:00Unknown





 Test Item  Value  Reference Range  Comments

 

 Unknown (test code = 704-7)  0.1 10^3/ul  Unknown  0-0.2  F



Ordering Physician [facility] Yowmruk7674-40-64 06:46:00Identifier [id number]-
6 Result Time 2019 06:46:00Unknown





 Test Item  Value  Reference Range  Comments

 

 Unknown (test code = 05060-8)  0.05 ng/mL  Unknown  Unknown  F



Ordering Physician [facility] Nzvdsak0321-47-43 06:46:00Identifier [id number]-
6 Result Time 2019 06:46:00Unknown





 Test Item  Value  Reference Range  Comments

 

 Unknown (test code = 2777-1)  2.8 mg/dL  Unknown  2.5-5.0  F



Ordering Physician [facility] Xoenovf7004-38-34 06:46:00Identifier [id number]-
6 Result Time 2019 06:46:00Unknown





 Test Item  Value  Reference Range  Comments

 

 Unknown (test code = 24032-2)  2.5 mg/dL  Unknown  1.9-2.7  F



Ordering Physician [facility] Studies2019-04-15 10:48:00Identifier [id number]-
6 Result Time 2019-04-15 10:48:00Unknown





 Test Item  Value  Reference Range  Comments

 

 Unknown (test code = NullTestCode)  6.0   Unknown  5-9  F



Ordering Physician [facility] Studies2019-04-15 10:48:00Identifier [id number]-
6 Result Time 2019-04-15 10:48:00Unknown





 Test Item  Value  Reference Range  Comments

 

 Unknown (test code = 96490-3)  1.013   Unknown  1.010-1.030  F



Ordering Physician [facility] Studies2019-04-15 10:15:00Identifier [id number]-
6 Result Time 2019-04-15 10:15:00Unknown





 Test Item  Value  Reference Range  Comments

 

 Unknown (test code = 3016-3)  3.38 mcIU/mL  Unknown  0.34-5.60  F



Ordering Physician [facility] Studies2019-04-15 10:15:00Identifier [id number]-
6 Result Time 2019-04-15 10:15:00Unknown





 Test Item  Value  Reference Range  Comments

 

 Unknown (test code = 94167-0)  1.14   Unknown  0.77-1.02  F



Ordering Physician [facility] Studies2019-04-15 10:15:00Identifier [id number]-
6 Result Time 2019-04-15 10:15:00Unknown





 Test Item  Value  Reference Range  Comments

 

 Unknown (test code = 66561-2)  365 pg/mL  Unknown  Unknown  F



Ordering Physician [facility] Studies2019-04-15 10:15:00Identifier [id number]-
6 Result Time 2019-04-15 10:15:00Unknown





 Test Item  Value  Reference Range  Comments

 

 Unknown (test code = 2885-2)  8.9 g/dL  Unknown  6.4-8.9  F



Ordering Physician [facility] Studies2019-04-15 10:15:00Identifier [id number]-
6 Result Time 2019-04-15 10:15:00Unknown





 Test Item  Value  Reference Range  Comments

 

 Unknown (test code = 1975-2)  0.60 mg/dL  Unknown  0.2-1.0  F



Ordering Physician [facility] Studies2019-04-15 10:15:00Identifier [id number]-
6 Result Time 2019-04-15 10:15:00Unknown





 Test Item  Value  Reference Range  Comments

 

 Unknown (test code = NullTestCode)  4.9 g/dL  Unknown  2-4  F



Ordering Physician [facility] Studies2019-04-15 10:15:00Identifier [id number]-
6 Result Time 2019-04-15 10:15:00Unknown





 Test Item  Value  Reference Range  Comments

 

 Unknown (test code = 1988-5)  11.17 mg/L  Unknown  0-8.00  F



Ordering Physician [facility] Studies2019-04-15 10:15:00Identifier [id number]
6 Result Time 2019-04-15 10:15:00Unknown





 Test Item  Value  Reference Range  Comments

 

 Unknown (test code = 1920-8)  25 U/L  Unknown  13-39  F



Ordering Physician [facility] Studies2019-04-15 10:15:00Identifier [id number]-
6 Result Time 2019-04-15 10:15:00Unknown





 Test Item  Value  Reference Range  Comments

 

 Unknown (test code = 6768-6)  153 U/L  Unknown    F



Ordering Physician [facility] Studies2019-04-15 10:15:00Identifier [id number]-
6 Result Time 2019-04-15 10:15:00Unknown





 Test Item  Value  Reference Range  Comments

 

 Unknown (test code = 1759-0)  0.8   Unknown  1-3  F



Ordering Physician [facility] Studies2019-04-15 10:15:00Identifier [id number]-
6 Result Time 2019-04-15 10:15:00Unknown





 Test Item  Value  Reference Range  Comments

 

 Unknown (test code = 46232-5)  4.0 g/dL  Unknown  3.2-5.2  F



Ordering Physician [facility] Studies2019-04-15 10:15:00Identifier [id number]-
6 Result Time 2019-04-15 10:15:00Unknown





 Test Item  Value  Reference Range  Comments

 

 Unknown (test code = 1742-6)  13 U/L  Unknown  7-52  F



Ordering Physician [facility] Studies2019-04-15 10:15:00Identifier [id number]-
6 Result Time 2019-04-15 10:15:00Unknown





 Test Item  Value  Reference Range  Comments

 

 Unknown (test code = 2524-7)  0.9 mmol/L  Unknown  0.5-2.0  F



Ordering Physician Unknown

## 2019-12-03 NOTE — XMS REPORT
Patient Summary Document

 Created on:2019



Patient:BERTHA SOLIS

Sex:Male

:1933

External Reference #:[id number]





Demographics







 Address  [address] [place], [place] [address]

 

 Home Phone  [phone]

 

 Preferred Language  English

 

 Marital Status  Unknown

 

 Adventist Affiliation  Unknown

 

 Race  Unknown

 

 Ethnic Group  Unknown









Author







 Organization  [facility]









Support







 Name  Relationship  Address  Phone

 

 IRAIS SOLIS, Unknown Name  Suha  [address] [facility]  [phone]



     [place] [address]  









Care Team Providers







 Name  Role  Phone

 

 Unavailable  Unavailable  Unavailable









Problems

This patient has no known problems.



Allergies, Adverse Reactions, Alerts







 Allergy  Allergy  Status  Severity  Reaction(s)  Onset  Inactive  Treating  
Comments



 Name  Type        Date  Date  Clinician  

 

 Uncoded  Unknown  Active  Unknown  Reaction  2019    Interface  



 free-text        Unknown  -18      



 allergy                







Medications







 Ordered  Filled  Start  Stop  Current  Ordering  Indication  Dosage  Frequency
  Signature  Comments  Components



 Medication  Medication  Date  Date  Medication?  Clinician        (SIG)    



 Name  Name                    

 

 Multivitami  Multivitami  2012    No  Unknown    Unknown  Unknown      



 ns/Minerals  ns/Minerals  -                  



 Tab*  Tab*                    

 

 amLODIPine  amLODIPine      No  Unknown    Unknown  Unknown      



 5 mg tablet  5 mg tablet  4-15                  

 

 acetaminoph  acetaminoph      No  Unknown    Unknown  Unknown      



 en 500 mg  en 500 mg  4-15                  



 tablet  tablet                    

 

 magnesium  magnesium      No  Unknown    Unknown  Unknown      



 oxide 400  oxide 400  4-15                  



 mg (241.3  mg (241.3                    



 mg  mg                    



 magnesium)  magnesium)                    



 tablet  tablet                    

 

 omeprazole  omeprazole      No  Unknown    Unknown  Unknown      



 40 mg  40 mg  4-15                  



 capsule,del  capsule,del                    



 ayed  ayed                    



 release  release                    

 

 verapamil  verapamil      No  Unknown    Unknown  Unknown      



  mg   mg  4-15                  



 capsule,ext  capsule,ext                    



 ended  ended                    



 release  release                    

 

 Calcium  Calcium      No  Unknown    Unknown  Unknown      



 Carbonate/V  Carbonate/V  4-15                  



 itamin D3  itamin D3                    

 

 cefdinir  cefdinir      No  Unknown    Unknown  Unknown      



 300 mg  300 mg  4-17                  



 capsule  capsule                    







Vital Signs







 Vital Name  Observation Time  Observation Value  Comments

 

 SYSTOLIC mm[Hg]  2019 18:05:21  135 mm[Hg] mm[Hg]  Method: Sit

 

 DIASTOLIC mm[Hg]  2019 18:05:21  48 mm[Hg] mm[Hg]  Method: Sit

 

 PULSE  2019 18:05:21  63 /min /min  

 

 RESP RATE  2019 18:05:21  16 /min /min  

 

 TEMP  2019 18:05:21  97.4 [degF]  







Procedures

This patient has no known procedures.



Results







 Test Description  Test Time  Test Comments  Text Results  Atomic Results  
Result Comments









 Laboratory Studies  2019 08:13:00  Identifier [id number] Result Time  
Unknown



     2019 08:13:00  









   Test Item  Value  Reference Range  Comments









 Unknown (test code = 2951-2)  136 mmol/L  Unknown  135-145  F



Ordering Physician [facility] Qsmmvqd0526-99-66 08:13:00Identifier [id number]
6 Result Time 2019 08:13:00Unknown





 Test Item  Value  Reference Range  Comments

 

 Unknown (test code = 2823-3)  4.0 mmol/L  Unknown  3.5-5.0  F



Ordering Physician [facility] Zkcqiwu2324-20-74 08:13:00Identifier [id number]-
6 Result Time 2019 08:13:00Unknown





 Test Item  Value  Reference Range  Comments

 

 Unknown (test code = 2345-7)  87 mg/dL  Unknown    F



Ordering Physician [facility] Trmtqof2767-47-58 08:13:00Identifier [id number]-
6 Result Time 2019 08:13:00Unknown





 Test Item  Value  Reference Range  Comments

 

 Unknown (test code = 48642-3)  46.9   Unknown  Unknown  F



Ordering Physician [facility] Uysegxc2537-18-62 08:13:00Identifier [id number]-
6 Result Time 2019 08:13:00Unknown





 Test Item  Value  Reference Range  Comments

 

 Unknown (test code = NullTestCode)  56.7   Unknown  Unknown  F



Ordering Physician [facility] Tnnaqzk9548-46-78 08:13:00Identifier [id number]
6 Result Time 2019 08:13:00Unknown





 Test Item  Value  Reference Range  Comments

 

 Unknown (test code = 2160-0)  1.43 mg/dL  Unknown  0.67-1.17  F



Ordering Physician [facility] Yhbvgne3885-23-65 08:13:00Identifier [id number]-
6 Result Time 2019 08:13:00Unknown





 Test Item  Value  Reference Range  Comments

 

 Unknown (test code = 2075-0)  104 mmol/L  Unknown  101-111  F



Ordering Physician [facility] Jwmosyq5413-50-18 08:13:00Identifier [id number]-
6 Result Time 2019 08:13:00Unknown





 Test Item  Value  Reference Range  Comments

 

 Unknown (test code = 2028-9)  24 mmol/L  Unknown  22-32  F



Ordering Physician [facility] Ighhvpr1871-22-48 08:13:00Identifier [id number]
6 Result Time 2019 08:13:00Unknown





 Test Item  Value  Reference Range  Comments

 

 Unknown (test code = 36502-8)  9.0 mg/dL  Unknown  8.6-10.3  F



Ordering Physician [facility] Bicvzue7848-51-28 08:13:00Identifier [id number]
6 Result Time 2019 08:13:00Unknown





 Test Item  Value  Reference Range  Comments

 

 Unknown (test code = 3094-0)  24 mg/dL  Unknown  6-24  F



Ordering Physician [facility] Yixogsn8187-76-00 08:13:00Identifier [id number]-
6 Result Time 2019 08:13:00Unknown





 Test Item  Value  Reference Range  Comments

 

 Unknown (test code = 3097-3)  16.8   Unknown  8-20  F



Ordering Physician [facility] Dqivvta7788-22-67 08:13:00Identifier [id number]-
6 Result Time 2019 08:13:00Unknown





 Test Item  Value  Reference Range  Comments

 

 Unknown (test code = 33037-3)  8 mmol/L  Unknown  2-11  F



Ordering Physician [facility] Erqyfdj9122-48-92 08:13:00Identifier [id number]-
6 Result Time 2019 08:13:00Unknown





 Test Item  Value  Reference Range  Comments

 

 Unknown (test code = 96363-9)  27.4 10^3/uL  Unknown  3.5-10.8  F



Ordering Physician [facility] Gdyrtls6684-77-61 08:13:00Identifier [id number]-
6 Result Time 2019 08:13:00Unknown





 Test Item  Value  Reference Range  Comments

 

 Unknown (test code = 788-0)  24 %  Unknown  10.5-15  F



Ordering Physician [facility] Hjwvlmt4452-40-82 08:13:00Identifier [id number]-
6 Result Time 2019 08:13:00Unknown





 Test Item  Value  Reference Range  Comments

 

 Unknown (test code = 789-8)  5.19 10^6 /uL  Unknown  4.18-5.48  F



Ordering Physician [facility] Uxchhtb0836-23-27 08:13:00Identifier [id number]-
6 Result Time 2019 08:13:00Unknown





 Test Item  Value  Reference Range  Comments

 

 Unknown (test code = 777-3)  330 10^3/uL  Unknown  150-450  F



Ordering Physician [facility] Gjvtzae6484-95-02 08:13:00Identifier [id number]-
6 Result Time 2019 08:13:00Unknown





 Test Item  Value  Reference Range  Comments

 

 Unknown (test code = 87259-9)  0   Unknown  Unknown  F



Ordering Physician [facility] Haiafmk4069-81-47 08:13:00Identifier [id number]-
6 Result Time 2019 08:13:00Unknown





 Test Item  Value  Reference Range  Comments

 

 Unknown (test code = 771-6)  0 10^3/ul  Unknown  Unknown  F



Ordering Physician [facility] Wjqqunv2089-88-53 08:13:00Identifier [id number]-
6 Result Time 2019 08:13:00Unknown





 Test Item  Value  Reference Range  Comments

 

 Unknown (test code = 770-8)  84.7 %  Unknown  Unknown  F



Ordering Physician [facility] Xndxqiz9218-89-60 08:13:00Identifier [id number]-
6 Result Time 2019 08:13:00Unknown





 Test Item  Value  Reference Range  Comments

 

 Unknown (test code = 5905-5)  8.6 %  Unknown  Unknown  F



Ordering Physician [facility] Jutdzut7389-79-13 08:13:00Identifier [id number]-
6 Result Time 2019 08:13:00Unknown





 Test Item  Value  Reference Range  Comments

 

 Unknown (test code = 96313-4)  8.6 fL  Unknown  7.4-10.4  F



Ordering Physician [facility] Wqytosz1394-61-24 08:13:00Identifier [id number]-
6 Result Time 2019 08:13:00Unknown





 Test Item  Value  Reference Range  Comments

 

 Unknown (test code = 787-2)  78 fL  Unknown  80-94  F



Ordering Physician [facility] Sabatjb5563-18-34 08:13:00Identifier [id number]-
6 Result Time 2019 08:13:00Unknown





 Test Item  Value  Reference Range  Comments

 

 Unknown (test code = 786-4)  31 g/dL  Unknown  31-36  F



Ordering Physician [facility] Fjmrzlb8693-97-60 08:13:00Identifier [id number]-
6 Result Time 2019 08:13:00Unknown





 Test Item  Value  Reference Range  Comments

 

 Unknown (test code = 785-6)  24 pg  Unknown  27-31  F



Ordering Physician [facility] Dojmhet9283-20-54 08:13:00Identifier [id number]-
6 Result Time 2019 08:13:00Unknown





 Test Item  Value  Reference Range  Comments

 

 Unknown (test code = 736-9)  5.4 %  Unknown  Unknown  F



Ordering Physician [facility] Rrutaho0225-87-26 08:13:00Identifier [id number]-
6 Result Time 2019 08:13:00Unknown





 Test Item  Value  Reference Range  Comments

 

 Unknown (test code = 718-7)  12.3 g/dL  Unknown  14.0-18.0  F



Ordering Physician [facility] Sslxnkn9794-07-93 08:13:00Identifier [id number]-
6 Result Time 2019 08:13:00Unknown





 Test Item  Value  Reference Range  Comments

 

 Unknown (test code = 4544-3)  40 %  Unknown  36-46  F



Ordering Physician [facility] Uuqugsv4981-81-82 08:13:00Identifier [id number]-
6 Result Time 2019 08:13:00Unknown





 Test Item  Value  Reference Range  Comments

 

 Unknown (test code = 713-8)  0.8 %  Unknown  Unknown  F



Ordering Physician [facility] Eenuaji7077-98-72 08:13:00Identifier [id number]-
6 Result Time 2019 08:13:00Unknown





 Test Item  Value  Reference Range  Comments

 

 Unknown (test code = 706-2)  0.5 %  Unknown  Unknown  F



Ordering Physician [facility] Bdoniim9798-05-28 08:13:00Identifier [id number]-
6 Result Time 2019 08:13:00Unknown





 Test Item  Value  Reference Range  Comments

 

 Unknown (test code = JZM0943)  23.2 10^3/ul  Unknown  1.5-7.7  F



Ordering Physician [facility] Qhozipg7286-16-66 08:13:00Identifier [id number]-
6 Result Time 2019 08:13:00Unknown





 Test Item  Value  Reference Range  Comments

 

 Unknown (test code = 742-7)  2.4 10^3/ul  Unknown  0-0.8  F



Ordering Physician [facility] Avfqwpd8561-50-42 08:13:00Identifier [id number]-
6 Result Time 2019 08:13:00Unknown





 Test Item  Value  Reference Range  Comments

 

 Unknown (test code = 731-0)  1.5 10^3/ul  Unknown  1.0-4.8  F



Ordering Physician [facility] Xnouzwb4234-35-82 08:13:00Identifier [id number]-
6 Result Time 2019 08:13:00Unknown





 Test Item  Value  Reference Range  Comments

 

 Unknown (test code = 711-2)  0.2 10^3/ul  Unknown  0-0.6  F



Ordering Physician [facility] Sdajvfz6606-35-52 08:13:00Identifier [id number]-
6 Result Time 2019 08:13:00Unknown





 Test Item  Value  Reference Range  Comments

 

 Unknown (test code = 704-7)  0.1 10^3/ul  Unknown  0-0.2  F



Ordering Physician [facility] Htdoopl8231-23-25 06:46:00Identifier [id number]-
6 Result Time 2019 06:46:00Unknown





 Test Item  Value  Reference Range  Comments

 

 Unknown (test code = 87318-9)  0.05 ng/mL  Unknown  Unknown  F



Ordering Physician [facility] Kddjjbe0058-14-28 06:46:00Identifier [id number]-
6 Result Time 2019 06:46:00Unknown





 Test Item  Value  Reference Range  Comments

 

 Unknown (test code = 2777-1)  2.8 mg/dL  Unknown  2.5-5.0  F



Ordering Physician [facility] Frazuah5976-83-06 06:46:00Identifier [id number]-
6 Result Time 2019 06:46:00Unknown





 Test Item  Value  Reference Range  Comments

 

 Unknown (test code = 57627-0)  2.5 mg/dL  Unknown  1.9-2.7  F



Ordering Physician [facility] Studies2019-04-15 10:48:00Identifier [id number]
6 Result Time 2019-04-15 10:48:00Unknown





 Test Item  Value  Reference Range  Comments

 

 Unknown (test code = NullTestCode)  6.0   Unknown  5-9  F



Ordering Physician [facility] Studies2019-04-15 10:48:00Identifier [id number]-
6 Result Time 2019-04-15 10:48:00Unknown





 Test Item  Value  Reference Range  Comments

 

 Unknown (test code = 46125-0)  1.013   Unknown  1.010-1.030  F



Ordering Physician [facility] Studies2019-04-15 10:15:00Identifier [id number]-
6 Result Time 2019-04-15 10:15:00Unknown





 Test Item  Value  Reference Range  Comments

 

 Unknown (test code = 3016-3)  3.38 mcIU/mL  Unknown  0.34-5.60  F



Ordering Physician [facility] Studies2019-04-15 10:15:00Identifier [id number]
6 Result Time 2019-04-15 10:15:00Unknown





 Test Item  Value  Reference Range  Comments

 

 Unknown (test code = 21892-3)  1.14   Unknown  0.77-1.02  F



Ordering Physician [facility] Studies2019-04-15 10:15:00Identifier [id number]
6 Result Time 2019-04-15 10:15:00Unknown





 Test Item  Value  Reference Range  Comments

 

 Unknown (test code = 67936-9)  365 pg/mL  Unknown  Unknown  F



Ordering Physician [facility] Studies2019-04-15 10:15:00Identifier [id number]-
6 Result Time 2019-04-15 10:15:00Unknown





 Test Item  Value  Reference Range  Comments

 

 Unknown (test code = 2885-2)  8.9 g/dL  Unknown  6.4-8.9  F



Ordering Physician [facility] Studies2019-04-15 10:15:00Identifier [id number]-
6 Result Time 2019-04-15 10:15:00Unknown





 Test Item  Value  Reference Range  Comments

 

 Unknown (test code = 1975-2)  0.60 mg/dL  Unknown  0.2-1.0  F



Ordering Physician [facility] Studies2019-04-15 10:15:00Identifier [id number]
6 Result Time 2019-04-15 10:15:00Unknown





 Test Item  Value  Reference Range  Comments

 

 Unknown (test code = NullTestCode)  4.9 g/dL  Unknown  2-4  F



Ordering Physician [facility] Studies2019-04-15 10:15:00Identifier [id number]-
6 Result Time 2019-04-15 10:15:00Unknown





 Test Item  Value  Reference Range  Comments

 

 Unknown (test code = 1988-5)  11.17 mg/L  Unknown  0-8.00  F



Ordering Physician [facility] Studies2019-04-15 10:15:00Identifier [id number]-
6 Result Time 2019-04-15 10:15:00Unknown





 Test Item  Value  Reference Range  Comments

 

 Unknown (test code = 1920-8)  25 U/L  Unknown  13-39  F



Ordering Physician [facility] Studies2019-04-15 10:15:00Identifier [id number]
6 Result Time 2019-04-15 10:15:00Unknown





 Test Item  Value  Reference Range  Comments

 

 Unknown (test code = 6768-6)  153 U/L  Unknown    F



Ordering Physician [facility] Studies2019-04-15 10:15:00Identifier [id number]-
6 Result Time 2019-04-15 10:15:00Unknown





 Test Item  Value  Reference Range  Comments

 

 Unknown (test code = 1759-0)  0.8   Unknown  1-3  F



Ordering Physician [facility] Studies2019-04-15 10:15:00Identifier [id number]-
6 Result Time 2019-04-15 10:15:00Unknown





 Test Item  Value  Reference Range  Comments

 

 Unknown (test code = 32891-6)  4.0 g/dL  Unknown  3.2-5.2  F



Ordering Physician [facility] Studies2019-04-15 10:15:00Identifier [id number]-
6 Result Time 2019-04-15 10:15:00Unknown





 Test Item  Value  Reference Range  Comments

 

 Unknown (test code = 1742-6)  13 U/L  Unknown  7-52  F



Ordering Physician [facility] Studies2019-04-15 10:15:00Identifier [id number]
6 Result Time 2019-04-15 10:15:00Unknown





 Test Item  Value  Reference Range  Comments

 

 Unknown (test code = 2524-7)  0.9 mmol/L  Unknown  0.5-2.0  F



Ordering Physician Unknown

## 2019-12-03 NOTE — XMS REPORT
Continuity of Care Document (CCD)

 Created on:December 3, 2019



Patient:Jorge Johnson

Sex:Male

:1933

External Reference #:[id number]





Demographics







 Address  [address] [place] [address]

 

 Home Phone  [phone]

 

 Mobile Phone  [phone]

 

 Email Address  [email or web address]

 

 Preferred Language  en

 

 Marital Status  Not  or 

 

 Latter-day Affiliation  Unknown

 

 Race  White

 

 Ethnic Group  Not  or 









Author







 Name  Rosa Andre DO (transmitted by agent of provider Liane Waterman)

 

 Address  [address] [place] [id number]









Care Team Providers







 Name  Role  Phone

 

 Rosa Andre DO - Hospitalist  Care Team Information   [phone]
[phone]

 

 Mateus Nunes NP - Nurse  Care Team Information   [phone]



 Practitioner    









Problems







 Active Problems  Provider  Date

 

 Cellulitis of left lower limb  Madhavi Olmos M.D.  Onset: 2018

 

 Essential hypertension  Madhavi Olmos M.D.  Onset: 2018

 

 Gastroesophageal reflux disease  NELLY Ho  Onset: 2018

 

 Polycythemia vera (clinical)  NELLY Ho  Onset: 2018







Social History







 Type  Date  Description  Comments

 

 Birth Sex    Unknown  

 

 Tobacco Use  Start: Unknown End:  Former Cigarette Smoker  



   Unknown    

 

 Smoking Status  Reviewed: 19  Former Cigarette Smoker  

 

 ETOH Use    Denies alcohol use  

 

 Tobacco Use  Start: Unknown End:  Patient is a former smoker  



   Unknown    

 

 Recreational Drug Use    Denies Drug Use  

 

 Exercise Type/Frequency    Exercises regularly  







Allergies, Adverse Reactions, Alerts







 Description

 

 No Known Drug Allergies







Medications







 Active Medications  SIG  Qnty  Indications  Ordering Provider  Date

 

 Topiramate  take one tab in  270tabs  R51  Mateus Nunes,  10/01/2019



         25mg Tablets  the am and two      N.P.  



   tabs at night        

 

 Atorvastatin Calcium  by mouth every  30tabs  I65.1  Rosa Andre,  2019



                   40mg  night      DO  



 Tablets          



           

 

 Clopidogrel Bisulfate  take one tab  90tabs  I65.1  Rosa Andre,  2019



   daily      DO  



 75mg Tablets          



           

 

 Verapamil HCL ER  1/2 tab by  45tabs  I65.1  Rosa Andre,  2018



               120mg  mouth once a      DO  



 Tablets ER  day.        



           

 

 Multivitamin Adult  1 by mouth      Unknown  



   every day        



 Tablets          



           

 

 Magnesium  1 by mouth      Unknown  



        400mg Tablets  every day        



           

 

 Hydroxyurea  1 by mouth  90caps    Rosa Andre,  



          500mg  every other day      DO  



 Capsules          



           

 

 Pantoprazole Sodium  2 by mouth  180tabs    Rosa Andre,  



                  40mg  every day      DO  



 Tablets DR          



           

 

 Calcium 600/Vitamin D3  1 by mouth      Unknown  



   every day        



 600-800mg-Unit Tablets          



           

 

 Calcium Magnesium And        Unknown  



 Zinc With D3          









 History Medications









 Topiramate  1 by mouth  30tabs  R51  Rafa Dupont,  2019 -



           25mg  daily      M.D.  10/01/2019



 Tablets          



           

 

 Aspirin 81  1 by mouth      Rosa Andre, DO  2019 -



           81mg  every day(2-3        2019



 Tablets DR  times a week)        



           







Immunizations







 CPT Code  Status  Date  Vaccine  Lot #

 

 [id number]  Given  10/08/2019  Pneumonia Vaccine  [id number]







Vital Signs







 Date  Vital  Result  Comment

 

 2019 10:58am  Height  71 inches  5'11"









 Weight  170.00 lb  

 

 Heart Rate  68 /min  

 

 BP Systolic  135 mmHg  

 

 BP Diastolic  71 mmHg  

 

 Body Temperature  97.4 F  

 

 O2 % BldC Oximetry  95 %  

 

 BMI (Body Mass Index)  23.7 kg/m2  









 10/31/2019 11:03am  Height  71 inches  5'11"









 Weight  170.00 lb  

 

 Heart Rate  78 /min  

 

 BP Systolic  132 mmHg  

 

 BP Diastolic  62 mmHg  

 

 BMI (Body Mass Index)  23.7 kg/m2  







Results







 Test  Acquired  Facility  Test  Result  H/L  Range  Note



   Date            

 

 Protein  2019  [facility]  Total  9.3 g/dL  Abnormal  6.3 - 
7.9  



 Electrophoresis    [address]  Protein([address])        



     [phone]          









 Albumin  3.8 g/dL    3.4-4.7  

 

 Alpha-1 Globulin  0.3 g/dL    0.1-0.3  

 

 Alpha-2 Globulin  1.1 g/dL  Abnormal  0.6-1.0  

 

 Beta Globulin  1.3 g/dL  Abnormal  0.7-1.2  

 

 Gamma Globulin  2.9 g/dL  Abnormal  0.6-1.6  

 

 Albumin/Globulin Ratio  0.68      

 

 Impression  See Comment      1









 Protein  2019  [facility]  Total  988  Abnormal  <229  2



 Electrophoresis    [address]  Protein(Pep)  mg/24h      



 Urine (24HR)    [place] [address]  Urine        



     [phone]          









 Collection Duration  24 h      

 

 Urine Volume  1900 mL      

 

 Total Protein Concentration  52 mg/dL      

 

 Albumin  64 %      3

 

 Alpha-1 Globulin  4 %      4

 

 Alpha-2 Globulin  5 %      5

 

 Beta Globulin  9 %      6

 

 Gamma Globulin  17 %      7

 

 Albumin/Globulin Ratio  1.81      

 

 Impression  See Comment      8









 Laboratory test  2019  [facility]  GGTP  362 U/L  High  9-
64.0  



 finding    [address]          



     [place] [address]          



     [phone]          

 

 Hepatitis C  2019  [facility]  HCV Index  0.02 s/c      



 Antibody    [address]          



     [place] [address] [phone]          









 Hepatitis C Antibody  Negative    Negative  









 CBC Auto  2019  [facility]  White Blood  39.4 10^3/uL  High  
3.5-10.8  



 Diff    [address]  Count        



     [place] [address] [phone]          









 Red Blood Count  5.74 10^6/uL  High  4.18-5.48  

 

 Hemoglobin  12.9 g/dL  Low  14.0-18.0  

 

 Hematocrit  43 %  Normal  42-52  

 

 Mean Corpuscular Volume  75 fL  Low  80-94  9

 

 Mean Corpuscular Hemoglobin  23 pg  Low  27-31  

 

 Mean Corpuscular HGB Conc  30 g/dL  Low  31-36  

 

 Red Cell Distribution Width  19 %  High  10-15  

 

 Platelet Count  349 10^3/uL  Normal  150-450  

 

 Mean Platelet Volume  9.3 fL  Normal  7.4-10.4  

 

 Abs Neutrophils  33.1 10^3/uL  High  1.5-7.7  

 

 Abs Lymphocytes  1.7 10^3/uL  Normal  1.0-4.8  

 

 Abs Monocytes  3.6 10^3/uL  High  0-0.8  

 

 Abs Eosinophils  0.7 10^3/uL  High  0-0.6  

 

 Abs Basophils  0.3 10^3/uL  High  0-0.2  

 

 Abs Nucleated RBC  0.0 10^3/uL      

 

 Granulocyte %  84.0 %      

 

 Lymphocyte %  4.3 %      

 

 Monocyte %  9.0 %      

 

 Eosinophil %  1.9 %      

 

 Basophil %  0.8 %      

 

 Nucleated Red Blood Cells %  0.0      









 Comp Metabolic  2019  [facility]  Sodium  138 mmol/L  Normal  
135-145  



 Panel    [address]          



     [place] [address]          



     [phone]          









 Potassium  4.5 mmol/L  Normal  3.5-5.0  

 

 Chloride  106 mmol/L  Normal  101-111  

 

 Co2 Carbon Dioxide  25 mmol/L  Normal  22-32  

 

 Anion Gap  7 mmol/L  Normal  2-11  

 

 Glucose  70 mg/dL  Normal    

 

 Blood Urea Nitrogen  25 mg/dL  High  6-24  

 

 Creatinine  1.46 mg/dL  High  0.67-1.17  

 

 BUN/Creatinine Ratio  17.1  Normal  8-20  

 

 Calcium  9.5 mg/dL  Normal  8.6-10.3  

 

 Total Protein  8.3 g/dL  Normal  6.4-8.9  

 

 Albumin  4.0 g/dL  Normal  3.2-5.2  

 

 Globulin  4.3 g/dL  High  2-4  

 

 Albumin/Globulin Ratio  0.9  Low  1-3  

 

 Total Bilirubin  0.40 mg/dL  Normal  0.2-1.0  

 

 Alkaline Phosphatase  489 U/L  High    

 

 Alt  109 U/L  High  7-52  

 

 Ast  99 U/L  High  13-39  

 

 Egfr Non-  45.8    >60  

 

 Egfr   55.4    >60  10









 Manual  2019  [facility]  Immature  4.0 %  Normal  0-9  



 Differential      Granulocytes        



     [place] [address]          



     [phone]          









 Neutrophil %  83.0 %      

 

 Band %  1.0 %  Normal  0-8  

 

 Lymphocytes %  5.0 %      

 

 Monocytes %  5.0 %      

 

 Eosinophils %  2.0 %      

 

 Basophil %  1.0 %      

 

 Metamyelocytes %  2.0 %  Normal  0-2  

 

 Myelocytes %  1.0 %  Normal  0-1  

 

 Microcytosis  1+      

 

 Polychromasia  1+      









 Laboratory test  2019  [facility]  Pathologist  (SEE      11



 finding    [address]  Review  NOTE)      



     [place] [address]          



     [phone]          

 

 Protein  10/31/2019  [facility]  Total  8.4 g/dL  Abnormal  6.3  



 Electrophoresis    [address]  Protein(Pep)      -  



     [place] [address]        7.9  



     [phone]          









 Albumin  3.4 g/dL    3.4-4.7  

 

 Alpha-1 Globulin  0.3 g/dL    0.1-0.3  

 

 Alpha-2 Globulin  1.0 g/dL    0.6-1.0  

 

 Beta Globulin  1.2 g/dL    0.7-1.2  

 

 Gamma Globulin  2.6 g/dL  Abnormal  0.6-1.6  

 

 Albumin/Globulin Ratio  0.68      

 

 Impression  See Comment      12

 

 Immunofixation  See Comment      13









 Laboratory test  10/31/2019  [facility]  Anti Nuclear  0.5 U      14



 finding    [address] [place] [address]  Antibody        



     [place] [address] [phone]          

 

 Lyme Disease AB  10/31/2019  [facility]  IgG Immunoblot  Negative  
    



 Immunoblot WB    [address] [place] [address] [place] [address] [phone]          









 IgG detected against  p41, p39 kDa      

 

 IgM Immunoblot  Negative      

 

 IgM detected against  p41 kDa      

 

 Lyme Disease Interpretation  See Comment      15









 Laboratory test  10/31/2019  [facility]  Pathologist Review  (SEE 
NOTE)      16



 finding    [address] [place] [address] [place] [address] [phone]          









 Miscellaneous Test  See Comment      17









 Manual Differential  10/31/2019  [facility]  Platelet Morphology  
Large      



     [address] [place] [address] [place] [address] [phone]          









 Immature Granulocytes  10.0 %  High  0-9  

 

 Neutrophil %  78.0 %      

 

 Band %  9.0 %  High  0-8  

 

 Lymphocytes %  4.0 %      

 

 Monocytes %  7.0 %      

 

 Eosinophils %  1.0 %      

 

 Metamyelocytes %  1.0 %  Normal  0-2  

 

 Macrocytosis  1+      

 

 Microcytosis  1+      









 Protein  10/31/2019  [facility]  Total  8.4  Abnormal  6.3 -  



 Electrophoresis    [address] [place] [address]  Protein(Pep)  g/dL    7.9  



     [place] [address] [phone]          









 Albumin  3.4 g/dL    3.4-4.7  

 

 Alpha-1 Globulin  0.3 g/dL    0.1-0.3  

 

 Alpha-2 Globulin  1.0 g/dL    0.6-1.0  

 

 Beta Globulin  1.2 g/dL    0.7-1.2  

 

 Gamma Globulin  2.6 g/dL  Abnormal  0.6-1.6  

 

 Albumin/Globulin Ratio  0.68      

 

 Impression  See Comment      18









 Laboratory  10/31/2019  [facility]  TSH (Thyroid  2.85  Normal  0.34
-5.60  



 test finding    [address] [place] [address]  Stim Horm)  mcIU/mL      



     [place] [address] [phone]          









 Free T4 (Free Thyroxine)  0.60 ng/dL  Low  0.61-1.12  

 

 Anti Nuclear Antibody  0.5 U      19

 

 Erythrocyte Sed Rate  23 mm/Hr  High  0-19  

 

 C Reactive Protein  9.71 mg/L  High  <8.01  









 Vitamin B12  10/31/2019  [facility]  Vitamin B12  > 1450  High  180-
914  20



 And Folate    [address]    pg/mL      



 [place] [address] [phone]          









 Folic Acid (Folate)  16.44 ng/mL    >3.99  









 Comp Metabolic  10/31/2019  [facility]  Sodium  139 mmol/L  Normal  
135-145  



 Panel    [address]          



     [place] [address] [phone]          









 Potassium  4.3 mmol/L  Normal  3.5-5.0  

 

 Chloride  108 mmol/L  Normal  101-111  

 

 Co2 Carbon Dioxide  24 mmol/L  Normal  22-32  

 

 Anion Gap  7 mmol/L  Normal  2-11  

 

 Glucose  79 mg/dL  Normal    

 

 Blood Urea Nitrogen  24 mg/dL  Normal  6-24  

 

 Creatinine  1.47 mg/dL  High  0.67-1.17  

 

 BUN/Creatinine Ratio  16.3  Normal  8-20  

 

 Calcium  9.4 mg/dL  Normal  8.6-10.3  

 

 Total Protein  8.4 g/dL  Normal  6.4-8.9  

 

 Albumin  3.9 g/dL  Normal  3.2-5.2  

 

 Globulin  4.5 g/dL  High  2-4  

 

 Albumin/Globulin Ratio  0.9  Low  1-3  

 

 Total Bilirubin  0.40 mg/dL  Normal  0.2-1.0  

 

 Alkaline Phosphatase  328 U/L  High    

 

 Alt  55 U/L  High  7-52  

 

 Ast  59 U/L  High  13-39  

 

 Egfr Non-  45.4    >60  

 

 Egfr   55.0    >60  21









 CBC Auto  10/31/2019  [facility]  White Blood  26.6 10^3/uL  High  
3.5-10.8  



 Diff    [address]  Count        



     [place] [address] [phone]          









 Red Blood Count  5.82 10^6/uL  High  4.18-5.48  

 

 Hemoglobin  13.3 g/dL  Low  14.0-18.0  

 

 Hematocrit  44 %  Normal  42-52  

 

 Mean Corpuscular Volume  75 fL  Low  80-94  

 

 Mean Corpuscular Hemoglobin  23 pg  Low  27-31  

 

 Mean Corpuscular HGB Conc  31 g/dL  Normal  31-36  

 

 Red Cell Distribution Width  20 %  High  10-15  

 

 Platelet Count  129 10^3/uL  Low  150-450  

 

 Mean Platelet Volume  9.5 fL  Normal  7.4-10.4  

 

 Abs Neutrophils  22.5 10^3/uL  High  1.5-7.7  

 

 Abs Lymphocytes  1.2 10^3/uL  Normal  1.0-4.8  

 

 Abs Monocytes  2.1 10^3/uL  High  0-0.8  

 

 Abs Eosinophils  0.5 10^3/uL  Normal  0-0.6  

 

 Abs Basophils  0.4 10^3/uL  High  0-0.2  

 

 Abs Nucleated RBC  0.0 10^3/uL      

 

 Granulocyte %  84.5 %      

 

 Lymphocyte %  4.4 %      

 

 Monocyte %  7.7 %      

 

 Eosinophil %  1.9 %      

 

 Basophil %  1.5 %      

 

 Nucleated Red Blood Cells %  0.0      









 1  Small abnormality in gamma fraction.



   Polyclonal hypergammaglobulinemia



   Test Performed by:



   [place] [address]



   : Reyes Verdugo M.D. Ph.D.; CLIA# [id number]

 

 2  -------------------ADDITIONAL INFORMATION-------------------



   On 2017 the total protein assay method changed



   resulting in approximately a 15% increase in protein



   values.

 

 3  632  mg/24 h

 

 4  40  mg/24 h

 

 5  49  mg/24 h

 

 6  89  mg/24 h

 

 7  168  mg/24 h

 

 8  All fractions present, no apparent M-spike.



   Due to the elevated protein, suggest monoclonal protein



   study ([facility]) if clinically indicated.



   Test Performed by:



   [address] [address]



   : Reyes Verdugo M.D. Ph.D.; CLIA# [id number]



   Test Performed by:



   [place] [address]



   : Reyes Verdugo M.D. Ph.D.; CLIA# [id number]

 

 9  Consistent with Previous Results Reported on 10/31/19.

 

 10  *******Because ethnic data is not always readily available,



   this report includes an eGFR for both -Americans and



   non- Americans.****



   The National Kidney Disease Education Program (NKDEP) does



   not endorse the use of the MDRD equation for patients that



   are not between the ages of 18 and 70, are pregnant, have



   extremes of body size, muscle mass, or nutritional status,



   or are non- or non-.



   According to the National Kidney Foundation, irrespective of



   diagnosis, the stage of the disease is based on the level of



   kidney function:



   Stage Description                      GFR(mL/min/1.73 m(2))



   1     Kidney damage with normal or decreased GFR       90



   2     Kidney damage with mild decrease in GFR          60-89



   3     Moderate decrease in GFR                         30-59



   4     Severe decrease in GFR                           15-29



   5     Kidney failure                       <15 (or dialysis)

 

 11  Leukocytosis with absolute neutrophilia and left-shifted



   granulocytes.



   



   Reviewed by Isha Molina MD

 

 12  Small abnormality in gamma fraction.



   Polyclonal hypergammaglobulinemia



   See Immunofixation.



   Test Performed by:



   [place] [address]



   : Reyes Verdugo M.D. Ph.D.; CLIA# [id number]

 

 13  Small monoclonal IgG lambda within the gamma fraction.



   Immunofixation with IgD and IgE was negative.



   Suggest repeat testing in 6-12 months if clinically



   indicated.



   Test Performed by:



   [place] [address]



   : Reyes Verdugo M.D. Ph.D.; CLIA# [id number]

 

 14  -------------------REFERENCE VALUE--------------------------



   <=1.0 (Negative)



   Test Performed by:



   [place] [address]



   : Reyes Verdugo M.D. Ph.D.; CLIA# [id number]

 

 15  Specific serologic response to B. burgdorferi infection is



   not detected, but cannot rule out early infection during



   which low or undetectable antibody levels to B. burgdorferi



   may be present.  If clinically indicated, a new serum



   specimen should be submitted in 7-14 days.



   -------------------ADDITIONAL INFORMATION-------------------



   Per CDC criteria, the Lyme IgG Immunoblot is interpreted as



   positive if IgG-class antibodies are detected to >=5 B.



   burgdorferi proteins, and the Lyme IgM Immunoblot is



   interpreted as positive if IgM-class antibodies are



   detected to >=2 B. burgdorferi proteins. Immunoblot



   patterns not meeting these criteria should not be



   interpreted as positive. Epitopes from certain B.



   burgdorferi proteins (e.g., p41) are conserved across other



   bacteria, which may lead to the detection of IgM- and/or



   IgG-class antibodies on the Lyme disease immunoblots in



   patients without Lyme disease. Immunoblot should only be



   ordered on specimens that are positive or equivocal by a



   FDA-licensed Lyme disease antibody screening test (e.g.,



   EIA). Results of the Lyme IgM immunoblot should not be



   considered in patients with >= 30 days of symptoms.



   Test Performed by:



   [place] [address]

 

 16  Leukocytosis with absolute neutrophilia indicative of acute



   inflammatory/reactive process.  Mild anemia with, cytopenia



   noted.  Additional studies as clinically warranted.



   



   



   Reviewed by Dr. Seth

 

 17  Test                          Result    Flag  Unit  RefValue



   ------------------------------------------------------------



   Lyme Disease Serology, S      Positive              Negative



   Not diagnostic. Supplemental testing by immunoblot has



   been ordered by reflex.



   Test Performed by:



   [place] [address]



   : Reyes Verdugo M.D. Ph.D.; CLIA# [id number]

 

 18  Small abnormality in gamma fraction.



   Polyclonal hypergammaglobulinemia



   See Immunofixation.



   Test Performed by:



   [facility] [place] [address]



   : Reyes Verdugo M.D. Ph.D.; CLIA# [id number]

 

 19  -------------------REFERENCE VALUE--------------------------



   <=1.0 (Negative)



   Test Performed by:



   [address] [place] [address]



   : Reyes Verdugo M.D. Ph.D.; CLIA# [id number]

 

 20  Normal Range 180 to 914



   Indeterminate Range 145 to 180



   Deficient Range  <145

 

 21  *******Because ethnic data is not always readily available,



   this report includes an eGFR for both -Americans and



   non- Americans.****



   The National Kidney Disease Education Program (NKDEP) does



   not endorse the use of the MDRD equation for patients that



   are not between the ages of 18 and 70, are pregnant, have



   extremes of body size, muscle mass, or nutritional status,



   or are non- or non-.



   According to the National Kidney Foundation, irrespective of



   diagnosis, the stage of the disease is based on the level of



   kidney function:



   Stage Description                      GFR(mL/min/1.73 m(2))



   1     Kidney damage with normal or decreased GFR       90



   2     Kidney damage with mild decrease in GFR          60-89



   3     Moderate decrease in GFR                         30-59



   4     Severe decrease in GFR                           15-29



   5     Kidney failure                       <15 (or dialysis)







Procedures







 Description

 

 No Information Available







[employer]







 Description

 

 No Information Available







Encounters







 Type  Date  Location  Provider  Dx  Diagnosis

 

 Office Visit  10/31/2019  [facility]  Mateus Nunes,  G90.09  Other 
idiopathic



   11:00a  [facility]  N.PConnor    peripheral



           autonomic



           neuropathy









 R42  Dizziness and giddiness

 

 R51  Headache









 Office Visit  10/01/2019  9:00a  [facility]  Mateus Nunes  R51  
Headache



     [facility]  NANA    









 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

 

 G90.09  Other idiopathic peripheral autonomic neuropathy

 

 R42  Dizziness and giddiness









 Office Visit  2019  4:15p  [facility]  Rafa Dupont  R51  
Headache



     [facility]  M.D.    









 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









 Office Visit  2019  3:20p  [facility]  Rosa Andre,  I65.1  
Occlusion and



     Medicine - Suite  DO    stenosis of



     R      basilar artery









 I10  Essential (primary) hypertension

 

 I35.0  Nonrheumatic aortic (valve) stenosis

 

 R51  Headache

 

 D45  Polycythemia vera







Assessments







 Date  Code  Description  Provider

 

 2019  R51  Headache  Rosa Andre DO

 

 10/31/2019  G90.09  Other idiopathic peripheral autonomic  Mateus Nunes, 
N.P.



     neuropathy  

 

 10/31/2019  R42  Dizziness and giddiness  Mateus Nunes, N.P.

 

 10/31/2019  R51  Headache  Mateus Nunes, N.P.

 

 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

 

 10/08/2019  R51  Headache  Rosa Scottner, DO

 

 10/08/2019  Z23  Encounter for immunization  Rosa Jes, DO

 

 10/01/2019  R51  Headache  Mateus Nunes, N.P.

 

 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 
N.P.

 

 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

 

 10/01/2019  G90.09  Other idiopathic peripheral autonomic  Mateus Nunes, 
N.P.



     neuropathy  

 

 10/01/2019  R42  Dizziness and giddiness  Mateus Nunes, N.P.

 

 2019  R51  Headache  Rafa Dupont M.D.

 

 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

 

 2019  I10  Essential (primary) hypertension  Rosa Andre, DO

 

 2019  I35.0  Nonrheumatic aortic (valve) stenosis  Rosa Jes, DO

 

 2019  R51  Headache  Rosa Chavezcathi, DO

 

 2019  D45  Polycythemia vera  Rosa Jes, DO







Plan of Treatment

No Information Available



Functional Status







 Description

 

 No Information Available







Mental Status







 Description

 

 No Information Available







Referrals







 Refer to   Reason for Referral  Status  Appt Date

 

 Narendra Peterson,  PH.D.  DONE IN ERROR  Closed  









 [address] [place] [address] [phone]

 

 









 Rafa Dupont M.D.    Received Complete  09/15/2019









 [facility]

 

 [address]

 

 [place] [address] [phone]

 

 









 Narendra Peterson,  PH.D.    Sent  2019









 [address] [place] [address]

 

 [phone]

## 2019-12-03 NOTE — XMS REPORT
Continuity of Care Document (CCD)

 Created on:2019



Patient:Jorge Johnson

Sex:Male

:1933

External Reference #:[id number]





Demographics







 Address  [address] [place] [address]

 

 Home Phone  [phone]

 

 Mobile Phone  [phone]

 

 Email Address  [email or web address]

 

 Preferred Language  en

 

 Marital Status  Not  or 

 

 Druze Affiliation  Unknown

 

 Race  White

 

 Ethnic Group  Not  or 









Author







 Name  Rosa Andre DO (transmitted by agent of provider Silke Villar)

 

 Address  [address] [place] [address]









Care Team Providers







 Name  Role  Phone

 

 Rosa Andre DO - Hospitalist  Care Team Information   [phone]
[phone]









Problems







 Active Problems  Provider  Date

 

 Cellulitis of left lower limb  Madhavi Olmos M.D.  Onset: 2018

 

 Essential hypertension  Madhavi Olmos M.D.  Onset: 2018

 

 Gastroesophageal reflux disease  NELLY Ho  Onset: 2018

 

 Polycythemia vera (clinical)  NELLY Ho  Onset: 2018







Social History







 Type  Date  Description  Comments

 

 Birth Sex    Unknown  

 

 Tobacco Use  Start: Unknown End:  Former Cigarette Smoker  



   Unknown    

 

 Smoking Status  Reviewed: 10/08/19  Former Cigarette Smoker  

 

 ETOH Use    Denies alcohol use  

 

 Tobacco Use  Start: Unknown End:  Patient is a former smoker  



   Unknown    

 

 Recreational Drug Use    Denies Drug Use  

 

 Exercise Type/Frequency    Exercises regularly  







Allergies, Adverse Reactions, Alerts







 Description

 

 No Known Drug Allergies







Medications







 Active Medications  SIG  Qnty  Indications  Ordering Provider  Date

 

 Topiramate  take one tab  60tabs  R51  Mateus Nunes,  10/01/2019



        25mg Tablets  twice a day      N.P.  



           

 

 Atorvastatin Calcium  by mouth every  30tabs  I65.1  Rosa Andre,  2019



                  40mg  night      DO  



 Tablets          



           

 

 Clopidogrel Bisulfate  take one tab  90tabs  I65.1  Rosa Andre,  2019



                   75mg  daily      DO  



 Tablets          



           

 

 Verapamil HCL ER  1/2 tab by  45tabs  I65.1  Rosa Andre,  2018



              120mg  mouth once a      DO  



 Tablets ER  day.        



           

 

 Multivitamin Adult  1 by mouth      Unknown  



                 Tablets  every day        



           

 

 Magnesium  1 by mouth      Unknown  



       400mg Tablets  every day        



           

 

 Acetaminophen  2 by mouth      Unknown  



           500mg Tablets  twice a day prn        



           

 

 Hydroxyurea  1 by mouth  90caps    Rosa Andre,  



         500mg Capsules  every other day      DO  



           

 

 Pantoprazole Sodium  1 by mouth  90tabs    Kalen Valles MD  



                 40mg  every day        



 Tablets DR          



           

 

 Calcium 600/Vitamin D3  1 by mouth      Unknown  



   every day        



 600-800mg-Unit Tablets          



           

 

 Calcium Magnesium And        Unknown  



 Zinc With D3          









 History Medications









 Topiramate  1 by mouth  30tabs  R51  Rafa Dupont,  2019 -



           25mg  daily      M.D.  10/01/2019



 Tablets          



           

 

 Aspirin 81  1 by mouth      Rosa Andre,   2019 -



           81mg  every day(2-3        2019



 Tablets DR  times a week)        



           







Immunizations







 CPT Code  Status  Date  Vaccine  Lot #

 

 [id number]  Given  10/08/2019  Pneumonia Vaccine  [id number]







Vital Signs







 Date  Vital  Result  Comment

 

 10/08/2019  9:06am  Height  71 inches  5'11"









 Weight  171.38 lb  

 

 Heart Rate  57 /min  

 

 BP Systolic  142 mmHg  

 

 BP Diastolic  80 mmHg  

 

 Body Temperature  97.4 F  

 

 O2 % BldC Oximetry  98 %  

 

 BMI (Body Mass Index)  23.9 kg/m2  









 10/01/2019  8:51am  Height  71 inches  5'11"









 Weight  170.00 lb  

 

 Heart Rate  90 /min  

 

 BP Systolic  134 mmHg  

 

 BP Diastolic  82 mmHg  

 

 BMI (Body Mass Index)  23.7 kg/m2  







Results







 Description

 

 No Information Available







Procedures







 Date  Code  Description  Status

 

 2019  72354  EKG Tracing & Interpretation  Completed

 

 2019  34048  ECHO Transthorasic Realtime 2D W Doppler & Color Flow Hosp  
Completed

 

 04/15/2019  62587  EKG Tracing & Interpretation  Completed

 

 04/15/2019  18629  EKG Tracing & Interpretation  Completed







[employer]







 Description

 

 No Information Available







Encounters







 Type  Date  Location  Provider  Dx  Diagnosis

 

 Office Visit  10/01/2019  [facility]  Mateus Nunes,  R51  Headache



   9:00a  [facility]    









 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

 

 G90.09  Other idiopathic peripheral autonomic neuropathy

 

 R42  Dizziness and giddiness









 Office Visit  2019  3:20p  [facility]  Rosa Sencathi,  I65.1  
Occlusion and



     Medicine - Suite  DO    stenosis of



     R      basilar artery









 I10  Essential (primary) hypertension

 

 I35.0  Nonrheumatic aortic (valve) stenosis

 

 R51  Headache

 

 D45  Polycythemia vera









 Office Visit  2019  2:40p  [facility]  Fantasma S.  I35.0  
Nonrheumatic



     [facility]  Lamas, DO    aortic (valve)



       FACC    stenosis









 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

 

 I10  Essential (primary) hypertension

 

 I44.7  Left bundle-branch block, unspecified









 Office Visit  2019  2:00p  [facility]  Rosa  I65.1  Occlusion and



     [facility]  Senner, DO    stenosis of



     [facility]      basilar artery









 I35.0  Nonrheumatic aortic (valve) stenosis

 

 I10  Essential (primary) hypertension

 

 J18.9  Pneumonia, unspecified organism









 Office Visit  2019  [facility]  Marilin  A40.3  Sepsis due to



   10:56a  Assoc,kamari Montes MD    Streptococcus



     Hospitalists      pneumoniae









 R79.89  Other specified abnormal findings of blood chemistry

 

 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

 

 I10  Essential (primary) hypertension

 

 K21.9  Gastro-esophageal reflux disease without esophagitis

 

 E87.6  Hypokalemia

 

 E83.42  Hypomagnesemia









 Office Visit  2019 10:55a  [facility]  Marilin  J18.9  Pneumonia,



     Assoc,kamari Montes MD    unspecified



     Hospitalists      organism









 I10  Essential (primary) hypertension









 Office Visit  04/15/2019 10:55a  [facility]  Marilin  J18.9  Pneumonia,



     Assoc,kamari Montes MD    unspecified



     Hospitalists      organism









 R79.89  Other specified abnormal findings of blood chemistry

 

 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

 

 I10  Essential (primary) hypertension

 

 K21.9  Gastro-esophageal reflux disease without esophagitis

 

 E87.6  Hypokalemia

 

 E83.42  Hypomagnesemia







Assessments







 Date  Code  Description  Provider

 

 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

 

 10/08/2019  R51  Headache  Rosa Andre, DO

 

 10/08/2019  Z23  Encounter for immunization  Rosa Andre, DO

 

 10/01/2019  R51  Headache  SARABJIT GonzalezP.

 

 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 
N.P.

 

 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

 

 10/01/2019  G90.09  Other idiopathic peripheral autonomic  Mateuscliff Nunes, 
N.P.



     neuropathy  

 

 10/01/2019  R42  Dizziness and giddiness  Mateuscliff Nunes, N.P.

 

 2019  R51  Headache  Rafa Dupont M.D.

 

 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

 

 2019  G90.09  Other idiopathic peripheral autonomic  Rafa Dupont M.D.



     neuropathy  

 

 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

 

 2019  I10  Essential (primary) hypertension  Rosa Andre, DO

 

 2019  I35.0  Nonrheumatic aortic (valve) stenosis  Rosa Andre, DO

 

 2019  R51  Headache  Rosa Andre, DO

 

 2019  D45  Polycythemia vera  Rosa Andre, DO

 

 2019  I35.0  Nonrheumatic aortic (valve) stenosis  Fantasma Lamas, DO 
[facility]

 

 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 
DO [facility]

 

 2019  I10  Essential (primary) hypertension  Fantasma Lamas, DO [facility]

 

 2019  I44.7  Left bundle-branch block, unspecified  Fantasma Lamas, DO 
[facility]

 

 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

 

 2019  I35.0  Nonrheumatic aortic (valve) stenosis  Rosa Andre, DO

 

 2019  I10  Essential (primary) hypertension  Rosa Andre, DO

 

 2019  J18.9  Pneumonia, unspecified organism  Rosa Andre, DO

 

 2019  A40.3  Sepsis due to Streptococcus pneumoniae  Marilin Montes MD

 

 2019  R79.89  Other specified abnormal findings of blood  Marilin Montes MD



     chemistry  

 

 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

 

 2019  I10  Essential (primary) hypertension  Marilin Montes MD

 

 2019  K21.9  Gastro-esophageal reflux disease without  Marilin Montes MD



     esophagitis  

 

 2019  E87.6  Hypokalemia  Marilin Montes MD

 

 2019  E83.42  Hypomagnesemia  Marilin Montes MD

 

 2019  R94.31  Abnormal electrocardiogram [ECG] [EKG]  Imtiaz Schneider M.D.

 

 2019  J18.9  Pneumonia, unspecified organism  Marilin Montes MD

 

 2019  I10  Essential (primary) hypertension  Marilin Montes MD

 

 04/15/2019  J18.9  Pneumonia, unspecified organism  Marilin Montes MD

 

 04/15/2019  R94.31  Abnormal electrocardiogram [ECG] [EKG]  Nurse Visit IC

 

 04/15/2019  R79.89  Other specified abnormal findings of blood  Marilin Montes MD



     chemistry  

 

 04/15/2019  I35.0  Nonrheumatic aortic (valve) stenosis  Imtiaz Schneider M.D.

 

 04/15/2019  D75.1  Secondary polycythemia  Marilin Montes MD

 

 04/15/2019  R68.83  Chills (without fever)  Imtiaz Schneider M.D.

 

 04/15/2019  I10  Essential (primary) hypertension  Marilin Montes MD

 

 04/15/2019  R53.1  Weakness  Imtiaz Schneider M.D.

 

 04/15/2019  K21.9  Gastro-esophageal reflux disease without  Marilin Montes MD



     esophagitis  

 

 04/15/2019  I44.7  Left bundle-branch block, unspecified  Imtiaz Schneider M.D.

 

 04/15/2019  E87.6  Hypokalemia  Marilin Montes MD

 

 04/15/2019  E83.42  Hypomagnesemia  Marilin Montes MD







Plan of Treatment

Future Appointment(s):10/31/2019 11:00 am - Mateus Nunes N.P. at [facility]10/2019 - Rosa Andre, DOI65.1 Occlusion and 
stenosis of basilar arteryComments:I will reach out to Dr. Morales's office to 
get the angiogram kyxbcdufzF95.1 Secondary polycythemiaComments:following with 
Dr. Bear, continue [id number] [id number] Encounter for immunization



Functional Status







 Description

 

 No Information Available







Mental Status







 Description

 

 No Information Available







Referrals







 Refer to Dr  Reason for Referral  Status  Appt Date

 

 Rafa Dupont M.D.    Received Complete  09/15/2019









 [address] 

 

 [address]

 

 [place] [address]

 

 [phone]

 

 









 Narendra Peterson,  PH.D.    Sent  2019









 [address] [place] [address]

 

 [phone]

 

 









 Narendra Peterson,  PH.D.    Sent  









 [address] [place] [address]

 

 [phone]

## 2019-12-03 NOTE — XMS REPORT
Patient Summary Document

 Created on:2019



Patient:BERTHA SOLIS

Sex:Male

:1933

External Reference #:[id number]





Demographics







 Address  [address] [place], [place] [address]

 

 Home Phone  [phone]

 

 Preferred Language  English

 

 Marital Status  Unknown

 

 Christianity Affiliation  Unknown

 

 Race  Unknown

 

 Ethnic Group  Unknown









Author







 Organization  [facility]









Support







 Name  Relationship  Address  Phone

 

 IRAIS SOLIS, Unknown Name  Suha  [address]  [phone]



     [place] [address]  









Care Team Providers







 Name  Role  Phone

 

 Unavailable  Unavailable  Unavailable









Problems

This patient has no known problems.



Allergies, Adverse Reactions, Alerts







 Allergy  Allergy  Status  Severity  Reaction(s)  Onset  Inactive  Treating  
Comments



 Name  Type        Date  Date  Clinician  

 

 Uncoded  Unknown  Active  Unknown  Reaction  2019    Interface  



 free-text        Unknown  -18      



 allergy                







Medications







 Ordered  Filled  Start  Stop  Current  Ordering  Indication  Dosage  Frequency
  Signature  Comments  Components



 Medication  Medication  Date  Date  Medication?  Clinician        (SIG)    



 Name  Name                    

 

 Multivitami  Multivitami  2012    No  Unknown    Unknown  Unknown      



 ns/Minerals  ns/Minerals  -                  



 Tab*  Tab*                    

 

 amLODIPine  amLODIPine      No  Unknown    Unknown  Unknown      



 5 mg tablet  5 mg tablet  4-15                  

 

 acetaminoph  acetaminoph      No  Unknown    Unknown  Unknown      



 en 500 mg  en 500 mg  4-15                  



 tablet  tablet                    

 

 magnesium  magnesium      No  Unknown    Unknown  Unknown      



 oxide 400  oxide 400  4-15                  



 mg (241.3  mg (241.3                    



 mg  mg                    



 magnesium)  magnesium)                    



 tablet  tablet                    

 

 omeprazole  omeprazole      No  Unknown    Unknown  Unknown      



 40 mg  40 mg  4-15                  



 capsule,del  capsule,del                    



 ayed  ayed                    



 release  release                    

 

 verapamil  verapamil      No  Unknown    Unknown  Unknown      



  mg   mg  4-15                  



 capsule,ext  capsule,ext                    



 ended  ended                    



 release  release                    

 

 Calcium  Calcium      No  Unknown    Unknown  Unknown      



 Carbonate/V  Carbonate/V  4-15                  



 itamin D3  itamin D3                    

 

 cefdinir  cefdinir      No  Unknown    Unknown  Unknown      



 300 mg  300 mg  4-17                  



 capsule  capsule                    







Vital Signs







 Vital Name  Observation Time  Observation Value  Comments

 

 SYSTOLIC mm[Hg]  2019 18:05:21  135 mm[Hg] mm[Hg]  Method: Sit

 

 DIASTOLIC mm[Hg]  2019 18:05:21  48 mm[Hg] mm[Hg]  Method: Sit

 

 PULSE  2019 18:05:21  63 /min /min  

 

 RESP RATE  2019 18:05:21  16 /min /min  

 

 TEMP  2019 18:05:21  97.4 [degF]  







Procedures

This patient has no known procedures.



Results







 Test Description  Test Time  Test Comments  Text Results  Atomic Results  
Result Comments









 Laboratory Studies  2019 08:13:00  Identifier [id number] Result Time  
Unknown



     2019 08:13:00  









   Test Item  Value  Reference Range  Comments









 Unknown (test code = 2951-2)  136 mmol/L  Unknown  135-145  F



Ordering Physician [facility] Dopuzcg6673-70-75 08:13:00Identifier [id number]-
6 Result Time 2019 08:13:00Unknown





 Test Item  Value  Reference Range  Comments

 

 Unknown (test code = 2823-3)  4.0 mmol/L  Unknown  3.5-5.0  F



Ordering Physician [facility] Qfopfwa5343-53-73 08:13:00Identifier [id number]-
6 Result Time 2019 08:13:00Unknown





 Test Item  Value  Reference Range  Comments

 

 Unknown (test code = 2345-7)  87 mg/dL  Unknown    F



Ordering Physician [facility] Uimxdzb6911-22-64 08:13:00Identifier [id number]-
6 Result Time 2019 08:13:00Unknown





 Test Item  Value  Reference Range  Comments

 

 Unknown (test code = 48642-3)  46.9   Unknown  Unknown  F



Ordering Physician [facility] Auqgeus2271-66-72 08:13:00Identifier [id number]-
6 Result Time 2019 08:13:00Unknown





 Test Item  Value  Reference Range  Comments

 

 Unknown (test code = NullTestCode)  56.7   Unknown  Unknown  F



Ordering Physician [facility] Gbdlspa0068-90-35 08:13:00Identifier [id number]-
6 Result Time 2019 08:13:00Unknown





 Test Item  Value  Reference Range  Comments

 

 Unknown (test code = 2160-0)  1.43 mg/dL  Unknown  0.67-1.17  F



Ordering Physician [facility] Lhntnkh2398-08-26 08:13:00Identifier [id number]
6 Result Time 2019 08:13:00Unknown





 Test Item  Value  Reference Range  Comments

 

 Unknown (test code = 2075-0)  104 mmol/L  Unknown  101-111  F



Ordering Physician [facility] Hsieiyy6940-19-87 08:13:00Identifier [id number]
6 Result Time 2019 08:13:00Unknown





 Test Item  Value  Reference Range  Comments

 

 Unknown (test code = 2028-9)  24 mmol/L  Unknown  22-32  F



Ordering Physician [facility] Fhcwqny4482-91-98 08:13:00Identifier [id number]-
6 Result Time 2019 08:13:00Unknown





 Test Item  Value  Reference Range  Comments

 

 Unknown (test code = 68689-5)  9.0 mg/dL  Unknown  8.6-10.3  F



Ordering Physician [facility] Tmbrllm1093-72-63 08:13:00Identifier [id number]-
6 Result Time 2019 08:13:00Unknown





 Test Item  Value  Reference Range  Comments

 

 Unknown (test code = 3094-0)  24 mg/dL  Unknown  6-24  F



Ordering Physician [facility] Peomsul9583-98-58 08:13:00Identifier [id number]-
6 Result Time 2019 08:13:00Unknown





 Test Item  Value  Reference Range  Comments

 

 Unknown (test code = 3097-3)  16.8   Unknown  8-20  F



Ordering Physician [facility] Wnhvzey2832-67-19 08:13:00Identifier [id number]-
6 Result Time 2019 08:13:00Unknown





 Test Item  Value  Reference Range  Comments

 

 Unknown (test code = 33037-3)  8 mmol/L  Unknown  2-11  F



Ordering Physician [facility] Kldrqnj5078-01-73 08:13:00Identifier [id number]-
6 Result Time 2019 08:13:00Unknown





 Test Item  Value  Reference Range  Comments

 

 Unknown (test code = 33308-9)  27.4 10^3/uL  Unknown  3.5-10.8  F



Ordering Physician [facility] Gwonnkv7492-89-40 08:13:00Identifier [id number]-
6 Result Time 2019 08:13:00Unknown





 Test Item  Value  Reference Range  Comments

 

 Unknown (test code = 788-0)  24 %  Unknown  10.5-15  F



Ordering Physician [facility] Japuobm0894-60-38 08:13:00Identifier [id number]-
6 Result Time 2019 08:13:00Unknown





 Test Item  Value  Reference Range  Comments

 

 Unknown (test code = 789-8)  5.19 10^6 /uL  Unknown  4.18-5.48  F



Ordering Physician [facility] Ecqzaaj4943-29-40 08:13:00Identifier [id number]-
6 Result Time 2019 08:13:00Unknown





 Test Item  Value  Reference Range  Comments

 

 Unknown (test code = 777-3)  330 10^3/uL  Unknown  150-450  F



Ordering Physician [facility] Ieggnje4123-06-73 08:13:00Identifier [id number]-
6 Result Time 2019 08:13:00Unknown





 Test Item  Value  Reference Range  Comments

 

 Unknown (test code = 52174-5)  0   Unknown  Unknown  F



Ordering Physician [facility] Cmqjvsd4544-61-65 08:13:00Identifier [id number]-
6 Result Time 2019 08:13:00Unknown





 Test Item  Value  Reference Range  Comments

 

 Unknown (test code = 771-6)  0 10^3/ul  Unknown  Unknown  F



Ordering Physician [facility] Afbtjlf7611-60-85 08:13:00Identifier [id number]-
6 Result Time 2019 08:13:00Unknown





 Test Item  Value  Reference Range  Comments

 

 Unknown (test code = 770-8)  84.7 %  Unknown  Unknown  F



Ordering Physician [facility] Mohifai2510-84-26 08:13:00Identifier [id number]-
6 Result Time 2019 08:13:00Unknown





 Test Item  Value  Reference Range  Comments

 

 Unknown (test code = 5905-5)  8.6 %  Unknown  Unknown  F



Ordering Physician [facility] Fxkhizp9512-41-40 08:13:00Identifier [id number]-
6 Result Time 2019 08:13:00Unknown





 Test Item  Value  Reference Range  Comments

 

 Unknown (test code = 37398-0)  8.6 fL  Unknown  7.4-10.4  F



Ordering Physician [facility] Qazlghp6779-04-37 08:13:00Identifier [id number]-
6 Result Time 2019 08:13:00Unknown





 Test Item  Value  Reference Range  Comments

 

 Unknown (test code = 787-2)  78 fL  Unknown  80-94  F



Ordering Physician [facility] Cmspmao0245-10-34 08:13:00Identifier [id number]-
6 Result Time 2019 08:13:00Unknown





 Test Item  Value  Reference Range  Comments

 

 Unknown (test code = 786-4)  31 g/dL  Unknown  31-36  F



Ordering Physician [facility] Ustyhsu8067-92-43 08:13:00Identifier [id number]-
6 Result Time 2019 08:13:00Unknown





 Test Item  Value  Reference Range  Comments

 

 Unknown (test code = 785-6)  24 pg  Unknown  27-31  F



Ordering Physician [facility] Ehfpbol1227-66-18 08:13:00Identifier [id number]-
6 Result Time 2019 08:13:00Unknown





 Test Item  Value  Reference Range  Comments

 

 Unknown (test code = 736-9)  5.4 %  Unknown  Unknown  F



Ordering Physician [facility] Twumreq6993-61-53 08:13:00Identifier [id number]-
6 Result Time 2019 08:13:00Unknown





 Test Item  Value  Reference Range  Comments

 

 Unknown (test code = 718-7)  12.3 g/dL  Unknown  14.0-18.0  F



Ordering Physician [facility] Ednzznr7860-51-32 08:13:00Identifier [id number]-
6 Result Time 2019 08:13:00Unknown





 Test Item  Value  Reference Range  Comments

 

 Unknown (test code = 4544-3)  40 %  Unknown  36-46  F



Ordering Physician [facility] Szhsjvl3363-94-05 08:13:00Identifier [id number]-
6 Result Time 2019 08:13:00Unknown





 Test Item  Value  Reference Range  Comments

 

 Unknown (test code = 713-8)  0.8 %  Unknown  Unknown  F



Ordering Physician [facility] Vtjqjzr5881-54-73 08:13:00Identifier [id number]-
6 Result Time 2019 08:13:00Unknown





 Test Item  Value  Reference Range  Comments

 

 Unknown (test code = 706-2)  0.5 %  Unknown  Unknown  F



Ordering Physician [facility] Oourbck8939-96-14 08:13:00Identifier [id number]-
6 Result Time 2019 08:13:00Unknown





 Test Item  Value  Reference Range  Comments

 

 Unknown (test code = EQU5867)  23.2 10^3/ul  Unknown  1.5-7.7  F



Ordering Physician [facility] Xtipyhz4344-44-14 08:13:00Identifier [id number]-
6 Result Time 2019 08:13:00Unknown





 Test Item  Value  Reference Range  Comments

 

 Unknown (test code = 742-7)  2.4 10^3/ul  Unknown  0-0.8  F



Ordering Physician [facility] Loymxlr0243-32-31 08:13:00Identifier [id number]-
6 Result Time 2019 08:13:00Unknown





 Test Item  Value  Reference Range  Comments

 

 Unknown (test code = 731-0)  1.5 10^3/ul  Unknown  1.0-4.8  F



Ordering Physician [facility] Mbrjkwt1581-76-09 08:13:00Identifier [id number]-
6 Result Time 2019 08:13:00Unknown





 Test Item  Value  Reference Range  Comments

 

 Unknown (test code = 711-2)  0.2 10^3/ul  Unknown  0-0.6  F



Ordering Physician [facility] Aiqwlee0348-61-76 08:13:00Identifier [id number]-
6 Result Time 2019 08:13:00Unknown





 Test Item  Value  Reference Range  Comments

 

 Unknown (test code = 704-7)  0.1 10^3/ul  Unknown  0-0.2  F



Ordering Physician [facility] Cyijgxd7359-82-25 06:46:00Identifier [id number]-
6 Result Time 2019 06:46:00Unknown





 Test Item  Value  Reference Range  Comments

 

 Unknown (test code = 56342-2)  0.05 ng/mL  Unknown  Unknown  F



Ordering Physician [facility] Btptvob3390-52-45 06:46:00Identifier [id number]-
6 Result Time 2019 06:46:00Unknown





 Test Item  Value  Reference Range  Comments

 

 Unknown (test code = 2777-1)  2.8 mg/dL  Unknown  2.5-5.0  F



Ordering Physician [facility] Icblbie4267-97-57 06:46:00Identifier [id number]-
6 Result Time 2019 06:46:00Unknown





 Test Item  Value  Reference Range  Comments

 

 Unknown (test code = 44513-4)  2.5 mg/dL  Unknown  1.9-2.7  F



Ordering Physician [facility] Studies2019-04-15 10:48:00Identifier [id number]-
6 Result Time 2019-04-15 10:48:00Unknown





 Test Item  Value  Reference Range  Comments

 

 Unknown (test code = NullTestCode)  6.0   Unknown  5-9  F



Ordering Physician [facility] Studies2019-04-15 10:48:00Identifier [id number]-
6 Result Time 2019-04-15 10:48:00Unknown





 Test Item  Value  Reference Range  Comments

 

 Unknown (test code = 67504-5)  1.013   Unknown  1.010-1.030  F



Ordering Physician [facility] Studies2019-04-15 10:15:00Identifier [id number]-
6 Result Time 2019-04-15 10:15:00Unknown





 Test Item  Value  Reference Range  Comments

 

 Unknown (test code = 3016-3)  3.38 mcIU/mL  Unknown  0.34-5.60  F



Ordering Physician [facility] Studies2019-04-15 10:15:00Identifier [id number]
6 Result Time 2019-04-15 10:15:00Unknown





 Test Item  Value  Reference Range  Comments

 

 Unknown (test code = 84549-3)  1.14   Unknown  0.77-1.02  F



Ordering Physician [facility] Studies2019-04-15 10:15:00Identifier [id number]-
6 Result Time 2019-04-15 10:15:00Unknown





 Test Item  Value  Reference Range  Comments

 

 Unknown (test code = 29428-9)  365 pg/mL  Unknown  Unknown  F



Ordering Physician [facility] Studies2019-04-15 10:15:00Identifier [id number]
6 Result Time 2019-04-15 10:15:00Unknown





 Test Item  Value  Reference Range  Comments

 

 Unknown (test code = 2885-2)  8.9 g/dL  Unknown  6.4-8.9  F



Ordering Physician [facility] Studies2019-04-15 10:15:00Identifier [id number]-
6 Result Time 2019-04-15 10:15:00Unknown





 Test Item  Value  Reference Range  Comments

 

 Unknown (test code = 1975-2)  0.60 mg/dL  Unknown  0.2-1.0  F



Ordering Physician [facility] Studies2019-04-15 10:15:00Identifier [id number]-
6 Result Time 2019-04-15 10:15:00Unknown





 Test Item  Value  Reference Range  Comments

 

 Unknown (test code = NullTestCode)  4.9 g/dL  Unknown  2-4  F



Ordering Physician [facility] Studies2019-04-15 10:15:00Identifier [id number]-
6 Result Time 2019-04-15 10:15:00Unknown





 Test Item  Value  Reference Range  Comments

 

 Unknown (test code = 1988-5)  11.17 mg/L  Unknown  0-8.00  F



Ordering Physician [facility] Studies2019-04-15 10:15:00Identifier [id number]-
6 Result Time 2019-04-15 10:15:00Unknown





 Test Item  Value  Reference Range  Comments

 

 Unknown (test code = 1920-8)  25 U/L  Unknown  13-39  F



Ordering Physician [facility] Studies2019-04-15 10:15:00Identifier [id number]
6 Result Time 2019-04-15 10:15:00Unknown





 Test Item  Value  Reference Range  Comments

 

 Unknown (test code = 6768-6)  153 U/L  Unknown    F



Ordering Physician [facility] Studies2019-04-15 10:15:00Identifier [id number]
6 Result Time 2019-04-15 10:15:00Unknown





 Test Item  Value  Reference Range  Comments

 

 Unknown (test code = 1759-0)  0.8   Unknown  1-3  F



Ordering Physician [facility] Studies2019-04-15 10:15:00Identifier [id number]-
6 Result Time 2019-04-15 10:15:00Unknown





 Test Item  Value  Reference Range  Comments

 

 Unknown (test code = 57571-4)  4.0 g/dL  Unknown  3.2-5.2  F



Ordering Physician [facility] Studies2019-04-15 10:15:00Identifier [id number]
6 Result Time 2019-04-15 10:15:00Unknown





 Test Item  Value  Reference Range  Comments

 

 Unknown (test code = 1742-6)  13 U/L  Unknown  7-52  F



Ordering Physician [facility] Studies2019-04-15 10:15:00Identifier [id number]
6 Result Time 2019-04-15 10:15:00Unknown





 Test Item  Value  Reference Range  Comments

 

 Unknown (test code = 2524-7)  0.9 mmol/L  Unknown  0.5-2.0  F



Ordering Physician Unknown

## 2019-12-03 NOTE — XMS REPORT
Patient Summary Document

 Created on:2019



Patient:BERTHA SOLIS

Sex:Male

:1933

External Reference #:[id number]





Demographics







 Address  [address] [place], [place] [address]

 

 Home Phone  [phone]

 

 Preferred Language  English

 

 Marital Status  Unknown

 

 Druze Affiliation  Unknown

 

 Race  Unknown

 

 Ethnic Group  Unknown









Author







 Organization  [facility]









Support







 Name  Relationship  Address  Phone

 

 IRAIS SOLIS, Unknown Name  Suha  [address] [facility]  [phone]



     [place] [address]  









Care Team Providers







 Name  Role  Phone

 

 Unavailable  Unavailable  Unavailable









Problems

This patient has no known problems.



Allergies, Adverse Reactions, Alerts







 Allergy  Allergy  Status  Severity  Reaction(s)  Onset  Inactive  Treating  
Comments



 Name  Type        Date  Date  Clinician  

 

 Uncoded  Unknown  Active  Unknown  Reaction  2019    Interface  



 free-text        Unknown  -18      



 allergy                







Medications







 Ordered  Filled  Start  Stop  Current  Ordering  Indication  Dosage  Frequency
  Signature  Comments  Components



 Medication  Medication  Date  Date  Medication?  Clinician        (SIG)    



 Name  Name                    

 

 Multivitami  Multivitami  2012    No  Unknown    Unknown  Unknown      



 ns/Minerals  ns/Minerals  -                  



 Tab*  Tab*                    

 

 amLODIPine  amLODIPine      No  Unknown    Unknown  Unknown      



 5 mg tablet  5 mg tablet  4-15                  

 

 acetaminoph  acetaminoph      No  Unknown    Unknown  Unknown      



 en 500 mg  en 500 mg  4-15                  



 tablet  tablet                    

 

 magnesium  magnesium      No  Unknown    Unknown  Unknown      



 oxide 400  oxide 400  4-15                  



 mg (241.3  mg (241.3                    



 mg  mg                    



 magnesium)  magnesium)                    



 tablet  tablet                    

 

 omeprazole  omeprazole      No  Unknown    Unknown  Unknown      



 40 mg  40 mg  4-15                  



 capsule,del  capsule,del                    



 ayed  ayed                    



 release  release                    

 

 verapamil  verapamil      No  Unknown    Unknown  Unknown      



  mg   mg  4-15                  



 capsule,ext  capsule,ext                    



 ended  ended                    



 release  release                    

 

 Calcium  Calcium      No  Unknown    Unknown  Unknown      



 Carbonate/V  Carbonate/V  4-15                  



 itamin D3  itamin D3                    

 

 cefdinir  cefdinir      No  Unknown    Unknown  Unknown      



 300 mg  300 mg  4-17                  



 capsule  capsule                    







Vital Signs







 Vital Name  Observation Time  Observation Value  Comments

 

 SYSTOLIC mm[Hg]  2019 18:05:21  135 mm[Hg] mm[Hg]  Method: Sit

 

 DIASTOLIC mm[Hg]  2019 18:05:21  48 mm[Hg] mm[Hg]  Method: Sit

 

 PULSE  2019 18:05:21  63 /min /min  

 

 RESP RATE  2019 18:05:21  16 /min /min  

 

 TEMP  2019 18:05:21  97.4 [degF]  







Procedures

This patient has no known procedures.



Results







 Test Description  Test Time  Test Comments  Text Results  Atomic Results  
Result Comments









 Laboratory Studies  2019 08:13:00  Identifier [id number] Result Time  
Unknown



     2019 08:13:00  









   Test Item  Value  Reference Range  Comments









 Unknown (test code = 2951-2)  136 mmol/L  Unknown  135-145  F



Ordering Physician [facility] Eubjvbk6011-74-34 08:13:00Identifier [id number]
6 Result Time 2019 08:13:00Unknown





 Test Item  Value  Reference Range  Comments

 

 Unknown (test code = 2823-3)  4.0 mmol/L  Unknown  3.5-5.0  F



Ordering Physician [facility] Ivpkiix7216-06-76 08:13:00Identifier [id number]-
6 Result Time 2019 08:13:00Unknown





 Test Item  Value  Reference Range  Comments

 

 Unknown (test code = 2345-7)  87 mg/dL  Unknown    F



Ordering Physician [facility] Mqodraj3567-10-06 08:13:00Identifier [id number]-
6 Result Time 2019 08:13:00Unknown





 Test Item  Value  Reference Range  Comments

 

 Unknown (test code = 48642-3)  46.9   Unknown  Unknown  F



Ordering Physician [facility] Oxszwgj9296-12-06 08:13:00Identifier [id number]-
6 Result Time 2019 08:13:00Unknown





 Test Item  Value  Reference Range  Comments

 

 Unknown (test code = NullTestCode)  56.7   Unknown  Unknown  F



Ordering Physician [facility] Ujixuaz0723-45-22 08:13:00Identifier [id number]-
6 Result Time 2019 08:13:00Unknown





 Test Item  Value  Reference Range  Comments

 

 Unknown (test code = 2160-0)  1.43 mg/dL  Unknown  0.67-1.17  F



Ordering Physician [facility] Eeqpzkf6824-87-42 08:13:00Identifier [id number]
6 Result Time 2019 08:13:00Unknown





 Test Item  Value  Reference Range  Comments

 

 Unknown (test code = 2075-0)  104 mmol/L  Unknown  101-111  F



Ordering Physician [facility] Oxnabyf5827-46-43 08:13:00Identifier [id number]-
6 Result Time 2019 08:13:00Unknown





 Test Item  Value  Reference Range  Comments

 

 Unknown (test code = 2028-9)  24 mmol/L  Unknown  22-32  F



Ordering Physician [facility] Kpoqjyv6418-34-06 08:13:00Identifier [id number]
6 Result Time 2019 08:13:00Unknown





 Test Item  Value  Reference Range  Comments

 

 Unknown (test code = 73942-8)  9.0 mg/dL  Unknown  8.6-10.3  F



Ordering Physician [facility] Lomliet7644-13-80 08:13:00Identifier [id number]-
6 Result Time 2019 08:13:00Unknown





 Test Item  Value  Reference Range  Comments

 

 Unknown (test code = 3094-0)  24 mg/dL  Unknown  6-24  F



Ordering Physician [facility] Xsaepws0778-41-85 08:13:00Identifier [id number]-
6 Result Time 2019 08:13:00Unknown





 Test Item  Value  Reference Range  Comments

 

 Unknown (test code = 3097-3)  16.8   Unknown  8-20  F



Ordering Physician [facility] Kjelwsl8303-97-77 08:13:00Identifier [id number]-
6 Result Time 2019 08:13:00Unknown





 Test Item  Value  Reference Range  Comments

 

 Unknown (test code = 33037-3)  8 mmol/L  Unknown  2-11  F



Ordering Physician [facility] Nexzbvo9200-01-82 08:13:00Identifier [id number]-
6 Result Time 2019 08:13:00Unknown





 Test Item  Value  Reference Range  Comments

 

 Unknown (test code = 42803-3)  27.4 10^3/uL  Unknown  3.5-10.8  F



Ordering Physician [facility] Wargcjv6291-17-54 08:13:00Identifier [id number]-
6 Result Time 2019 08:13:00Unknown





 Test Item  Value  Reference Range  Comments

 

 Unknown (test code = 788-0)  24 %  Unknown  10.5-15  F



Ordering Physician [facility] Zycxxcj6632-92-68 08:13:00Identifier [id number]-
6 Result Time 2019 08:13:00Unknown





 Test Item  Value  Reference Range  Comments

 

 Unknown (test code = 789-8)  5.19 10^6 /uL  Unknown  4.18-5.48  F



Ordering Physician [facility] Mrgociq3551-49-92 08:13:00Identifier [id number]-
6 Result Time 2019 08:13:00Unknown





 Test Item  Value  Reference Range  Comments

 

 Unknown (test code = 777-3)  330 10^3/uL  Unknown  150-450  F



Ordering Physician [facility] Gnticeh5226-16-31 08:13:00Identifier [id number]-
6 Result Time 2019 08:13:00Unknown





 Test Item  Value  Reference Range  Comments

 

 Unknown (test code = 75814-2)  0   Unknown  Unknown  F



Ordering Physician [facility] Obnujxr2651-84-30 08:13:00Identifier [id number]-
6 Result Time 2019 08:13:00Unknown





 Test Item  Value  Reference Range  Comments

 

 Unknown (test code = 771-6)  0 10^3/ul  Unknown  Unknown  F



Ordering Physician [facility] Tdbirqp9945-25-99 08:13:00Identifier [id number]-
6 Result Time 2019 08:13:00Unknown





 Test Item  Value  Reference Range  Comments

 

 Unknown (test code = 770-8)  84.7 %  Unknown  Unknown  F



Ordering Physician [facility] Lhndobf8737-36-67 08:13:00Identifier [id number]-
6 Result Time 2019 08:13:00Unknown





 Test Item  Value  Reference Range  Comments

 

 Unknown (test code = 5905-5)  8.6 %  Unknown  Unknown  F



Ordering Physician [facility] Safqrrr3050-82-16 08:13:00Identifier [id number]-
6 Result Time 2019 08:13:00Unknown





 Test Item  Value  Reference Range  Comments

 

 Unknown (test code = 46066-7)  8.6 fL  Unknown  7.4-10.4  F



Ordering Physician [facility] Swcdysl4108-42-04 08:13:00Identifier [id number]-
6 Result Time 2019 08:13:00Unknown





 Test Item  Value  Reference Range  Comments

 

 Unknown (test code = 787-2)  78 fL  Unknown  80-94  F



Ordering Physician [facility] Ewywcad5116-12-16 08:13:00Identifier [id number]-
6 Result Time 2019 08:13:00Unknown





 Test Item  Value  Reference Range  Comments

 

 Unknown (test code = 786-4)  31 g/dL  Unknown  31-36  F



Ordering Physician [facility] Xqcrvqc3922-04-90 08:13:00Identifier [id number]-
6 Result Time 2019 08:13:00Unknown





 Test Item  Value  Reference Range  Comments

 

 Unknown (test code = 785-6)  24 pg  Unknown  27-31  F



Ordering Physician [facility] Fvgmudd3818-49-59 08:13:00Identifier [id number]-
6 Result Time 2019 08:13:00Unknown





 Test Item  Value  Reference Range  Comments

 

 Unknown (test code = 736-9)  5.4 %  Unknown  Unknown  F



Ordering Physician [facility] Otmgtsv4848-97-24 08:13:00Identifier [id number]-
6 Result Time 2019 08:13:00Unknown





 Test Item  Value  Reference Range  Comments

 

 Unknown (test code = 718-7)  12.3 g/dL  Unknown  14.0-18.0  F



Ordering Physician [facility] Znbejil9894-73-10 08:13:00Identifier [id number]-
6 Result Time 2019 08:13:00Unknown





 Test Item  Value  Reference Range  Comments

 

 Unknown (test code = 4544-3)  40 %  Unknown  36-46  F



Ordering Physician [facility] Gvhrjqc0998-01-87 08:13:00Identifier [id number]-
6 Result Time 2019 08:13:00Unknown





 Test Item  Value  Reference Range  Comments

 

 Unknown (test code = 713-8)  0.8 %  Unknown  Unknown  F



Ordering Physician [facility] Ckjhtze3741-24-90 08:13:00Identifier [id number]-
6 Result Time 2019 08:13:00Unknown





 Test Item  Value  Reference Range  Comments

 

 Unknown (test code = 706-2)  0.5 %  Unknown  Unknown  F



Ordering Physician [facility] Yglrgte5816-17-71 08:13:00Identifier [id number]-
6 Result Time 2019 08:13:00Unknown





 Test Item  Value  Reference Range  Comments

 

 Unknown (test code = CCS0958)  23.2 10^3/ul  Unknown  1.5-7.7  F



Ordering Physician [facility] Pwsgagu3921-79-44 08:13:00Identifier [id number]-
6 Result Time 2019 08:13:00Unknown





 Test Item  Value  Reference Range  Comments

 

 Unknown (test code = 742-7)  2.4 10^3/ul  Unknown  0-0.8  F



Ordering Physician [facility] Tfvojvs6379-03-56 08:13:00Identifier [id number]-
6 Result Time 2019 08:13:00Unknown





 Test Item  Value  Reference Range  Comments

 

 Unknown (test code = 731-0)  1.5 10^3/ul  Unknown  1.0-4.8  F



Ordering Physician [facility] Qnfdfva7601-67-84 08:13:00Identifier [id number]-
6 Result Time 2019 08:13:00Unknown





 Test Item  Value  Reference Range  Comments

 

 Unknown (test code = 711-2)  0.2 10^3/ul  Unknown  0-0.6  F



Ordering Physician [facility] Nfgzuoj8458-46-92 08:13:00Identifier [id number]-
6 Result Time 2019 08:13:00Unknown





 Test Item  Value  Reference Range  Comments

 

 Unknown (test code = 704-7)  0.1 10^3/ul  Unknown  0-0.2  F



Ordering Physician [facility] Oudavrr1301-65-06 06:46:00Identifier [id number]-
6 Result Time 2019 06:46:00Unknown





 Test Item  Value  Reference Range  Comments

 

 Unknown (test code = 80283-4)  0.05 ng/mL  Unknown  Unknown  F



Ordering Physician [facility] Wgrputv8177-90-10 06:46:00Identifier [id number]-
6 Result Time 2019 06:46:00Unknown





 Test Item  Value  Reference Range  Comments

 

 Unknown (test code = 2777-1)  2.8 mg/dL  Unknown  2.5-5.0  F



Ordering Physician [facility] Evttcpd0148-39-74 06:46:00Identifier [id number]-
6 Result Time 2019 06:46:00Unknown





 Test Item  Value  Reference Range  Comments

 

 Unknown (test code = 86903-3)  2.5 mg/dL  Unknown  1.9-2.7  F



Ordering Physician [facility] Studies2019-04-15 10:48:00Identifier [id number]-
6 Result Time 2019-04-15 10:48:00Unknown





 Test Item  Value  Reference Range  Comments

 

 Unknown (test code = NullTestCode)  6.0   Unknown  5-9  F



Ordering Physician [facility] Studies2019-04-15 10:48:00Identifier [id number]
6 Result Time 2019-04-15 10:48:00Unknown





 Test Item  Value  Reference Range  Comments

 

 Unknown (test code = 41875-7)  1.013   Unknown  1.010-1.030  F



Ordering Physician [facility] Studies2019-04-15 10:15:00Identifier [id number]-
6 Result Time 2019-04-15 10:15:00Unknown





 Test Item  Value  Reference Range  Comments

 

 Unknown (test code = 3016-3)  3.38 mcIU/mL  Unknown  0.34-5.60  F



Ordering Physician [facility] Studies2019-04-15 10:15:00Identifier [id number]-
6 Result Time 2019-04-15 10:15:00Unknown





 Test Item  Value  Reference Range  Comments

 

 Unknown (test code = 16652-1)  1.14   Unknown  0.77-1.02  F



Ordering Physician [facility] Studies2019-04-15 10:15:00Identifier [id number]
6 Result Time 2019-04-15 10:15:00Unknown





 Test Item  Value  Reference Range  Comments

 

 Unknown (test code = 31760-1)  365 pg/mL  Unknown  Unknown  F



Ordering Physician [facility] Studies2019-04-15 10:15:00Identifier [id number]-
6 Result Time 2019-04-15 10:15:00Unknown





 Test Item  Value  Reference Range  Comments

 

 Unknown (test code = 2885-2)  8.9 g/dL  Unknown  6.4-8.9  F



Ordering Physician [facility] Studies2019-04-15 10:15:00Identifier [id number]-
6 Result Time 2019-04-15 10:15:00Unknown





 Test Item  Value  Reference Range  Comments

 

 Unknown (test code = 1975-2)  0.60 mg/dL  Unknown  0.2-1.0  F



Ordering Physician [facility] Studies2019-04-15 10:15:00Identifier [id number]-
6 Result Time 2019-04-15 10:15:00Unknown





 Test Item  Value  Reference Range  Comments

 

 Unknown (test code = NullTestCode)  4.9 g/dL  Unknown  2-4  F



Ordering Physician [facility] Studies2019-04-15 10:15:00Identifier [id number]-
6 Result Time 2019-04-15 10:15:00Unknown





 Test Item  Value  Reference Range  Comments

 

 Unknown (test code = 1988-5)  11.17 mg/L  Unknown  0-8.00  F



Ordering Physician [facility] Studies2019-04-15 10:15:00Identifier [id number]-
6 Result Time 2019-04-15 10:15:00Unknown





 Test Item  Value  Reference Range  Comments

 

 Unknown (test code = 1920-8)  25 U/L  Unknown  13-39  F



Ordering Physician [facility] Studies2019-04-15 10:15:00Identifier [id number]-
6 Result Time 2019-04-15 10:15:00Unknown





 Test Item  Value  Reference Range  Comments

 

 Unknown (test code = 6768-6)  153 U/L  Unknown    F



Ordering Physician [facility] Studies2019-04-15 10:15:00Identifier [id number]-
6 Result Time 2019-04-15 10:15:00Unknown





 Test Item  Value  Reference Range  Comments

 

 Unknown (test code = 1759-0)  0.8   Unknown  1-3  F



Ordering Physician [facility] Studies2019-04-15 10:15:00Identifier [id number]-
6 Result Time 2019-04-15 10:15:00Unknown





 Test Item  Value  Reference Range  Comments

 

 Unknown (test code = 98019-2)  4.0 g/dL  Unknown  3.2-5.2  F



Ordering Physician [facility] Studies2019-04-15 10:15:00Identifier [id number]-
6 Result Time 2019-04-15 10:15:00Unknown





 Test Item  Value  Reference Range  Comments

 

 Unknown (test code = 1742-6)  13 U/L  Unknown  7-52  F



Ordering Physician [facility] Studies2019-04-15 10:15:00Identifier [id number]
6 Result Time 2019-04-15 10:15:00Unknown





 Test Item  Value  Reference Range  Comments

 

 Unknown (test code = 2524-7)  0.9 mmol/L  Unknown  0.5-2.0  F



Ordering Physician Unknown

## 2019-12-03 NOTE — XMS REPORT
Patient Summary Document

 Created on:2019



Patient:BERTHA SOLIS

Sex:Male

:1933

External Reference #:[id number]





Demographics







 Address  [address] [place], [place] [address]

 

 Home Phone  [phone]

 

 Preferred Language  English

 

 Marital Status  Unknown

 

 Latter-day Affiliation  Unknown

 

 Race  Unknown

 

 Ethnic Group  Unknown









Author







 Organization  [facility]









Support







 Name  Relationship  Address  Phone

 

 IRAIS SOLIS, Unknown Name  Suha  [address]  [phone]



     [place] [address]  









Care Team Providers







 Name  Role  Phone

 

 Unavailable  Unavailable  Unavailable









Problems

This patient has no known problems.



Allergies, Adverse Reactions, Alerts







 Allergy  Allergy  Status  Severity  Reaction(s)  Onset  Inactive  Treating  
Comments



 Name  Type        Date  Date  Clinician  

 

 Uncoded  Unknown  Active  Unknown  Reaction  2019    Interface  



 free-text        Unknown  -18      



 allergy                







Medications







 Ordered  Filled  Start  Stop  Current  Ordering  Indication  Dosage  Frequency
  Signature  Comments  Components



 Medication  Medication  Date  Date  Medication?  Clinician        (SIG)    



 Name  Name                    

 

 Multivitami  Multivitami  2012    No  Unknown    Unknown  Unknown      



 ns/Minerals  ns/Minerals  -                  



 Tab*  Tab*                    

 

 amLODIPine  amLODIPine      No  Unknown    Unknown  Unknown      



 5 mg tablet  5 mg tablet  4-15                  

 

 acetaminoph  acetaminoph      No  Unknown    Unknown  Unknown      



 en 500 mg  en 500 mg  4-15                  



 tablet  tablet                    

 

 magnesium  magnesium      No  Unknown    Unknown  Unknown      



 oxide 400  oxide 400  4-15                  



 mg (241.3  mg (241.3                    



 mg  mg                    



 magnesium)  magnesium)                    



 tablet  tablet                    

 

 omeprazole  omeprazole      No  Unknown    Unknown  Unknown      



 40 mg  40 mg  4-15                  



 capsule,del  capsule,del                    



 ayed  ayed                    



 release  release                    

 

 verapamil  verapamil      No  Unknown    Unknown  Unknown      



  mg   mg  4-15                  



 capsule,ext  capsule,ext                    



 ended  ended                    



 release  release                    

 

 Calcium  Calcium      No  Unknown    Unknown  Unknown      



 Carbonate/V  Carbonate/V  4-15                  



 itamin D3  itamin D3                    

 

 cefdinir  cefdinir      No  Unknown    Unknown  Unknown      



 300 mg  300 mg  4-17                  



 capsule  capsule                    







Vital Signs







 Vital Name  Observation Time  Observation Value  Comments

 

 SYSTOLIC mm[Hg]  2019 18:05:21  135 mm[Hg] mm[Hg]  Method: Sit

 

 DIASTOLIC mm[Hg]  2019 18:05:21  48 mm[Hg] mm[Hg]  Method: Sit

 

 PULSE  2019 18:05:21  63 /min /min  

 

 RESP RATE  2019 18:05:21  16 /min /min  

 

 TEMP  2019 18:05:21  97.4 [degF]  







Procedures

This patient has no known procedures.



Results







 Test Description  Test Time  Test Comments  Text Results  Atomic Results  
Result Comments









 Laboratory Studies  2019 08:13:00  Identifier [id number] Result Time  
Unknown



     2019 08:13:00  









   Test Item  Value  Reference Range  Comments









 Unknown (test code = 2951-2)  136 mmol/L  Unknown  135-145  F



Ordering Physician [facility] Wgolwgu9286-53-77 08:13:00Identifier [id number]
6 Result Time 2019 08:13:00Unknown





 Test Item  Value  Reference Range  Comments

 

 Unknown (test code = 2823-3)  4.0 mmol/L  Unknown  3.5-5.0  F



Ordering Physician [facility] Wirdtpf9977-37-33 08:13:00Identifier [id number]-
6 Result Time 2019 08:13:00Unknown





 Test Item  Value  Reference Range  Comments

 

 Unknown (test code = 2345-7)  87 mg/dL  Unknown    F



Ordering Physician [facility] Kpyvpva2556-86-32 08:13:00Identifier [id number]-
6 Result Time 2019 08:13:00Unknown





 Test Item  Value  Reference Range  Comments

 

 Unknown (test code = 48642-3)  46.9   Unknown  Unknown  F



Ordering Physician [facility] Dnhdjvb1540-89-03 08:13:00Identifier [id number]-
6 Result Time 2019 08:13:00Unknown





 Test Item  Value  Reference Range  Comments

 

 Unknown (test code = NullTestCode)  56.7   Unknown  Unknown  F



Ordering Physician [facility] Ecxnuin4694-58-48 08:13:00Identifier [id number]-
6 Result Time 2019 08:13:00Unknown





 Test Item  Value  Reference Range  Comments

 

 Unknown (test code = 2160-0)  1.43 mg/dL  Unknown  0.67-1.17  F



Ordering Physician [facility] Vlcsywd8497-88-93 08:13:00Identifier [id number]-
6 Result Time 2019 08:13:00Unknown





 Test Item  Value  Reference Range  Comments

 

 Unknown (test code = 2075-0)  104 mmol/L  Unknown  101-111  F



Ordering Physician [facility] Sezebtk2867-50-92 08:13:00Identifier [id number]-
6 Result Time 2019 08:13:00Unknown





 Test Item  Value  Reference Range  Comments

 

 Unknown (test code = 2028-9)  24 mmol/L  Unknown  22-32  F



Ordering Physician [facility] Azyozam7630-67-18 08:13:00Identifier [id number]-
6 Result Time 2019 08:13:00Unknown





 Test Item  Value  Reference Range  Comments

 

 Unknown (test code = 77382-4)  9.0 mg/dL  Unknown  8.6-10.3  F



Ordering Physician [facility] Ekbheca6437-09-22 08:13:00Identifier [id number]-
6 Result Time 2019 08:13:00Unknown





 Test Item  Value  Reference Range  Comments

 

 Unknown (test code = 3094-0)  24 mg/dL  Unknown  6-24  F



Ordering Physician [facility] Svgdcay2734-14-59 08:13:00Identifier [id number]-
6 Result Time 2019 08:13:00Unknown





 Test Item  Value  Reference Range  Comments

 

 Unknown (test code = 3097-3)  16.8   Unknown  8-20  F



Ordering Physician [facility] Lnlyxtc8181-07-08 08:13:00Identifier [id number]-
6 Result Time 2019 08:13:00Unknown





 Test Item  Value  Reference Range  Comments

 

 Unknown (test code = 33037-3)  8 mmol/L  Unknown  2-11  F



Ordering Physician [facility] Klzrceg7447-01-02 08:13:00Identifier [id number]-
6 Result Time 2019 08:13:00Unknown





 Test Item  Value  Reference Range  Comments

 

 Unknown (test code = 03335-7)  27.4 10^3/uL  Unknown  3.5-10.8  F



Ordering Physician [facility] Cymaclo4608-61-59 08:13:00Identifier [id number]-
6 Result Time 2019 08:13:00Unknown





 Test Item  Value  Reference Range  Comments

 

 Unknown (test code = 788-0)  24 %  Unknown  10.5-15  F



Ordering Physician [facility] Aqvhkaa9665-26-63 08:13:00Identifier [id number]-
6 Result Time 2019 08:13:00Unknown





 Test Item  Value  Reference Range  Comments

 

 Unknown (test code = 789-8)  5.19 10^6 /uL  Unknown  4.18-5.48  F



Ordering Physician [facility] Zsihpbo6739-27-42 08:13:00Identifier [id number]-
6 Result Time 2019 08:13:00Unknown





 Test Item  Value  Reference Range  Comments

 

 Unknown (test code = 777-3)  330 10^3/uL  Unknown  150-450  F



Ordering Physician [facility] Setsyag3263-20-87 08:13:00Identifier [id number]
6 Result Time 2019 08:13:00Unknown





 Test Item  Value  Reference Range  Comments

 

 Unknown (test code = 41034-5)  0   Unknown  Unknown  F



Ordering Physician [facility] Pmvayhw4312-25-57 08:13:00Identifier [id number]-
6 Result Time 2019 08:13:00Unknown





 Test Item  Value  Reference Range  Comments

 

 Unknown (test code = 771-6)  0 10^3/ul  Unknown  Unknown  F



Ordering Physician [facility] Lzgdsry3385-38-75 08:13:00Identifier [id number]-
6 Result Time 2019 08:13:00Unknown





 Test Item  Value  Reference Range  Comments

 

 Unknown (test code = 770-8)  84.7 %  Unknown  Unknown  F



Ordering Physician [facility] Whyavof7578-10-11 08:13:00Identifier [id number]-
6 Result Time 2019 08:13:00Unknown





 Test Item  Value  Reference Range  Comments

 

 Unknown (test code = 5905-5)  8.6 %  Unknown  Unknown  F



Ordering Physician [facility] Ixireuu2151-71-47 08:13:00Identifier [id number]-
6 Result Time 2019 08:13:00Unknown





 Test Item  Value  Reference Range  Comments

 

 Unknown (test code = 77737-4)  8.6 fL  Unknown  7.4-10.4  F



Ordering Physician [facility] Fabektq6267-12-36 08:13:00Identifier [id number]-
6 Result Time 2019 08:13:00Unknown





 Test Item  Value  Reference Range  Comments

 

 Unknown (test code = 787-2)  78 fL  Unknown  80-94  F



Ordering Physician [facility] Oxhmyhc5893-93-59 08:13:00Identifier [id number]-
6 Result Time 2019 08:13:00Unknown





 Test Item  Value  Reference Range  Comments

 

 Unknown (test code = 786-4)  31 g/dL  Unknown  31-36  F



Ordering Physician [facility] Bswjraz9077-61-77 08:13:00Identifier [id number]-
6 Result Time 2019 08:13:00Unknown





 Test Item  Value  Reference Range  Comments

 

 Unknown (test code = 785-6)  24 pg  Unknown  27-31  F



Ordering Physician [facility] Tpgaqid2134-40-17 08:13:00Identifier [id number]-
6 Result Time 2019 08:13:00Unknown





 Test Item  Value  Reference Range  Comments

 

 Unknown (test code = 736-9)  5.4 %  Unknown  Unknown  F



Ordering Physician [facility] Frugzen4160-90-68 08:13:00Identifier [id number]-
6 Result Time 2019 08:13:00Unknown





 Test Item  Value  Reference Range  Comments

 

 Unknown (test code = 718-7)  12.3 g/dL  Unknown  14.0-18.0  F



Ordering Physician [facility] Pnrxcko3769-47-43 08:13:00Identifier [id number]-
6 Result Time 2019 08:13:00Unknown





 Test Item  Value  Reference Range  Comments

 

 Unknown (test code = 4544-3)  40 %  Unknown  36-46  F



Ordering Physician [facility] Dctaqdh6931-18-34 08:13:00Identifier [id number]-
6 Result Time 2019 08:13:00Unknown





 Test Item  Value  Reference Range  Comments

 

 Unknown (test code = 713-8)  0.8 %  Unknown  Unknown  F



Ordering Physician [facility] Gublpym3134-45-80 08:13:00Identifier [id number]-
6 Result Time 2019 08:13:00Unknown





 Test Item  Value  Reference Range  Comments

 

 Unknown (test code = 706-2)  0.5 %  Unknown  Unknown  F



Ordering Physician [facility] Ywsmslv0671-86-16 08:13:00Identifier [id number]-
6 Result Time 2019 08:13:00Unknown





 Test Item  Value  Reference Range  Comments

 

 Unknown (test code = BEC8798)  23.2 10^3/ul  Unknown  1.5-7.7  F



Ordering Physician [facility] Sejxzjs3969-99-42 08:13:00Identifier [id number]-
6 Result Time 2019 08:13:00Unknown





 Test Item  Value  Reference Range  Comments

 

 Unknown (test code = 742-7)  2.4 10^3/ul  Unknown  0-0.8  F



Ordering Physician [facility] Eqevpff4306-27-54 08:13:00Identifier [id number]-
6 Result Time 2019 08:13:00Unknown





 Test Item  Value  Reference Range  Comments

 

 Unknown (test code = 731-0)  1.5 10^3/ul  Unknown  1.0-4.8  F



Ordering Physician [facility] Smnumyo0498-22-46 08:13:00Identifier [id number]-
6 Result Time 2019 08:13:00Unknown





 Test Item  Value  Reference Range  Comments

 

 Unknown (test code = 711-2)  0.2 10^3/ul  Unknown  0-0.6  F



Ordering Physician [facility] Evtzotw2799-63-34 08:13:00Identifier [id number]-
6 Result Time 2019 08:13:00Unknown





 Test Item  Value  Reference Range  Comments

 

 Unknown (test code = 704-7)  0.1 10^3/ul  Unknown  0-0.2  F



Ordering Physician [facility] Opzvrwl0077-92-97 06:46:00Identifier [id number]-
6 Result Time 2019 06:46:00Unknown





 Test Item  Value  Reference Range  Comments

 

 Unknown (test code = 51244-6)  0.05 ng/mL  Unknown  Unknown  F



Ordering Physician [facility] Drnrfeo1380-17-31 06:46:00Identifier [id number]-
6 Result Time 2019 06:46:00Unknown





 Test Item  Value  Reference Range  Comments

 

 Unknown (test code = 2777-1)  2.8 mg/dL  Unknown  2.5-5.0  F



Ordering Physician [facility] Txidpdh7796-98-44 06:46:00Identifier [id number]-
6 Result Time 2019 06:46:00Unknown





 Test Item  Value  Reference Range  Comments

 

 Unknown (test code = 41342-2)  2.5 mg/dL  Unknown  1.9-2.7  F



Ordering Physician [facility] Studies2019-04-15 10:48:00Identifier [id number]-
6 Result Time 2019-04-15 10:48:00Unknown





 Test Item  Value  Reference Range  Comments

 

 Unknown (test code = NullTestCode)  6.0   Unknown  5-9  F



Ordering Physician [facility] Studies2019-04-15 10:48:00Identifier [id number]-
6 Result Time 2019-04-15 10:48:00Unknown





 Test Item  Value  Reference Range  Comments

 

 Unknown (test code = 47239-5)  1.013   Unknown  1.010-1.030  F



Ordering Physician [facility] Studies2019-04-15 10:15:00Identifier [id number]-
6 Result Time 2019-04-15 10:15:00Unknown





 Test Item  Value  Reference Range  Comments

 

 Unknown (test code = 3016-3)  3.38 mcIU/mL  Unknown  0.34-5.60  F



Ordering Physician [facility] Studies2019-04-15 10:15:00Identifier [id number]-
6 Result Time 2019-04-15 10:15:00Unknown





 Test Item  Value  Reference Range  Comments

 

 Unknown (test code = 33324-4)  1.14   Unknown  0.77-1.02  F



Ordering Physician [facility] Studies2019-04-15 10:15:00Identifier [id number]
6 Result Time 2019-04-15 10:15:00Unknown





 Test Item  Value  Reference Range  Comments

 

 Unknown (test code = 79862-4)  365 pg/mL  Unknown  Unknown  F



Ordering Physician [facility] Studies2019-04-15 10:15:00Identifier [id number]-
6 Result Time 2019-04-15 10:15:00Unknown





 Test Item  Value  Reference Range  Comments

 

 Unknown (test code = 2885-2)  8.9 g/dL  Unknown  6.4-8.9  F



Ordering Physician [facility] Studies2019-04-15 10:15:00Identifier [id number]-
6 Result Time 2019-04-15 10:15:00Unknown





 Test Item  Value  Reference Range  Comments

 

 Unknown (test code = 1975-2)  0.60 mg/dL  Unknown  0.2-1.0  F



Ordering Physician [facility] Studies2019-04-15 10:15:00Identifier [id number]-
6 Result Time 2019-04-15 10:15:00Unknown





 Test Item  Value  Reference Range  Comments

 

 Unknown (test code = NullTestCode)  4.9 g/dL  Unknown  2-4  F



Ordering Physician [facility] Studies2019-04-15 10:15:00Identifier [id number]-
6 Result Time 2019-04-15 10:15:00Unknown





 Test Item  Value  Reference Range  Comments

 

 Unknown (test code = 1988-5)  11.17 mg/L  Unknown  0-8.00  F



Ordering Physician [facility] Studies2019-04-15 10:15:00Identifier [id number]-
6 Result Time 2019-04-15 10:15:00Unknown





 Test Item  Value  Reference Range  Comments

 

 Unknown (test code = 1920-8)  25 U/L  Unknown  13-39  F



Ordering Physician [facility] Studies2019-04-15 10:15:00Identifier [id number]
6 Result Time 2019-04-15 10:15:00Unknown





 Test Item  Value  Reference Range  Comments

 

 Unknown (test code = 6768-6)  153 U/L  Unknown    F



Ordering Physician [facility] Studies2019-04-15 10:15:00Identifier [id number]
6 Result Time 2019-04-15 10:15:00Unknown





 Test Item  Value  Reference Range  Comments

 

 Unknown (test code = 1759-0)  0.8   Unknown  1-3  F



Ordering Physician [facility] Studies2019-04-15 10:15:00Identifier [id number]-
6 Result Time 2019-04-15 10:15:00Unknown





 Test Item  Value  Reference Range  Comments

 

 Unknown (test code = 39426-8)  4.0 g/dL  Unknown  3.2-5.2  F



Ordering Physician [facility] Studies2019-04-15 10:15:00Identifier [id number]-
6 Result Time 2019-04-15 10:15:00Unknown





 Test Item  Value  Reference Range  Comments

 

 Unknown (test code = 1742-6)  13 U/L  Unknown  7-52  F



Ordering Physician [facility] Studies2019-04-15 10:15:00Identifier [id number]
6 Result Time 2019-04-15 10:15:00Unknown





 Test Item  Value  Reference Range  Comments

 

 Unknown (test code = 2524-7)  0.9 mmol/L  Unknown  0.5-2.0  F



Ordering Physician Unknown

## 2019-12-03 NOTE — XMS REPORT
Patient Summary Document

 Created on:2019



Patient:BERTHA SOLIS

Sex:Male

:1933

External Reference #:[id number]





Demographics







 Address  [address] [place], [place] [address]

 

 Home Phone  [phone]

 

 Preferred Language  English

 

 Marital Status  Unknown

 

 Mormon Affiliation  Unknown

 

 Race  Unknown

 

 Ethnic Group  Unknown









Author







 Organization  [facility]









Support







 Name  Relationship  Address  Phone

 

 IRAIS SOLIS, Unknown Name  Suha  [address] [facility] [address]  [phone]



     [place] [address]  









Care Team Providers







 Name  Role  Phone

 

 Unavailable  Unavailable  Unavailable









Problems

This patient has no known problems.



Allergies, Adverse Reactions, Alerts







 Allergy  Allergy  Status  Severity  Reaction(s)  Onset  Inactive  Treating  
Comments



 Name  Type        Date  Date  Clinician  

 

 Uncoded  Unknown  Active  Unknown  Reaction  2019    Interface  



 free-text        Unknown  -18      



 allergy                







Medications







 Ordered  Filled  Start  Stop  Current  Ordering  Indication  Dosage  Frequency
  Signature  Comments  Components



 Medication  Medication  Date  Date  Medication?  Clinician        (SIG)    



 Name  Name                    

 

 Multivitami  Multivitami  2012    No  Unknown    Unknown  Unknown      



 ns/Minerals  ns/Minerals  -                  



 Tab*  Tab*                    

 

 amLODIPine  amLODIPine      No  Unknown    Unknown  Unknown      



 5 mg tablet  5 mg tablet  4-15                  

 

 acetaminoph  acetaminoph      No  Unknown    Unknown  Unknown      



 en 500 mg  en 500 mg  4-15                  



 tablet  tablet                    

 

 magnesium  magnesium      No  Unknown    Unknown  Unknown      



 oxide 400  oxide 400  4-15                  



 mg (241.3  mg (241.3                    



 mg  mg                    



 magnesium)  magnesium)                    



 tablet  tablet                    

 

 omeprazole  omeprazole      No  Unknown    Unknown  Unknown      



 40 mg  40 mg  4-15                  



 capsule,del  capsule,del                    



 ayed  ayed                    



 release  release                    

 

 verapamil  verapamil      No  Unknown    Unknown  Unknown      



  mg   mg  4-15                  



 capsule,ext  capsule,ext                    



 ended  ended                    



 release  release                    

 

 Calcium  Calcium      No  Unknown    Unknown  Unknown      



 Carbonate/V  Carbonate/V  4-15                  



 itamin D3  itamin D3                    

 

 cefdinir  cefdinir      No  Unknown    Unknown  Unknown      



 300 mg  300 mg  4-17                  



 capsule  capsule                    







Vital Signs







 Vital Name  Observation Time  Observation Value  Comments

 

 SYSTOLIC mm[Hg]  2019 18:05:21  135 mm[Hg] mm[Hg]  Method: Sit

 

 DIASTOLIC mm[Hg]  2019 18:05:21  48 mm[Hg] mm[Hg]  Method: Sit

 

 PULSE  2019 18:05:21  63 /min /min  

 

 RESP RATE  2019 18:05:21  16 /min /min  

 

 TEMP  2019 18:05:21  97.4 [degF]  







Procedures

This patient has no known procedures.



Results







 Test Description  Test Time  Test Comments  Text Results  Atomic Results  
Result Comments









 Laboratory Studies  2019 08:13:00  Identifier [id number] Result Time  
Unknown



     2019 08:13:00  









   Test Item  Value  Reference Range  Comments









 Unknown (test code = 2951-2)  136 mmol/L  Unknown  135-145  F



Ordering Physician [facility] Mzezkbu2280-73-84 08:13:00Identifier [id number]-
6 Result Time 2019 08:13:00Unknown





 Test Item  Value  Reference Range  Comments

 

 Unknown (test code = 2823-3)  4.0 mmol/L  Unknown  3.5-5.0  F



Ordering Physician [facility] Eibklix1347-69-45 08:13:00Identifier [id number]-
6 Result Time 2019 08:13:00Unknown





 Test Item  Value  Reference Range  Comments

 

 Unknown (test code = 2345-7)  87 mg/dL  Unknown    F



Ordering Physician [facility] Rvqepfw3946-55-86 08:13:00Identifier [id number]
6 Result Time 2019 08:13:00Unknown





 Test Item  Value  Reference Range  Comments

 

 Unknown (test code = 48642-3)  46.9   Unknown  Unknown  F



Ordering Physician [facility] Aifapfv4380-50-63 08:13:00Identifier [id number]
6 Result Time 2019 08:13:00Unknown





 Test Item  Value  Reference Range  Comments

 

 Unknown (test code = NullTestCode)  56.7   Unknown  Unknown  F



Ordering Physician [facility] Aqnvtgp7232-38-28 08:13:00Identifier [id number]
6 Result Time 2019 08:13:00Unknown





 Test Item  Value  Reference Range  Comments

 

 Unknown (test code = 2160-0)  1.43 mg/dL  Unknown  0.67-1.17  F



Ordering Physician [facility] Cwddwqi6569-35-25 08:13:00Identifier [id number]-
6 Result Time 2019 08:13:00Unknown





 Test Item  Value  Reference Range  Comments

 

 Unknown (test code = 2075-0)  104 mmol/L  Unknown  101-111  F



Ordering Physician [facility] Uxhnirf2443-99-81 08:13:00Identifier [id number]
6 Result Time 2019 08:13:00Unknown





 Test Item  Value  Reference Range  Comments

 

 Unknown (test code = 2028-9)  24 mmol/L  Unknown  22-32  F



Ordering Physician [facility] Fbvgets9466-04-96 08:13:00Identifier [id number]-
6 Result Time 2019 08:13:00Unknown





 Test Item  Value  Reference Range  Comments

 

 Unknown (test code = 94638-4)  9.0 mg/dL  Unknown  8.6-10.3  F



Ordering Physician [facility] Auyeike1657-78-96 08:13:00Identifier [id number]-
6 Result Time 2019 08:13:00Unknown





 Test Item  Value  Reference Range  Comments

 

 Unknown (test code = 3094-0)  24 mg/dL  Unknown  6-24  F



Ordering Physician [facility] Faydydz9126-64-84 08:13:00Identifier [id number]-
6 Result Time 2019 08:13:00Unknown





 Test Item  Value  Reference Range  Comments

 

 Unknown (test code = 3097-3)  16.8   Unknown  8-20  F



Ordering Physician [facility] Yksvhyk3170-72-95 08:13:00Identifier [id number]
6 Result Time 2019 08:13:00Unknown





 Test Item  Value  Reference Range  Comments

 

 Unknown (test code = 33037-3)  8 mmol/L  Unknown  2-11  F



Ordering Physician [facility] Qcvnaat9141-78-31 08:13:00Identifier [id number]-
6 Result Time 2019 08:13:00Unknown





 Test Item  Value  Reference Range  Comments

 

 Unknown (test code = 78737-5)  27.4 10^3/uL  Unknown  3.5-10.8  F



Ordering Physician [facility] Ljfovkd2870-60-23 08:13:00Identifier [id number]-
6 Result Time 2019 08:13:00Unknown





 Test Item  Value  Reference Range  Comments

 

 Unknown (test code = 788-0)  24 %  Unknown  10.5-15  F



Ordering Physician [facility] Ahjakku2654-81-77 08:13:00Identifier [id number]-
6 Result Time 2019 08:13:00Unknown





 Test Item  Value  Reference Range  Comments

 

 Unknown (test code = 789-8)  5.19 10^6 /uL  Unknown  4.18-5.48  F



Ordering Physician [facility] Uukfplu3003-30-34 08:13:00Identifier [id number]-
6 Result Time 2019 08:13:00Unknown





 Test Item  Value  Reference Range  Comments

 

 Unknown (test code = 777-3)  330 10^3/uL  Unknown  150-450  F



Ordering Physician [facility] Olusrib0431-86-75 08:13:00Identifier [id number]-
6 Result Time 2019 08:13:00Unknown





 Test Item  Value  Reference Range  Comments

 

 Unknown (test code = 90909-5)  0   Unknown  Unknown  F



Ordering Physician [facility] Nzccuxl6425-09-57 08:13:00Identifier [id number]-
6 Result Time 2019 08:13:00Unknown





 Test Item  Value  Reference Range  Comments

 

 Unknown (test code = 771-6)  0 10^3/ul  Unknown  Unknown  F



Ordering Physician [facility] Ijrcaor7389-55-33 08:13:00Identifier [id number]-
6 Result Time 2019 08:13:00Unknown





 Test Item  Value  Reference Range  Comments

 

 Unknown (test code = 770-8)  84.7 %  Unknown  Unknown  F



Ordering Physician [facility] Ykyjaya1327-68-31 08:13:00Identifier [id number]-
6 Result Time 2019 08:13:00Unknown





 Test Item  Value  Reference Range  Comments

 

 Unknown (test code = 5905-5)  8.6 %  Unknown  Unknown  F



Ordering Physician [facility] Nrngxaj5362-84-98 08:13:00Identifier [id number]-
6 Result Time 2019 08:13:00Unknown





 Test Item  Value  Reference Range  Comments

 

 Unknown (test code = 46474-8)  8.6 fL  Unknown  7.4-10.4  F



Ordering Physician [facility] Lcmsccx3742-40-91 08:13:00Identifier [id number]-
6 Result Time 2019 08:13:00Unknown





 Test Item  Value  Reference Range  Comments

 

 Unknown (test code = 787-2)  78 fL  Unknown  80-94  F



Ordering Physician [facility] Xbrhsrn0516-32-95 08:13:00Identifier [id number]-
6 Result Time 2019 08:13:00Unknown





 Test Item  Value  Reference Range  Comments

 

 Unknown (test code = 786-4)  31 g/dL  Unknown  31-36  F



Ordering Physician [facility] Vualywu7986-65-58 08:13:00Identifier [id number]-
6 Result Time 2019 08:13:00Unknown





 Test Item  Value  Reference Range  Comments

 

 Unknown (test code = 785-6)  24 pg  Unknown  27-31  F



Ordering Physician [facility] Woqpwys5898-32-18 08:13:00Identifier [id number]-
6 Result Time 2019 08:13:00Unknown





 Test Item  Value  Reference Range  Comments

 

 Unknown (test code = 736-9)  5.4 %  Unknown  Unknown  F



Ordering Physician [facility] Hmdmwik3452-06-32 08:13:00Identifier [id number]-
6 Result Time 2019 08:13:00Unknown





 Test Item  Value  Reference Range  Comments

 

 Unknown (test code = 718-7)  12.3 g/dL  Unknown  14.0-18.0  F



Ordering Physician [facility] Sdxwkri6532-59-80 08:13:00Identifier [id number]-
6 Result Time 2019 08:13:00Unknown





 Test Item  Value  Reference Range  Comments

 

 Unknown (test code = 4544-3)  40 %  Unknown  36-46  F



Ordering Physician [facility] Tvpnvvq6065-50-67 08:13:00Identifier [id number]-
6 Result Time 2019 08:13:00Unknown





 Test Item  Value  Reference Range  Comments

 

 Unknown (test code = 713-8)  0.8 %  Unknown  Unknown  F



Ordering Physician [facility] Flzfdnt3861-79-12 08:13:00Identifier [id number]-
6 Result Time 2019 08:13:00Unknown





 Test Item  Value  Reference Range  Comments

 

 Unknown (test code = 706-2)  0.5 %  Unknown  Unknown  F



Ordering Physician [facility] Beahivz9855-69-35 08:13:00Identifier [id number]-
6 Result Time 2019 08:13:00Unknown





 Test Item  Value  Reference Range  Comments

 

 Unknown (test code = PVS1124)  23.2 10^3/ul  Unknown  1.5-7.7  F



Ordering Physician [facility] Llixnka8317-82-61 08:13:00Identifier [id number]-
6 Result Time 2019 08:13:00Unknown





 Test Item  Value  Reference Range  Comments

 

 Unknown (test code = 742-7)  2.4 10^3/ul  Unknown  0-0.8  F



Ordering Physician [facility] Rmkcuiz7614-84-76 08:13:00Identifier [id number]-
6 Result Time 2019 08:13:00Unknown





 Test Item  Value  Reference Range  Comments

 

 Unknown (test code = 731-0)  1.5 10^3/ul  Unknown  1.0-4.8  F



Ordering Physician [facility] Vzbmbql5914-25-05 08:13:00Identifier [id number]-
6 Result Time 2019 08:13:00Unknown





 Test Item  Value  Reference Range  Comments

 

 Unknown (test code = 711-2)  0.2 10^3/ul  Unknown  0-0.6  F



Ordering Physician [facility] Mjcazno1962-67-22 08:13:00Identifier [id number]-
6 Result Time 2019 08:13:00Unknown





 Test Item  Value  Reference Range  Comments

 

 Unknown (test code = 704-7)  0.1 10^3/ul  Unknown  0-0.2  F



Ordering Physician [facility] Tdsqrwo9863-94-88 06:46:00Identifier [id number]-
6 Result Time 2019 06:46:00Unknown





 Test Item  Value  Reference Range  Comments

 

 Unknown (test code = 45160-9)  0.05 ng/mL  Unknown  Unknown  F



Ordering Physician [facility] Qyehewu2227-36-67 06:46:00Identifier [id number]-
6 Result Time 2019 06:46:00Unknown





 Test Item  Value  Reference Range  Comments

 

 Unknown (test code = 2777-1)  2.8 mg/dL  Unknown  2.5-5.0  F



Ordering Physician [facility] Rkrkavq3871-12-36 06:46:00Identifier [id number]-
6 Result Time 2019 06:46:00Unknown





 Test Item  Value  Reference Range  Comments

 

 Unknown (test code = 64368-1)  2.5 mg/dL  Unknown  1.9-2.7  F



Ordering Physician [facility] Studies2019-04-15 10:48:00Identifier [id number]-
6 Result Time 2019-04-15 10:48:00Unknown





 Test Item  Value  Reference Range  Comments

 

 Unknown (test code = NullTestCode)  6.0   Unknown  5-9  F



Ordering Physician [facility] Studies2019-04-15 10:48:00Identifier [id number]-
6 Result Time 2019-04-15 10:48:00Unknown





 Test Item  Value  Reference Range  Comments

 

 Unknown (test code = 36590-2)  1.013   Unknown  1.010-1.030  F



Ordering Physician [facility] Studies2019-04-15 10:15:00Identifier [id number]-
6 Result Time 2019-04-15 10:15:00Unknown





 Test Item  Value  Reference Range  Comments

 

 Unknown (test code = 3016-3)  3.38 mcIU/mL  Unknown  0.34-5.60  F



Ordering Physician [facility] Studies2019-04-15 10:15:00Identifier [id number]-
6 Result Time 2019-04-15 10:15:00Unknown





 Test Item  Value  Reference Range  Comments

 

 Unknown (test code = 54389-6)  1.14   Unknown  0.77-1.02  F



Ordering Physician [facility] Studies2019-04-15 10:15:00Identifier [id number]
6 Result Time 2019-04-15 10:15:00Unknown





 Test Item  Value  Reference Range  Comments

 

 Unknown (test code = 31135-8)  365 pg/mL  Unknown  Unknown  F



Ordering Physician [facility] Studies2019-04-15 10:15:00Identifier [id number]-
6 Result Time 2019-04-15 10:15:00Unknown





 Test Item  Value  Reference Range  Comments

 

 Unknown (test code = 2885-2)  8.9 g/dL  Unknown  6.4-8.9  F



Ordering Physician [facility] Studies2019-04-15 10:15:00Identifier [id number]-
6 Result Time 2019-04-15 10:15:00Unknown





 Test Item  Value  Reference Range  Comments

 

 Unknown (test code = 1975-2)  0.60 mg/dL  Unknown  0.2-1.0  F



Ordering Physician [facility] Studies2019-04-15 10:15:00Identifier [id number]-
6 Result Time 2019-04-15 10:15:00Unknown





 Test Item  Value  Reference Range  Comments

 

 Unknown (test code = NullTestCode)  4.9 g/dL  Unknown  2-4  F



Ordering Physician [facility] Studies2019-04-15 10:15:00Identifier [id number]-
6 Result Time 2019-04-15 10:15:00Unknown





 Test Item  Value  Reference Range  Comments

 

 Unknown (test code = 1988-5)  11.17 mg/L  Unknown  0-8.00  F



Ordering Physician [facility] Studies2019-04-15 10:15:00Identifier [id number]-
6 Result Time 2019-04-15 10:15:00Unknown





 Test Item  Value  Reference Range  Comments

 

 Unknown (test code = 1920-8)  25 U/L  Unknown  13-39  F



Ordering Physician [facility] Studies2019-04-15 10:15:00Identifier [id number]-
6 Result Time 2019-04-15 10:15:00Unknown





 Test Item  Value  Reference Range  Comments

 

 Unknown (test code = 6768-6)  153 U/L  Unknown    F



Ordering Physician [facility] Studies2019-04-15 10:15:00Identifier [id number]-
6 Result Time 2019-04-15 10:15:00Unknown





 Test Item  Value  Reference Range  Comments

 

 Unknown (test code = 1759-0)  0.8   Unknown  1-3  F



Ordering Physician [facility] Studies2019-04-15 10:15:00Identifier [id number]-
6 Result Time 2019-04-15 10:15:00Unknown





 Test Item  Value  Reference Range  Comments

 

 Unknown (test code = 87574-2)  4.0 g/dL  Unknown  3.2-5.2  F



Ordering Physician [facility] Studies2019-04-15 10:15:00Identifier [id number]-
6 Result Time 2019-04-15 10:15:00Unknown





 Test Item  Value  Reference Range  Comments

 

 Unknown (test code = 1742-6)  13 U/L  Unknown  7-52  F



Ordering Physician [facility] Studies2019-04-15 10:15:00Identifier [id number]
6 Result Time 2019-04-15 10:15:00Unknown





 Test Item  Value  Reference Range  Comments

 

 Unknown (test code = 2524-7)  0.9 mmol/L  Unknown  0.5-2.0  F



Ordering Physician Unknown

## 2019-12-03 NOTE — XMS REPORT
Patient Summary Document

 Created on:2019



Patient:BERTHA SOLIS

Sex:Male

:1933

External Reference #:[id number]





Demographics







 Address  [address] [place], [place] [address]

 

 Home Phone  [phone]

 

 Preferred Language  English

 

 Marital Status  Unknown

 

 Denominational Affiliation  Unknown

 

 Race  Unknown

 

 Ethnic Group  Unknown









Author







 Organization  [facility]









Support







 Name  Relationship  Address  Phone

 

 IRAIS SOLIS, Unknown Name  Suha  [address]  [phone]



     [place] [address]  









Care Team Providers







 Name  Role  Phone

 

 Unavailable  Unavailable  Unavailable









Problems

This patient has no known problems.



Allergies, Adverse Reactions, Alerts







 Allergy  Allergy  Status  Severity  Reaction(s)  Onset  Inactive  Treating  
Comments



 Name  Type        Date  Date  Clinician  

 

 Uncoded  Unknown  Active  Unknown  Reaction  2019    Interface  



 free-text        Unknown  -18      



 allergy                







Medications







 Ordered  Filled  Start  Stop  Current  Ordering  Indication  Dosage  Frequency
  Signature  Comments  Components



 Medication  Medication  Date  Date  Medication?  Clinician        (SIG)    



 Name  Name                    

 

 Multivitami  Multivitami  2012    No  Unknown    Unknown  Unknown      



 ns/Minerals  ns/Minerals  -                  



 Tab*  Tab*                    

 

 amLODIPine  amLODIPine      No  Unknown    Unknown  Unknown      



 5 mg tablet  5 mg tablet  4-15                  

 

 acetaminoph  acetaminoph      No  Unknown    Unknown  Unknown      



 en 500 mg  en 500 mg  4-15                  



 tablet  tablet                    

 

 magnesium  magnesium      No  Unknown    Unknown  Unknown      



 oxide 400  oxide 400  4-15                  



 mg (241.3  mg (241.3                    



 mg  mg                    



 magnesium)  magnesium)                    



 tablet  tablet                    

 

 omeprazole  omeprazole      No  Unknown    Unknown  Unknown      



 40 mg  40 mg  4-15                  



 capsule,del  capsule,del                    



 ayed  ayed                    



 release  release                    

 

 verapamil  verapamil      No  Unknown    Unknown  Unknown      



  mg   mg  4-15                  



 capsule,ext  capsule,ext                    



 ended  ended                    



 release  release                    

 

 Calcium  Calcium      No  Unknown    Unknown  Unknown      



 Carbonate/V  Carbonate/V  4-15                  



 itamin D3  itamin D3                    

 

 cefdinir  cefdinir      No  Unknown    Unknown  Unknown      



 300 mg  300 mg  4-17                  



 capsule  capsule                    







Vital Signs







 Vital Name  Observation Time  Observation Value  Comments

 

 SYSTOLIC mm[Hg]  2019 18:05:21  135 mm[Hg] mm[Hg]  Method: Sit

 

 DIASTOLIC mm[Hg]  2019 18:05:21  48 mm[Hg] mm[Hg]  Method: Sit

 

 PULSE  2019 18:05:21  63 /min /min  

 

 RESP RATE  2019 18:05:21  16 /min /min  

 

 TEMP  2019 18:05:21  97.4 [degF]  







Procedures

This patient has no known procedures.



Results







 Test Description  Test Time  Test Comments  Text Results  Atomic Results  
Result Comments









 Laboratory Studies  2019 08:13:00  Identifier [id number] Result Time  
Unknown



     2019 08:13:00  









   Test Item  Value  Reference Range  Comments









 Unknown (test code = 2951-2)  136 mmol/L  Unknown  135-145  F



Ordering Physician [facility] Uxyaaja1815-37-02 08:13:00Identifier [id number]
6 Result Time 2019 08:13:00Unknown





 Test Item  Value  Reference Range  Comments

 

 Unknown (test code = 2823-3)  4.0 mmol/L  Unknown  3.5-5.0  F



Ordering Physician [facility] Srqbheg7297-45-99 08:13:00Identifier [id number]-
6 Result Time 2019 08:13:00Unknown





 Test Item  Value  Reference Range  Comments

 

 Unknown (test code = 2345-7)  87 mg/dL  Unknown    F



Ordering Physician [facility] Zboekej0246-48-59 08:13:00Identifier [id number]
6 Result Time 2019 08:13:00Unknown





 Test Item  Value  Reference Range  Comments

 

 Unknown (test code = 48642-3)  46.9   Unknown  Unknown  F



Ordering Physician [facility] Kbpvubl9342-25-25 08:13:00Identifier [id number]
6 Result Time 2019 08:13:00Unknown





 Test Item  Value  Reference Range  Comments

 

 Unknown (test code = NullTestCode)  56.7   Unknown  Unknown  F



Ordering Physician [facility] Rnxevnr9178-84-26 08:13:00Identifier [id number]-
6 Result Time 2019 08:13:00Unknown





 Test Item  Value  Reference Range  Comments

 

 Unknown (test code = 2160-0)  1.43 mg/dL  Unknown  0.67-1.17  F



Ordering Physician [facility] Qghnnur3201-69-54 08:13:00Identifier [id number]
6 Result Time 2019 08:13:00Unknown





 Test Item  Value  Reference Range  Comments

 

 Unknown (test code = 2075-0)  104 mmol/L  Unknown  101-111  F



Ordering Physician [facility] Cfzenkg5684-42-44 08:13:00Identifier [id number]
6 Result Time 2019 08:13:00Unknown





 Test Item  Value  Reference Range  Comments

 

 Unknown (test code = 2028-9)  24 mmol/L  Unknown  22-32  F



Ordering Physician [facility] Dnzuxut0914-17-72 08:13:00Identifier [id number]-
6 Result Time 2019 08:13:00Unknown





 Test Item  Value  Reference Range  Comments

 

 Unknown (test code = 52191-1)  9.0 mg/dL  Unknown  8.6-10.3  F



Ordering Physician [facility] Hgjtolu4610-47-41 08:13:00Identifier [id number]-
6 Result Time 2019 08:13:00Unknown





 Test Item  Value  Reference Range  Comments

 

 Unknown (test code = 3094-0)  24 mg/dL  Unknown  6-24  F



Ordering Physician [facility] Yypydfj3981-44-94 08:13:00Identifier [id number]
6 Result Time 2019 08:13:00Unknown





 Test Item  Value  Reference Range  Comments

 

 Unknown (test code = 3097-3)  16.8   Unknown  8-20  F



Ordering Physician [facility] Pybuzri8729-32-30 08:13:00Identifier [id number]-
6 Result Time 2019 08:13:00Unknown





 Test Item  Value  Reference Range  Comments

 

 Unknown (test code = 33037-3)  8 mmol/L  Unknown  2-11  F



Ordering Physician [facility] Deqnwrx9453-47-05 08:13:00Identifier [id number]-
6 Result Time 2019 08:13:00Unknown





 Test Item  Value  Reference Range  Comments

 

 Unknown (test code = 55521-5)  27.4 10^3/uL  Unknown  3.5-10.8  F



Ordering Physician [facility] Ndnnejc0895-30-22 08:13:00Identifier [id number]-
6 Result Time 2019 08:13:00Unknown





 Test Item  Value  Reference Range  Comments

 

 Unknown (test code = 788-0)  24 %  Unknown  10.5-15  F



Ordering Physician [facility] Ldqgwlb9201-65-73 08:13:00Identifier [id number]-
6 Result Time 2019 08:13:00Unknown





 Test Item  Value  Reference Range  Comments

 

 Unknown (test code = 789-8)  5.19 10^6 /uL  Unknown  4.18-5.48  F



Ordering Physician [facility] Qxyhiuq8387-17-35 08:13:00Identifier [id number]-
6 Result Time 2019 08:13:00Unknown





 Test Item  Value  Reference Range  Comments

 

 Unknown (test code = 777-3)  330 10^3/uL  Unknown  150-450  F



Ordering Physician [facility] Xmzfwbe0330-21-19 08:13:00Identifier [id number]
6 Result Time 2019 08:13:00Unknown





 Test Item  Value  Reference Range  Comments

 

 Unknown (test code = 84266-6)  0   Unknown  Unknown  F



Ordering Physician [facility] Tvajlfc6952-63-20 08:13:00Identifier [id number]-
6 Result Time 2019 08:13:00Unknown





 Test Item  Value  Reference Range  Comments

 

 Unknown (test code = 771-6)  0 10^3/ul  Unknown  Unknown  F



Ordering Physician [facility] Mynlszx8385-80-84 08:13:00Identifier [id number]-
6 Result Time 2019 08:13:00Unknown





 Test Item  Value  Reference Range  Comments

 

 Unknown (test code = 770-8)  84.7 %  Unknown  Unknown  F



Ordering Physician [facility] Luxthxp9575-40-55 08:13:00Identifier [id number]-
6 Result Time 2019 08:13:00Unknown





 Test Item  Value  Reference Range  Comments

 

 Unknown (test code = 5905-5)  8.6 %  Unknown  Unknown  F



Ordering Physician [facility] Rszavpr1577-12-87 08:13:00Identifier [id number]-
6 Result Time 2019 08:13:00Unknown





 Test Item  Value  Reference Range  Comments

 

 Unknown (test code = 91840-1)  8.6 fL  Unknown  7.4-10.4  F



Ordering Physician [facility] Ekhxpnv0402-85-59 08:13:00Identifier [id number]-
6 Result Time 2019 08:13:00Unknown





 Test Item  Value  Reference Range  Comments

 

 Unknown (test code = 787-2)  78 fL  Unknown  80-94  F



Ordering Physician [facility] Vyosovm1828-32-76 08:13:00Identifier [id number]-
6 Result Time 2019 08:13:00Unknown





 Test Item  Value  Reference Range  Comments

 

 Unknown (test code = 786-4)  31 g/dL  Unknown  31-36  F



Ordering Physician [facility] Fbclglt3893-21-54 08:13:00Identifier [id number]-
6 Result Time 2019 08:13:00Unknown





 Test Item  Value  Reference Range  Comments

 

 Unknown (test code = 785-6)  24 pg  Unknown  27-31  F



Ordering Physician [facility] Obtnnie1259-71-58 08:13:00Identifier [id number]-
6 Result Time 2019 08:13:00Unknown





 Test Item  Value  Reference Range  Comments

 

 Unknown (test code = 736-9)  5.4 %  Unknown  Unknown  F



Ordering Physician [facility] Ttahsfy7523-14-61 08:13:00Identifier [id number]-
6 Result Time 2019 08:13:00Unknown





 Test Item  Value  Reference Range  Comments

 

 Unknown (test code = 718-7)  12.3 g/dL  Unknown  14.0-18.0  F



Ordering Physician [facility] Wdmgclz9268-33-88 08:13:00Identifier [id number]-
6 Result Time 2019 08:13:00Unknown





 Test Item  Value  Reference Range  Comments

 

 Unknown (test code = 4544-3)  40 %  Unknown  36-46  F



Ordering Physician [facility] Cxhejwt0449-75-35 08:13:00Identifier [id number]-
6 Result Time 2019 08:13:00Unknown





 Test Item  Value  Reference Range  Comments

 

 Unknown (test code = 713-8)  0.8 %  Unknown  Unknown  F



Ordering Physician [facility] Nzvneah1118-98-47 08:13:00Identifier [id number]-
6 Result Time 2019 08:13:00Unknown





 Test Item  Value  Reference Range  Comments

 

 Unknown (test code = 706-2)  0.5 %  Unknown  Unknown  F



Ordering Physician [facility] Jaedqnq0968-59-38 08:13:00Identifier [id number]-
6 Result Time 2019 08:13:00Unknown





 Test Item  Value  Reference Range  Comments

 

 Unknown (test code = JCZ8977)  23.2 10^3/ul  Unknown  1.5-7.7  F



Ordering Physician [facility] Ngbmcgn8453-09-09 08:13:00Identifier [id number]-
6 Result Time 2019 08:13:00Unknown





 Test Item  Value  Reference Range  Comments

 

 Unknown (test code = 742-7)  2.4 10^3/ul  Unknown  0-0.8  F



Ordering Physician [facility] Gplaafd3255-17-87 08:13:00Identifier [id number]-
6 Result Time 2019 08:13:00Unknown





 Test Item  Value  Reference Range  Comments

 

 Unknown (test code = 731-0)  1.5 10^3/ul  Unknown  1.0-4.8  F



Ordering Physician [facility] Fvsuvoz8154-45-08 08:13:00Identifier [id number]-
6 Result Time 2019 08:13:00Unknown





 Test Item  Value  Reference Range  Comments

 

 Unknown (test code = 711-2)  0.2 10^3/ul  Unknown  0-0.6  F



Ordering Physician [facility] Qijcnpt0267-80-10 08:13:00Identifier [id number]-
6 Result Time 2019 08:13:00Unknown





 Test Item  Value  Reference Range  Comments

 

 Unknown (test code = 704-7)  0.1 10^3/ul  Unknown  0-0.2  F



Ordering Physician [facility] Xizxcms7091-94-16 06:46:00Identifier [id number]-
6 Result Time 2019 06:46:00Unknown





 Test Item  Value  Reference Range  Comments

 

 Unknown (test code = 33654-2)  0.05 ng/mL  Unknown  Unknown  F



Ordering Physician [facility] Vtldcim3184-59-08 06:46:00Identifier [id number]-
6 Result Time 2019 06:46:00Unknown





 Test Item  Value  Reference Range  Comments

 

 Unknown (test code = 2777-1)  2.8 mg/dL  Unknown  2.5-5.0  F



Ordering Physician [facility] Anqfcth5559-22-08 06:46:00Identifier [id number]-
6 Result Time 2019 06:46:00Unknown





 Test Item  Value  Reference Range  Comments

 

 Unknown (test code = 51951-0)  2.5 mg/dL  Unknown  1.9-2.7  F



Ordering Physician [facility] Studies2019-04-15 10:48:00Identifier [id number]
6 Result Time 2019-04-15 10:48:00Unknown





 Test Item  Value  Reference Range  Comments

 

 Unknown (test code = NullTestCode)  6.0   Unknown  5-9  F



Ordering Physician [facility] Studies2019-04-15 10:48:00Identifier [id number]
6 Result Time 2019-04-15 10:48:00Unknown





 Test Item  Value  Reference Range  Comments

 

 Unknown (test code = 34333-9)  1.013   Unknown  1.010-1.030  F



Ordering Physician [facility] Studies2019-04-15 10:15:00Identifier [id number]
6 Result Time 2019-04-15 10:15:00Unknown





 Test Item  Value  Reference Range  Comments

 

 Unknown (test code = 3016-3)  3.38 mcIU/mL  Unknown  0.34-5.60  F



Ordering Physician [facility] Studies2019-04-15 10:15:00Identifier [id number]
6 Result Time 2019-04-15 10:15:00Unknown





 Test Item  Value  Reference Range  Comments

 

 Unknown (test code = 72964-5)  1.14   Unknown  0.77-1.02  F



Ordering Physician [facility] Studies2019-04-15 10:15:00Identifier [id number]
6 Result Time 2019-04-15 10:15:00Unknown





 Test Item  Value  Reference Range  Comments

 

 Unknown (test code = 46980-2)  365 pg/mL  Unknown  Unknown  F



Ordering Physician [facility] Studies2019-04-15 10:15:00Identifier [id number]
6 Result Time 2019-04-15 10:15:00Unknown





 Test Item  Value  Reference Range  Comments

 

 Unknown (test code = 2885-2)  8.9 g/dL  Unknown  6.4-8.9  F



Ordering Physician [facility] Studies2019-04-15 10:15:00Identifier [id number]
6 Result Time 2019-04-15 10:15:00Unknown





 Test Item  Value  Reference Range  Comments

 

 Unknown (test code = 1975-2)  0.60 mg/dL  Unknown  0.2-1.0  F



Ordering Physician [facility] Studies2019-04-15 10:15:00Identifier [id number]-
6 Result Time 2019-04-15 10:15:00Unknown





 Test Item  Value  Reference Range  Comments

 

 Unknown (test code = NullTestCode)  4.9 g/dL  Unknown  2-4  F



Ordering Physician [facility] Studies2019-04-15 10:15:00Identifier [id number]
6 Result Time 2019-04-15 10:15:00Unknown





 Test Item  Value  Reference Range  Comments

 

 Unknown (test code = 1988-5)  11.17 mg/L  Unknown  0-8.00  F



Ordering Physician [facility] Studies2019-04-15 10:15:00Identifier [id number]
6 Result Time 2019-04-15 10:15:00Unknown





 Test Item  Value  Reference Range  Comments

 

 Unknown (test code = 1920-8)  25 U/L  Unknown  13-39  F



Ordering Physician [facility] Studies2019-04-15 10:15:00Identifier [id number]
6 Result Time 2019-04-15 10:15:00Unknown





 Test Item  Value  Reference Range  Comments

 

 Unknown (test code = 6768-6)  153 U/L  Unknown    F



Ordering Physician [facility] Studies2019-04-15 10:15:00Identifier [id number]
6 Result Time 2019-04-15 10:15:00Unknown





 Test Item  Value  Reference Range  Comments

 

 Unknown (test code = 1759-0)  0.8   Unknown  1-3  F



Ordering Physician [facility] Studies2019-04-15 10:15:00Identifier [id number]
6 Result Time 2019-04-15 10:15:00Unknown





 Test Item  Value  Reference Range  Comments

 

 Unknown (test code = 24899-8)  4.0 g/dL  Unknown  3.2-5.2  F



Ordering Physician [facility] Studies2019-04-15 10:15:00Identifier [id number]-
6 Result Time 2019-04-15 10:15:00Unknown





 Test Item  Value  Reference Range  Comments

 

 Unknown (test code = 1742-6)  13 U/L  Unknown  7-52  F



Ordering Physician [facility] Studies2019-04-15 10:15:00Identifier [id number]-
6 Result Time 2019-04-15 10:15:00Unknown





 Test Item  Value  Reference Range  Comments

 

 Unknown (test code = 2524-7)  0.9 mmol/L  Unknown  0.5-2.0  F



Ordering Physician Unknown

## 2019-12-03 NOTE — XMS REPORT
Patient Summary Document

 Created on:2019



Patient:BERTHA SOLIS

Sex:Male

:1933

External Reference #:[id number]





Demographics







 Address  [address] [place], [place] [address]

 

 Home Phone  [phone]

 

 Preferred Language  English

 

 Marital Status  Unknown

 

 Alevism Affiliation  Unknown

 

 Race  Unknown

 

 Ethnic Group  Unknown









Author







 Organization  [facility]









Support







 Name  Relationship  Address  Phone

 

 IRAIS SOLIS, Unknown Name  Suha  [address] [facility]  [phone]



     [place] [address]  









Care Team Providers







 Name  Role  Phone

 

 Unavailable  Unavailable  Unavailable









Problems

This patient has no known problems.



Allergies, Adverse Reactions, Alerts







 Allergy  Allergy  Status  Severity  Reaction(s)  Onset  Inactive  Treating  
Comments



 Name  Type        Date  Date  Clinician  

 

 Uncoded  Unknown  Active  Unknown  Reaction  2019    Interface  



 free-text        Unknown  -18      



 allergy                







Medications







 Ordered  Filled  Start  Stop  Current  Ordering  Indication  Dosage  Frequency
  Signature  Comments  Components



 Medication  Medication  Date  Date  Medication?  Clinician        (SIG)    



 Name  Name                    

 

 Multivitami  Multivitami  2012    No  Unknown    Unknown  Unknown      



 ns/Minerals  ns/Minerals  -                  



 Tab*  Tab*                    

 

 amLODIPine  amLODIPine      No  Unknown    Unknown  Unknown      



 5 mg tablet  5 mg tablet  4-15                  

 

 acetaminoph  acetaminoph      No  Unknown    Unknown  Unknown      



 en 500 mg  en 500 mg  4-15                  



 tablet  tablet                    

 

 magnesium  magnesium      No  Unknown    Unknown  Unknown      



 oxide 400  oxide 400  4-15                  



 mg (241.3  mg (241.3                    



 mg  mg                    



 magnesium)  magnesium)                    



 tablet  tablet                    

 

 omeprazole  omeprazole      No  Unknown    Unknown  Unknown      



 40 mg  40 mg  4-15                  



 capsule,del  capsule,del                    



 ayed  ayed                    



 release  release                    

 

 verapamil  verapamil      No  Unknown    Unknown  Unknown      



  mg   mg  4-15                  



 capsule,ext  capsule,ext                    



 ended  ended                    



 release  release                    

 

 Calcium  Calcium      No  Unknown    Unknown  Unknown      



 Carbonate/V  Carbonate/V  4-15                  



 itamin D3  itamin D3                    

 

 cefdinir  cefdinir      No  Unknown    Unknown  Unknown      



 300 mg  300 mg  4-17                  



 capsule  capsule                    







Vital Signs







 Vital Name  Observation Time  Observation Value  Comments

 

 SYSTOLIC mm[Hg]  2019 18:05:21  135 mm[Hg] mm[Hg]  Method: Sit

 

 DIASTOLIC mm[Hg]  2019 18:05:21  48 mm[Hg] mm[Hg]  Method: Sit

 

 PULSE  2019 18:05:21  63 /min /min  

 

 RESP RATE  2019 18:05:21  16 /min /min  

 

 TEMP  2019 18:05:21  97.4 [degF]  







Procedures

This patient has no known procedures.



Results







 Test Description  Test Time  Test Comments  Text Results  Atomic Results  
Result Comments









 Laboratory Studies  2019 08:13:00  Identifier [id number] Result Time  
Unknown



     2019 08:13:00  









   Test Item  Value  Reference Range  Comments









 Unknown (test code = 2951-2)  136 mmol/L  Unknown  135-145  F



Ordering Physician [facility] Gztaeru2804-49-63 08:13:00Identifier [id number]
6 Result Time 2019 08:13:00Unknown





 Test Item  Value  Reference Range  Comments

 

 Unknown (test code = 2823-3)  4.0 mmol/L  Unknown  3.5-5.0  F



Ordering Physician [facility] Ogucdvs6477-41-26 08:13:00Identifier [id number]
6 Result Time 2019 08:13:00Unknown





 Test Item  Value  Reference Range  Comments

 

 Unknown (test code = 2345-7)  87 mg/dL  Unknown    F



Ordering Physician [facility] Llstyuv6933-83-30 08:13:00Identifier [id number]
6 Result Time 2019 08:13:00Unknown





 Test Item  Value  Reference Range  Comments

 

 Unknown (test code = 48642-3)  46.9   Unknown  Unknown  F



Ordering Physician [facility] Obgfpmu4844-17-46 08:13:00Identifier [id number]
6 Result Time 2019 08:13:00Unknown





 Test Item  Value  Reference Range  Comments

 

 Unknown (test code = NullTestCode)  56.7   Unknown  Unknown  F



Ordering Physician [facility] Zqulrqi1313-82-10 08:13:00Identifier [id number]-
6 Result Time 2019 08:13:00Unknown





 Test Item  Value  Reference Range  Comments

 

 Unknown (test code = 2160-0)  1.43 mg/dL  Unknown  0.67-1.17  F



Ordering Physician [facility] Crqcdpy4006-78-58 08:13:00Identifier [id number]-
6 Result Time 2019 08:13:00Unknown





 Test Item  Value  Reference Range  Comments

 

 Unknown (test code = 2075-0)  104 mmol/L  Unknown  101-111  F



Ordering Physician [facility] Nqcpwwd6981-62-26 08:13:00Identifier [id number]-
6 Result Time 2019 08:13:00Unknown





 Test Item  Value  Reference Range  Comments

 

 Unknown (test code = 2028-9)  24 mmol/L  Unknown  22-32  F



Ordering Physician [facility] Acnryny4963-52-45 08:13:00Identifier [id number]-
6 Result Time 2019 08:13:00Unknown





 Test Item  Value  Reference Range  Comments

 

 Unknown (test code = 07031-0)  9.0 mg/dL  Unknown  8.6-10.3  F



Ordering Physician [facility] Mwpjxku1735-19-37 08:13:00Identifier [id number]
6 Result Time 2019 08:13:00Unknown





 Test Item  Value  Reference Range  Comments

 

 Unknown (test code = 3094-0)  24 mg/dL  Unknown  6-24  F



Ordering Physician [facility] Yduwlbo7612-82-86 08:13:00Identifier [id number]-
6 Result Time 2019 08:13:00Unknown





 Test Item  Value  Reference Range  Comments

 

 Unknown (test code = 3097-3)  16.8   Unknown  8-20  F



Ordering Physician [facility] Cooxpni6385-14-51 08:13:00Identifier [id number]-
6 Result Time 2019 08:13:00Unknown





 Test Item  Value  Reference Range  Comments

 

 Unknown (test code = 33037-3)  8 mmol/L  Unknown  2-11  F



Ordering Physician [facility] Idgqkmi5189-56-19 08:13:00Identifier [id number]-
6 Result Time 2019 08:13:00Unknown





 Test Item  Value  Reference Range  Comments

 

 Unknown (test code = 82605-4)  27.4 10^3/uL  Unknown  3.5-10.8  F



Ordering Physician [facility] Yfstmln0664-50-91 08:13:00Identifier [id number]-
6 Result Time 2019 08:13:00Unknown





 Test Item  Value  Reference Range  Comments

 

 Unknown (test code = 788-0)  24 %  Unknown  10.5-15  F



Ordering Physician [facility] Dnnpmrf9290-54-28 08:13:00Identifier [id number]-
6 Result Time 2019 08:13:00Unknown





 Test Item  Value  Reference Range  Comments

 

 Unknown (test code = 789-8)  5.19 10^6 /uL  Unknown  4.18-5.48  F



Ordering Physician [facility] Aqlckdn7270-87-32 08:13:00Identifier [id number]-
6 Result Time 2019 08:13:00Unknown





 Test Item  Value  Reference Range  Comments

 

 Unknown (test code = 777-3)  330 10^3/uL  Unknown  150-450  F



Ordering Physician [facility] Paujxzj3661-47-92 08:13:00Identifier [id number]-
6 Result Time 2019 08:13:00Unknown





 Test Item  Value  Reference Range  Comments

 

 Unknown (test code = 48190-4)  0   Unknown  Unknown  F



Ordering Physician [facility] Qwwxubd4062-03-53 08:13:00Identifier [id number]-
6 Result Time 2019 08:13:00Unknown





 Test Item  Value  Reference Range  Comments

 

 Unknown (test code = 771-6)  0 10^3/ul  Unknown  Unknown  F



Ordering Physician [facility] Vkgekvn6589-92-63 08:13:00Identifier [id number]-
6 Result Time 2019 08:13:00Unknown





 Test Item  Value  Reference Range  Comments

 

 Unknown (test code = 770-8)  84.7 %  Unknown  Unknown  F



Ordering Physician [facility] Omzgpzs4996-95-51 08:13:00Identifier [id number]-
6 Result Time 2019 08:13:00Unknown





 Test Item  Value  Reference Range  Comments

 

 Unknown (test code = 5905-5)  8.6 %  Unknown  Unknown  F



Ordering Physician [facility] Iutetlk6157-95-53 08:13:00Identifier [id number]-
6 Result Time 2019 08:13:00Unknown





 Test Item  Value  Reference Range  Comments

 

 Unknown (test code = 03798-3)  8.6 fL  Unknown  7.4-10.4  F



Ordering Physician [facility] Ifinuvg6589-86-15 08:13:00Identifier [id number]-
6 Result Time 2019 08:13:00Unknown





 Test Item  Value  Reference Range  Comments

 

 Unknown (test code = 787-2)  78 fL  Unknown  80-94  F



Ordering Physician [facility] Pzbxnpj8974-11-12 08:13:00Identifier [id number]-
6 Result Time 2019 08:13:00Unknown





 Test Item  Value  Reference Range  Comments

 

 Unknown (test code = 786-4)  31 g/dL  Unknown  31-36  F



Ordering Physician [facility] Anvxikp8681-91-20 08:13:00Identifier [id number]-
6 Result Time 2019 08:13:00Unknown





 Test Item  Value  Reference Range  Comments

 

 Unknown (test code = 785-6)  24 pg  Unknown  27-31  F



Ordering Physician [facility] Itbhlsj6234-80-34 08:13:00Identifier [id number]-
6 Result Time 2019 08:13:00Unknown





 Test Item  Value  Reference Range  Comments

 

 Unknown (test code = 736-9)  5.4 %  Unknown  Unknown  F



Ordering Physician [facility] Yljpowh3125-94-42 08:13:00Identifier [id number]-
6 Result Time 2019 08:13:00Unknown





 Test Item  Value  Reference Range  Comments

 

 Unknown (test code = 718-7)  12.3 g/dL  Unknown  14.0-18.0  F



Ordering Physician [facility] Oialini9091-48-12 08:13:00Identifier [id number]-
6 Result Time 2019 08:13:00Unknown





 Test Item  Value  Reference Range  Comments

 

 Unknown (test code = 4544-3)  40 %  Unknown  36-46  F



Ordering Physician [facility] Okucatz9994-80-37 08:13:00Identifier [id number]
6 Result Time 2019 08:13:00Unknown





 Test Item  Value  Reference Range  Comments

 

 Unknown (test code = 713-8)  0.8 %  Unknown  Unknown  F



Ordering Physician [facility] Bszgtxq1978-35-58 08:13:00Identifier [id number]-
6 Result Time 2019 08:13:00Unknown





 Test Item  Value  Reference Range  Comments

 

 Unknown (test code = 706-2)  0.5 %  Unknown  Unknown  F



Ordering Physician [facility] Zqitlnh3116-95-78 08:13:00Identifier [id number]-
6 Result Time 2019 08:13:00Unknown





 Test Item  Value  Reference Range  Comments

 

 Unknown (test code = RNO9212)  23.2 10^3/ul  Unknown  1.5-7.7  F



Ordering Physician [facility] Cesjgsz3866-54-82 08:13:00Identifier [id number]-
6 Result Time 2019 08:13:00Unknown





 Test Item  Value  Reference Range  Comments

 

 Unknown (test code = 742-7)  2.4 10^3/ul  Unknown  0-0.8  F



Ordering Physician [facility] Fxyonse0026-45-56 08:13:00Identifier [id number]-
6 Result Time 2019 08:13:00Unknown





 Test Item  Value  Reference Range  Comments

 

 Unknown (test code = 731-0)  1.5 10^3/ul  Unknown  1.0-4.8  F



Ordering Physician [facility] Mlylpdh4779-78-29 08:13:00Identifier [id number]-
6 Result Time 2019 08:13:00Unknown





 Test Item  Value  Reference Range  Comments

 

 Unknown (test code = 711-2)  0.2 10^3/ul  Unknown  0-0.6  F



Ordering Physician [facility] Pbvpvaw2837-42-42 08:13:00Identifier [id number]-
6 Result Time 2019 08:13:00Unknown





 Test Item  Value  Reference Range  Comments

 

 Unknown (test code = 704-7)  0.1 10^3/ul  Unknown  0-0.2  F



Ordering Physician [facility] Ibxqnag4978-69-60 06:46:00Identifier [id number]-
6 Result Time 2019 06:46:00Unknown





 Test Item  Value  Reference Range  Comments

 

 Unknown (test code = 20615-4)  0.05 ng/mL  Unknown  Unknown  F



Ordering Physician [facility] Tjiuopj4000-29-87 06:46:00Identifier [id number]-
6 Result Time 2019 06:46:00Unknown





 Test Item  Value  Reference Range  Comments

 

 Unknown (test code = 2777-1)  2.8 mg/dL  Unknown  2.5-5.0  F



Ordering Physician [facility] Eyrexdt7951-84-94 06:46:00Identifier [id number]-
6 Result Time 2019 06:46:00Unknown





 Test Item  Value  Reference Range  Comments

 

 Unknown (test code = 93673-6)  2.5 mg/dL  Unknown  1.9-2.7  F



Ordering Physician [facility] Studies2019-04-15 10:48:00Identifier [id number]-
6 Result Time 2019-04-15 10:48:00Unknown





 Test Item  Value  Reference Range  Comments

 

 Unknown (test code = NullTestCode)  6.0   Unknown  5-9  F



Ordering Physician [facility] Studies2019-04-15 10:48:00Identifier [id number]
6 Result Time 2019-04-15 10:48:00Unknown





 Test Item  Value  Reference Range  Comments

 

 Unknown (test code = 38908-6)  1.013   Unknown  1.010-1.030  F



Ordering Physician [facility] Studies2019-04-15 10:15:00Identifier [id number]
6 Result Time 2019-04-15 10:15:00Unknown





 Test Item  Value  Reference Range  Comments

 

 Unknown (test code = 3016-3)  3.38 mcIU/mL  Unknown  0.34-5.60  F



Ordering Physician [facility] Studies2019-04-15 10:15:00Identifier [id number]-
6 Result Time 2019-04-15 10:15:00Unknown





 Test Item  Value  Reference Range  Comments

 

 Unknown (test code = 13970-2)  1.14   Unknown  0.77-1.02  F



Ordering Physician [facility] Studies2019-04-15 10:15:00Identifier [id number]-
6 Result Time 2019-04-15 10:15:00Unknown





 Test Item  Value  Reference Range  Comments

 

 Unknown (test code = 53232-8)  365 pg/mL  Unknown  Unknown  F



Ordering Physician [facility] Studies2019-04-15 10:15:00Identifier [id number]-
6 Result Time 2019-04-15 10:15:00Unknown





 Test Item  Value  Reference Range  Comments

 

 Unknown (test code = 2885-2)  8.9 g/dL  Unknown  6.4-8.9  F



Ordering Physician [facility] Studies2019-04-15 10:15:00Identifier [id number]-
6 Result Time 2019-04-15 10:15:00Unknown





 Test Item  Value  Reference Range  Comments

 

 Unknown (test code = 1975-2)  0.60 mg/dL  Unknown  0.2-1.0  F



Ordering Physician [facility] Studies2019-04-15 10:15:00Identifier [id number]-
6 Result Time 2019-04-15 10:15:00Unknown





 Test Item  Value  Reference Range  Comments

 

 Unknown (test code = NullTestCode)  4.9 g/dL  Unknown  2-4  F



Ordering Physician [facility] Studies2019-04-15 10:15:00Identifier [id number]-
6 Result Time 2019-04-15 10:15:00Unknown





 Test Item  Value  Reference Range  Comments

 

 Unknown (test code = 1988-5)  11.17 mg/L  Unknown  0-8.00  F



Ordering Physician [facility] Studies2019-04-15 10:15:00Identifier [id number]
6 Result Time 2019-04-15 10:15:00Unknown





 Test Item  Value  Reference Range  Comments

 

 Unknown (test code = 1920-8)  25 U/L  Unknown  13-39  F



Ordering Physician [facility] Studies2019-04-15 10:15:00Identifier [id number]-
6 Result Time 2019-04-15 10:15:00Unknown





 Test Item  Value  Reference Range  Comments

 

 Unknown (test code = 6768-6)  153 U/L  Unknown    F



Ordering Physician [facility] Studies2019-04-15 10:15:00Identifier [id number]-
6 Result Time 2019-04-15 10:15:00Unknown





 Test Item  Value  Reference Range  Comments

 

 Unknown (test code = 1759-0)  0.8   Unknown  1-3  F



Ordering Physician [facility] Studies2019-04-15 10:15:00Identifier [id number]-
6 Result Time 2019-04-15 10:15:00Unknown





 Test Item  Value  Reference Range  Comments

 

 Unknown (test code = 25587-3)  4.0 g/dL  Unknown  3.2-5.2  F



Ordering Physician [facility] Studies2019-04-15 10:15:00Identifier [id number]-
6 Result Time 2019-04-15 10:15:00Unknown





 Test Item  Value  Reference Range  Comments

 

 Unknown (test code = 1742-6)  13 U/L  Unknown  7-52  F



Ordering Physician [facility] Studies2019-04-15 10:15:00Identifier [id number]-
6 Result Time 2019-04-15 10:15:00Unknown





 Test Item  Value  Reference Range  Comments

 

 Unknown (test code = 2524-7)  0.9 mmol/L  Unknown  0.5-2.0  F



Ordering Physician Unknown

## 2019-12-03 NOTE — ED
Headache





- HPI Summary


HPI Summary: 





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 

and plavix, presenting to the ED for a chief complaint of constant headache. 

Patient is present with his wife. Patient was recommended to go to the ED for 

his headaches by his PCP, Dr. Andre. He also saw a specialist, Dr. Clayton today 

for his stenosis for which he was told there is no additional interventions 

available. Patient reports chronic hx headaches, today he describes his 

headaches as a pressure sensation. He admits mild blurry vision and dizziness 

with his headaches. Patients wife was also concerned for slurred speech, which 

she reports he has had in the past. The headache was first noticed 4 years ago, 

but has worsened in the last week. He denies any aggravating or alleviating 

factors. He has seen Dr. Dupont for his headaches. PMHx is significant for 

polycythemia vera, HTN, and basilar artery insufficiency, but denies DM.








- History Of Current Complaint


Chief Complaint: EDHeadache


Stated Complaint: SEVERE HEADACHE PER PT


Time Seen by Provider: 12/03/19 14:52


Hx Obtained From: Patient


Onset/Duration: Sudden Onset, Still Present


Initially Headache Was: Moderate


Currently Pain Is: Moderate


Timing: Constant


Character: Pressure


Location of Headache: Diffuse


Aggravating Factor: Nothing


Allevating Factors: Nothing


Associated Signs And Symptoms: Dizziness, Visual Changes - Blurred vision





- Allergies/Home Medications


Allergies/Adverse Reactions: 


 Allergies











Allergy/AdvReac Type Severity Reaction Status Date / Time


 


No Known Allergies Allergy   Verified 12/03/19 14:46











Home Medications: 


 Home Medications





Atorvastatin* [Lipitor*] 40 mg PO QPM 12/03/19 [History Confirmed 12/03/19]


Calcium Carb/D3/Magnesium/Zinc [Bandar Mag Zinc + D3 Tablet] 1 each PO DAILY 12/03/ 19 [History Confirmed 12/03/19]


Clopidogrel TAB* [Plavix TAB*] 75 mg PO DAILY 12/03/19 [History Confirmed 12/03/ 19]


HydroxyUREA CAP* [Hydrea CAP*] 500 mg PO EVERY OTHER DAY 12/03/19 [History 

Confirmed 12/03/19]


Pantoprazole TAB * [Protonix TAB*] 80 mg PO DAILY 12/03/19 [History Confirmed 12 /03/19]


Topiramate TAB(*) [Topamax 25 MG tab] 25 mg PO QAM 12/03/19 [History Confirmed 

12/03/19]


Topiramate TAB(*) [Topamax 25 MG tab] 50 mg PO BEDTIME 12/03/19 [History 

Confirmed 12/03/19]











PMH/Surg Hx/FS Hx/Imm Hx


Previously Healthy: Yes


Endocrine/Hematology History: Reports: Hx Anemia - iron def., taking supplement

, Other Endocrine/Hematological Disorders - Polycythemia vera


   Denies: Hx Anticoagulant Therapy, Hx Diabetes


Cardiovascular History: Reports: Hx Hypertension, Other Cardiovascular Problems/

Disorders - mild aortic stenosis


   Denies: Hx Angina, Hx Coronary Artery Disease, Hx Hypercholesterolemia, Hx 

Myocardial Infarction, Hx Pacemaker/ICD


Respiratory History: Reports: Other Respiratory Problems/Disorders - RECENT 

PNEUMONIA 6/2017, and also in Jan. 2017, DR. NIELSEN AWARE


   Denies: Hx Asthma, Hx Chronic Obstructive Pulmonary Disease (COPD)


GI History: Reports: Hx Gastroesophageal Reflux Disease - FUNDOPLICATION 

SURGERY 2001, ON NEXIUM., Hx Hiatal Hernia


 History: Reports: Other  Problems/Disorders - BPH


Musculoskeletal History: Reports: Hx Arthritis, Other Musculoskeletal History - 

polymyalgia rheumatica


Sensory History: Reports: Hx Cataracts, Hx Contacts or Glasses, Hx Hearing Aid 

- No hearing aides in the hospital. Pt left @ home, Hx Hearing Problem


   Denies: Hx Legally Blind, Hx Deafness, Other Sensory Impairments


Opthamlomology History: Reports: Hx Cataracts, Hx Contacts or Glasses


   Denies: Hx Legally Blind, Other Sensory Impairments


EENT History: 


   Denies: Hx Deafness


Neurological History: Reports: Hx Headaches - HA once a day, "poor circulation 

back of head", Hx Transient Ischemic Attacks (TIA), Other Neuro Impairments/

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


Psychiatric History: 


   Denies: Hx Panic Disorder





- Surgical History


Surgical History: Yes


Surgery Procedure, Year, and Place: 2001 LAP NISSEN FUNDOPLICATION [facility].  2001 L 

HIP FRACTURE [facility].  6/10 RIGHT INGUINAL HERNAI REPAIR


Hx Anesthesia Reactions: No





- Immunization History


Date of Tetanus Vaccine: utd


Date of Influenza Vaccine: fall 2017


Infectious Disease History: No


Infectious Disease History: 


   Denies: Traveled Outside the [place] in Last 30 Days





- Family History


Known Family History: Positive: Other - aneurysm, alcoholism





- Social History


Occupation: Retired


Lives: With Family


Alcohol Use: Occasionally


Alcohol Amount: wine or beer


Hx Substance Use: No


Substance Use Type: Reports: None


Hx Tobacco Use: Yes


Smoking Status (MU): Former Smoker


Amount Used/How Often: light smoker for approx 6 yrs





Review of Systems


Positive: Blurred Vision - With headache


Neurological: Other - Positive dizziness


Positive: Headache, Slurred Speech - Per wife


All Other Systems Reviewed And Are Negative: Yes





Physical Exam





- Summary


Physical Exam Summary: 





Constitutional: Well-developed, Well-nourished, Elderly male. (-) Distressed


Skin: Warm, Dry


HENT: Normocephalic; Atraumatic


Eyes: Conjunctiva normal


Neck: Musculoskeletal ROM normal neck. (-) JVD, (-) Stridor, (-) Nuchal rigidity


Cardio: Rhythm regular, rate normal, Heart sounds normal; Intact distal pulses; 

Radial pulses are 2+ and symmetric. (-) Murmur


Pulmonary/Chest wall: Effort normal. (-) Respiratory distress, (-) Wheezes, (-) 

Rales


Abd: Soft, (-) tenderness, (-) Distension, (-) Guarding, (-) Rebound


Musculoskeletal: (-) Edema


Lymph: (-) Cervical adenopathy


Neuro: Alert, Oriented x3, mild dysarthria otherwise CN2-12 grossly intact, 


Psych: Mood and affect Normal


Triage Information Reviewed: Yes


Vital Signs On Initial Exam: 


 Initial Vitals











Temp Pulse Resp BP Pulse Ox


 


 98.4 F   65   16   124/58   98 


 


 12/03/19 14:42  12/03/19 14:42  12/03/19 14:42  12/03/19 14:42  12/03/19 14:42











Vital Signs Reviewed: Yes





- Teresa Coma Scale


Best Eye Response: 4 - Spontaneous


Best Motor Response: 6 - Obeys Commands


Best Verbal Response: 5 - Oriented


Coma Scale Total: 15





Procedures





- Sedation


Patient Received Moderate/Deep Sedation with Procedure: No





Diagnostics





- Vital Signs


 Vital Signs











  Temp Pulse Resp BP Pulse Ox


 


 12/03/19 14:42  98.4 F  65  16  124/58  98














- Laboratory


Result Diagrams: 


 12/03/19 16:21





 12/03/19 16:21


Lab Statement: Any lab studies that have been ordered have been reviewed, and 

results considered in the medical decision making process.





- CT


  ** Brain CT


CT Interpretation Completed By: Radiologist


Summary of CT Findings: Brain CT IMPRESSION:  NO ACUTE INTRACRANIAL PATHOLOGY.  

CHRONIC SMALL VESSEL ISCHEMIC CHANGE.  Reviewed by Dr. Glade.





  ** Head CTA


CT Interpretation Completed By: Radiologist


Summary of CT Findings: Head CTA IMPRESSION:  #. High-grade approximate 90% 

stenosis at the distal segment of the tortuous dominant RIGHT vertebral artery. 

This appears grossly unchanged compared with MRI exam from June 12, 2017.  #. 

The LEFT vertebral artery is diminutive in size and appears to terminate in 

the.  posterior inferior cerebellar artery representing a normal variant.  #. 

No large vessel anterior circulation significant stenosis or occlusion evident.

  #. No evidence for dural venous sinus thrombosis.  Reviewed by Dr. Almanzar.





- EKG


  ** 15:45


Cardiac Rate: NL - 65 BPM


EKG Rhythm: Sinus Rhythm


ST Segment: Normal


Ectopy: None


EKG Comparison: No Significant Change - From 04/15/19


Summary of EKG Findings: An EKG at 15:45 reveals normal sinus rhythm with 65 BPM

, nml axis, nml intervals. No STEMI. No acute changes. T wave inversions in 

aVF. No change from 04/15/19.  Reviewed and interpreted by Dr. Almanzar.





Re-Evaluation





- Re-Evaluation


  ** First Eval


Re-Evaluation Time: 15:40


Comment: CT tech changed CT brain order to CTA w venogram protocol





Headache Course/Dx





- Course


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  

- patient has hx of similar headaches in past, follows w neurology. NIHSS 1 for 

dysarthria (which he has intermittently). Neurology saw in ED and recommended 

CT brain which was negative. Given symptomatic txt with improvement in 

symptoms. CTV obtained given hx PCV, (via CTA w venogram protocol) which shows 

his chronic basilar stenosis. Topamax inc by neurology. Patient can f/u w PCP 

and return for worsening symptoms.





- Diagnoses


Provider Diagnoses: 


 Headache, Polycythemia vera








- Physician Notifications


Discussed Care Of Patient With: Juan Dupont - At 15:45, Dr. Dupont recommends 

a head CTA. 


Time Discussed With Above Provider: 15:45





Discharge ED





- Sign-Out/Discharge


Documenting (check all that apply): Patient Departure - Discharge





- Discharge Plan


Condition: Stable


Disposition: HOME


Patient Education Materials:  Acute Headache (ED)


Referrals: 


Rosa Andre DO [Primary Care Provider] - 


Additional Instructions: 


You were seen in the emergency department for headache.  Your CT did not show 

any acute changes.  Please follow up with youtr neurologist and increased your 

Topamax as prescribed.


If any studies were not completed at the time of discharge you will be called 

with the relevant results.


Please follow up with your primary care doctor in next 2-3 days and return to 

emergency department for worsening headaches, fevers, confusion, or concerning 

symptoms.


It was a pleasure taking care of you today.





- Billing Disposition and Condition


Condition: STABLE


Disposition: Home





- Attestation Statements


Document Initiated by Kalyan: Yes


Documenting Scribe: Noemi Bhat


Provider For Whom Kalyan is Documenting (Include Credential): Lanny Almanzar MD


Scribe Attestation: 


INoemi, scribed for Lanny Almanzar MD on 12/05/19 at 1547. 


Scribe Documentation Reviewed: Yes


Provider Attestation: 


The documentation as recorded by the Noemi leos accurately reflects 

the service I personally performed and the decisions made by me, Lanny Almanzar MD


Status of Scribe Document: Viewed

## 2019-12-03 NOTE — XMS REPORT
Patient Summary Document

 Created on:December 3, 2019



Patient:BERTHA SOLIS

Sex:Male

:1933

External Reference #:[id number]





Demographics







 Address  [address] [place] [id number]

 

 Home Phone  [phone]

 

 Preferred Language  English

 

 Marital Status  Unknown

 

 Sikh Affiliation  Unknown

 

 Race  Unknown

 

 Ethnic Group  Unknown









Author







 Organization  [facility]









Support







 Name  Relationship  Address  Phone

 

 IRAIS SOLIS, Unknown Name  Suha  [address] [facility]  [phone]



     [place] [address]  









Care Team Providers







 Name  Role  Phone

 

 Unavailable  Unavailable  Unavailable









Problems

This patient has no known problems.



Allergies, Adverse Reactions, Alerts







 Allergy  Allergy  Status  Severity  Reaction(s)  Onset  Inactive  Treating  
Comments



 Name  Type        Date  Date  Clinician  

 

 Uncoded  Unknown  Active  Unknown  Reaction  2019    Interface  



 free-text        Unknown  -18      



 allergy                







Medications







 Ordered  Filled  Start  Stop  Current  Ordering  Indication  Dosage  Frequency
  Signature  Comments  Components



 Medication  Medication  Date  Date  Medication?  Clinician        (SIG)    



 Name  Name                    

 

 Multivitami  Multivitami  2012    No  Unknown    Unknown  Unknown      



 ns/Minerals  ns/Minerals  -                  



 Tab*  Tab*                    

 

 amLODIPine  amLODIPine      No  Unknown    Unknown  Unknown      



 5 mg tablet  5 mg tablet  4-15                  

 

 acetaminoph  acetaminoph      No  Unknown    Unknown  Unknown      



 en 500 mg  en 500 mg  4-15                  



 tablet  tablet                    

 

 magnesium  magnesium      No  Unknown    Unknown  Unknown      



 oxide 400  oxide 400  4-15                  



 mg (241.3  mg (241.3                    



 mg  mg                    



 magnesium)  magnesium)                    



 tablet  tablet                    

 

 omeprazole  omeprazole      No  Unknown    Unknown  Unknown      



 40 mg  40 mg  4-15                  



 capsule,del  capsule,del                    



 ayed  ayed                    



 release  release                    

 

 verapamil  verapamil      No  Unknown    Unknown  Unknown      



  mg   mg  4-15                  



 capsule,ext  capsule,ext                    



 ended  ended                    



 release  release                    

 

 Calcium  Calcium      No  Unknown    Unknown  Unknown      



 Carbonate/V  Carbonate/V  4-15                  



 itamin D3  itamin D3                    

 

 cefdinir  cefdinir      No  Unknown    Unknown  Unknown      



 300 mg  300 mg  4-17                  



 capsule  capsule                    







Vital Signs







 Vital Name  Observation Time  Observation Value  Comments

 

 SYSTOLIC mm[Hg]  2019 18:05:21  135 mm[Hg] mm[Hg]  Method: Sit

 

 DIASTOLIC mm[Hg]  2019 18:05:21  48 mm[Hg] mm[Hg]  Method: Sit

 

 PULSE  2019 18:05:21  63 /min /min  

 

 RESP RATE  2019 18:05:21  16 /min /min  

 

 TEMP  2019 18:05:21  97.4 [degF]  







Procedures

This patient has no known procedures.



Results







 Test Description  Test Time  Test Comments  Text Results  Atomic Results  
Result Comments









 Laboratory Studies  2019 08:13:00  Identifier [id number] Result Time  
Unknown



     2019 08:13:00  









   Test Item  Value  Reference Range  Comments









 Unknown (test code = 2951-2)  136 mmol/L  Unknown  135-145  F



Ordering Physician [facility] Ycydpwn0643-14-44 08:13:00Identifier [id number]
6 Result Time 2019 08:13:00Unknown





 Test Item  Value  Reference Range  Comments

 

 Unknown (test code = 2823-3)  4.0 mmol/L  Unknown  3.5-5.0  F



Ordering Physician [facility] Vjiwaru9595-60-52 08:13:00Identifier [id number]
6 Result Time 2019 08:13:00Unknown





 Test Item  Value  Reference Range  Comments

 

 Unknown (test code = 2345-7)  87 mg/dL  Unknown    F



Ordering Physician [facility] Shtafpl2254-84-19 08:13:00Identifier [id number]
6 Result Time 2019 08:13:00Unknown





 Test Item  Value  Reference Range  Comments

 

 Unknown (test code = 48642-3)  46.9   Unknown  Unknown  F



Ordering Physician [facility] Chemzde4753-00-43 08:13:00Identifier [id number]
6 Result Time 2019 08:13:00Unknown





 Test Item  Value  Reference Range  Comments

 

 Unknown (test code = NullTestCode)  56.7   Unknown  Unknown  F



Ordering Physician [facility] Lzftbbh1604-44-67 08:13:00Identifier [id number]
6 Result Time 2019 08:13:00Unknown





 Test Item  Value  Reference Range  Comments

 

 Unknown (test code = 2160-0)  1.43 mg/dL  Unknown  0.67-1.17  F



Ordering Physician [facility] Bbrytoe1562-02-75 08:13:00Identifier [id number]-
6 Result Time 2019 08:13:00Unknown





 Test Item  Value  Reference Range  Comments

 

 Unknown (test code = 2075-0)  104 mmol/L  Unknown  101-111  F



Ordering Physician [facility] Cvwdeoj7794-34-76 08:13:00Identifier [id number]
6 Result Time 2019 08:13:00Unknown





 Test Item  Value  Reference Range  Comments

 

 Unknown (test code = 2028-9)  24 mmol/L  Unknown  22-32  F



Ordering Physician [facility] Rzzaalv4707-14-14 08:13:00Identifier [id number]
6 Result Time 2019 08:13:00Unknown





 Test Item  Value  Reference Range  Comments

 

 Unknown (test code = 61787-6)  9.0 mg/dL  Unknown  8.6-10.3  F



Ordering Physician [facility] Egdicyi2766-22-41 08:13:00Identifier [id number]-
6 Result Time 2019 08:13:00Unknown





 Test Item  Value  Reference Range  Comments

 

 Unknown (test code = 3094-0)  24 mg/dL  Unknown  6-24  F



Ordering Physician [facility] Xczwrqr8427-05-93 08:13:00Identifier [id number]-
6 Result Time 2019 08:13:00Unknown





 Test Item  Value  Reference Range  Comments

 

 Unknown (test code = 3097-3)  16.8   Unknown  8-20  F



Ordering Physician [facility] Phwhtvm4934-12-12 08:13:00Identifier [id number]
6 Result Time 2019 08:13:00Unknown





 Test Item  Value  Reference Range  Comments

 

 Unknown (test code = 33037-3)  8 mmol/L  Unknown  2-11  F



Ordering Physician [facility] Ujfccac0990-12-87 08:13:00Identifier [id number]-
6 Result Time 2019 08:13:00Unknown





 Test Item  Value  Reference Range  Comments

 

 Unknown (test code = 50012-8)  27.4 10^3/uL  Unknown  3.5-10.8  F



Ordering Physician [facility] Ggpzifu8969-49-06 08:13:00Identifier [id number]-
6 Result Time 2019 08:13:00Unknown





 Test Item  Value  Reference Range  Comments

 

 Unknown (test code = 788-0)  24 %  Unknown  10.5-15  F



Ordering Physician [facility] Jkvrhps2720-28-97 08:13:00Identifier [id number]-
6 Result Time 2019 08:13:00Unknown





 Test Item  Value  Reference Range  Comments

 

 Unknown (test code = 789-8)  5.19 10^6 /uL  Unknown  4.18-5.48  F



Ordering Physician [facility] Uqizjex4022-18-93 08:13:00Identifier [id number]
6 Result Time 2019 08:13:00Unknown





 Test Item  Value  Reference Range  Comments

 

 Unknown (test code = 777-3)  330 10^3/uL  Unknown  150-450  F



Ordering Physician [facility] Rzprguo9297-49-67 08:13:00Identifier [id number]-
6 Result Time 2019 08:13:00Unknown





 Test Item  Value  Reference Range  Comments

 

 Unknown (test code = 65629-3)  0   Unknown  Unknown  F



Ordering Physician [facility] Mushiri0342-86-51 08:13:00Identifier [id number]-
6 Result Time 2019 08:13:00Unknown





 Test Item  Value  Reference Range  Comments

 

 Unknown (test code = 771-6)  0 10^3/ul  Unknown  Unknown  F



Ordering Physician [facility] Nlzaitn1437-95-81 08:13:00Identifier [id number]-
6 Result Time 2019 08:13:00Unknown





 Test Item  Value  Reference Range  Comments

 

 Unknown (test code = 770-8)  84.7 %  Unknown  Unknown  F



Ordering Physician [facility] Hvpeurp2861-55-48 08:13:00Identifier [id number]-
6 Result Time 2019 08:13:00Unknown





 Test Item  Value  Reference Range  Comments

 

 Unknown (test code = 5905-5)  8.6 %  Unknown  Unknown  F



Ordering Physician [facility] Qsbgaak8187-80-57 08:13:00Identifier [id number]-
6 Result Time 2019 08:13:00Unknown





 Test Item  Value  Reference Range  Comments

 

 Unknown (test code = 17777-4)  8.6 fL  Unknown  7.4-10.4  F



Ordering Physician [facility] Bqidzbt7118-05-45 08:13:00Identifier [id number]-
6 Result Time 2019 08:13:00Unknown





 Test Item  Value  Reference Range  Comments

 

 Unknown (test code = 787-2)  78 fL  Unknown  80-94  F



Ordering Physician [facility] Vfzbzjr1302-94-15 08:13:00Identifier [id number]-
6 Result Time 2019 08:13:00Unknown





 Test Item  Value  Reference Range  Comments

 

 Unknown (test code = 786-4)  31 g/dL  Unknown  31-36  F



Ordering Physician [facility] Ohqgtne8489-78-00 08:13:00Identifier [id number]-
6 Result Time 2019 08:13:00Unknown





 Test Item  Value  Reference Range  Comments

 

 Unknown (test code = 785-6)  24 pg  Unknown  27-31  F



Ordering Physician [facility] Hisndpw5354-50-07 08:13:00Identifier [id number]-
6 Result Time 2019 08:13:00Unknown





 Test Item  Value  Reference Range  Comments

 

 Unknown (test code = 736-9)  5.4 %  Unknown  Unknown  F



Ordering Physician [facility] Yddimph3609-93-73 08:13:00Identifier [id number]-
6 Result Time 2019 08:13:00Unknown





 Test Item  Value  Reference Range  Comments

 

 Unknown (test code = 718-7)  12.3 g/dL  Unknown  14.0-18.0  F



Ordering Physician [facility] Miqxwxp1320-52-45 08:13:00Identifier [id number]-
6 Result Time 2019 08:13:00Unknown





 Test Item  Value  Reference Range  Comments

 

 Unknown (test code = 4544-3)  40 %  Unknown  36-46  F



Ordering Physician [facility] Pntjxfz0760-28-48 08:13:00Identifier [id number]-
6 Result Time 2019 08:13:00Unknown





 Test Item  Value  Reference Range  Comments

 

 Unknown (test code = 713-8)  0.8 %  Unknown  Unknown  F



Ordering Physician [facility] Hdkmbwp2593-54-39 08:13:00Identifier [id number]-
6 Result Time 2019 08:13:00Unknown





 Test Item  Value  Reference Range  Comments

 

 Unknown (test code = 706-2)  0.5 %  Unknown  Unknown  F



Ordering Physician [facility] Dcsafdo9709-48-15 08:13:00Identifier [id number]-
6 Result Time 2019 08:13:00Unknown





 Test Item  Value  Reference Range  Comments

 

 Unknown (test code = FMS2940)  23.2 10^3/ul  Unknown  1.5-7.7  F



Ordering Physician [facility] Wwmqepv7245-22-61 08:13:00Identifier [id number]-
6 Result Time 2019 08:13:00Unknown





 Test Item  Value  Reference Range  Comments

 

 Unknown (test code = 742-7)  2.4 10^3/ul  Unknown  0-0.8  F



Ordering Physician [facility] Yymrzbn3235-17-20 08:13:00Identifier [id number]-
6 Result Time 2019 08:13:00Unknown





 Test Item  Value  Reference Range  Comments

 

 Unknown (test code = 731-0)  1.5 10^3/ul  Unknown  1.0-4.8  F



Ordering Physician [facility] Lypkjpo5730-40-75 08:13:00Identifier [id number]-
6 Result Time 2019 08:13:00Unknown





 Test Item  Value  Reference Range  Comments

 

 Unknown (test code = 711-2)  0.2 10^3/ul  Unknown  0-0.6  F



Ordering Physician [facility] Hepaufe2949-38-49 08:13:00Identifier [id number]-
6 Result Time 2019 08:13:00Unknown





 Test Item  Value  Reference Range  Comments

 

 Unknown (test code = 704-7)  0.1 10^3/ul  Unknown  0-0.2  F



Ordering Physician [facility] Tuzxxyo8722-69-22 06:46:00Identifier [id number]-
6 Result Time 2019 06:46:00Unknown





 Test Item  Value  Reference Range  Comments

 

 Unknown (test code = 42399-2)  0.05 ng/mL  Unknown  Unknown  F



Ordering Physician [facility] Zfnkoiu0917-47-61 06:46:00Identifier [id number]-
6 Result Time 2019 06:46:00Unknown





 Test Item  Value  Reference Range  Comments

 

 Unknown (test code = 2777-1)  2.8 mg/dL  Unknown  2.5-5.0  F



Ordering Physician [facility] Nyafohq7567-97-23 06:46:00Identifier [id number]-
6 Result Time 2019 06:46:00Unknown





 Test Item  Value  Reference Range  Comments

 

 Unknown (test code = 63751-9)  2.5 mg/dL  Unknown  1.9-2.7  F



Ordering Physician [facility] Studies2019-04-15 10:48:00Identifier [id number]-
6 Result Time 2019-04-15 10:48:00Unknown





 Test Item  Value  Reference Range  Comments

 

 Unknown (test code = NullTestCode)  6.0   Unknown  5-9  F



Ordering Physician [facility] Studies2019-04-15 10:48:00Identifier [id number]
6 Result Time 2019-04-15 10:48:00Unknown





 Test Item  Value  Reference Range  Comments

 

 Unknown (test code = 26212-2)  1.013   Unknown  1.010-1.030  F



Ordering Physician [facility] Studies2019-04-15 10:15:00Identifier [id number]-
6 Result Time 2019-04-15 10:15:00Unknown





 Test Item  Value  Reference Range  Comments

 

 Unknown (test code = 3016-3)  3.38 mcIU/mL  Unknown  0.34-5.60  F



Ordering Physician [facility] Studies2019-04-15 10:15:00Identifier [id number]
6 Result Time 2019-04-15 10:15:00Unknown





 Test Item  Value  Reference Range  Comments

 

 Unknown (test code = 90662-0)  1.14   Unknown  0.77-1.02  F



Ordering Physician [facility] Studies2019-04-15 10:15:00Identifier [id number]-
6 Result Time 2019-04-15 10:15:00Unknown





 Test Item  Value  Reference Range  Comments

 

 Unknown (test code = 39496-3)  365 pg/mL  Unknown  Unknown  F



Ordering Physician [facility] Studies2019-04-15 10:15:00Identifier [id number]
6 Result Time 2019-04-15 10:15:00Unknown





 Test Item  Value  Reference Range  Comments

 

 Unknown (test code = 2885-2)  8.9 g/dL  Unknown  6.4-8.9  F



Ordering Physician [facility] Studies2019-04-15 10:15:00Identifier [id number]-
6 Result Time 2019-04-15 10:15:00Unknown





 Test Item  Value  Reference Range  Comments

 

 Unknown (test code = 1975-2)  0.60 mg/dL  Unknown  0.2-1.0  F



Ordering Physician [facility] Studies2019-04-15 10:15:00Identifier [id number]
6 Result Time 2019-04-15 10:15:00Unknown





 Test Item  Value  Reference Range  Comments

 

 Unknown (test code = NullTestCode)  4.9 g/dL  Unknown  2-4  F



Ordering Physician [facility] Studies2019-04-15 10:15:00Identifier [id number]
6 Result Time 2019-04-15 10:15:00Unknown





 Test Item  Value  Reference Range  Comments

 

 Unknown (test code = 1988-5)  11.17 mg/L  Unknown  0-8.00  F



Ordering Physician [facility] Studies2019-04-15 10:15:00Identifier [id number]
6 Result Time 2019-04-15 10:15:00Unknown





 Test Item  Value  Reference Range  Comments

 

 Unknown (test code = 1920-8)  25 U/L  Unknown  13-39  F



Ordering Physician [facility] Studies2019-04-15 10:15:00Identifier [id number]
6 Result Time 2019-04-15 10:15:00Unknown





 Test Item  Value  Reference Range  Comments

 

 Unknown (test code = 6768-6)  153 U/L  Unknown    F



Ordering Physician [facility] Studies2019-04-15 10:15:00Identifier [id number]
6 Result Time 2019-04-15 10:15:00Unknown





 Test Item  Value  Reference Range  Comments

 

 Unknown (test code = 1759-0)  0.8   Unknown  1-3  F



Ordering Physician [facility] Studies2019-04-15 10:15:00Identifier [id number]
6 Result Time 2019-04-15 10:15:00Unknown





 Test Item  Value  Reference Range  Comments

 

 Unknown (test code = 82272-8)  4.0 g/dL  Unknown  3.2-5.2  F



Ordering Physician [facility] Studies2019-04-15 10:15:00Identifier [id number]-
6 Result Time 2019-04-15 10:15:00Unknown





 Test Item  Value  Reference Range  Comments

 

 Unknown (test code = 1742-6)  13 U/L  Unknown  7-52  F



Ordering Physician [facility] Studies2019-04-15 10:15:00Identifier [id number]
6 Result Time 2019-04-15 10:15:00Unknown





 Test Item  Value  Reference Range  Comments

 

 Unknown (test code = 2524-7)  0.9 mmol/L  Unknown  0.5-2.0  F



Ordering Physician Unknown

## 2019-12-03 NOTE — XMS REPORT
Patient Summary Document

 Created on:2019



Patient:BERTHA SOLIS

Sex:Male

:1933

External Reference #:[id number]





Demographics







 Address  [address] [place], [place] [address]

 

 Home Phone  [phone]

 

 Preferred Language  English

 

 Marital Status  Unknown

 

 Pent[employer]al Affiliation  Unknown

 

 Race  Unknown

 

 Ethnic Group  Unknown









Author







 Organization  [facility]









Support







 Name  Relationship  Address  Phone

 

 IRAIS SOLIS, Unknown Name  Suha  [address]  [phone]



     [place] [address]  









Care Team Providers







 Name  Role  Phone

 

 Unavailable  Unavailable  Unavailable









Problems

This patient has no known problems.



Allergies, Adverse Reactions, Alerts







 Allergy  Allergy  Status  Severity  Reaction(s)  Onset  Inactive  Treating  
Comments



 Name  Type        Date  Date  Clinician  

 

 Uncoded  Unknown  Active  Unknown  Reaction  2019    Interface  



 free-text        Unknown  -18      



 allergy                







Medications







 Ordered  Filled  Start  Stop  Current  Ordering  Indication  Dosage  Frequency
  Signature  Comments  Components



 Medication  Medication  Date  Date  Medication?  Clinician        (SIG)    



 Name  Name                    

 

 Multivitami  Multivitami  2012    No  Unknown    Unknown  Unknown      



 ns/Minerals  ns/Minerals  -                  



 Tab*  Tab*                    

 

 amLODIPine  amLODIPine      No  Unknown    Unknown  Unknown      



 5 mg tablet  5 mg tablet  4-15                  

 

 acetaminoph  acetaminoph      No  Unknown    Unknown  Unknown      



 en 500 mg  en 500 mg  4-15                  



 tablet  tablet                    

 

 magnesium  magnesium      No  Unknown    Unknown  Unknown      



 oxide 400  oxide 400  4-15                  



 mg (241.3  mg (241.3                    



 mg  mg                    



 magnesium)  magnesium)                    



 tablet  tablet                    

 

 omeprazole  omeprazole      No  Unknown    Unknown  Unknown      



 40 mg  40 mg  4-15                  



 capsule,del  capsule,del                    



 ayed  ayed                    



 release  release                    

 

 verapamil  verapamil      No  Unknown    Unknown  Unknown      



  mg   mg  4-15                  



 capsule,ext  capsule,ext                    



 ended  ended                    



 release  release                    

 

 Calcium  Calcium      No  Unknown    Unknown  Unknown      



 Carbonate/V  Carbonate/V  4-15                  



 itamin D3  itamin D3                    

 

 cefdinir  cefdinir      No  Unknown    Unknown  Unknown      



 300 mg  300 mg  4-17                  



 capsule  capsule                    







Vital Signs







 Vital Name  Observation Time  Observation Value  Comments

 

 SYSTOLIC mm[Hg]  2019 18:05:21  135 mm[Hg] mm[Hg]  Method: Sit

 

 DIASTOLIC mm[Hg]  2019 18:05:21  48 mm[Hg] mm[Hg]  Method: Sit

 

 PULSE  2019 18:05:21  63 /min /min  

 

 RESP RATE  2019 18:05:21  16 /min /min  

 

 TEMP  2019 18:05:21  97.4 [degF]  







Procedures

This patient has no known procedures.



Results







 Test Description  Test Time  Test Comments  Text Results  Atomic Results  
Result Comments









 Laboratory Studies  2019 08:13:00  Identifier [id number] Result Time  
Unknown



     2019 08:13:00  









   Test Item  Value  Reference Range  Comments









 Unknown (test code = 2951-2)  136 mmol/L  Unknown  135-145  F



Ordering Physician [facility] Fuwsvfq9005-86-13 08:13:00Identifier [id number]
6 Result Time 2019 08:13:00Unknown





 Test Item  Value  Reference Range  Comments

 

 Unknown (test code = 2823-3)  4.0 mmol/L  Unknown  3.5-5.0  F



Ordering Physician [facility] Epcofss7188-44-90 08:13:00Identifier [id number]
6 Result Time 2019 08:13:00Unknown





 Test Item  Value  Reference Range  Comments

 

 Unknown (test code = 2345-7)  87 mg/dL  Unknown    F



Ordering Physician [facility] Bvwlebw1984-64-96 08:13:00Identifier [id number]-
6 Result Time 2019 08:13:00Unknown





 Test Item  Value  Reference Range  Comments

 

 Unknown (test code = 48642-3)  46.9   Unknown  Unknown  F



Ordering Physician [facility] Wbjogww9539-78-99 08:13:00Identifier [id number]
6 Result Time 2019 08:13:00Unknown





 Test Item  Value  Reference Range  Comments

 

 Unknown (test code = NullTestCode)  56.7   Unknown  Unknown  F



Ordering Physician [facility] Daogbxt1809-70-88 08:13:00Identifier [id number]
6 Result Time 2019 08:13:00Unknown





 Test Item  Value  Reference Range  Comments

 

 Unknown (test code = 2160-0)  1.43 mg/dL  Unknown  0.67-1.17  F



Ordering Physician [facility] Yzevrfq0086-39-98 08:13:00Identifier [id number]-
6 Result Time 2019 08:13:00Unknown





 Test Item  Value  Reference Range  Comments

 

 Unknown (test code = 2075-0)  104 mmol/L  Unknown  101-111  F



Ordering Physician [facility] Ibedpzo8019-20-64 08:13:00Identifier [id number]-
6 Result Time 2019 08:13:00Unknown





 Test Item  Value  Reference Range  Comments

 

 Unknown (test code = 2028-9)  24 mmol/L  Unknown  22-32  F



Ordering Physician [facility] Shhhdmr6763-91-72 08:13:00Identifier [id number]-
6 Result Time 2019 08:13:00Unknown





 Test Item  Value  Reference Range  Comments

 

 Unknown (test code = 66459-8)  9.0 mg/dL  Unknown  8.6-10.3  F



Ordering Physician [facility] Fccrmdr7763-46-29 08:13:00Identifier [id number]
6 Result Time 2019 08:13:00Unknown





 Test Item  Value  Reference Range  Comments

 

 Unknown (test code = 3094-0)  24 mg/dL  Unknown  6-24  F



Ordering Physician [facility] Gpzitsz7031-87-14 08:13:00Identifier [id number]-
6 Result Time 2019 08:13:00Unknown





 Test Item  Value  Reference Range  Comments

 

 Unknown (test code = 3097-3)  16.8   Unknown  8-20  F



Ordering Physician [facility] Qerkkzz2656-08-32 08:13:00Identifier [id number]-
6 Result Time 2019 08:13:00Unknown





 Test Item  Value  Reference Range  Comments

 

 Unknown (test code = 33037-3)  8 mmol/L  Unknown  2-11  F



Ordering Physician [facility] Rvnijae8446-67-69 08:13:00Identifier [id number]-
6 Result Time 2019 08:13:00Unknown





 Test Item  Value  Reference Range  Comments

 

 Unknown (test code = 38405-2)  27.4 10^3/uL  Unknown  3.5-10.8  F



Ordering Physician [facility] Cnjcdfy7017-99-86 08:13:00Identifier [id number]-
6 Result Time 2019 08:13:00Unknown





 Test Item  Value  Reference Range  Comments

 

 Unknown (test code = 788-0)  24 %  Unknown  10.5-15  F



Ordering Physician [facility] Wulnwiz4476-78-21 08:13:00Identifier [id number]-
6 Result Time 2019 08:13:00Unknown





 Test Item  Value  Reference Range  Comments

 

 Unknown (test code = 789-8)  5.19 10^6 /uL  Unknown  4.18-5.48  F



Ordering Physician [facility] Vobfvnz6607-04-82 08:13:00Identifier [id number]-
6 Result Time 2019 08:13:00Unknown





 Test Item  Value  Reference Range  Comments

 

 Unknown (test code = 777-3)  330 10^3/uL  Unknown  150-450  F



Ordering Physician [facility] Uwkuqqd3025-24-82 08:13:00Identifier [id number]-
6 Result Time 2019 08:13:00Unknown





 Test Item  Value  Reference Range  Comments

 

 Unknown (test code = 01819-9)  0   Unknown  Unknown  F



Ordering Physician [facility] Qvigpmj9166-04-64 08:13:00Identifier [id number]-
6 Result Time 2019 08:13:00Unknown





 Test Item  Value  Reference Range  Comments

 

 Unknown (test code = 771-6)  0 10^3/ul  Unknown  Unknown  F



Ordering Physician [facility] Ctgfvmi7699-40-32 08:13:00Identifier [id number]-
6 Result Time 2019 08:13:00Unknown





 Test Item  Value  Reference Range  Comments

 

 Unknown (test code = 770-8)  84.7 %  Unknown  Unknown  F



Ordering Physician [facility] Cimvyzr9178-00-64 08:13:00Identifier [id number]-
6 Result Time 2019 08:13:00Unknown





 Test Item  Value  Reference Range  Comments

 

 Unknown (test code = 5905-5)  8.6 %  Unknown  Unknown  F



Ordering Physician [facility] Lttkjbj8456-91-71 08:13:00Identifier [id number]-
6 Result Time 2019 08:13:00Unknown





 Test Item  Value  Reference Range  Comments

 

 Unknown (test code = 04965-8)  8.6 fL  Unknown  7.4-10.4  F



Ordering Physician [facility] Safnxyo3842-46-74 08:13:00Identifier [id number]-
6 Result Time 2019 08:13:00Unknown





 Test Item  Value  Reference Range  Comments

 

 Unknown (test code = 787-2)  78 fL  Unknown  80-94  F



Ordering Physician [facility] Jyihpxh5105-20-97 08:13:00Identifier [id number]-
6 Result Time 2019 08:13:00Unknown





 Test Item  Value  Reference Range  Comments

 

 Unknown (test code = 786-4)  31 g/dL  Unknown  31-36  F



Ordering Physician [facility] Clgsumr3706-54-45 08:13:00Identifier [id number]-
6 Result Time 2019 08:13:00Unknown





 Test Item  Value  Reference Range  Comments

 

 Unknown (test code = 785-6)  24 pg  Unknown  27-31  F



Ordering Physician [facility] Nfdtyhs2028-01-12 08:13:00Identifier [id number]-
6 Result Time 2019 08:13:00Unknown





 Test Item  Value  Reference Range  Comments

 

 Unknown (test code = 736-9)  5.4 %  Unknown  Unknown  F



Ordering Physician [facility] Npdarsx6884-14-70 08:13:00Identifier [id number]-
6 Result Time 2019 08:13:00Unknown





 Test Item  Value  Reference Range  Comments

 

 Unknown (test code = 718-7)  12.3 g/dL  Unknown  14.0-18.0  F



Ordering Physician [facility] Ikllheu7911-36-43 08:13:00Identifier [id number]-
6 Result Time 2019 08:13:00Unknown





 Test Item  Value  Reference Range  Comments

 

 Unknown (test code = 4544-3)  40 %  Unknown  36-46  F



Ordering Physician [facility] Ibsanuz8592-73-10 08:13:00Identifier [id number]-
6 Result Time 2019 08:13:00Unknown





 Test Item  Value  Reference Range  Comments

 

 Unknown (test code = 713-8)  0.8 %  Unknown  Unknown  F



Ordering Physician [facility] Wbyhmel2572-10-39 08:13:00Identifier [id number]-
6 Result Time 2019 08:13:00Unknown





 Test Item  Value  Reference Range  Comments

 

 Unknown (test code = 706-2)  0.5 %  Unknown  Unknown  F



Ordering Physician [facility] Eiiqvgs1591-60-97 08:13:00Identifier [id number]-
6 Result Time 2019 08:13:00Unknown





 Test Item  Value  Reference Range  Comments

 

 Unknown (test code = FMG8063)  23.2 10^3/ul  Unknown  1.5-7.7  F



Ordering Physician [facility] Owxuxgf8152-89-01 08:13:00Identifier [id number]-
6 Result Time 2019 08:13:00Unknown





 Test Item  Value  Reference Range  Comments

 

 Unknown (test code = 742-7)  2.4 10^3/ul  Unknown  0-0.8  F



Ordering Physician [facility] Akrraui2178-55-68 08:13:00Identifier [id number]-
6 Result Time 2019 08:13:00Unknown





 Test Item  Value  Reference Range  Comments

 

 Unknown (test code = 731-0)  1.5 10^3/ul  Unknown  1.0-4.8  F



Ordering Physician [facility] Nllmfha1964-50-55 08:13:00Identifier [id number]-
6 Result Time 2019 08:13:00Unknown





 Test Item  Value  Reference Range  Comments

 

 Unknown (test code = 711-2)  0.2 10^3/ul  Unknown  0-0.6  F



Ordering Physician [facility] Eiqxxng9105-39-52 08:13:00Identifier [id number]-
6 Result Time 2019 08:13:00Unknown





 Test Item  Value  Reference Range  Comments

 

 Unknown (test code = 704-7)  0.1 10^3/ul  Unknown  0-0.2  F



Ordering Physician [facility] Dgrcmkx5620-95-69 06:46:00Identifier [id number]-
6 Result Time 2019 06:46:00Unknown





 Test Item  Value  Reference Range  Comments

 

 Unknown (test code = 21485-2)  0.05 ng/mL  Unknown  Unknown  F



Ordering Physician [facility] Optmgwb6023-63-39 06:46:00Identifier [id number]-
6 Result Time 2019 06:46:00Unknown





 Test Item  Value  Reference Range  Comments

 

 Unknown (test code = 2777-1)  2.8 mg/dL  Unknown  2.5-5.0  F



Ordering Physician [facility] Dcrxord4911-19-14 06:46:00Identifier [id number]-
6 Result Time 2019 06:46:00Unknown





 Test Item  Value  Reference Range  Comments

 

 Unknown (test code = 85617-1)  2.5 mg/dL  Unknown  1.9-2.7  F



Ordering Physician [facility] Studies2019-04-15 10:48:00Identifier [id number]
6 Result Time 2019-04-15 10:48:00Unknown





 Test Item  Value  Reference Range  Comments

 

 Unknown (test code = NullTestCode)  6.0   Unknown  5-9  F



Ordering Physician [facility] Studies2019-04-15 10:48:00Identifier [id number]-
6 Result Time 2019-04-15 10:48:00Unknown





 Test Item  Value  Reference Range  Comments

 

 Unknown (test code = 67534-6)  1.013   Unknown  1.010-1.030  F



Ordering Physician [facility] Studies2019-04-15 10:15:00Identifier [id number]-
6 Result Time 2019-04-15 10:15:00Unknown





 Test Item  Value  Reference Range  Comments

 

 Unknown (test code = 3016-3)  3.38 mcIU/mL  Unknown  0.34-5.60  F



Ordering Physician [facility] Studies2019-04-15 10:15:00Identifier [id number]
6 Result Time 2019-04-15 10:15:00Unknown





 Test Item  Value  Reference Range  Comments

 

 Unknown (test code = 80268-2)  1.14   Unknown  0.77-1.02  F



Ordering Physician [facility] Studies2019-04-15 10:15:00Identifier [id number]
6 Result Time 2019-04-15 10:15:00Unknown





 Test Item  Value  Reference Range  Comments

 

 Unknown (test code = 27110-7)  365 pg/mL  Unknown  Unknown  F



Ordering Physician [facility] Studies2019-04-15 10:15:00Identifier [id number]-
6 Result Time 2019-04-15 10:15:00Unknown





 Test Item  Value  Reference Range  Comments

 

 Unknown (test code = 2885-2)  8.9 g/dL  Unknown  6.4-8.9  F



Ordering Physician [facility] Studies2019-04-15 10:15:00Identifier [id number]
6 Result Time 2019-04-15 10:15:00Unknown





 Test Item  Value  Reference Range  Comments

 

 Unknown (test code = 1975-2)  0.60 mg/dL  Unknown  0.2-1.0  F



Ordering Physician [facility] Studies2019-04-15 10:15:00Identifier [id number]-
6 Result Time 2019-04-15 10:15:00Unknown





 Test Item  Value  Reference Range  Comments

 

 Unknown (test code = NullTestCode)  4.9 g/dL  Unknown  2-4  F



Ordering Physician [facility] Studies2019-04-15 10:15:00Identifier [id number]-
6 Result Time 2019-04-15 10:15:00Unknown





 Test Item  Value  Reference Range  Comments

 

 Unknown (test code = 1988-5)  11.17 mg/L  Unknown  0-8.00  F



Ordering Physician [facility] Studies2019-04-15 10:15:00Identifier [id number]-
6 Result Time 2019-04-15 10:15:00Unknown





 Test Item  Value  Reference Range  Comments

 

 Unknown (test code = 1920-8)  25 U/L  Unknown  13-39  F



Ordering Physician [facility] Studies2019-04-15 10:15:00Identifier [id number]
6 Result Time 2019-04-15 10:15:00Unknown





 Test Item  Value  Reference Range  Comments

 

 Unknown (test code = 6768-6)  153 U/L  Unknown    F



Ordering Physician [facility] Studies2019-04-15 10:15:00Identifier [id number]
6 Result Time 2019-04-15 10:15:00Unknown





 Test Item  Value  Reference Range  Comments

 

 Unknown (test code = 1759-0)  0.8   Unknown  1-3  F



Ordering Physician [facility] Studies2019-04-15 10:15:00Identifier [id number]
6 Result Time 2019-04-15 10:15:00Unknown





 Test Item  Value  Reference Range  Comments

 

 Unknown (test code = 55674-8)  4.0 g/dL  Unknown  3.2-5.2  F



Ordering Physician [facility] Studies2019-04-15 10:15:00Identifier [id number]
6 Result Time 2019-04-15 10:15:00Unknown





 Test Item  Value  Reference Range  Comments

 

 Unknown (test code = 1742-6)  13 U/L  Unknown  7-52  F



Ordering Physician [facility] Studies2019-04-15 10:15:00Identifier [id number]
6 Result Time 2019-04-15 10:15:00Unknown





 Test Item  Value  Reference Range  Comments

 

 Unknown (test code = 2524-7)  0.9 mmol/L  Unknown  0.5-2.0  F



Ordering Physician Unknown

## 2019-12-03 NOTE — XMS REPORT
Patient Summary Document

 Created on:2019



Patient:BERTHA SOLIS

Sex:Male

:1933

External Reference #:[id number]





Demographics







 Address  [address] [place], [place] [address]

 

 Home Phone  [phone]

 

 Preferred Language  English

 

 Marital Status  Unknown

 

 Baptism Affiliation  Unknown

 

 Race  Unknown

 

 Ethnic Group  Unknown









Author







 Organization  [facility]









Support







 Name  Relationship  Address  Phone

 

 IRAIS SOLIS, Unknown Name  Suha  [address]  [phone]



     [place] [address]  









Care Team Providers







 Name  Role  Phone

 

 Unavailable  Unavailable  Unavailable









Problems

This patient has no known problems.



Allergies, Adverse Reactions, Alerts







 Allergy  Allergy  Status  Severity  Reaction(s)  Onset  Inactive  Treating  
Comments



 Name  Type        Date  Date  Clinician  

 

 Uncoded  Unknown  Active  Unknown  Reaction  2019    Interface  



 free-text        Unknown  -18      



 allergy                







Medications







 Ordered  Filled  Start  Stop  Current  Ordering  Indication  Dosage  Frequency
  Signature  Comments  Components



 Medication  Medication  Date  Date  Medication?  Clinician        (SIG)    



 Name  Name                    

 

 Multivitami  Multivitami  2012    No  Unknown    Unknown  Unknown      



 ns/Minerals  ns/Minerals  -                  



 Tab*  Tab*                    

 

 amLODIPine  amLODIPine      No  Unknown    Unknown  Unknown      



 5 mg tablet  5 mg tablet  4-15                  

 

 acetaminoph  acetaminoph      No  Unknown    Unknown  Unknown      



 en 500 mg  en 500 mg  4-15                  



 tablet  tablet                    

 

 magnesium  magnesium      No  Unknown    Unknown  Unknown      



 oxide 400  oxide 400  4-15                  



 mg (241.3  mg (241.3                    



 mg  mg                    



 magnesium)  magnesium)                    



 tablet  tablet                    

 

 omeprazole  omeprazole      No  Unknown    Unknown  Unknown      



 40 mg  40 mg  4-15                  



 capsule,del  capsule,del                    



 ayed  ayed                    



 release  release                    

 

 verapamil  verapamil      No  Unknown    Unknown  Unknown      



  mg   mg  4-15                  



 capsule,ext  capsule,ext                    



 ended  ended                    



 release  release                    

 

 Calcium  Calcium      No  Unknown    Unknown  Unknown      



 Carbonate/V  Carbonate/V  4-15                  



 itamin D3  itamin D3                    

 

 cefdinir  cefdinir      No  Unknown    Unknown  Unknown      



 300 mg  300 mg  4-17                  



 capsule  capsule                    







Vital Signs







 Vital Name  Observation Time  Observation Value  Comments

 

 SYSTOLIC mm[Hg]  2019 18:05:21  135 mm[Hg] mm[Hg]  Method: Sit

 

 DIASTOLIC mm[Hg]  2019 18:05:21  48 mm[Hg] mm[Hg]  Method: Sit

 

 PULSE  2019 18:05:21  63 /min /min  

 

 RESP RATE  2019 18:05:21  16 /min /min  

 

 TEMP  2019 18:05:21  97.4 [degF]  







Procedures

This patient has no known procedures.



Results







 Test Description  Test Time  Test Comments  Text Results  Atomic Results  
Result Comments









 Laboratory Studies  2019 08:13:00  Identifier [id number] Result Time  
Unknown



     2019 08:13:00  









   Test Item  Value  Reference Range  Comments









 Unknown (test code = 2951-2)  136 mmol/L  Unknown  135-145  F



Ordering Physician [facility] Tkfjbjb7931-96-76 08:13:00Identifier [id number]
6 Result Time 2019 08:13:00Unknown





 Test Item  Value  Reference Range  Comments

 

 Unknown (test code = 2823-3)  4.0 mmol/L  Unknown  3.5-5.0  F



Ordering Physician [facility] Pupmkqm5109-57-53 08:13:00Identifier [id number]
6 Result Time 2019 08:13:00Unknown





 Test Item  Value  Reference Range  Comments

 

 Unknown (test code = 2345-7)  87 mg/dL  Unknown    F



Ordering Physician [facility] Mtxvhae7835-01-89 08:13:00Identifier [id number]-
6 Result Time 2019 08:13:00Unknown





 Test Item  Value  Reference Range  Comments

 

 Unknown (test code = 48642-3)  46.9   Unknown  Unknown  F



Ordering Physician [facility] Tjctblf0634-15-78 08:13:00Identifier [id number]-
6 Result Time 2019 08:13:00Unknown





 Test Item  Value  Reference Range  Comments

 

 Unknown (test code = NullTestCode)  56.7   Unknown  Unknown  F



Ordering Physician [facility] Zeqoqag0992-44-27 08:13:00Identifier [id number]-
6 Result Time 2019 08:13:00Unknown





 Test Item  Value  Reference Range  Comments

 

 Unknown (test code = 2160-0)  1.43 mg/dL  Unknown  0.67-1.17  F



Ordering Physician [facility] Rgxesgl3799-06-99 08:13:00Identifier [id number]-
6 Result Time 2019 08:13:00Unknown





 Test Item  Value  Reference Range  Comments

 

 Unknown (test code = 2075-0)  104 mmol/L  Unknown  101-111  F



Ordering Physician [facility] Pvbjlrk2674-57-44 08:13:00Identifier [id number]
6 Result Time 2019 08:13:00Unknown





 Test Item  Value  Reference Range  Comments

 

 Unknown (test code = 2028-9)  24 mmol/L  Unknown  22-32  F



Ordering Physician [facility] Hfukish8214-83-50 08:13:00Identifier [id number]-
6 Result Time 2019 08:13:00Unknown





 Test Item  Value  Reference Range  Comments

 

 Unknown (test code = 39277-4)  9.0 mg/dL  Unknown  8.6-10.3  F



Ordering Physician [facility] Acghzoc5968-17-37 08:13:00Identifier [id number]-
6 Result Time 2019 08:13:00Unknown





 Test Item  Value  Reference Range  Comments

 

 Unknown (test code = 3094-0)  24 mg/dL  Unknown  6-24  F



Ordering Physician [facility] Ksemczl2067-85-16 08:13:00Identifier [id number]
6 Result Time 2019 08:13:00Unknown





 Test Item  Value  Reference Range  Comments

 

 Unknown (test code = 3097-3)  16.8   Unknown  8-20  F



Ordering Physician [facility] Ouadmjw4549-45-87 08:13:00Identifier [id number]
6 Result Time 2019 08:13:00Unknown





 Test Item  Value  Reference Range  Comments

 

 Unknown (test code = 33037-3)  8 mmol/L  Unknown  2-11  F



Ordering Physician [facility] Jpxwcle1098-14-28 08:13:00Identifier [id number]-
6 Result Time 2019 08:13:00Unknown





 Test Item  Value  Reference Range  Comments

 

 Unknown (test code = 45448-6)  27.4 10^3/uL  Unknown  3.5-10.8  F



Ordering Physician [facility] Vwmmfrc8668-31-92 08:13:00Identifier [id number]-
6 Result Time 2019 08:13:00Unknown





 Test Item  Value  Reference Range  Comments

 

 Unknown (test code = 788-0)  24 %  Unknown  10.5-15  F



Ordering Physician [facility] Upkgpfu1280-52-36 08:13:00Identifier [id number]-
6 Result Time 2019 08:13:00Unknown





 Test Item  Value  Reference Range  Comments

 

 Unknown (test code = 789-8)  5.19 10^6 /uL  Unknown  4.18-5.48  F



Ordering Physician [facility] Kiljcuq8482-42-18 08:13:00Identifier [id number]-
6 Result Time 2019 08:13:00Unknown





 Test Item  Value  Reference Range  Comments

 

 Unknown (test code = 777-3)  330 10^3/uL  Unknown  150-450  F



Ordering Physician [facility] Jnjccdu8395-31-80 08:13:00Identifier [id number]-
6 Result Time 2019 08:13:00Unknown





 Test Item  Value  Reference Range  Comments

 

 Unknown (test code = 39859-8)  0   Unknown  Unknown  F



Ordering Physician [facility] Txpsium8585-28-34 08:13:00Identifier [id number]-
6 Result Time 2019 08:13:00Unknown





 Test Item  Value  Reference Range  Comments

 

 Unknown (test code = 771-6)  0 10^3/ul  Unknown  Unknown  F



Ordering Physician [facility] Mdesaoa5123-22-03 08:13:00Identifier [id number]-
6 Result Time 2019 08:13:00Unknown





 Test Item  Value  Reference Range  Comments

 

 Unknown (test code = 770-8)  84.7 %  Unknown  Unknown  F



Ordering Physician [facility] Lyeucwx0111-37-97 08:13:00Identifier [id number]-
6 Result Time 2019 08:13:00Unknown





 Test Item  Value  Reference Range  Comments

 

 Unknown (test code = 5905-5)  8.6 %  Unknown  Unknown  F



Ordering Physician [facility] Qtlnrah3683-83-73 08:13:00Identifier [id number]-
6 Result Time 2019 08:13:00Unknown





 Test Item  Value  Reference Range  Comments

 

 Unknown (test code = 89536-2)  8.6 fL  Unknown  7.4-10.4  F



Ordering Physician [facility] Tmiwwqa7714-22-63 08:13:00Identifier [id number]-
6 Result Time 2019 08:13:00Unknown





 Test Item  Value  Reference Range  Comments

 

 Unknown (test code = 787-2)  78 fL  Unknown  80-94  F



Ordering Physician [facility] Fateffz3382-48-91 08:13:00Identifier [id number]-
6 Result Time 2019 08:13:00Unknown





 Test Item  Value  Reference Range  Comments

 

 Unknown (test code = 786-4)  31 g/dL  Unknown  31-36  F



Ordering Physician [facility] Csvnqnc6469-55-52 08:13:00Identifier [id number]-
6 Result Time 2019 08:13:00Unknown





 Test Item  Value  Reference Range  Comments

 

 Unknown (test code = 785-6)  24 pg  Unknown  27-31  F



Ordering Physician [facility] Eqhxzjg9300-71-85 08:13:00Identifier [id number]-
6 Result Time 2019 08:13:00Unknown





 Test Item  Value  Reference Range  Comments

 

 Unknown (test code = 736-9)  5.4 %  Unknown  Unknown  F



Ordering Physician [facility] Vemzgqe8092-47-83 08:13:00Identifier [id number]-
6 Result Time 2019 08:13:00Unknown





 Test Item  Value  Reference Range  Comments

 

 Unknown (test code = 718-7)  12.3 g/dL  Unknown  14.0-18.0  F



Ordering Physician [facility] Lssdvae1964-38-92 08:13:00Identifier [id number]-
6 Result Time 2019 08:13:00Unknown





 Test Item  Value  Reference Range  Comments

 

 Unknown (test code = 4544-3)  40 %  Unknown  36-46  F



Ordering Physician [facility] Hgqvysh6425-52-57 08:13:00Identifier [id number]-
6 Result Time 2019 08:13:00Unknown





 Test Item  Value  Reference Range  Comments

 

 Unknown (test code = 713-8)  0.8 %  Unknown  Unknown  F



Ordering Physician [facility] Gvnxnon5679-12-32 08:13:00Identifier [id number]-
6 Result Time 2019 08:13:00Unknown





 Test Item  Value  Reference Range  Comments

 

 Unknown (test code = 706-2)  0.5 %  Unknown  Unknown  F



Ordering Physician [facility] Bpbecvu2527-95-11 08:13:00Identifier [id number]-
6 Result Time 2019 08:13:00Unknown





 Test Item  Value  Reference Range  Comments

 

 Unknown (test code = MYR8169)  23.2 10^3/ul  Unknown  1.5-7.7  F



Ordering Physician [facility] Kygmltb9260-55-08 08:13:00Identifier [id number]-
6 Result Time 2019 08:13:00Unknown





 Test Item  Value  Reference Range  Comments

 

 Unknown (test code = 742-7)  2.4 10^3/ul  Unknown  0-0.8  F



Ordering Physician [facility] Tpqhqhv6560-99-74 08:13:00Identifier [id number]-
6 Result Time 2019 08:13:00Unknown





 Test Item  Value  Reference Range  Comments

 

 Unknown (test code = 731-0)  1.5 10^3/ul  Unknown  1.0-4.8  F



Ordering Physician [facility] Vawpvyh0348-91-85 08:13:00Identifier [id number]-
6 Result Time 2019 08:13:00Unknown





 Test Item  Value  Reference Range  Comments

 

 Unknown (test code = 711-2)  0.2 10^3/ul  Unknown  0-0.6  F



Ordering Physician [facility] Nwfccqe1482-93-86 08:13:00Identifier [id number]-
6 Result Time 2019 08:13:00Unknown





 Test Item  Value  Reference Range  Comments

 

 Unknown (test code = 704-7)  0.1 10^3/ul  Unknown  0-0.2  F



Ordering Physician [facility] Andkscl6141-38-86 06:46:00Identifier [id number]-
6 Result Time 2019 06:46:00Unknown





 Test Item  Value  Reference Range  Comments

 

 Unknown (test code = 73225-6)  0.05 ng/mL  Unknown  Unknown  F



Ordering Physician [facility] Eqqwrsq4603-92-00 06:46:00Identifier [id number]-
6 Result Time 2019 06:46:00Unknown





 Test Item  Value  Reference Range  Comments

 

 Unknown (test code = 2777-1)  2.8 mg/dL  Unknown  2.5-5.0  F



Ordering Physician [facility] Rlhnlkk4625-91-73 06:46:00Identifier [id number]-
6 Result Time 2019 06:46:00Unknown





 Test Item  Value  Reference Range  Comments

 

 Unknown (test code = 23892-8)  2.5 mg/dL  Unknown  1.9-2.7  F



Ordering Physician [facility] Studies2019-04-15 10:48:00Identifier [id number]-
6 Result Time 2019-04-15 10:48:00Unknown





 Test Item  Value  Reference Range  Comments

 

 Unknown (test code = NullTestCode)  6.0   Unknown  5-9  F



Ordering Physician [facility] Studies2019-04-15 10:48:00Identifier [id number]-
6 Result Time 2019-04-15 10:48:00Unknown





 Test Item  Value  Reference Range  Comments

 

 Unknown (test code = 47422-1)  1.013   Unknown  1.010-1.030  F



Ordering Physician [facility] Studies2019-04-15 10:15:00Identifier [id number]-
6 Result Time 2019-04-15 10:15:00Unknown





 Test Item  Value  Reference Range  Comments

 

 Unknown (test code = 3016-3)  3.38 mcIU/mL  Unknown  0.34-5.60  F



Ordering Physician [facility] Studies2019-04-15 10:15:00Identifier [id number]
6 Result Time 2019-04-15 10:15:00Unknown





 Test Item  Value  Reference Range  Comments

 

 Unknown (test code = 01037-2)  1.14   Unknown  0.77-1.02  F



Ordering Physician [facility] Studies2019-04-15 10:15:00Identifier [id number]
6 Result Time 2019-04-15 10:15:00Unknown





 Test Item  Value  Reference Range  Comments

 

 Unknown (test code = 40556-0)  365 pg/mL  Unknown  Unknown  F



Ordering Physician [facility] Studies2019-04-15 10:15:00Identifier [id number]
6 Result Time 2019-04-15 10:15:00Unknown





 Test Item  Value  Reference Range  Comments

 

 Unknown (test code = 2885-2)  8.9 g/dL  Unknown  6.4-8.9  F



Ordering Physician [facility] Studies2019-04-15 10:15:00Identifier [id number]
6 Result Time 2019-04-15 10:15:00Unknown





 Test Item  Value  Reference Range  Comments

 

 Unknown (test code = 1975-2)  0.60 mg/dL  Unknown  0.2-1.0  F



Ordering Physician [facility] Studies2019-04-15 10:15:00Identifier [id number]
6 Result Time 2019-04-15 10:15:00Unknown





 Test Item  Value  Reference Range  Comments

 

 Unknown (test code = NullTestCode)  4.9 g/dL  Unknown  2-4  F



Ordering Physician [facility] Studies2019-04-15 10:15:00Identifier [id number]
6 Result Time 2019-04-15 10:15:00Unknown





 Test Item  Value  Reference Range  Comments

 

 Unknown (test code = 1988-5)  11.17 mg/L  Unknown  0-8.00  F



Ordering Physician [facility] Studies2019-04-15 10:15:00Identifier [id number]-
6 Result Time 2019-04-15 10:15:00Unknown





 Test Item  Value  Reference Range  Comments

 

 Unknown (test code = 1920-8)  25 U/L  Unknown  13-39  F



Ordering Physician [facility] Studies2019-04-15 10:15:00Identifier [id number]-
6 Result Time 2019-04-15 10:15:00Unknown





 Test Item  Value  Reference Range  Comments

 

 Unknown (test code = 6768-6)  153 U/L  Unknown    F



Ordering Physician [facility] Studies2019-04-15 10:15:00Identifier [id number]-
6 Result Time 2019-04-15 10:15:00Unknown





 Test Item  Value  Reference Range  Comments

 

 Unknown (test code = 1759-0)  0.8   Unknown  1-3  F



Ordering Physician [facility] Studies2019-04-15 10:15:00Identifier [id number]-
6 Result Time 2019-04-15 10:15:00Unknown





 Test Item  Value  Reference Range  Comments

 

 Unknown (test code = 85229-0)  4.0 g/dL  Unknown  3.2-5.2  F



Ordering Physician [facility] Studies2019-04-15 10:15:00Identifier [id number]
6 Result Time 2019-04-15 10:15:00Unknown





 Test Item  Value  Reference Range  Comments

 

 Unknown (test code = 1742-6)  13 U/L  Unknown  7-52  F



Ordering Physician [facility] Studies2019-04-15 10:15:00Identifier [id number]
6 Result Time 2019-04-15 10:15:00Unknown





 Test Item  Value  Reference Range  Comments

 

 Unknown (test code = 2524-7)  0.9 mmol/L  Unknown  0.5-2.0  F



Ordering Physician Unknown

## 2019-12-03 NOTE — XMS REPORT
Patient Summary Document

 Created on:2019



Patient:BERTHA SOLIS

Sex:Male

:1933

External Reference #:[id number]





Demographics







 Address  [address] [place], [place] [address]

 

 Home Phone  [phone]

 

 Preferred Language  English

 

 Marital Status  Unknown

 

 Faith Affiliation  Unknown

 

 Race  Unknown

 

 Ethnic Group  Unknown









Author







 Organization  [facility]









Support







 Name  Relationship  Address  Phone

 

 IRAIS SOLIS, Unknown Name  Suha  [address] [facility]  [phone]



     [place] [address]  









Care Team Providers







 Name  Role  Phone

 

 Unavailable  Unavailable  Unavailable









Problems

This patient has no known problems.



Allergies, Adverse Reactions, Alerts







 Allergy  Allergy  Status  Severity  Reaction(s)  Onset  Inactive  Treating  
Comments



 Name  Type        Date  Date  Clinician  

 

 Uncoded  Unknown  Active  Unknown  Reaction  2019    Interface  



 free-text        Unknown  -18      



 allergy                







Medications







 Ordered  Filled  Start  Stop  Current  Ordering  Indication  Dosage  Frequency
  Signature  Comments  Components



 Medication  Medication  Date  Date  Medication?  Clinician        (SIG)    



 Name  Name                    

 

 Multivitami  Multivitami  2012    No  Unknown    Unknown  Unknown      



 ns/Minerals  ns/Minerals  -                  



 Tab*  Tab*                    

 

 amLODIPine  amLODIPine      No  Unknown    Unknown  Unknown      



 5 mg tablet  5 mg tablet  4-15                  

 

 acetaminoph  acetaminoph      No  Unknown    Unknown  Unknown      



 en 500 mg  en 500 mg  4-15                  



 tablet  tablet                    

 

 magnesium  magnesium      No  Unknown    Unknown  Unknown      



 oxide 400  oxide 400  4-15                  



 mg (241.3  mg (241.3                    



 mg  mg                    



 magnesium)  magnesium)                    



 tablet  tablet                    

 

 omeprazole  omeprazole      No  Unknown    Unknown  Unknown      



 40 mg  40 mg  4-15                  



 capsule,del  capsule,del                    



 ayed  ayed                    



 release  release                    

 

 verapamil  verapamil      No  Unknown    Unknown  Unknown      



  mg   mg  4-15                  



 capsule,ext  capsule,ext                    



 ended  ended                    



 release  release                    

 

 Calcium  Calcium      No  Unknown    Unknown  Unknown      



 Carbonate/V  Carbonate/V  4-15                  



 itamin D3  itamin D3                    

 

 cefdinir  cefdinir      No  Unknown    Unknown  Unknown      



 300 mg  300 mg  4-17                  



 capsule  capsule                    







Vital Signs







 Vital Name  Observation Time  Observation Value  Comments

 

 SYSTOLIC mm[Hg]  2019 18:05:21  135 mm[Hg] mm[Hg]  Method: Sit

 

 DIASTOLIC mm[Hg]  2019 18:05:21  48 mm[Hg] mm[Hg]  Method: Sit

 

 PULSE  2019 18:05:21  63 /min /min  

 

 RESP RATE  2019 18:05:21  16 /min /min  

 

 TEMP  2019 18:05:21  97.4 [degF]  







Procedures

This patient has no known procedures.



Results







 Test Description  Test Time  Test Comments  Text Results  Atomic Results  
Result Comments









 Laboratory Studies  2019 08:13:00  Identifier [id number] Result Time  
Unknown



     2019 08:13:00  









   Test Item  Value  Reference Range  Comments









 Unknown (test code = 2951-2)  136 mmol/L  Unknown  135-145  F



Ordering Physician [facility] Rlftfdu9278-00-51 08:13:00Identifier [id number]-
6 Result Time 2019 08:13:00Unknown





 Test Item  Value  Reference Range  Comments

 

 Unknown (test code = 2823-3)  4.0 mmol/L  Unknown  3.5-5.0  F



Ordering Physician [facility] Wognnjs6551-62-01 08:13:00Identifier [id number]
6 Result Time 2019 08:13:00Unknown





 Test Item  Value  Reference Range  Comments

 

 Unknown (test code = 2345-7)  87 mg/dL  Unknown    F



Ordering Physician [facility] Cchfzzr5153-10-33 08:13:00Identifier [id number]-
6 Result Time 2019 08:13:00Unknown





 Test Item  Value  Reference Range  Comments

 

 Unknown (test code = 48642-3)  46.9   Unknown  Unknown  F



Ordering Physician [facility] Yvxluom6371-12-11 08:13:00Identifier [id number]
6 Result Time 2019 08:13:00Unknown





 Test Item  Value  Reference Range  Comments

 

 Unknown (test code = NullTestCode)  56.7   Unknown  Unknown  F



Ordering Physician [facility] Auenpbn2029-18-58 08:13:00Identifier [id number]-
6 Result Time 2019 08:13:00Unknown





 Test Item  Value  Reference Range  Comments

 

 Unknown (test code = 2160-0)  1.43 mg/dL  Unknown  0.67-1.17  F



Ordering Physician [facility] Yqtotzg7682-43-58 08:13:00Identifier [id number]-
6 Result Time 2019 08:13:00Unknown





 Test Item  Value  Reference Range  Comments

 

 Unknown (test code = 2075-0)  104 mmol/L  Unknown  101-111  F



Ordering Physician [facility] Kxnexqk2903-64-30 08:13:00Identifier [id number]-
6 Result Time 2019 08:13:00Unknown





 Test Item  Value  Reference Range  Comments

 

 Unknown (test code = 2028-9)  24 mmol/L  Unknown  22-32  F



Ordering Physician [facility] Bhjnuyr6904-20-39 08:13:00Identifier [id number]-
6 Result Time 2019 08:13:00Unknown





 Test Item  Value  Reference Range  Comments

 

 Unknown (test code = 36989-7)  9.0 mg/dL  Unknown  8.6-10.3  F



Ordering Physician [facility] Qcldfwr9045-99-42 08:13:00Identifier [id number]-
6 Result Time 2019 08:13:00Unknown





 Test Item  Value  Reference Range  Comments

 

 Unknown (test code = 3094-0)  24 mg/dL  Unknown  6-24  F



Ordering Physician [facility] Vfqvjpd5749-85-73 08:13:00Identifier [id number]
6 Result Time 2019 08:13:00Unknown





 Test Item  Value  Reference Range  Comments

 

 Unknown (test code = 3097-3)  16.8   Unknown  8-20  F



Ordering Physician [facility] Vgmzmzf4891-14-05 08:13:00Identifier [id number]-
6 Result Time 2019 08:13:00Unknown





 Test Item  Value  Reference Range  Comments

 

 Unknown (test code = 33037-3)  8 mmol/L  Unknown  2-11  F



Ordering Physician [facility] Ikjkftd1208-44-88 08:13:00Identifier [id number]-
6 Result Time 2019 08:13:00Unknown





 Test Item  Value  Reference Range  Comments

 

 Unknown (test code = 10355-3)  27.4 10^3/uL  Unknown  3.5-10.8  F



Ordering Physician [facility] Mojeeqk8037-16-41 08:13:00Identifier [id number]-
6 Result Time 2019 08:13:00Unknown





 Test Item  Value  Reference Range  Comments

 

 Unknown (test code = 788-0)  24 %  Unknown  10.5-15  F



Ordering Physician [facility] Mcedwjk8880-15-76 08:13:00Identifier [id number]-
6 Result Time 2019 08:13:00Unknown





 Test Item  Value  Reference Range  Comments

 

 Unknown (test code = 789-8)  5.19 10^6 /uL  Unknown  4.18-5.48  F



Ordering Physician [facility] Kdpkdhc7591-18-63 08:13:00Identifier [id number]-
6 Result Time 2019 08:13:00Unknown





 Test Item  Value  Reference Range  Comments

 

 Unknown (test code = 777-3)  330 10^3/uL  Unknown  150-450  F



Ordering Physician [facility] Rceppbv4051-85-33 08:13:00Identifier [id number]-
6 Result Time 2019 08:13:00Unknown





 Test Item  Value  Reference Range  Comments

 

 Unknown (test code = 13214-3)  0   Unknown  Unknown  F



Ordering Physician [facility] Woickii7694-81-07 08:13:00Identifier [id number]-
6 Result Time 2019 08:13:00Unknown





 Test Item  Value  Reference Range  Comments

 

 Unknown (test code = 771-6)  0 10^3/ul  Unknown  Unknown  F



Ordering Physician [facility] Mwyrocs9695-28-13 08:13:00Identifier [id number]
6 Result Time 2019 08:13:00Unknown





 Test Item  Value  Reference Range  Comments

 

 Unknown (test code = 770-8)  84.7 %  Unknown  Unknown  F



Ordering Physician [facility] Qbaqamv3318-64-06 08:13:00Identifier [id number]-
6 Result Time 2019 08:13:00Unknown





 Test Item  Value  Reference Range  Comments

 

 Unknown (test code = 5905-5)  8.6 %  Unknown  Unknown  F



Ordering Physician [facility] Obqxscu4438-01-79 08:13:00Identifier [id number]-
6 Result Time 2019 08:13:00Unknown





 Test Item  Value  Reference Range  Comments

 

 Unknown (test code = 16931-9)  8.6 fL  Unknown  7.4-10.4  F



Ordering Physician [facility] Sdmboib6498-10-99 08:13:00Identifier [id number]-
6 Result Time 2019 08:13:00Unknown





 Test Item  Value  Reference Range  Comments

 

 Unknown (test code = 787-2)  78 fL  Unknown  80-94  F



Ordering Physician [facility] Nconhal5872-55-09 08:13:00Identifier [id number]-
6 Result Time 2019 08:13:00Unknown





 Test Item  Value  Reference Range  Comments

 

 Unknown (test code = 786-4)  31 g/dL  Unknown  31-36  F



Ordering Physician [facility] Lkwpepe1221-35-05 08:13:00Identifier [id number]-
6 Result Time 2019 08:13:00Unknown





 Test Item  Value  Reference Range  Comments

 

 Unknown (test code = 785-6)  24 pg  Unknown  27-31  F



Ordering Physician [facility] Dvehzmz5232-07-48 08:13:00Identifier [id number]-
6 Result Time 2019 08:13:00Unknown





 Test Item  Value  Reference Range  Comments

 

 Unknown (test code = 736-9)  5.4 %  Unknown  Unknown  F



Ordering Physician [facility] Eazeyiv6790-01-31 08:13:00Identifier [id number]-
6 Result Time 2019 08:13:00Unknown





 Test Item  Value  Reference Range  Comments

 

 Unknown (test code = 718-7)  12.3 g/dL  Unknown  14.0-18.0  F



Ordering Physician [facility] Bnachtz5616-50-13 08:13:00Identifier [id number]-
6 Result Time 2019 08:13:00Unknown





 Test Item  Value  Reference Range  Comments

 

 Unknown (test code = 4544-3)  40 %  Unknown  36-46  F



Ordering Physician [facility] Cnavtwg3488-00-04 08:13:00Identifier [id number]-
6 Result Time 2019 08:13:00Unknown





 Test Item  Value  Reference Range  Comments

 

 Unknown (test code = 713-8)  0.8 %  Unknown  Unknown  F



Ordering Physician [facility] Vsukawq6867-58-53 08:13:00Identifier [id number]-
6 Result Time 2019 08:13:00Unknown





 Test Item  Value  Reference Range  Comments

 

 Unknown (test code = 706-2)  0.5 %  Unknown  Unknown  F



Ordering Physician [facility] Uiaqvjr1750-52-44 08:13:00Identifier [id number]-
6 Result Time 2019 08:13:00Unknown





 Test Item  Value  Reference Range  Comments

 

 Unknown (test code = BLI4807)  23.2 10^3/ul  Unknown  1.5-7.7  F



Ordering Physician [facility] Hxxdtyk7099-54-44 08:13:00Identifier [id number]-
6 Result Time 2019 08:13:00Unknown





 Test Item  Value  Reference Range  Comments

 

 Unknown (test code = 742-7)  2.4 10^3/ul  Unknown  0-0.8  F



Ordering Physician [facility] Yooxxnb1434-49-48 08:13:00Identifier [id number]-
6 Result Time 2019 08:13:00Unknown





 Test Item  Value  Reference Range  Comments

 

 Unknown (test code = 731-0)  1.5 10^3/ul  Unknown  1.0-4.8  F



Ordering Physician [facility] Tdxlesf0308-89-91 08:13:00Identifier [id number]-
6 Result Time 2019 08:13:00Unknown





 Test Item  Value  Reference Range  Comments

 

 Unknown (test code = 711-2)  0.2 10^3/ul  Unknown  0-0.6  F



Ordering Physician [facility] Kpxzfrz2221-68-59 08:13:00Identifier [id number]-
6 Result Time 2019 08:13:00Unknown





 Test Item  Value  Reference Range  Comments

 

 Unknown (test code = 704-7)  0.1 10^3/ul  Unknown  0-0.2  F



Ordering Physician [facility] Wkzoxny0562-48-01 06:46:00Identifier [id number]-
6 Result Time 2019 06:46:00Unknown





 Test Item  Value  Reference Range  Comments

 

 Unknown (test code = 66693-8)  0.05 ng/mL  Unknown  Unknown  F



Ordering Physician [facility] Sclqvib5009-15-00 06:46:00Identifier [id number]-
6 Result Time 2019 06:46:00Unknown





 Test Item  Value  Reference Range  Comments

 

 Unknown (test code = 2777-1)  2.8 mg/dL  Unknown  2.5-5.0  F



Ordering Physician [facility] Zyriaza6601-63-62 06:46:00Identifier [id number]-
6 Result Time 2019 06:46:00Unknown





 Test Item  Value  Reference Range  Comments

 

 Unknown (test code = 36751-6)  2.5 mg/dL  Unknown  1.9-2.7  F



Ordering Physician [facility] Studies2019-04-15 10:48:00Identifier [id number]-
6 Result Time 2019-04-15 10:48:00Unknown





 Test Item  Value  Reference Range  Comments

 

 Unknown (test code = NullTestCode)  6.0   Unknown  5-9  F



Ordering Physician [facility] Studies2019-04-15 10:48:00Identifier [id number]
6 Result Time 2019-04-15 10:48:00Unknown





 Test Item  Value  Reference Range  Comments

 

 Unknown (test code = 19014-0)  1.013   Unknown  1.010-1.030  F



Ordering Physician [facility] Studies2019-04-15 10:15:00Identifier [id number]-
6 Result Time 2019-04-15 10:15:00Unknown





 Test Item  Value  Reference Range  Comments

 

 Unknown (test code = 3016-3)  3.38 mcIU/mL  Unknown  0.34-5.60  F



Ordering Physician [facility] Studies2019-04-15 10:15:00Identifier [id number]
6 Result Time 2019-04-15 10:15:00Unknown





 Test Item  Value  Reference Range  Comments

 

 Unknown (test code = 16947-0)  1.14   Unknown  0.77-1.02  F



Ordering Physician [facility] Studies2019-04-15 10:15:00Identifier [id number]
6 Result Time 2019-04-15 10:15:00Unknown





 Test Item  Value  Reference Range  Comments

 

 Unknown (test code = 92643-9)  365 pg/mL  Unknown  Unknown  F



Ordering Physician [facility] Studies2019-04-15 10:15:00Identifier [id number]-
6 Result Time 2019-04-15 10:15:00Unknown





 Test Item  Value  Reference Range  Comments

 

 Unknown (test code = 2885-2)  8.9 g/dL  Unknown  6.4-8.9  F



Ordering Physician [facility] Studies2019-04-15 10:15:00Identifier [id number]-
6 Result Time 2019-04-15 10:15:00Unknown





 Test Item  Value  Reference Range  Comments

 

 Unknown (test code = 1975-2)  0.60 mg/dL  Unknown  0.2-1.0  F



Ordering Physician [facility] Studies2019-04-15 10:15:00Identifier [id number]
6 Result Time 2019-04-15 10:15:00Unknown





 Test Item  Value  Reference Range  Comments

 

 Unknown (test code = NullTestCode)  4.9 g/dL  Unknown  2-4  F



Ordering Physician [facility] Studies2019-04-15 10:15:00Identifier [id number]-
6 Result Time 2019-04-15 10:15:00Unknown





 Test Item  Value  Reference Range  Comments

 

 Unknown (test code = 1988-5)  11.17 mg/L  Unknown  0-8.00  F



Ordering Physician [facility] Studies2019-04-15 10:15:00Identifier [id number]-
6 Result Time 2019-04-15 10:15:00Unknown





 Test Item  Value  Reference Range  Comments

 

 Unknown (test code = 1920-8)  25 U/L  Unknown  13-39  F



Ordering Physician [facility] Studies2019-04-15 10:15:00Identifier [id number]-
6 Result Time 2019-04-15 10:15:00Unknown





 Test Item  Value  Reference Range  Comments

 

 Unknown (test code = 6768-6)  153 U/L  Unknown    F



Ordering Physician [facility] Studies2019-04-15 10:15:00Identifier [id number]
6 Result Time 2019-04-15 10:15:00Unknown





 Test Item  Value  Reference Range  Comments

 

 Unknown (test code = 1759-0)  0.8   Unknown  1-3  F



Ordering Physician [facility] Studies2019-04-15 10:15:00Identifier [id number]
6 Result Time 2019-04-15 10:15:00Unknown





 Test Item  Value  Reference Range  Comments

 

 Unknown (test code = 81245-2)  4.0 g/dL  Unknown  3.2-5.2  F



Ordering Physician [facility] Studies2019-04-15 10:15:00Identifier [id number]-
6 Result Time 2019-04-15 10:15:00Unknown





 Test Item  Value  Reference Range  Comments

 

 Unknown (test code = 1742-6)  13 U/L  Unknown  7-52  F



Ordering Physician [facility] Studies2019-04-15 10:15:00Identifier [id number]-
6 Result Time 2019-04-15 10:15:00Unknown





 Test Item  Value  Reference Range  Comments

 

 Unknown (test code = 2524-7)  0.9 mmol/L  Unknown  0.5-2.0  F



Ordering Physician Unknown

## 2019-12-03 NOTE — XMS REPORT
Patient Summary Document

 Created on:2019



Patient:BERTHA SOLIS

Sex:Male

:1933

External Reference #:[id number]





Demographics







 Address  [address] [place], [place] [address]

 

 Home Phone  [phone]

 

 Preferred Language  English

 

 Marital Status  Unknown

 

 [employer] Affiliation  Unknown

 

 Race  Unknown

 

 Ethnic Group  Unknown









Author







 Organization  [facility]









Support







 Name  Relationship  Address  Phone

 

 IRAIS SOLIS, Unknown Name  Suha  [address]  [phone]



     [place] [address]  









Care Team Providers







 Name  Role  Phone

 

 Unavailable  Unavailable  Unavailable









Problems

This patient has no known problems.



Allergies, Adverse Reactions, Alerts







 Allergy  Allergy  Status  Severity  Reaction(s)  Onset  Inactive  Treating  
Comments



 Name  Type        Date  Date  Clinician  

 

 Uncoded  Unknown  Active  Unknown  Reaction  2019    Interface  



 free-text        Unknown  -18      



 allergy                







Medications







 Ordered  Filled  Start  Stop  Current  Ordering  Indication  Dosage  Frequency
  Signature  Comments  Components



 Medication  Medication  Date  Date  Medication?  Clinician        (SIG)    



 Name  Name                    

 

 Multivitami  Multivitami  2012    No  Unknown    Unknown  Unknown      



 ns/Minerals  ns/Minerals  -                  



 Tab*  Tab*                    

 

 amLODIPine  amLODIPine      No  Unknown    Unknown  Unknown      



 5 mg tablet  5 mg tablet  4-15                  

 

 acetaminoph  acetaminoph      No  Unknown    Unknown  Unknown      



 en 500 mg  en 500 mg  4-15                  



 tablet  tablet                    

 

 magnesium  magnesium      No  Unknown    Unknown  Unknown      



 oxide 400  oxide 400  4-15                  



 mg (241.3  mg (241.3                    



 mg  mg                    



 magnesium)  magnesium)                    



 tablet  tablet                    

 

 omeprazole  omeprazole      No  Unknown    Unknown  Unknown      



 40 mg  40 mg  4-15                  



 capsule,del  capsule,del                    



 ayed  ayed                    



 release  release                    

 

 verapamil  verapamil      No  Unknown    Unknown  Unknown      



  mg   mg  4-15                  



 capsule,ext  capsule,ext                    



 ended  ended                    



 release  release                    

 

 Calcium  Calcium      No  Unknown    Unknown  Unknown      



 Carbonate/V  Carbonate/V  4-15                  



 itamin D3  itamin D3                    

 

 cefdinir  cefdinir      No  Unknown    Unknown  Unknown      



 300 mg  300 mg  4-17                  



 capsule  capsule                    







Vital Signs







 Vital Name  Observation Time  Observation Value  Comments

 

 SYSTOLIC mm[Hg]  2019 18:05:21  135 mm[Hg] mm[Hg]  Method: Sit

 

 DIASTOLIC mm[Hg]  2019 18:05:21  48 mm[Hg] mm[Hg]  Method: Sit

 

 PULSE  2019 18:05:21  63 /min /min  

 

 RESP RATE  2019 18:05:21  16 /min /min  

 

 TEMP  2019 18:05:21  97.4 [degF]  







Procedures

This patient has no known procedures.



Results







 Test Description  Test Time  Test Comments  Text Results  Atomic Results  
Result Comments









 Laboratory Studies  2019 08:13:00  Identifier [id number] Result Time  
Unknown



     2019 08:13:00  









   Test Item  Value  Reference Range  Comments









 Unknown (test code = 2951-2)  136 mmol/L  Unknown  135-145  F



Ordering Physician [facility] Lhkgddt0408-61-40 08:13:00Identifier [id number]-
6 Result Time 2019 08:13:00Unknown





 Test Item  Value  Reference Range  Comments

 

 Unknown (test code = 2823-3)  4.0 mmol/L  Unknown  3.5-5.0  F



Ordering Physician [facility] Ocnyfyf1967-20-47 08:13:00Identifier [id number]-
6 Result Time 2019 08:13:00Unknown





 Test Item  Value  Reference Range  Comments

 

 Unknown (test code = 2345-7)  87 mg/dL  Unknown    F



Ordering Physician [facility] Dymzsnm3697-97-32 08:13:00Identifier [id number]-
6 Result Time 2019 08:13:00Unknown





 Test Item  Value  Reference Range  Comments

 

 Unknown (test code = 48642-3)  46.9   Unknown  Unknown  F



Ordering Physician [facility] Fqfzfhg1436-77-04 08:13:00Identifier [id number]-
6 Result Time 2019 08:13:00Unknown





 Test Item  Value  Reference Range  Comments

 

 Unknown (test code = NullTestCode)  56.7   Unknown  Unknown  F



Ordering Physician [facility] Jkmgsee8442-88-34 08:13:00Identifier [id number]-
6 Result Time 2019 08:13:00Unknown





 Test Item  Value  Reference Range  Comments

 

 Unknown (test code = 2160-0)  1.43 mg/dL  Unknown  0.67-1.17  F



Ordering Physician [facility] Kvpkwjx2477-72-04 08:13:00Identifier [id number]-
6 Result Time 2019 08:13:00Unknown





 Test Item  Value  Reference Range  Comments

 

 Unknown (test code = 2075-0)  104 mmol/L  Unknown  101-111  F



Ordering Physician [facility] Aoqsvtg3971-26-15 08:13:00Identifier [id number]-
6 Result Time 2019 08:13:00Unknown





 Test Item  Value  Reference Range  Comments

 

 Unknown (test code = 2028-9)  24 mmol/L  Unknown  22-32  F



Ordering Physician [facility] Jwjzram5256-15-85 08:13:00Identifier [id number]-
6 Result Time 2019 08:13:00Unknown





 Test Item  Value  Reference Range  Comments

 

 Unknown (test code = 33000-6)  9.0 mg/dL  Unknown  8.6-10.3  F



Ordering Physician [facility] Kwwkrun6771-44-44 08:13:00Identifier [id number]-
6 Result Time 2019 08:13:00Unknown





 Test Item  Value  Reference Range  Comments

 

 Unknown (test code = 3094-0)  24 mg/dL  Unknown  6-24  F



Ordering Physician [facility] Fhilwvz0413-87-32 08:13:00Identifier [id number]-
6 Result Time 2019 08:13:00Unknown





 Test Item  Value  Reference Range  Comments

 

 Unknown (test code = 3097-3)  16.8   Unknown  8-20  F



Ordering Physician [facility] Vyybguh8350-76-75 08:13:00Identifier [id number]-
6 Result Time 2019 08:13:00Unknown





 Test Item  Value  Reference Range  Comments

 

 Unknown (test code = 33037-3)  8 mmol/L  Unknown  2-11  F



Ordering Physician [facility] Qjdbwuv9356-24-97 08:13:00Identifier [id number]-
6 Result Time 2019 08:13:00Unknown





 Test Item  Value  Reference Range  Comments

 

 Unknown (test code = 70395-1)  27.4 10^3/uL  Unknown  3.5-10.8  F



Ordering Physician [facility] Quondsl6847-83-26 08:13:00Identifier [id number]-
6 Result Time 2019 08:13:00Unknown





 Test Item  Value  Reference Range  Comments

 

 Unknown (test code = 788-0)  24 %  Unknown  10.5-15  F



Ordering Physician [facility] Klbyiht2125-92-41 08:13:00Identifier [id number]-
6 Result Time 2019 08:13:00Unknown





 Test Item  Value  Reference Range  Comments

 

 Unknown (test code = 789-8)  5.19 10^6 /uL  Unknown  4.18-5.48  F



Ordering Physician [facility] Jyizvym5482-20-51 08:13:00Identifier [id number]-
6 Result Time 2019 08:13:00Unknown





 Test Item  Value  Reference Range  Comments

 

 Unknown (test code = 777-3)  330 10^3/uL  Unknown  150-450  F



Ordering Physician [facility] Itdzfmg1540-02-84 08:13:00Identifier [id number]-
6 Result Time 2019 08:13:00Unknown





 Test Item  Value  Reference Range  Comments

 

 Unknown (test code = 04177-0)  0   Unknown  Unknown  F



Ordering Physician [facility] Reeyfxc2389-49-60 08:13:00Identifier [id number]-
6 Result Time 2019 08:13:00Unknown





 Test Item  Value  Reference Range  Comments

 

 Unknown (test code = 771-6)  0 10^3/ul  Unknown  Unknown  F



Ordering Physician [facility] Zapqcbk5599-54-22 08:13:00Identifier [id number]-
6 Result Time 2019 08:13:00Unknown





 Test Item  Value  Reference Range  Comments

 

 Unknown (test code = 770-8)  84.7 %  Unknown  Unknown  F



Ordering Physician [facility] Vswpnda0988-44-68 08:13:00Identifier [id number]-
6 Result Time 2019 08:13:00Unknown





 Test Item  Value  Reference Range  Comments

 

 Unknown (test code = 5905-5)  8.6 %  Unknown  Unknown  F



Ordering Physician [facility] Cunqwxu5742-38-45 08:13:00Identifier [id number]-
6 Result Time 2019 08:13:00Unknown





 Test Item  Value  Reference Range  Comments

 

 Unknown (test code = 86275-5)  8.6 fL  Unknown  7.4-10.4  F



Ordering Physician [facility] Jfshdrz2190-77-79 08:13:00Identifier [id number]-
6 Result Time 2019 08:13:00Unknown





 Test Item  Value  Reference Range  Comments

 

 Unknown (test code = 787-2)  78 fL  Unknown  80-94  F



Ordering Physician [facility] Npfwbwt2453-51-22 08:13:00Identifier [id number]-
6 Result Time 2019 08:13:00Unknown





 Test Item  Value  Reference Range  Comments

 

 Unknown (test code = 786-4)  31 g/dL  Unknown  31-36  F



Ordering Physician [facility] Hfjroqd5338-21-52 08:13:00Identifier [id number]-
6 Result Time 2019 08:13:00Unknown





 Test Item  Value  Reference Range  Comments

 

 Unknown (test code = 785-6)  24 pg  Unknown  27-31  F



Ordering Physician [facility] Puerygk5593-85-02 08:13:00Identifier [id number]-
6 Result Time 2019 08:13:00Unknown





 Test Item  Value  Reference Range  Comments

 

 Unknown (test code = 736-9)  5.4 %  Unknown  Unknown  F



Ordering Physician [facility] Swemyni0103-44-28 08:13:00Identifier [id number]-
6 Result Time 2019 08:13:00Unknown





 Test Item  Value  Reference Range  Comments

 

 Unknown (test code = 718-7)  12.3 g/dL  Unknown  14.0-18.0  F



Ordering Physician [facility] Arbolfr7782-33-70 08:13:00Identifier [id number]-
6 Result Time 2019 08:13:00Unknown





 Test Item  Value  Reference Range  Comments

 

 Unknown (test code = 4544-3)  40 %  Unknown  36-46  F



Ordering Physician [facility] Xvhjeao4535-77-79 08:13:00Identifier [id number]-
6 Result Time 2019 08:13:00Unknown





 Test Item  Value  Reference Range  Comments

 

 Unknown (test code = 713-8)  0.8 %  Unknown  Unknown  F



Ordering Physician [facility] Znvhrnl9217-47-75 08:13:00Identifier [id number]-
6 Result Time 2019 08:13:00Unknown





 Test Item  Value  Reference Range  Comments

 

 Unknown (test code = 706-2)  0.5 %  Unknown  Unknown  F



Ordering Physician [facility] Qifikno3894-26-38 08:13:00Identifier [id number]-
6 Result Time 2019 08:13:00Unknown





 Test Item  Value  Reference Range  Comments

 

 Unknown (test code = TRS5917)  23.2 10^3/ul  Unknown  1.5-7.7  F



Ordering Physician [facility] Gweblck5360-66-94 08:13:00Identifier [id number]-
6 Result Time 2019 08:13:00Unknown





 Test Item  Value  Reference Range  Comments

 

 Unknown (test code = 742-7)  2.4 10^3/ul  Unknown  0-0.8  F



Ordering Physician [facility] Zpimren3043-89-17 08:13:00Identifier [id number]-
6 Result Time 2019 08:13:00Unknown





 Test Item  Value  Reference Range  Comments

 

 Unknown (test code = 731-0)  1.5 10^3/ul  Unknown  1.0-4.8  F



Ordering Physician [facility] Lxygbic8502-69-04 08:13:00Identifier [id number]-
6 Result Time 2019 08:13:00Unknown





 Test Item  Value  Reference Range  Comments

 

 Unknown (test code = 711-2)  0.2 10^3/ul  Unknown  0-0.6  F



Ordering Physician [facility] Wqmaskl2943-60-67 08:13:00Identifier [id number]-
6 Result Time 2019 08:13:00Unknown





 Test Item  Value  Reference Range  Comments

 

 Unknown (test code = 704-7)  0.1 10^3/ul  Unknown  0-0.2  F



Ordering Physician [facility] Nocgnph4755-63-38 06:46:00Identifier [id number]-
6 Result Time 2019 06:46:00Unknown





 Test Item  Value  Reference Range  Comments

 

 Unknown (test code = 62542-4)  0.05 ng/mL  Unknown  Unknown  F



Ordering Physician [facility] Rutpiyq7351-92-45 06:46:00Identifier [id number]-
6 Result Time 2019 06:46:00Unknown





 Test Item  Value  Reference Range  Comments

 

 Unknown (test code = 2777-1)  2.8 mg/dL  Unknown  2.5-5.0  F



Ordering Physician [facility] Jnyynbv5447-80-75 06:46:00Identifier [id number]-
6 Result Time 2019 06:46:00Unknown





 Test Item  Value  Reference Range  Comments

 

 Unknown (test code = 07948-0)  2.5 mg/dL  Unknown  1.9-2.7  F



Ordering Physician [facility] Studies2019-04-15 10:48:00Identifier [id number]-
6 Result Time 2019-04-15 10:48:00Unknown





 Test Item  Value  Reference Range  Comments

 

 Unknown (test code = NullTestCode)  6.0   Unknown  5-9  F



Ordering Physician [facility] Studies2019-04-15 10:48:00Identifier [id number]-
6 Result Time 2019-04-15 10:48:00Unknown





 Test Item  Value  Reference Range  Comments

 

 Unknown (test code = 21159-7)  1.013   Unknown  1.010-1.030  F



Ordering Physician [facility] Studies2019-04-15 10:15:00Identifier [id number]-
6 Result Time 2019-04-15 10:15:00Unknown





 Test Item  Value  Reference Range  Comments

 

 Unknown (test code = 3016-3)  3.38 mcIU/mL  Unknown  0.34-5.60  F



Ordering Physician [facility] Studies2019-04-15 10:15:00Identifier [id number]-
6 Result Time 2019-04-15 10:15:00Unknown





 Test Item  Value  Reference Range  Comments

 

 Unknown (test code = 71963-3)  1.14   Unknown  0.77-1.02  F



Ordering Physician [facility] Studies2019-04-15 10:15:00Identifier [id number]-
6 Result Time 2019-04-15 10:15:00Unknown





 Test Item  Value  Reference Range  Comments

 

 Unknown (test code = 49356-8)  365 pg/mL  Unknown  Unknown  F



Ordering Physician [facility] Studies2019-04-15 10:15:00Identifier [id number]-
6 Result Time 2019-04-15 10:15:00Unknown





 Test Item  Value  Reference Range  Comments

 

 Unknown (test code = 2885-2)  8.9 g/dL  Unknown  6.4-8.9  F



Ordering Physician [facility] Studies2019-04-15 10:15:00Identifier [id number]-
6 Result Time 2019-04-15 10:15:00Unknown





 Test Item  Value  Reference Range  Comments

 

 Unknown (test code = 1975-2)  0.60 mg/dL  Unknown  0.2-1.0  F



Ordering Physician [facility] Studies2019-04-15 10:15:00Identifier [id number]-
6 Result Time 2019-04-15 10:15:00Unknown





 Test Item  Value  Reference Range  Comments

 

 Unknown (test code = NullTestCode)  4.9 g/dL  Unknown  2-4  F



Ordering Physician [facility] Studies2019-04-15 10:15:00Identifier [id number]-
6 Result Time 2019-04-15 10:15:00Unknown





 Test Item  Value  Reference Range  Comments

 

 Unknown (test code = 1988-5)  11.17 mg/L  Unknown  0-8.00  F



Ordering Physician [facility] Studies2019-04-15 10:15:00Identifier [id number]-
6 Result Time 2019-04-15 10:15:00Unknown





 Test Item  Value  Reference Range  Comments

 

 Unknown (test code = 1920-8)  25 U/L  Unknown  13-39  F



Ordering Physician [facility] Studies2019-04-15 10:15:00Identifier [id number]-
6 Result Time 2019-04-15 10:15:00Unknown





 Test Item  Value  Reference Range  Comments

 

 Unknown (test code = 6768-6)  153 U/L  Unknown    F



Ordering Physician [facility] Studies2019-04-15 10:15:00Identifier [id number]
6 Result Time 2019-04-15 10:15:00Unknown





 Test Item  Value  Reference Range  Comments

 

 Unknown (test code = 1759-0)  0.8   Unknown  1-3  F



Ordering Physician [facility] Studies2019-04-15 10:15:00Identifier [id number]-
6 Result Time 2019-04-15 10:15:00Unknown





 Test Item  Value  Reference Range  Comments

 

 Unknown (test code = 71389-8)  4.0 g/dL  Unknown  3.2-5.2  F



Ordering Physician [facility] Studies2019-04-15 10:15:00Identifier [id number]-
6 Result Time 2019-04-15 10:15:00Unknown





 Test Item  Value  Reference Range  Comments

 

 Unknown (test code = 1742-6)  13 U/L  Unknown  7-52  F



Ordering Physician [facility] Studies2019-04-15 10:15:00Identifier [id number]-
6 Result Time 2019-04-15 10:15:00Unknown





 Test Item  Value  Reference Range  Comments

 

 Unknown (test code = 2524-7)  0.9 mmol/L  Unknown  0.5-2.0  F



Ordering Physician Unknown

## 2019-12-03 NOTE — XMS REPORT
Continuity of Care Document (CCD)

 Created on:2019



Patient:Jorge Johnson

Sex:Male

:1933

External Reference #:[id number]





Demographics







 Address  [address] [place] [address]

 

 Home Phone  [phone]

 

 Mobile Phone  [phone]

 

 Email Address  [email or web address]

 

 Preferred Language  en

 

 Marital Status  Not  or 

 

 Advent Affiliation  Unknown

 

 Race  White

 

 Ethnic Group  Not  or 









Author







 Name  Mateus Nunes N.P. (transmitted by agent of provider Sindy Happy)

 

 Address  [address], [address]



   [place] [address]









Care Team Providers







 Name  Role  Phone

 

 Rosa Andre DO - Hospitalist  Care Team Information   [phone]
[phone]









Problems







 Active Problems  Provider  Date

 

 Cellulitis of left lower limb  Madhavi Olmos M.D.  Onset: 2018

 

 Essential hypertension  Madhavi Olmos M.D.  Onset: 2018

 

 Gastroesophageal reflux disease  NELLY Ho  Onset: 2018

 

 Polycythemia vera (clinical)  NELLY Ho  Onset: 2018







Social History







 Type  Date  Description  Comments

 

 Birth Sex    Unknown  

 

 Tobacco Use  Start: Unknown End:  Former Cigarette Smoker  



   Unknown    

 

 Smoking Status  Reviewed: 10/31/19  Former Cigarette Smoker  

 

 ETOH Use    Denies alcohol use  

 

 Tobacco Use  Start: Unknown End:  Patient is a former smoker  



   Unknown    

 

 Recreational Drug Use    Denies Drug Use  

 

 Exercise Type/Frequency    Exercises regularly  







Allergies, Adverse Reactions, Alerts







 Description

 

 No Known Drug Allergies







Medications







 Active Medications  SIG  Qnty  Indications  Ordering Provider  Date

 

 Topiramate  take one tab  60tabs  R51  Mateus Nunes,  10/01/2019



         25mg Tablets  twice a day      N.P.  



           

 

 Atorvastatin Calcium  by mouth every  30tabs  I65.1  Rosa Andre DO  2019



                   40mg  night        



 Tablets          



           

 

 Clopidogrel Bisulfate  take one tab  90tabs  I65.1  Rosa Andre DO  2019



   daily        



 75mg Tablets          



           

 

 Verapamil HCL ER  1/2 tab by  45tabs  I65.1  Rosa Andre DO  2018



               120mg  mouth once a        



 Tablets ER  day.        



           

 

 Multivitamin Adult  1 by mouth      Unknown  



   every day        



 Tablets          



           

 

 Magnesium  1 by mouth      Unknown  



        400mg Tablets  every day        



           

 

 Hydroxyurea  1 by mouth  90caps    Rosa Andre, DO  



          500mg  every other day        



 Capsules          



           

 

 Pantoprazole Sodium  1 by mouth  90tabs    Kalen Valles MD  



                  40mg  every day        



 Tablets DR          



           

 

 Calcium 600/Vitamin D3  1 by mouth      Unknown  



   every day        



 600-800mg-Unit Tablets          



           

 

 Calcium Magnesium And        Unknown  



 Zinc With D3          









 History Medications









 Topiramate  1 by mouth  30tabs  R51  Rafa Dupont,  2019 -



           25mg  daily      M.DConnor  10/01/2019



 Tablets          



           

 

 Aspirin 81  1 by mouth      Rosa Andre DO  2019 -



           81mg  every day(2-3        2019



 Tablets DR  times a week)        



           







Immunizations







 CPT Code  Status  Date  Vaccine  Lot #

 

 [id number]  Given  10/08/2019  Pneumonia Vaccine  [id number]







Vital Signs







 Date  Vital  Result  Comment

 

 10/31/2019 11:03am  Height  71 inches  5'11"









 Weight  170.00 lb  

 

 Heart Rate  78 /min  

 

 BP Systolic  132 mmHg  

 

 BP Diastolic  62 mmHg  

 

 BMI (Body Mass Index)  23.7 kg/m2  









 10/08/2019  9:06am  Height  71 inches  5'11"









 Weight  171.38 lb  

 

 Heart Rate  57 /min  

 

 BP Systolic  142 mmHg  

 

 BP Diastolic  80 mmHg  

 

 Body Temperature  97.4 F  

 

 O2 % BldC Oximetry  98 %  

 

 BMI (Body Mass Index)  23.9 kg/m2  







Results







 Description

 

 No Information Available







Procedures







 Description

 

 No Information Available







Medical Devices







 Description

 

 No Information Available







Encounters







 Type  Date  Location  Provider  Dx  Diagnosis

 

 Office Visit  10/01/2019  [facility]  Mateus Nunes,  R51  Headache



   9:00a  [facility]  N.PConnor    









 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

 

 G90.09  Other idiopathic peripheral autonomic neuropathy

 

 R42  Dizziness and giddiness









 Office Visit  2019  4:15p  [facility]  KACIE Hbubard  
Headache



     [facility]    









 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









 Office Visit  2019  3:20p  [facility]  Rosa Andre,  I65.1  
Occlusion and



     Medicine - Suite  DO    stenosis of



     R      basilar artery









 I10  Essential (primary) hypertension

 

 I35.0  Nonrheumatic aortic (valve) stenosis

 

 R51  Headache

 

 D45  Polycythemia vera







Assessments







 Date  Code  Description  Provider

 

 10/31/2019  G90.09  Other idiopathic peripheral autonomic  Mateus Nunes, 
N.P.



     neuropathy  

 

 10/31/2019  R42  Dizziness and giddiness  Mateus Nunes, N.P.

 

 10/31/2019  R51  Headache  Mateus Nunes, N.P.

 

 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

 

 10/08/2019  R51  Headache  Rosa Andre, DO

 

 10/08/2019  Z23  Encounter for immunization  Rosa Andre, DO

 

 10/01/2019  R51  Headache  Mateus Nunes, N.P.

 

 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 
N.P.

 

 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

 

 10/01/2019  G90.09  Other idiopathic peripheral autonomic  Mateus Nunes, 
N.P.



     neuropathy  

 

 10/01/2019  R42  Dizziness and giddiness  Mateus Nunes, N.P.

 

 2019  R51  Headache  Rafa Dupont M.D.

 

 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 

 

 2019  I10  Essential (primary) hypertension  Rosa Andre, DO

 

 2019  I35.0  Nonrheumatic aortic (valve) stenosis  Rosa Andre, 

 

 2019  R51  Headache  Rosa Andre, DO

 

 2019  D45  Polycythemia vera  Rosa Andre, DO







Plan of Treatment

Future Appointment(s):12/10/2019 11:30 am - Mateus Nunes N.P. at [facility]10/ - Mateus Nunes N.P.G90.09 Other 
idiopathic peripheral autonomic neuropathyNew Therapy:Physical TherapyFollow up:
follow before you leave your [id number] Dizziness and giddinessRecommendations:
Make follow up with Dr John51 Headache



Functional Status







 Description

 

 No Information Available







Mental Status







 Description

 

 No Information Available







Referrals







 Refer to   Reason for Referral  Status  Appt Date

 

 Rafa Dupont M.D.    Received Complete  09/15/2019









 [address] 

 

 [address]

 

 [place] [address]

 

 [phone]

 

 









 Narendra Peterson,  PH.D.    Sent  2019









 [facility] [place] [address]

 

 [phone]

## 2019-12-03 NOTE — XMS REPORT
Patient Summary Document

 Created on:November 15, 2019



Patient:BERTHA SOLIS

Sex:Male

:1933

External Reference #:[id number]





Demographics







 Address  [address] [place], [place] [address]

 

 Home Phone  [phone]

 

 Preferred Language  English

 

 Marital Status  Unknown

 

 Gnosticist Affiliation  Unknown

 

 Race  Unknown

 

 Ethnic Group  Unknown









Author







 Organization  [facility]









Support







 Name  Relationship  Address  Phone

 

 IRAIS SOLIS, Unknown Name  Suha  [address] [facility]  [phone]



     [place] [address]  









Care Team Providers







 Name  Role  Phone

 

 Unavailable  Unavailable  Unavailable









Problems

This patient has no known problems.



Allergies, Adverse Reactions, Alerts







 Allergy  Allergy  Status  Severity  Reaction(s)  Onset  Inactive  Treating  
Comments



 Name  Type        Date  Date  Clinician  

 

 Uncoded  Unknown  Active  Unknown  Reaction  2019    Interface  



 free-text        Unknown  -18      



 allergy                







Medications







 Ordered  Filled  Start  Stop  Current  Ordering  Indication  Dosage  Frequency
  Signature  Comments  Components



 Medication  Medication  Date  Date  Medication?  Clinician        (SIG)    



 Name  Name                    

 

 Multivitami  Multivitami  2012    No  Unknown    Unknown  Unknown      



 ns/Minerals  ns/Minerals  -                  



 Tab*  Tab*                    

 

 amLODIPine  amLODIPine      No  Unknown    Unknown  Unknown      



 5 mg tablet  5 mg tablet  4-15                  

 

 acetaminoph  acetaminoph      No  Unknown    Unknown  Unknown      



 en 500 mg  en 500 mg  4-15                  



 tablet  tablet                    

 

 magnesium  magnesium      No  Unknown    Unknown  Unknown      



 oxide 400  oxide 400  4-15                  



 mg (241.3  mg (241.3                    



 mg  mg                    



 magnesium)  magnesium)                    



 tablet  tablet                    

 

 omeprazole  omeprazole      No  Unknown    Unknown  Unknown      



 40 mg  40 mg  4-15                  



 capsule,del  capsule,del                    



 ayed  ayed                    



 release  release                    

 

 verapamil  verapamil      No  Unknown    Unknown  Unknown      



  mg   mg  4-15                  



 capsule,ext  capsule,ext                    



 ended  ended                    



 release  release                    

 

 Calcium  Calcium      No  Unknown    Unknown  Unknown      



 Carbonate/V  Carbonate/V  4-15                  



 itamin D3  itamin D3                    

 

 cefdinir  cefdinir      No  Unknown    Unknown  Unknown      



 300 mg  300 mg  4-17                  



 capsule  capsule                    







Vital Signs







 Vital Name  Observation Time  Observation Value  Comments

 

 SYSTOLIC mm[Hg]  2019 18:05:21  135 mm[Hg] mm[Hg]  Method: Sit

 

 DIASTOLIC mm[Hg]  2019 18:05:21  48 mm[Hg] mm[Hg]  Method: Sit

 

 PULSE  2019 18:05:21  63 /min /min  

 

 RESP RATE  2019 18:05:21  16 /min /min  

 

 TEMP  2019 18:05:21  97.4 [degF]  







Procedures

This patient has no known procedures.



Results







 Test Description  Test Time  Test Comments  Text Results  Atomic Results  
Result Comments









 Laboratory Studies  2019 08:13:00  Identifier [id number] Result Time  
Unknown



     2019 08:13:00  









   Test Item  Value  Reference Range  Comments









 Unknown (test code = 2951-2)  136 mmol/L  Unknown  135-145  F



Ordering Physician [facility] Mncpwyj3119-14-63 08:13:00Identifier [id number]
6 Result Time 2019 08:13:00Unknown





 Test Item  Value  Reference Range  Comments

 

 Unknown (test code = 2823-3)  4.0 mmol/L  Unknown  3.5-5.0  F



Ordering Physician [facility] Qspzalh4156-86-25 08:13:00Identifier [id number]-
6 Result Time 2019 08:13:00Unknown





 Test Item  Value  Reference Range  Comments

 

 Unknown (test code = 2345-7)  87 mg/dL  Unknown    F



Ordering Physician [facility] Tviequi8504-36-83 08:13:00Identifier [id number]-
6 Result Time 2019 08:13:00Unknown





 Test Item  Value  Reference Range  Comments

 

 Unknown (test code = 48642-3)  46.9   Unknown  Unknown  F



Ordering Physician [facility] Giuqbuj5154-75-05 08:13:00Identifier [id number]
6 Result Time 2019 08:13:00Unknown





 Test Item  Value  Reference Range  Comments

 

 Unknown (test code = NullTestCode)  56.7   Unknown  Unknown  F



Ordering Physician [facility] Dwyakvu9286-10-47 08:13:00Identifier [id number]
6 Result Time 2019 08:13:00Unknown





 Test Item  Value  Reference Range  Comments

 

 Unknown (test code = 2160-0)  1.43 mg/dL  Unknown  0.67-1.17  F



Ordering Physician [facility] Xqrmnco0227-02-35 08:13:00Identifier [id number]-
6 Result Time 2019 08:13:00Unknown





 Test Item  Value  Reference Range  Comments

 

 Unknown (test code = 2075-0)  104 mmol/L  Unknown  101-111  F



Ordering Physician [facility] Osmretp8868-83-45 08:13:00Identifier [id number]-
6 Result Time 2019 08:13:00Unknown





 Test Item  Value  Reference Range  Comments

 

 Unknown (test code = 2028-9)  24 mmol/L  Unknown  22-32  F



Ordering Physician [facility] Fvdajxh9485-73-82 08:13:00Identifier [id number]-
6 Result Time 2019 08:13:00Unknown





 Test Item  Value  Reference Range  Comments

 

 Unknown (test code = 66893-6)  9.0 mg/dL  Unknown  8.6-10.3  F



Ordering Physician [facility] Pljpeih6020-48-70 08:13:00Identifier [id number]
6 Result Time 2019 08:13:00Unknown





 Test Item  Value  Reference Range  Comments

 

 Unknown (test code = 3094-0)  24 mg/dL  Unknown  6-24  F



Ordering Physician [facility] Dvpluue6768-61-01 08:13:00Identifier [id number]-
6 Result Time 2019 08:13:00Unknown





 Test Item  Value  Reference Range  Comments

 

 Unknown (test code = 3097-3)  16.8   Unknown  8-20  F



Ordering Physician [facility] Orikwjs0891-19-56 08:13:00Identifier [id number]
6 Result Time 2019 08:13:00Unknown





 Test Item  Value  Reference Range  Comments

 

 Unknown (test code = 33037-3)  8 mmol/L  Unknown  2-11  F



Ordering Physician [facility] Mtgejya8120-97-91 08:13:00Identifier [id number]-
6 Result Time 2019 08:13:00Unknown





 Test Item  Value  Reference Range  Comments

 

 Unknown (test code = 92677-8)  27.4 10^3/uL  Unknown  3.5-10.8  F



Ordering Physician [facility] Btujuur1150-41-63 08:13:00Identifier [id number]-
6 Result Time 2019 08:13:00Unknown





 Test Item  Value  Reference Range  Comments

 

 Unknown (test code = 788-0)  24 %  Unknown  10.5-15  F



Ordering Physician [facility] Kdcrvkx0720-63-93 08:13:00Identifier [id number]-
6 Result Time 2019 08:13:00Unknown





 Test Item  Value  Reference Range  Comments

 

 Unknown (test code = 789-8)  5.19 10^6 /uL  Unknown  4.18-5.48  F



Ordering Physician [facility] Dheophz7200-94-71 08:13:00Identifier [id number]-
6 Result Time 2019 08:13:00Unknown





 Test Item  Value  Reference Range  Comments

 

 Unknown (test code = 777-3)  330 10^3/uL  Unknown  150-450  F



Ordering Physician [facility] Lsiggpj1599-25-27 08:13:00Identifier [id number]
6 Result Time 2019 08:13:00Unknown





 Test Item  Value  Reference Range  Comments

 

 Unknown (test code = 94964-0)  0   Unknown  Unknown  F



Ordering Physician [facility] Lpgqcdn0857-48-87 08:13:00Identifier [id number]-
6 Result Time 2019 08:13:00Unknown





 Test Item  Value  Reference Range  Comments

 

 Unknown (test code = 771-6)  0 10^3/ul  Unknown  Unknown  F



Ordering Physician [facility] Ymbpuzi1266-71-21 08:13:00Identifier [id number]-
6 Result Time 2019 08:13:00Unknown





 Test Item  Value  Reference Range  Comments

 

 Unknown (test code = 770-8)  84.7 %  Unknown  Unknown  F



Ordering Physician [facility] Ehkvryj8147-42-67 08:13:00Identifier [id number]-
6 Result Time 2019 08:13:00Unknown





 Test Item  Value  Reference Range  Comments

 

 Unknown (test code = 5905-5)  8.6 %  Unknown  Unknown  F



Ordering Physician [facility] Qwsoqxh7381-97-88 08:13:00Identifier [id number]-
6 Result Time 2019 08:13:00Unknown





 Test Item  Value  Reference Range  Comments

 

 Unknown (test code = 94464-4)  8.6 fL  Unknown  7.4-10.4  F



Ordering Physician [facility] Qwwsyha9630-86-23 08:13:00Identifier [id number]-
6 Result Time 2019 08:13:00Unknown





 Test Item  Value  Reference Range  Comments

 

 Unknown (test code = 787-2)  78 fL  Unknown  80-94  F



Ordering Physician [facility] Rkftuxx0533-39-35 08:13:00Identifier [id number]-
6 Result Time 2019 08:13:00Unknown





 Test Item  Value  Reference Range  Comments

 

 Unknown (test code = 786-4)  31 g/dL  Unknown  31-36  F



Ordering Physician [facility] Ijeqiin3115-98-06 08:13:00Identifier [id number]-
6 Result Time 2019 08:13:00Unknown





 Test Item  Value  Reference Range  Comments

 

 Unknown (test code = 785-6)  24 pg  Unknown  27-31  F



Ordering Physician [facility] Vtlepts7373-26-08 08:13:00Identifier [id number]-
6 Result Time 2019 08:13:00Unknown





 Test Item  Value  Reference Range  Comments

 

 Unknown (test code = 736-9)  5.4 %  Unknown  Unknown  F



Ordering Physician [facility] Rhswjyg7812-38-24 08:13:00Identifier [id number]-
6 Result Time 2019 08:13:00Unknown





 Test Item  Value  Reference Range  Comments

 

 Unknown (test code = 718-7)  12.3 g/dL  Unknown  14.0-18.0  F



Ordering Physician [facility] Luwrvof7550-83-21 08:13:00Identifier [id number]-
6 Result Time 2019 08:13:00Unknown





 Test Item  Value  Reference Range  Comments

 

 Unknown (test code = 4544-3)  40 %  Unknown  36-46  F



Ordering Physician [facility] Likqsux7981-78-41 08:13:00Identifier [id number]-
6 Result Time 2019 08:13:00Unknown





 Test Item  Value  Reference Range  Comments

 

 Unknown (test code = 713-8)  0.8 %  Unknown  Unknown  F



Ordering Physician [facility] Nmyphfp9842-80-76 08:13:00Identifier [id number]-
6 Result Time 2019 08:13:00Unknown





 Test Item  Value  Reference Range  Comments

 

 Unknown (test code = 706-2)  0.5 %  Unknown  Unknown  F



Ordering Physician [facility] Qyxxbho5505-52-55 08:13:00Identifier [id number]-
6 Result Time 2019 08:13:00Unknown





 Test Item  Value  Reference Range  Comments

 

 Unknown (test code = BBG1090)  23.2 10^3/ul  Unknown  1.5-7.7  F



Ordering Physician [facility] Biadaru8365-28-36 08:13:00Identifier [id number]-
6 Result Time 2019 08:13:00Unknown





 Test Item  Value  Reference Range  Comments

 

 Unknown (test code = 742-7)  2.4 10^3/ul  Unknown  0-0.8  F



Ordering Physician [facility] Tiipcwl9531-84-64 08:13:00Identifier [id number]-
6 Result Time 2019 08:13:00Unknown





 Test Item  Value  Reference Range  Comments

 

 Unknown (test code = 731-0)  1.5 10^3/ul  Unknown  1.0-4.8  F



Ordering Physician [facility] Rbbmaaz1981-51-68 08:13:00Identifier [id number]-
6 Result Time 2019 08:13:00Unknown





 Test Item  Value  Reference Range  Comments

 

 Unknown (test code = 711-2)  0.2 10^3/ul  Unknown  0-0.6  F



Ordering Physician [facility] Iiiqmmc7057-78-22 08:13:00Identifier [id number]-
6 Result Time 2019 08:13:00Unknown





 Test Item  Value  Reference Range  Comments

 

 Unknown (test code = 704-7)  0.1 10^3/ul  Unknown  0-0.2  F



Ordering Physician [facility] Snpmqgf5068-60-52 06:46:00Identifier [id number]-
6 Result Time 2019 06:46:00Unknown





 Test Item  Value  Reference Range  Comments

 

 Unknown (test code = 74956-5)  0.05 ng/mL  Unknown  Unknown  F



Ordering Physician [facility] Ujcvftx0136-51-62 06:46:00Identifier [id number]-
6 Result Time 2019 06:46:00Unknown





 Test Item  Value  Reference Range  Comments

 

 Unknown (test code = 2777-1)  2.8 mg/dL  Unknown  2.5-5.0  F



Ordering Physician [facility] Vtgbgqa1889-43-83 06:46:00Identifier [id number]-
6 Result Time 2019 06:46:00Unknown





 Test Item  Value  Reference Range  Comments

 

 Unknown (test code = 58519-4)  2.5 mg/dL  Unknown  1.9-2.7  F



Ordering Physician [facility] Studies2019-04-15 10:48:00Identifier [id number]-
6 Result Time 2019-04-15 10:48:00Unknown





 Test Item  Value  Reference Range  Comments

 

 Unknown (test code = NullTestCode)  6.0   Unknown  5-9  F



Ordering Physician [facility] Studies2019-04-15 10:48:00Identifier [id number]-
6 Result Time 2019-04-15 10:48:00Unknown





 Test Item  Value  Reference Range  Comments

 

 Unknown (test code = 74013-2)  1.013   Unknown  1.010-1.030  F



Ordering Physician [facility] Studies2019-04-15 10:15:00Identifier [id number]-
6 Result Time 2019-04-15 10:15:00Unknown





 Test Item  Value  Reference Range  Comments

 

 Unknown (test code = 3016-3)  3.38 mcIU/mL  Unknown  0.34-5.60  F



Ordering Physician [facility] Studies2019-04-15 10:15:00Identifier [id number]-
6 Result Time 2019-04-15 10:15:00Unknown





 Test Item  Value  Reference Range  Comments

 

 Unknown (test code = 23872-3)  1.14   Unknown  0.77-1.02  F



Ordering Physician [facility] Studies2019-04-15 10:15:00Identifier [id number]
6 Result Time 2019-04-15 10:15:00Unknown





 Test Item  Value  Reference Range  Comments

 

 Unknown (test code = 29126-6)  365 pg/mL  Unknown  Unknown  F



Ordering Physician [facility] Studies2019-04-15 10:15:00Identifier [id number]
6 Result Time 2019-04-15 10:15:00Unknown





 Test Item  Value  Reference Range  Comments

 

 Unknown (test code = 2885-2)  8.9 g/dL  Unknown  6.4-8.9  F



Ordering Physician [facility] Studies2019-04-15 10:15:00Identifier [id number]
6 Result Time 2019-04-15 10:15:00Unknown





 Test Item  Value  Reference Range  Comments

 

 Unknown (test code = 1975-2)  0.60 mg/dL  Unknown  0.2-1.0  F



Ordering Physician [facility] Studies2019-04-15 10:15:00Identifier [id number]
6 Result Time 2019-04-15 10:15:00Unknown





 Test Item  Value  Reference Range  Comments

 

 Unknown (test code = NullTestCode)  4.9 g/dL  Unknown  2-4  F



Ordering Physician [facility] Studies2019-04-15 10:15:00Identifier [id number]-
6 Result Time 2019-04-15 10:15:00Unknown





 Test Item  Value  Reference Range  Comments

 

 Unknown (test code = 1988-5)  11.17 mg/L  Unknown  0-8.00  F



Ordering Physician [facility] Studies2019-04-15 10:15:00Identifier [id number]
6 Result Time 2019-04-15 10:15:00Unknown





 Test Item  Value  Reference Range  Comments

 

 Unknown (test code = 1920-8)  25 U/L  Unknown  13-39  F



Ordering Physician [facility] Studies2019-04-15 10:15:00Identifier [id number]
6 Result Time 2019-04-15 10:15:00Unknown





 Test Item  Value  Reference Range  Comments

 

 Unknown (test code = 6768-6)  153 U/L  Unknown    F



Ordering Physician [facility] Studies2019-04-15 10:15:00Identifier [id number]-
6 Result Time 2019-04-15 10:15:00Unknown





 Test Item  Value  Reference Range  Comments

 

 Unknown (test code = 1759-0)  0.8   Unknown  1-3  F



Ordering Physician [facility] Studies2019-04-15 10:15:00Identifier [id number]
6 Result Time 2019-04-15 10:15:00Unknown





 Test Item  Value  Reference Range  Comments

 

 Unknown (test code = 64671-3)  4.0 g/dL  Unknown  3.2-5.2  F



Ordering Physician [facility] Studies2019-04-15 10:15:00Identifier [id number]
6 Result Time 2019-04-15 10:15:00Unknown





 Test Item  Value  Reference Range  Comments

 

 Unknown (test code = 1742-6)  13 U/L  Unknown  7-52  F



Ordering Physician [facility] Studies2019-04-15 10:15:00Identifier [id number]
6 Result Time 2019-04-15 10:15:00Unknown





 Test Item  Value  Reference Range  Comments

 

 Unknown (test code = 2524-7)  0.9 mmol/L  Unknown  0.5-2.0  F



Ordering Physician Unknown

## 2019-12-03 NOTE — XMS REPORT
Patient Summary Document

 Created on:2019



Patient:BERTHA SOLIS

Sex:Male

:1933

External Reference #:[id number]





Demographics







 Address  [address] [place], [place] [address]

 

 Home Phone  [phone]

 

 Preferred Language  English

 

 Marital Status  Unknown

 

 Bahai Affiliation  Unknown

 

 Race  Unknown

 

 Ethnic Group  Unknown









Author







 Organization  [facility]









Support







 Name  Relationship  Address  Phone

 

 IRAIS SOLIS, Unknown Name  Suha  [address] [facility]  [phone]



     [place] [address]  









Care Team Providers







 Name  Role  Phone

 

 Unavailable  Unavailable  Unavailable









Problems

This patient has no known problems.



Allergies, Adverse Reactions, Alerts







 Allergy  Allergy  Status  Severity  Reaction(s)  Onset  Inactive  Treating  
Comments



 Name  Type        Date  Date  Clinician  

 

 Uncoded  Unknown  Active  Unknown  Reaction  2019    Interface  



 free-text        Unknown  -18      



 allergy                







Medications







 Ordered  Filled  Start  Stop  Current  Ordering  Indication  Dosage  Frequency
  Signature  Comments  Components



 Medication  Medication  Date  Date  Medication?  Clinician        (SIG)    



 Name  Name                    

 

 Multivitami  Multivitami  2012    No  Unknown    Unknown  Unknown      



 ns/Minerals  ns/Minerals  -                  



 Tab*  Tab*                    

 

 amLODIPine  amLODIPine      No  Unknown    Unknown  Unknown      



 5 mg tablet  5 mg tablet  4-15                  

 

 acetaminoph  acetaminoph      No  Unknown    Unknown  Unknown      



 en 500 mg  en 500 mg  4-15                  



 tablet  tablet                    

 

 magnesium  magnesium      No  Unknown    Unknown  Unknown      



 oxide 400  oxide 400  4-15                  



 mg (241.3  mg (241.3                    



 mg  mg                    



 magnesium)  magnesium)                    



 tablet  tablet                    

 

 omeprazole  omeprazole      No  Unknown    Unknown  Unknown      



 40 mg  40 mg  4-15                  



 capsule,del  capsule,del                    



 ayed  ayed                    



 release  release                    

 

 verapamil  verapamil      No  Unknown    Unknown  Unknown      



  mg   mg  4-15                  



 capsule,ext  capsule,ext                    



 ended  ended                    



 release  release                    

 

 Calcium  Calcium      No  Unknown    Unknown  Unknown      



 Carbonate/V  Carbonate/V  4-15                  



 itamin D3  itamin D3                    

 

 cefdinir  cefdinir      No  Unknown    Unknown  Unknown      



 300 mg  300 mg  4-17                  



 capsule  capsule                    







Vital Signs







 Vital Name  Observation Time  Observation Value  Comments

 

 SYSTOLIC mm[Hg]  2019 18:05:21  135 mm[Hg] mm[Hg]  Method: Sit

 

 DIASTOLIC mm[Hg]  2019 18:05:21  48 mm[Hg] mm[Hg]  Method: Sit

 

 PULSE  2019 18:05:21  63 /min /min  

 

 RESP RATE  2019 18:05:21  16 /min /min  

 

 TEMP  2019 18:05:21  97.4 [degF]  







Procedures

This patient has no known procedures.



Results







 Test Description  Test Time  Test Comments  Text Results  Atomic Results  
Result Comments









 Laboratory Studies  2019 08:13:00  Identifier [id number] Result Time  
Unknown



     2019 08:13:00  









   Test Item  Value  Reference Range  Comments









 Unknown (test code = 2951-2)  136 mmol/L  Unknown  135-145  F



Ordering Physician [facility] Gknwycq1716-27-10 08:13:00Identifier [id number]
6 Result Time 2019 08:13:00Unknown





 Test Item  Value  Reference Range  Comments

 

 Unknown (test code = 2823-3)  4.0 mmol/L  Unknown  3.5-5.0  F



Ordering Physician [facility] Rcageow8119-00-42 08:13:00Identifier [id number]
6 Result Time 2019 08:13:00Unknown





 Test Item  Value  Reference Range  Comments

 

 Unknown (test code = 2345-7)  87 mg/dL  Unknown    F



Ordering Physician [facility] Ftjatvh5473-80-84 08:13:00Identifier [id number]-
6 Result Time 2019 08:13:00Unknown





 Test Item  Value  Reference Range  Comments

 

 Unknown (test code = 48642-3)  46.9   Unknown  Unknown  F



Ordering Physician [facility] Newtdbn8349-32-59 08:13:00Identifier [id number]
6 Result Time 2019 08:13:00Unknown





 Test Item  Value  Reference Range  Comments

 

 Unknown (test code = NullTestCode)  56.7   Unknown  Unknown  F



Ordering Physician [facility] Bkwjeyh9867-94-23 08:13:00Identifier [id number]-
6 Result Time 2019 08:13:00Unknown





 Test Item  Value  Reference Range  Comments

 

 Unknown (test code = 2160-0)  1.43 mg/dL  Unknown  0.67-1.17  F



Ordering Physician [facility] Fnobpkj7305-54-87 08:13:00Identifier [id number]-
6 Result Time 2019 08:13:00Unknown





 Test Item  Value  Reference Range  Comments

 

 Unknown (test code = 2075-0)  104 mmol/L  Unknown  101-111  F



Ordering Physician [facility] Zsovrkv2806-50-96 08:13:00Identifier [id number]
6 Result Time 2019 08:13:00Unknown





 Test Item  Value  Reference Range  Comments

 

 Unknown (test code = 2028-9)  24 mmol/L  Unknown  22-32  F



Ordering Physician [facility] Rxybkyf0100-28-65 08:13:00Identifier [id number]-
6 Result Time 2019 08:13:00Unknown





 Test Item  Value  Reference Range  Comments

 

 Unknown (test code = 45318-2)  9.0 mg/dL  Unknown  8.6-10.3  F



Ordering Physician [facility] Cjjwoxb2874-32-86 08:13:00Identifier [id number]-
6 Result Time 2019 08:13:00Unknown





 Test Item  Value  Reference Range  Comments

 

 Unknown (test code = 3094-0)  24 mg/dL  Unknown  6-24  F



Ordering Physician [facility] Jyfgzph6410-76-76 08:13:00Identifier [id number]-
6 Result Time 2019 08:13:00Unknown





 Test Item  Value  Reference Range  Comments

 

 Unknown (test code = 3097-3)  16.8   Unknown  8-20  F



Ordering Physician [facility] Wcxoaeq2974-24-55 08:13:00Identifier [id number]-
6 Result Time 2019 08:13:00Unknown





 Test Item  Value  Reference Range  Comments

 

 Unknown (test code = 33037-3)  8 mmol/L  Unknown  2-11  F



Ordering Physician [facility] Ojmeooa2644-14-48 08:13:00Identifier [id number]
6 Result Time 2019 08:13:00Unknown





 Test Item  Value  Reference Range  Comments

 

 Unknown (test code = 04825-6)  27.4 10^3/uL  Unknown  3.5-10.8  F



Ordering Physician [facility] Wfisolk0572-44-34 08:13:00Identifier [id number]-
6 Result Time 2019 08:13:00Unknown





 Test Item  Value  Reference Range  Comments

 

 Unknown (test code = 788-0)  24 %  Unknown  10.5-15  F



Ordering Physician [facility] Atseeil6540-01-39 08:13:00Identifier [id number]-
6 Result Time 2019 08:13:00Unknown





 Test Item  Value  Reference Range  Comments

 

 Unknown (test code = 789-8)  5.19 10^6 /uL  Unknown  4.18-5.48  F



Ordering Physician [facility] Kntncnv0406-86-88 08:13:00Identifier [id number]-
6 Result Time 2019 08:13:00Unknown





 Test Item  Value  Reference Range  Comments

 

 Unknown (test code = 777-3)  330 10^3/uL  Unknown  150-450  F



Ordering Physician [facility] Ecoxapw9501-06-32 08:13:00Identifier [id number]-
6 Result Time 2019 08:13:00Unknown





 Test Item  Value  Reference Range  Comments

 

 Unknown (test code = 59245-1)  0   Unknown  Unknown  F



Ordering Physician [facility] Oqxxmds9110-47-70 08:13:00Identifier [id number]-
6 Result Time 2019 08:13:00Unknown





 Test Item  Value  Reference Range  Comments

 

 Unknown (test code = 771-6)  0 10^3/ul  Unknown  Unknown  F



Ordering Physician [facility] Okvvxqg6041-42-78 08:13:00Identifier [id number]-
6 Result Time 2019 08:13:00Unknown





 Test Item  Value  Reference Range  Comments

 

 Unknown (test code = 770-8)  84.7 %  Unknown  Unknown  F



Ordering Physician [facility] Satwiyg2965-49-73 08:13:00Identifier [id number]-
6 Result Time 2019 08:13:00Unknown





 Test Item  Value  Reference Range  Comments

 

 Unknown (test code = 5905-5)  8.6 %  Unknown  Unknown  F



Ordering Physician [facility] Tqhkiyq9642-71-60 08:13:00Identifier [id number]-
6 Result Time 2019 08:13:00Unknown





 Test Item  Value  Reference Range  Comments

 

 Unknown (test code = 98317-6)  8.6 fL  Unknown  7.4-10.4  F



Ordering Physician [facility] Szjwehm9878-49-57 08:13:00Identifier [id number]-
6 Result Time 2019 08:13:00Unknown





 Test Item  Value  Reference Range  Comments

 

 Unknown (test code = 787-2)  78 fL  Unknown  80-94  F



Ordering Physician [facility] Cwvvsaq9159-07-23 08:13:00Identifier [id number]-
6 Result Time 2019 08:13:00Unknown





 Test Item  Value  Reference Range  Comments

 

 Unknown (test code = 786-4)  31 g/dL  Unknown  31-36  F



Ordering Physician [facility] Gnyzjzh5706-18-05 08:13:00Identifier [id number]-
6 Result Time 2019 08:13:00Unknown





 Test Item  Value  Reference Range  Comments

 

 Unknown (test code = 785-6)  24 pg  Unknown  27-31  F



Ordering Physician [facility] Vntnggi5912-39-61 08:13:00Identifier [id number]-
6 Result Time 2019 08:13:00Unknown





 Test Item  Value  Reference Range  Comments

 

 Unknown (test code = 736-9)  5.4 %  Unknown  Unknown  F



Ordering Physician [facility] Hfacrrq3404-23-27 08:13:00Identifier [id number]-
6 Result Time 2019 08:13:00Unknown





 Test Item  Value  Reference Range  Comments

 

 Unknown (test code = 718-7)  12.3 g/dL  Unknown  14.0-18.0  F



Ordering Physician [facility] Qzexdba8537-65-10 08:13:00Identifier [id number]-
6 Result Time 2019 08:13:00Unknown





 Test Item  Value  Reference Range  Comments

 

 Unknown (test code = 4544-3)  40 %  Unknown  36-46  F



Ordering Physician [facility] Vtvpqbp8870-57-37 08:13:00Identifier [id number]-
6 Result Time 2019 08:13:00Unknown





 Test Item  Value  Reference Range  Comments

 

 Unknown (test code = 713-8)  0.8 %  Unknown  Unknown  F



Ordering Physician [facility] Ycjygia9757-73-72 08:13:00Identifier [id number]-
6 Result Time 2019 08:13:00Unknown





 Test Item  Value  Reference Range  Comments

 

 Unknown (test code = 706-2)  0.5 %  Unknown  Unknown  F



Ordering Physician [facility] Zmagvhk8033-01-70 08:13:00Identifier [id number]-
6 Result Time 2019 08:13:00Unknown





 Test Item  Value  Reference Range  Comments

 

 Unknown (test code = LTW7183)  23.2 10^3/ul  Unknown  1.5-7.7  F



Ordering Physician [facility] Utrplhx7210-46-32 08:13:00Identifier [id number]-
6 Result Time 2019 08:13:00Unknown





 Test Item  Value  Reference Range  Comments

 

 Unknown (test code = 742-7)  2.4 10^3/ul  Unknown  0-0.8  F



Ordering Physician [facility] Bjvtogj1527-37-95 08:13:00Identifier [id number]-
6 Result Time 2019 08:13:00Unknown





 Test Item  Value  Reference Range  Comments

 

 Unknown (test code = 731-0)  1.5 10^3/ul  Unknown  1.0-4.8  F



Ordering Physician [facility] Ilxwwsk1731-40-14 08:13:00Identifier [id number]-
6 Result Time 2019 08:13:00Unknown





 Test Item  Value  Reference Range  Comments

 

 Unknown (test code = 711-2)  0.2 10^3/ul  Unknown  0-0.6  F



Ordering Physician [facility] Jztpcmw1339-81-30 08:13:00Identifier [id number]-
6 Result Time 2019 08:13:00Unknown





 Test Item  Value  Reference Range  Comments

 

 Unknown (test code = 704-7)  0.1 10^3/ul  Unknown  0-0.2  F



Ordering Physician [facility] Bfgypiq1007-73-88 06:46:00Identifier [id number]-
6 Result Time 2019 06:46:00Unknown





 Test Item  Value  Reference Range  Comments

 

 Unknown (test code = 71301-6)  0.05 ng/mL  Unknown  Unknown  F



Ordering Physician [facility] Kdalumv0175-48-61 06:46:00Identifier [id number]-
6 Result Time 2019 06:46:00Unknown





 Test Item  Value  Reference Range  Comments

 

 Unknown (test code = 2777-1)  2.8 mg/dL  Unknown  2.5-5.0  F



Ordering Physician [facility] Awxpmrw5453-31-93 06:46:00Identifier [id number]-
6 Result Time 2019 06:46:00Unknown





 Test Item  Value  Reference Range  Comments

 

 Unknown (test code = 77974-4)  2.5 mg/dL  Unknown  1.9-2.7  F



Ordering Physician [facility] Studies2019-04-15 10:48:00Identifier [id number]
6 Result Time 2019-04-15 10:48:00Unknown





 Test Item  Value  Reference Range  Comments

 

 Unknown (test code = NullTestCode)  6.0   Unknown  5-9  F



Ordering Physician [facility] Studies2019-04-15 10:48:00Identifier [id number]-
6 Result Time 2019-04-15 10:48:00Unknown





 Test Item  Value  Reference Range  Comments

 

 Unknown (test code = 40360-1)  1.013   Unknown  1.010-1.030  F



Ordering Physician [facility] Studies2019-04-15 10:15:00Identifier [id number]-
6 Result Time 2019-04-15 10:15:00Unknown





 Test Item  Value  Reference Range  Comments

 

 Unknown (test code = 3016-3)  3.38 mcIU/mL  Unknown  0.34-5.60  F



Ordering Physician [facility] Studies2019-04-15 10:15:00Identifier [id number]-
6 Result Time 2019-04-15 10:15:00Unknown





 Test Item  Value  Reference Range  Comments

 

 Unknown (test code = 78855-5)  1.14   Unknown  0.77-1.02  F



Ordering Physician [facility] Studies2019-04-15 10:15:00Identifier [id number]
6 Result Time 2019-04-15 10:15:00Unknown





 Test Item  Value  Reference Range  Comments

 

 Unknown (test code = 38133-7)  365 pg/mL  Unknown  Unknown  F



Ordering Physician [facility] Studies2019-04-15 10:15:00Identifier [id number]
6 Result Time 2019-04-15 10:15:00Unknown





 Test Item  Value  Reference Range  Comments

 

 Unknown (test code = 2885-2)  8.9 g/dL  Unknown  6.4-8.9  F



Ordering Physician [facility] Studies2019-04-15 10:15:00Identifier [id number]-
6 Result Time 2019-04-15 10:15:00Unknown





 Test Item  Value  Reference Range  Comments

 

 Unknown (test code = 1975-2)  0.60 mg/dL  Unknown  0.2-1.0  F



Ordering Physician [facility] Studies2019-04-15 10:15:00Identifier [id number]
6 Result Time 2019-04-15 10:15:00Unknown





 Test Item  Value  Reference Range  Comments

 

 Unknown (test code = NullTestCode)  4.9 g/dL  Unknown  2-4  F



Ordering Physician [facility] Studies2019-04-15 10:15:00Identifier [id number]-
6 Result Time 2019-04-15 10:15:00Unknown





 Test Item  Value  Reference Range  Comments

 

 Unknown (test code = 1988-5)  11.17 mg/L  Unknown  0-8.00  F



Ordering Physician [facility] Studies2019-04-15 10:15:00Identifier [id number]-
6 Result Time 2019-04-15 10:15:00Unknown





 Test Item  Value  Reference Range  Comments

 

 Unknown (test code = 1920-8)  25 U/L  Unknown  13-39  F



Ordering Physician [facility] Studies2019-04-15 10:15:00Identifier [id number]-
6 Result Time 2019-04-15 10:15:00Unknown





 Test Item  Value  Reference Range  Comments

 

 Unknown (test code = 6768-6)  153 U/L  Unknown    F



Ordering Physician [facility] Studies2019-04-15 10:15:00Identifier [id number]
6 Result Time 2019-04-15 10:15:00Unknown





 Test Item  Value  Reference Range  Comments

 

 Unknown (test code = 1759-0)  0.8   Unknown  1-3  F



Ordering Physician [facility] Studies2019-04-15 10:15:00Identifier [id number]
6 Result Time 2019-04-15 10:15:00Unknown





 Test Item  Value  Reference Range  Comments

 

 Unknown (test code = 15919-9)  4.0 g/dL  Unknown  3.2-5.2  F



Ordering Physician [facility] Studies2019-04-15 10:15:00Identifier [id number]
6 Result Time 2019-04-15 10:15:00Unknown





 Test Item  Value  Reference Range  Comments

 

 Unknown (test code = 1742-6)  13 U/L  Unknown  7-52  F



Ordering Physician [facility] Studies2019-04-15 10:15:00Identifier [id number]
6 Result Time 2019-04-15 10:15:00Unknown





 Test Item  Value  Reference Range  Comments

 

 Unknown (test code = 2524-7)  0.9 mmol/L  Unknown  0.5-2.0  F



Ordering Physician Unknown

## 2019-12-03 NOTE — XMS REPORT
Patient Summary Document

 Created on:2019



Patient:BERTHA SOLIS

Sex:Male

:1933

External Reference #:[id number]





Demographics







 Address  [address] [place], [place] [address]

 

 Home Phone  [phone]

 

 Preferred Language  English

 

 Marital Status  Unknown

 

 Oriental orthodox Affiliation  Unknown

 

 Race  Unknown

 

 Ethnic Group  Unknown









Author







 Organization  [facility]









Support







 Name  Relationship  Address  Phone

 

 IRAIS SOLIS, Unknown Name  Suha  [address]  [phone]



     [place] [address]  









Care Team Providers







 Name  Role  Phone

 

 Unavailable  Unavailable  Unavailable









Problems

This patient has no known problems.



Allergies, Adverse Reactions, Alerts







 Allergy  Allergy  Status  Severity  Reaction(s)  Onset  Inactive  Treating  
Comments



 Name  Type        Date  Date  Clinician  

 

 Uncoded  Unknown  Active  Unknown  Reaction  2019    Interface  



 free-text        Unknown  -18      



 allergy                







Medications







 Ordered  Filled  Start  Stop  Current  Ordering  Indication  Dosage  Frequency
  Signature  Comments  Components



 Medication  Medication  Date  Date  Medication?  Clinician        (SIG)    



 Name  Name                    

 

 Multivitami  Multivitami  2012    No  Unknown    Unknown  Unknown      



 ns/Minerals  ns/Minerals  -                  



 Tab*  Tab*                    

 

 amLODIPine  amLODIPine      No  Unknown    Unknown  Unknown      



 5 mg tablet  5 mg tablet  4-15                  

 

 acetaminoph  acetaminoph      No  Unknown    Unknown  Unknown      



 en 500 mg  en 500 mg  4-15                  



 tablet  tablet                    

 

 magnesium  magnesium      No  Unknown    Unknown  Unknown      



 oxide 400  oxide 400  4-15                  



 mg (241.3  mg (241.3                    



 mg  mg                    



 magnesium)  magnesium)                    



 tablet  tablet                    

 

 omeprazole  omeprazole      No  Unknown    Unknown  Unknown      



 40 mg  40 mg  4-15                  



 capsule,del  capsule,del                    



 ayed  ayed                    



 release  release                    

 

 verapamil  verapamil      No  Unknown    Unknown  Unknown      



  mg   mg  4-15                  



 capsule,ext  capsule,ext                    



 ended  ended                    



 release  release                    

 

 Calcium  Calcium      No  Unknown    Unknown  Unknown      



 Carbonate/V  Carbonate/V  4-15                  



 itamin D3  itamin D3                    

 

 cefdinir  cefdinir      No  Unknown    Unknown  Unknown      



 300 mg  300 mg  4-17                  



 capsule  capsule                    







Vital Signs







 Vital Name  Observation Time  Observation Value  Comments

 

 SYSTOLIC mm[Hg]  2019 18:05:21  135 mm[Hg] mm[Hg]  Method: Sit

 

 DIASTOLIC mm[Hg]  2019 18:05:21  48 mm[Hg] mm[Hg]  Method: Sit

 

 PULSE  2019 18:05:21  63 /min /min  

 

 RESP RATE  2019 18:05:21  16 /min /min  

 

 TEMP  2019 18:05:21  97.4 [degF]  







Procedures

This patient has no known procedures.



Results







 Test Description  Test Time  Test Comments  Text Results  Atomic Results  
Result Comments









 Laboratory Studies  2019 08:13:00  Identifier [id number] Result Time  
Unknown



     2019 08:13:00  









   Test Item  Value  Reference Range  Comments









 Unknown (test code = 2951-2)  136 mmol/L  Unknown  135-145  F



Ordering Physician [facility] Lmjvqbq9797-47-25 08:13:00Identifier [id number]
6 Result Time 2019 08:13:00Unknown





 Test Item  Value  Reference Range  Comments

 

 Unknown (test code = 2823-3)  4.0 mmol/L  Unknown  3.5-5.0  F



Ordering Physician [facility] Yeryiia3051-47-69 08:13:00Identifier [id number]
6 Result Time 2019 08:13:00Unknown





 Test Item  Value  Reference Range  Comments

 

 Unknown (test code = 2345-7)  87 mg/dL  Unknown    F



Ordering Physician [facility] Ldrmyed7212-45-39 08:13:00Identifier [id number]-
6 Result Time 2019 08:13:00Unknown





 Test Item  Value  Reference Range  Comments

 

 Unknown (test code = 48642-3)  46.9   Unknown  Unknown  F



Ordering Physician [facility] Oecilsj1649-31-40 08:13:00Identifier [id number]
6 Result Time 2019 08:13:00Unknown





 Test Item  Value  Reference Range  Comments

 

 Unknown (test code = NullTestCode)  56.7   Unknown  Unknown  F



Ordering Physician [facility] Fplhprj6960-95-12 08:13:00Identifier [id number]
6 Result Time 2019 08:13:00Unknown





 Test Item  Value  Reference Range  Comments

 

 Unknown (test code = 2160-0)  1.43 mg/dL  Unknown  0.67-1.17  F



Ordering Physician [facility] Hvokplb3351-72-42 08:13:00Identifier [id number]-
6 Result Time 2019 08:13:00Unknown





 Test Item  Value  Reference Range  Comments

 

 Unknown (test code = 2075-0)  104 mmol/L  Unknown  101-111  F



Ordering Physician [facility] Qyxhqww8283-25-11 08:13:00Identifier [id number]
6 Result Time 2019 08:13:00Unknown





 Test Item  Value  Reference Range  Comments

 

 Unknown (test code = 2028-9)  24 mmol/L  Unknown  22-32  F



Ordering Physician [facility] Gstgewg3667-01-21 08:13:00Identifier [id number]
6 Result Time 2019 08:13:00Unknown





 Test Item  Value  Reference Range  Comments

 

 Unknown (test code = 43019-2)  9.0 mg/dL  Unknown  8.6-10.3  F



Ordering Physician [facility] Tpgcxyg8100-68-15 08:13:00Identifier [id number]-
6 Result Time 2019 08:13:00Unknown





 Test Item  Value  Reference Range  Comments

 

 Unknown (test code = 3094-0)  24 mg/dL  Unknown  6-24  F



Ordering Physician [facility] Qunofnm2644-34-43 08:13:00Identifier [id number]-
6 Result Time 2019 08:13:00Unknown





 Test Item  Value  Reference Range  Comments

 

 Unknown (test code = 3097-3)  16.8   Unknown  8-20  F



Ordering Physician [facility] Bmmhtyj3120-59-75 08:13:00Identifier [id number]
6 Result Time 2019 08:13:00Unknown





 Test Item  Value  Reference Range  Comments

 

 Unknown (test code = 33037-3)  8 mmol/L  Unknown  2-11  F



Ordering Physician [facility] Cvbgrlh4736-84-15 08:13:00Identifier [id number]-
6 Result Time 2019 08:13:00Unknown





 Test Item  Value  Reference Range  Comments

 

 Unknown (test code = 19803-7)  27.4 10^3/uL  Unknown  3.5-10.8  F



Ordering Physician [facility] Wzmwpjm2925-28-02 08:13:00Identifier [id number]
6 Result Time 2019 08:13:00Unknown





 Test Item  Value  Reference Range  Comments

 

 Unknown (test code = 788-0)  24 %  Unknown  10.5-15  F



Ordering Physician [facility] Wdhvder9937-52-75 08:13:00Identifier [id number]-
6 Result Time 2019 08:13:00Unknown





 Test Item  Value  Reference Range  Comments

 

 Unknown (test code = 789-8)  5.19 10^6 /uL  Unknown  4.18-5.48  F



Ordering Physician [facility] Snfdndf4934-68-76 08:13:00Identifier [id number]-
6 Result Time 2019 08:13:00Unknown





 Test Item  Value  Reference Range  Comments

 

 Unknown (test code = 777-3)  330 10^3/uL  Unknown  150-450  F



Ordering Physician [facility] Ttzxwhr2337-42-55 08:13:00Identifier [id number]-
6 Result Time 2019 08:13:00Unknown





 Test Item  Value  Reference Range  Comments

 

 Unknown (test code = 04816-0)  0   Unknown  Unknown  F



Ordering Physician [facility] Pqvaize9862-70-51 08:13:00Identifier [id number]-
6 Result Time 2019 08:13:00Unknown





 Test Item  Value  Reference Range  Comments

 

 Unknown (test code = 771-6)  0 10^3/ul  Unknown  Unknown  F



Ordering Physician [facility] Ixmnzcd3325-61-65 08:13:00Identifier [id number]-
6 Result Time 2019 08:13:00Unknown





 Test Item  Value  Reference Range  Comments

 

 Unknown (test code = 770-8)  84.7 %  Unknown  Unknown  F



Ordering Physician [facility] Ekbjucl1588-36-87 08:13:00Identifier [id number]-
6 Result Time 2019 08:13:00Unknown





 Test Item  Value  Reference Range  Comments

 

 Unknown (test code = 5905-5)  8.6 %  Unknown  Unknown  F



Ordering Physician [facility] Zzprcur6377-42-35 08:13:00Identifier [id number]-
6 Result Time 2019 08:13:00Unknown





 Test Item  Value  Reference Range  Comments

 

 Unknown (test code = 74382-6)  8.6 fL  Unknown  7.4-10.4  F



Ordering Physician [facility] Refbnyg4226-76-87 08:13:00Identifier [id number]-
6 Result Time 2019 08:13:00Unknown





 Test Item  Value  Reference Range  Comments

 

 Unknown (test code = 787-2)  78 fL  Unknown  80-94  F



Ordering Physician [facility] Mmftrzf7528-01-85 08:13:00Identifier [id number]-
6 Result Time 2019 08:13:00Unknown





 Test Item  Value  Reference Range  Comments

 

 Unknown (test code = 786-4)  31 g/dL  Unknown  31-36  F



Ordering Physician [facility] Xqudttt9522-54-38 08:13:00Identifier [id number]-
6 Result Time 2019 08:13:00Unknown





 Test Item  Value  Reference Range  Comments

 

 Unknown (test code = 785-6)  24 pg  Unknown  27-31  F



Ordering Physician [facility] Ybiiswz8185-26-00 08:13:00Identifier [id number]-
6 Result Time 2019 08:13:00Unknown





 Test Item  Value  Reference Range  Comments

 

 Unknown (test code = 736-9)  5.4 %  Unknown  Unknown  F



Ordering Physician [facility] Sncrcji4089-69-19 08:13:00Identifier [id number]-
6 Result Time 2019 08:13:00Unknown





 Test Item  Value  Reference Range  Comments

 

 Unknown (test code = 718-7)  12.3 g/dL  Unknown  14.0-18.0  F



Ordering Physician [facility] Fsnaygn1511-18-45 08:13:00Identifier [id number]-
6 Result Time 2019 08:13:00Unknown





 Test Item  Value  Reference Range  Comments

 

 Unknown (test code = 4544-3)  40 %  Unknown  36-46  F



Ordering Physician [facility] Cgjzfnr6418-26-71 08:13:00Identifier [id number]-
6 Result Time 2019 08:13:00Unknown





 Test Item  Value  Reference Range  Comments

 

 Unknown (test code = 713-8)  0.8 %  Unknown  Unknown  F



Ordering Physician [facility] Sqxruio0056-99-16 08:13:00Identifier [id number]-
6 Result Time 2019 08:13:00Unknown





 Test Item  Value  Reference Range  Comments

 

 Unknown (test code = 706-2)  0.5 %  Unknown  Unknown  F



Ordering Physician [facility] Sxfnzgk5263-16-69 08:13:00Identifier [id number]-
6 Result Time 2019 08:13:00Unknown





 Test Item  Value  Reference Range  Comments

 

 Unknown (test code = SED3726)  23.2 10^3/ul  Unknown  1.5-7.7  F



Ordering Physician [facility] Wwrxgpy4545-00-83 08:13:00Identifier [id number]-
6 Result Time 2019 08:13:00Unknown





 Test Item  Value  Reference Range  Comments

 

 Unknown (test code = 742-7)  2.4 10^3/ul  Unknown  0-0.8  F



Ordering Physician [facility] Qatnobf8435-69-26 08:13:00Identifier [id number]-
6 Result Time 2019 08:13:00Unknown





 Test Item  Value  Reference Range  Comments

 

 Unknown (test code = 731-0)  1.5 10^3/ul  Unknown  1.0-4.8  F



Ordering Physician [facility] Ocspprw3611-95-75 08:13:00Identifier [id number]-
6 Result Time 2019 08:13:00Unknown





 Test Item  Value  Reference Range  Comments

 

 Unknown (test code = 711-2)  0.2 10^3/ul  Unknown  0-0.6  F



Ordering Physician [facility] Ookdxjh4273-14-23 08:13:00Identifier [id number]-
6 Result Time 2019 08:13:00Unknown





 Test Item  Value  Reference Range  Comments

 

 Unknown (test code = 704-7)  0.1 10^3/ul  Unknown  0-0.2  F



Ordering Physician [facility] Ppvpfdn0276-02-59 06:46:00Identifier [id number]-
6 Result Time 2019 06:46:00Unknown





 Test Item  Value  Reference Range  Comments

 

 Unknown (test code = 05757-9)  0.05 ng/mL  Unknown  Unknown  F



Ordering Physician [facility] Szxydgc5382-86-46 06:46:00Identifier [id number]-
6 Result Time 2019 06:46:00Unknown





 Test Item  Value  Reference Range  Comments

 

 Unknown (test code = 2777-1)  2.8 mg/dL  Unknown  2.5-5.0  F



Ordering Physician [facility] Zjnsqti4154-36-85 06:46:00Identifier [id number]-
6 Result Time 2019 06:46:00Unknown





 Test Item  Value  Reference Range  Comments

 

 Unknown (test code = 32414-7)  2.5 mg/dL  Unknown  1.9-2.7  F



Ordering Physician [facility] Studies2019-04-15 10:48:00Identifier [id number]-
6 Result Time 2019-04-15 10:48:00Unknown





 Test Item  Value  Reference Range  Comments

 

 Unknown (test code = NullTestCode)  6.0   Unknown  5-9  F



Ordering Physician [facility] Studies2019-04-15 10:48:00Identifier [id number]
6 Result Time 2019-04-15 10:48:00Unknown





 Test Item  Value  Reference Range  Comments

 

 Unknown (test code = 57239-1)  1.013   Unknown  1.010-1.030  F



Ordering Physician [facility] Studies2019-04-15 10:15:00Identifier [id number]-
6 Result Time 2019-04-15 10:15:00Unknown





 Test Item  Value  Reference Range  Comments

 

 Unknown (test code = 3016-3)  3.38 mcIU/mL  Unknown  0.34-5.60  F



Ordering Physician [facility] Studies2019-04-15 10:15:00Identifier [id number]-
6 Result Time 2019-04-15 10:15:00Unknown





 Test Item  Value  Reference Range  Comments

 

 Unknown (test code = 33471-6)  1.14   Unknown  0.77-1.02  F



Ordering Physician [facility] Studies2019-04-15 10:15:00Identifier [id number]-
6 Result Time 2019-04-15 10:15:00Unknown





 Test Item  Value  Reference Range  Comments

 

 Unknown (test code = 55294-4)  365 pg/mL  Unknown  Unknown  F



Ordering Physician [facility] Studies2019-04-15 10:15:00Identifier [id number]-
6 Result Time 2019-04-15 10:15:00Unknown





 Test Item  Value  Reference Range  Comments

 

 Unknown (test code = 2885-2)  8.9 g/dL  Unknown  6.4-8.9  F



Ordering Physician [facility] Studies2019-04-15 10:15:00Identifier [id number]-
6 Result Time 2019-04-15 10:15:00Unknown





 Test Item  Value  Reference Range  Comments

 

 Unknown (test code = 1975-2)  0.60 mg/dL  Unknown  0.2-1.0  F



Ordering Physician [facility] Studies2019-04-15 10:15:00Identifier [id number]-
6 Result Time 2019-04-15 10:15:00Unknown





 Test Item  Value  Reference Range  Comments

 

 Unknown (test code = NullTestCode)  4.9 g/dL  Unknown  2-4  F



Ordering Physician [facility] Studies2019-04-15 10:15:00Identifier [id number]
6 Result Time 2019-04-15 10:15:00Unknown





 Test Item  Value  Reference Range  Comments

 

 Unknown (test code = 1988-5)  11.17 mg/L  Unknown  0-8.00  F



Ordering Physician [facility] Studies2019-04-15 10:15:00Identifier [id number]-
6 Result Time 2019-04-15 10:15:00Unknown





 Test Item  Value  Reference Range  Comments

 

 Unknown (test code = 1920-8)  25 U/L  Unknown  13-39  F



Ordering Physician [facility] Studies2019-04-15 10:15:00Identifier [id number]
6 Result Time 2019-04-15 10:15:00Unknown





 Test Item  Value  Reference Range  Comments

 

 Unknown (test code = 6768-6)  153 U/L  Unknown    F



Ordering Physician [facility] Studies2019-04-15 10:15:00Identifier [id number]-
6 Result Time 2019-04-15 10:15:00Unknown





 Test Item  Value  Reference Range  Comments

 

 Unknown (test code = 1759-0)  0.8   Unknown  1-3  F



Ordering Physician [facility] Studies2019-04-15 10:15:00Identifier [id number]
6 Result Time 2019-04-15 10:15:00Unknown





 Test Item  Value  Reference Range  Comments

 

 Unknown (test code = 92583-8)  4.0 g/dL  Unknown  3.2-5.2  F



Ordering Physician [facility] Studies2019-04-15 10:15:00Identifier [id number]
6 Result Time 2019-04-15 10:15:00Unknown





 Test Item  Value  Reference Range  Comments

 

 Unknown (test code = 1742-6)  13 U/L  Unknown  7-52  F



Ordering Physician [facility] Studies2019-04-15 10:15:00Identifier [id number]
6 Result Time 2019-04-15 10:15:00Unknown





 Test Item  Value  Reference Range  Comments

 

 Unknown (test code = 2524-7)  0.9 mmol/L  Unknown  0.5-2.0  F



Ordering Physician Unknown

## 2020-04-24 ENCOUNTER — HOSPITAL ENCOUNTER (EMERGENCY)
Dept: HOSPITAL 25 - ED | Age: 85
Discharge: HOME | End: 2020-04-24
Payer: MEDICARE

## 2020-04-24 VITALS — SYSTOLIC BLOOD PRESSURE: 145 MMHG | DIASTOLIC BLOOD PRESSURE: 65 MMHG

## 2020-04-24 DIAGNOSIS — K21.9: ICD-10-CM

## 2020-04-24 DIAGNOSIS — I10: ICD-10-CM

## 2020-04-24 DIAGNOSIS — D50.9: ICD-10-CM

## 2020-04-24 DIAGNOSIS — R04.0: Primary | ICD-10-CM

## 2020-04-24 DIAGNOSIS — F17.200: ICD-10-CM

## 2020-04-24 DIAGNOSIS — D45: ICD-10-CM

## 2020-04-24 LAB
ALBUMIN SERPL BCG-MCNC: 4 G/DL (ref 3.2–5.2)
ALBUMIN/GLOB SERPL: 0.9 {RATIO} (ref 1–3)
ALP SERPL-CCNC: 105 U/L (ref 34–104)
ALT SERPL W P-5'-P-CCNC: 16 U/L (ref 7–52)
ANION GAP SERPL CALC-SCNC: 7 MMOL/L (ref 2–11)
APTT PPP: 29.8 SECONDS (ref 26–38)
AST SERPL-CCNC: (no result) U/L (ref 13–39)
BASOPHILS # BLD AUTO: 0.1 10^3/UL (ref 0–0.2)
BUN SERPL-MCNC: 36 MG/DL (ref 6–24)
BUN/CREAT SERPL: 24.3 (ref 8–20)
CALCIUM SERPL-MCNC: 9.9 MG/DL (ref 8.6–10.3)
CHLORIDE SERPL-SCNC: 106 MMOL/L (ref 101–111)
EOSINOPHIL # BLD AUTO: 0.1 10^3/UL (ref 0–0.6)
GLOBULIN SER CALC-MCNC: 4.5 G/DL (ref 2–4)
GLUCOSE SERPL-MCNC: 93 MG/DL (ref 70–100)
HCO3 SERPL-SCNC: 23 MMOL/L (ref 22–32)
HCT VFR BLD AUTO: 36 % (ref 42–52)
HGB BLD-MCNC: 11.7 G/DL (ref 14–18)
INR PPP/BLD: 1.19 (ref 0.82–1.09)
LYMPHOCYTES # BLD AUTO: 1.1 10^3/UL (ref 1–4.8)
MCH RBC QN AUTO: 28 PG (ref 27–31)
MCHC RBC AUTO-ENTMCNC: 32 G/DL (ref 31–36)
MCV RBC AUTO: 88 FL (ref 80–94)
MONOCYTES # BLD AUTO: 1.8 10^3/UL (ref 0–0.8)
NEUTROPHILS # BLD AUTO: 10.3 10^3/UL (ref 1.5–7.7)
NRBC # BLD AUTO: 0 10^3/UL
NRBC BLD QL AUTO: 0.2
PLATELET # BLD AUTO: 294 10^3/UL (ref 150–450)
POTASSIUM SERPL-SCNC: (no result) MMOL/L (ref 3.5–5)
PROT SERPL-MCNC: 8.5 G/DL (ref 6.4–8.9)
RBC # BLD AUTO: 4.16 10^6 /UL (ref 4.18–5.48)
SODIUM SERPL-SCNC: 136 MMOL/L (ref 135–145)
WBC # BLD AUTO: 13.4 10^3/UL (ref 3.5–10.8)

## 2020-04-24 PROCEDURE — 85610 PROTHROMBIN TIME: CPT

## 2020-04-24 PROCEDURE — 99283 EMERGENCY DEPT VISIT LOW MDM: CPT

## 2020-04-24 PROCEDURE — 85730 THROMBOPLASTIN TIME PARTIAL: CPT

## 2020-04-24 PROCEDURE — 36415 COLL VENOUS BLD VENIPUNCTURE: CPT

## 2020-04-24 PROCEDURE — 85025 COMPLETE CBC W/AUTO DIFF WBC: CPT

## 2020-04-24 PROCEDURE — 80053 COMPREHEN METABOLIC PANEL: CPT

## 2020-04-24 NOTE — XMS REPORT
Patient Summary Document

 Created on:2020



Patient:BERTHA SOLIS

Sex:Male

:1933

External Reference #:[id number]





Demographics







 Address  [address] [place] [id number]

 

 Home Phone  [phone]

 

 Preferred Language  English

 

 Marital Status  Unknown

 

 Alevism Affiliation  Unknown

 

 Race  Unknown

 

 Ethnic Group  Unknown









Author







 Organization  [facility]









Support







 Name  Relationship  Address  Phone

 

 IRAIS SOLIS, Unknown Name  Suha  [address] [facility]  [phone]



     [place] [address]  









Care Team Providers







 Name  Role  Phone

 

 Unavailable  Unavailable  Unavailable









Problems

This patient has no known problems.



Allergies, Adverse Reactions, Alerts







 Allergy  Allergy  Status  Severity  Reaction(s)  Onset  Inactive  Treating  
Comments



 Name  Type        Date  Date  Clinician  

 

 Uncoded  Unknown  Active  Unknown  Reaction  2019    Interface  



 free-text        Unknown  -18      



 allergy                







Medications







 Ordered  Filled  Start  Stop  Current  Ordering  Indication  Dosage  Frequency
  Signature  Comments  Components



 Medication  Medication  Date  Date  Medication?  Clinician        (SIG)    



 Name  Name                    

 

 Multivitami  Multivitami  2012    No  Unknown    Unknown  Unknown      



 ns/Minerals  ns/Minerals  -                  



 Tab*  Tab*                    

 

 amLODIPine  amLODIPine      No  Unknown    Unknown  Unknown      



 5 mg tablet  5 mg tablet  4-15                  

 

 acetaminoph  acetaminoph      No  Unknown    Unknown  Unknown      



 en 500 mg  en 500 mg  4-15                  



 tablet  tablet                    

 

 magnesium  magnesium      No  Unknown    Unknown  Unknown      



 oxide 400  oxide 400  4-15                  



 mg (241.3  mg (241.3                    



 mg  mg                    



 magnesium)  magnesium)                    



 tablet  tablet                    

 

 omeprazole  omeprazole      No  Unknown    Unknown  Unknown      



 40 mg  40 mg  4-15                  



 capsule,del  capsule,del                    



 ayed  ayed                    



 release  release                    

 

 verapamil  verapamil      No  Unknown    Unknown  Unknown      



  mg   mg  4-15                  



 capsule,ext  capsule,ext                    



 ended  ended                    



 release  release                    

 

 Calcium  Calcium      No  Unknown    Unknown  Unknown      



 Carbonate/V  Carbonate/V  4-15                  



 itamin D3  itamin D3                    

 

 cefdinir  cefdinir      No  Unknown    Unknown  Unknown      



 300 mg  300 mg  4-17                  



 capsule  capsule                    







Vital Signs







 Vital Name  Observation Time  Observation Value  Comments

 

 SYSTOLIC mm[Hg]  2019 18:05:21  135 mm[Hg] mm[Hg]  Method: Sit

 

 DIASTOLIC mm[Hg]  2019 18:05:21  48 mm[Hg] mm[Hg]  Method: Sit

 

 PULSE  2019 18:05:21  63 /min /min  

 

 RESP RATE  2019 18:05:21  16 /min /min  

 

 TEMP  2019 18:05:21  97.4 [degF]  







Procedures

This patient has no known procedures.



Results







 Test Description  Test Time  Test Comments  Text Results  Atomic Results  
Result Comments









 Laboratory Studies  2019 08:13:00  Identifier [id number] Result Time  
Unknown



     2019 08:13:00  









   Test Item  Value  Reference Range  Comments









 Unknown (test code = 2951-2)  136 mmol/L  Unknown  135-145  F



Ordering Physician [facility] Knqaqyn0362-34-96 08:13:00Identifier [id number]-
6 Result Time 2019 08:13:00Unknown





 Test Item  Value  Reference Range  Comments

 

 Unknown (test code = 2823-3)  4.0 mmol/L  Unknown  3.5-5.0  F



Ordering Physician [facility] Psjqwwn0643-04-87 08:13:00Identifier [id number]
6 Result Time 2019 08:13:00Unknown





 Test Item  Value  Reference Range  Comments

 

 Unknown (test code = 2345-7)  87 mg/dL  Unknown    F



Ordering Physician [facility] Cbkgctu0973-15-14 08:13:00Identifier [id number]-
6 Result Time 2019 08:13:00Unknown





 Test Item  Value  Reference Range  Comments

 

 Unknown (test code = 48642-3)  46.9   Unknown  Unknown  F



Ordering Physician [facility] Mcqegmn5545-45-26 08:13:00Identifier [id number]
6 Result Time 2019 08:13:00Unknown





 Test Item  Value  Reference Range  Comments

 

 Unknown (test code = NullTestCode)  56.7   Unknown  Unknown  F



Ordering Physician [facility] Djcrtlz9031-92-64 08:13:00Identifier [id number]-
6 Result Time 2019 08:13:00Unknown





 Test Item  Value  Reference Range  Comments

 

 Unknown (test code = 2160-0)  1.43 mg/dL  Unknown  0.67-1.17  F



Ordering Physician [facility] Thoierl7008-40-13 08:13:00Identifier [id number]-
6 Result Time 2019 08:13:00Unknown





 Test Item  Value  Reference Range  Comments

 

 Unknown (test code = 2075-0)  104 mmol/L  Unknown  101-111  F



Ordering Physician [facility] Wnnzqwk8007-89-19 08:13:00Identifier [id number]-
6 Result Time 2019 08:13:00Unknown





 Test Item  Value  Reference Range  Comments

 

 Unknown (test code = 2028-9)  24 mmol/L  Unknown  22-32  F



Ordering Physician [facility] Oahtcbn8657-35-68 08:13:00Identifier [id number]-
6 Result Time 2019 08:13:00Unknown





 Test Item  Value  Reference Range  Comments

 

 Unknown (test code = 46201-7)  9.0 mg/dL  Unknown  8.6-10.3  F



Ordering Physician [facility] Ijvkasv6700-53-91 08:13:00Identifier [id number]-
6 Result Time 2019 08:13:00Unknown





 Test Item  Value  Reference Range  Comments

 

 Unknown (test code = 3094-0)  24 mg/dL  Unknown  6-24  F



Ordering Physician [facility] Djzvidt6371-00-74 08:13:00Identifier [id number]-
6 Result Time 2019 08:13:00Unknown





 Test Item  Value  Reference Range  Comments

 

 Unknown (test code = 3097-3)  16.8   Unknown  8-20  F



Ordering Physician [facility] Mkuwzox0183-23-57 08:13:00Identifier [id number]-
6 Result Time 2019 08:13:00Unknown





 Test Item  Value  Reference Range  Comments

 

 Unknown (test code = 33037-3)  8 mmol/L  Unknown  2-11  F



Ordering Physician [facility] Vdxklzd9754-74-10 08:13:00Identifier [id number]-
6 Result Time 2019 08:13:00Unknown





 Test Item  Value  Reference Range  Comments

 

 Unknown (test code = 04882-1)  27.4 10^3/uL  Unknown  3.5-10.8  F



Ordering Physician [facility] Lymunei9418-68-65 08:13:00Identifier [id number]-
6 Result Time 2019 08:13:00Unknown





 Test Item  Value  Reference Range  Comments

 

 Unknown (test code = 788-0)  24 %  Unknown  10.5-15  F



Ordering Physician [facility] Hyzzqym6640-55-55 08:13:00Identifier [id number]-
6 Result Time 2019 08:13:00Unknown





 Test Item  Value  Reference Range  Comments

 

 Unknown (test code = 789-8)  5.19 10^6 /uL  Unknown  4.18-5.48  F



Ordering Physician [facility] Uendons5691-48-90 08:13:00Identifier [id number]-
6 Result Time 2019 08:13:00Unknown





 Test Item  Value  Reference Range  Comments

 

 Unknown (test code = 777-3)  330 10^3/uL  Unknown  150-450  F



Ordering Physician [facility] Orpzmno7643-39-92 08:13:00Identifier [id number]-
6 Result Time 2019 08:13:00Unknown





 Test Item  Value  Reference Range  Comments

 

 Unknown (test code = 15020-0)  0   Unknown  Unknown  F



Ordering Physician [facility] Odahpot4025-63-98 08:13:00Identifier [id number]-
6 Result Time 2019 08:13:00Unknown





 Test Item  Value  Reference Range  Comments

 

 Unknown (test code = 771-6)  0 10^3/ul  Unknown  Unknown  F



Ordering Physician [facility] Uhbqohl7008-11-98 08:13:00Identifier [id number]-
6 Result Time 2019 08:13:00Unknown





 Test Item  Value  Reference Range  Comments

 

 Unknown (test code = 770-8)  84.7 %  Unknown  Unknown  F



Ordering Physician [facility] Smkoqeb3216-79-08 08:13:00Identifier [id number]-
6 Result Time 2019 08:13:00Unknown





 Test Item  Value  Reference Range  Comments

 

 Unknown (test code = 5905-5)  8.6 %  Unknown  Unknown  F



Ordering Physician [facility] Hjyecdp5179-22-11 08:13:00Identifier [id number]-
6 Result Time 2019 08:13:00Unknown





 Test Item  Value  Reference Range  Comments

 

 Unknown (test code = 99119-1)  8.6 fL  Unknown  7.4-10.4  F



Ordering Physician [facility] Trveosi4221-08-15 08:13:00Identifier [id number]-
6 Result Time 2019 08:13:00Unknown





 Test Item  Value  Reference Range  Comments

 

 Unknown (test code = 787-2)  78 fL  Unknown  80-94  F



Ordering Physician [facility] Pabvwpw7355-16-50 08:13:00Identifier [id number]-
6 Result Time 2019 08:13:00Unknown





 Test Item  Value  Reference Range  Comments

 

 Unknown (test code = 786-4)  31 g/dL  Unknown  31-36  F



Ordering Physician [facility] Uwkxjdi1778-79-38 08:13:00Identifier [id number]-
6 Result Time 2019 08:13:00Unknown





 Test Item  Value  Reference Range  Comments

 

 Unknown (test code = 785-6)  24 pg  Unknown  27-31  F



Ordering Physician [facility] Cuvrlje8759-69-91 08:13:00Identifier [id number]-
6 Result Time 2019 08:13:00Unknown





 Test Item  Value  Reference Range  Comments

 

 Unknown (test code = 736-9)  5.4 %  Unknown  Unknown  F



Ordering Physician [facility] Dkppgpl6868-19-97 08:13:00Identifier [id number]-
6 Result Time 2019 08:13:00Unknown





 Test Item  Value  Reference Range  Comments

 

 Unknown (test code = 718-7)  12.3 g/dL  Unknown  14.0-18.0  F



Ordering Physician [facility] Kolanar8418-82-54 08:13:00Identifier [id number]-
6 Result Time 2019 08:13:00Unknown





 Test Item  Value  Reference Range  Comments

 

 Unknown (test code = 4544-3)  40 %  Unknown  36-46  F



Ordering Physician [facility] Paubkau9918-18-76 08:13:00Identifier [id number]-
6 Result Time 2019 08:13:00Unknown





 Test Item  Value  Reference Range  Comments

 

 Unknown (test code = 713-8)  0.8 %  Unknown  Unknown  F



Ordering Physician [facility] Twwxqxv9528-80-19 08:13:00Identifier [id number]-
6 Result Time 2019 08:13:00Unknown





 Test Item  Value  Reference Range  Comments

 

 Unknown (test code = 706-2)  0.5 %  Unknown  Unknown  F



Ordering Physician [facility] Ocaygqb5452-71-32 08:13:00Identifier [id number]-
6 Result Time 2019 08:13:00Unknown





 Test Item  Value  Reference Range  Comments

 

 Unknown (test code = YUZ0656)  23.2 10^3/ul  Unknown  1.5-7.7  F



Ordering Physician [facility] Nkuwusl7343-20-61 08:13:00Identifier [id number]-
6 Result Time 2019 08:13:00Unknown





 Test Item  Value  Reference Range  Comments

 

 Unknown (test code = 742-7)  2.4 10^3/ul  Unknown  0-0.8  F



Ordering Physician [facility] Logghtq5005-28-56 08:13:00Identifier [id number]-
6 Result Time 2019 08:13:00Unknown





 Test Item  Value  Reference Range  Comments

 

 Unknown (test code = 731-0)  1.5 10^3/ul  Unknown  1.0-4.8  F



Ordering Physician [facility] Kodznyz1799-58-06 08:13:00Identifier [id number]-
6 Result Time 2019 08:13:00Unknown





 Test Item  Value  Reference Range  Comments

 

 Unknown (test code = 711-2)  0.2 10^3/ul  Unknown  0-0.6  F



Ordering Physician [facility] Trxqxne2785-33-81 08:13:00Identifier [id number]-
6 Result Time 2019 08:13:00Unknown





 Test Item  Value  Reference Range  Comments

 

 Unknown (test code = 704-7)  0.1 10^3/ul  Unknown  0-0.2  F



Ordering Physician [facility] Bqbldtg5005-20-78 06:46:00Identifier [id number]-
6 Result Time 2019 06:46:00Unknown





 Test Item  Value  Reference Range  Comments

 

 Unknown (test code = 69042-0)  0.05 ng/mL  Unknown  Unknown  F



Ordering Physician [facility] Rrkiudb8775-27-29 06:46:00Identifier [id number]-
6 Result Time 2019 06:46:00Unknown





 Test Item  Value  Reference Range  Comments

 

 Unknown (test code = 2777-1)  2.8 mg/dL  Unknown  2.5-5.0  F



Ordering Physician [facility] Ccrxzje7302-80-81 06:46:00Identifier [id number]-
6 Result Time 2019 06:46:00Unknown





 Test Item  Value  Reference Range  Comments

 

 Unknown (test code = 13076-1)  2.5 mg/dL  Unknown  1.9-2.7  F



Ordering Physician [facility] Studies2019-04-15 10:48:00Identifier [id number]
6 Result Time 2019-04-15 10:48:00Unknown





 Test Item  Value  Reference Range  Comments

 

 Unknown (test code = NullTestCode)  6.0   Unknown  5-9  F



Ordering Physician [facility] Studies2019-04-15 10:48:00Identifier [id number]-
6 Result Time 2019-04-15 10:48:00Unknown





 Test Item  Value  Reference Range  Comments

 

 Unknown (test code = 68703-6)  1.013   Unknown  1.010-1.030  F



Ordering Physician [facility] Studies2019-04-15 10:15:00Identifier [id number]-
6 Result Time 2019-04-15 10:15:00Unknown





 Test Item  Value  Reference Range  Comments

 

 Unknown (test code = 3016-3)  3.38 mcIU/mL  Unknown  0.34-5.60  F



Ordering Physician [facility] Studies2019-04-15 10:15:00Identifier [id number]-
6 Result Time 2019-04-15 10:15:00Unknown





 Test Item  Value  Reference Range  Comments

 

 Unknown (test code = 28311-6)  1.14   Unknown  0.77-1.02  F



Ordering Physician [facility] Studies2019-04-15 10:15:00Identifier [id number]-
6 Result Time 2019-04-15 10:15:00Unknown





 Test Item  Value  Reference Range  Comments

 

 Unknown (test code = 53665-4)  365 pg/mL  Unknown  Unknown  F



Ordering Physician [facility] Studies2019-04-15 10:15:00Identifier [id number]-
6 Result Time 2019-04-15 10:15:00Unknown





 Test Item  Value  Reference Range  Comments

 

 Unknown (test code = 2885-2)  8.9 g/dL  Unknown  6.4-8.9  F



Ordering Physician [facility] Studies2019-04-15 10:15:00Identifier [id number]-
6 Result Time 2019-04-15 10:15:00Unknown





 Test Item  Value  Reference Range  Comments

 

 Unknown (test code = 1975-2)  0.60 mg/dL  Unknown  0.2-1.0  F



Ordering Physician [facility] Studies2019-04-15 10:15:00Identifier [id number]
6 Result Time 2019-04-15 10:15:00Unknown





 Test Item  Value  Reference Range  Comments

 

 Unknown (test code = NullTestCode)  4.9 g/dL  Unknown  2-4  F



Ordering Physician [facility] Studies2019-04-15 10:15:00Identifier [id number]-
6 Result Time 2019-04-15 10:15:00Unknown





 Test Item  Value  Reference Range  Comments

 

 Unknown (test code = 1988-5)  11.17 mg/L  Unknown  0-8.00  F



Ordering Physician [facility] Studies2019-04-15 10:15:00Identifier [id number]-
6 Result Time 2019-04-15 10:15:00Unknown





 Test Item  Value  Reference Range  Comments

 

 Unknown (test code = 1920-8)  25 U/L  Unknown  13-39  F



Ordering Physician [facility] Studies2019-04-15 10:15:00Identifier [id number]-
6 Result Time 2019-04-15 10:15:00Unknown





 Test Item  Value  Reference Range  Comments

 

 Unknown (test code = 6768-6)  153 U/L  Unknown    F



Ordering Physician [facility] Studies2019-04-15 10:15:00Identifier [id number]-
6 Result Time 2019-04-15 10:15:00Unknown





 Test Item  Value  Reference Range  Comments

 

 Unknown (test code = 1759-0)  0.8   Unknown  1-3  F



Ordering Physician [facility] Studies2019-04-15 10:15:00Identifier [id number]-
6 Result Time 2019-04-15 10:15:00Unknown





 Test Item  Value  Reference Range  Comments

 

 Unknown (test code = 14785-3)  4.0 g/dL  Unknown  3.2-5.2  F



Ordering Physician [facility] Studies2019-04-15 10:15:00Identifier [id number]-
6 Result Time 2019-04-15 10:15:00Unknown





 Test Item  Value  Reference Range  Comments

 

 Unknown (test code = 1742-6)  13 U/L  Unknown  7-52  F



Ordering Physician [facility] Studies2019-04-15 10:15:00Identifier [id number]-
6 Result Time 2019-04-15 10:15:00Unknown





 Test Item  Value  Reference Range  Comments

 

 Unknown (test code = 2524-7)  0.9 mmol/L  Unknown  0.5-2.0  F



Ordering Physician Unknown

## 2020-04-24 NOTE — XMS REPORT
Patient Summary Document

 Created on:2020



Patient:BERTHA SOLIS

Sex:Male

:1933

External Reference #:[id number]





Demographics







 Address  [address] [place] [address]

 

 Home Phone  [phone]

 

 Preferred Language  English

 

 Marital Status  Unknown

 

 Denominational Affiliation  Unknown

 

 Race  Unknown

 

 Ethnic Group  Unknown









Author







 Organization  [facility]









Support







 Name  Relationship  Address  Phone

 

 IRAIS SOLIS, Unknown Name  Suha  [address]  [phone]



     [place] [address]  









Care Team Providers







 Name  Role  Phone

 

 Unavailable  Unavailable  Unavailable









Problems

This patient has no known problems.



Allergies, Adverse Reactions, Alerts







 Allergy  Allergy  Status  Severity  Reaction(s)  Onset  Inactive  Treating  
Comments



 Name  Type        Date  Date  Clinician  

 

 Uncoded  Unknown  Active  Unknown  Reaction  2019    Interface  



 free-text        Unknown  -18      



 allergy                







Medications







 Ordered  Filled  Start  Stop  Current  Ordering  Indication  Dosage  Frequency
  Signature  Comments  Components



 Medication  Medication  Date  Date  Medication?  Clinician        (SIG)    



 Name  Name                    

 

 Multivitami  Multivitami  2012    No  Unknown    Unknown  Unknown      



 ns/Minerals  ns/Minerals  -                  



 Tab*  Tab*                    

 

 amLODIPine  amLODIPine      No  Unknown    Unknown  Unknown      



 5 mg tablet  5 mg tablet  4-15                  

 

 acetaminoph  acetaminoph      No  Unknown    Unknown  Unknown      



 en 500 mg  en 500 mg  4-15                  



 tablet  tablet                    

 

 magnesium  magnesium      No  Unknown    Unknown  Unknown      



 oxide 400  oxide 400  4-15                  



 mg (241.3  mg (241.3                    



 mg  mg                    



 magnesium)  magnesium)                    



 tablet  tablet                    

 

 omeprazole  omeprazole      No  Unknown    Unknown  Unknown      



 40 mg  40 mg  4-15                  



 capsule,del  capsule,del                    



 ayed  ayed                    



 release  release                    

 

 verapamil  verapamil      No  Unknown    Unknown  Unknown      



  mg   mg  4-15                  



 capsule,ext  capsule,ext                    



 ended  ended                    



 release  release                    

 

 Calcium  Calcium      No  Unknown    Unknown  Unknown      



 Carbonate/V  Carbonate/V  4-15                  



 itamin D3  itamin D3                    

 

 cefdinir  cefdinir      No  Unknown    Unknown  Unknown      



 300 mg  300 mg  4-17                  



 capsule  capsule                    







Vital Signs







 Vital Name  Observation Time  Observation Value  Comments

 

 SYSTOLIC mm[Hg]  2019 18:05:21  135 mm[Hg] mm[Hg]  Method: Sit

 

 DIASTOLIC mm[Hg]  2019 18:05:21  48 mm[Hg] mm[Hg]  Method: Sit

 

 PULSE  2019 18:05:21  63 /min /min  

 

 RESP RATE  2019 18:05:21  16 /min /min  

 

 TEMP  2019 18:05:21  97.4 [degF]  







Procedures

This patient has no known procedures.



Results







 Test Description  Test Time  Test Comments  Text Results  Atomic Results  
Result Comments









 Laboratory Studies  2019 08:13:00  Identifier [id number] Result Time  
Unknown



     2019 08:13:00  









   Test Item  Value  Reference Range  Comments









 Unknown (test code = 2951-2)  136 mmol/L  Unknown  135-145  F



Ordering Physician [facility] Lxnsykk7700-98-37 08:13:00Identifier [id number]
6 Result Time 2019 08:13:00Unknown





 Test Item  Value  Reference Range  Comments

 

 Unknown (test code = 2823-3)  4.0 mmol/L  Unknown  3.5-5.0  F



Ordering Physician [facility] Pvmumvn4332-44-76 08:13:00Identifier [id number]-
6 Result Time 2019 08:13:00Unknown





 Test Item  Value  Reference Range  Comments

 

 Unknown (test code = 2345-7)  87 mg/dL  Unknown    F



Ordering Physician [facility] Kgfculu9039-22-05 08:13:00Identifier [id number]-
6 Result Time 2019 08:13:00Unknown





 Test Item  Value  Reference Range  Comments

 

 Unknown (test code = 48642-3)  46.9   Unknown  Unknown  F



Ordering Physician [facility] Chqmplh3312-33-77 08:13:00Identifier [id number]
6 Result Time 2019 08:13:00Unknown





 Test Item  Value  Reference Range  Comments

 

 Unknown (test code = NullTestCode)  56.7   Unknown  Unknown  F



Ordering Physician [facility] Advgcob4275-81-66 08:13:00Identifier [id number]
6 Result Time 2019 08:13:00Unknown





 Test Item  Value  Reference Range  Comments

 

 Unknown (test code = 2160-0)  1.43 mg/dL  Unknown  0.67-1.17  F



Ordering Physician [facility] Tgxrjpp7494-01-54 08:13:00Identifier [id number]
6 Result Time 2019 08:13:00Unknown





 Test Item  Value  Reference Range  Comments

 

 Unknown (test code = 2075-0)  104 mmol/L  Unknown  101-111  F



Ordering Physician [facility] Nwiveli1898-78-54 08:13:00Identifier [id number]
6 Result Time 2019 08:13:00Unknown





 Test Item  Value  Reference Range  Comments

 

 Unknown (test code = 2028-9)  24 mmol/L  Unknown  22-32  F



Ordering Physician [facility] Bdkffot1733-28-22 08:13:00Identifier [id number]-
6 Result Time 2019 08:13:00Unknown





 Test Item  Value  Reference Range  Comments

 

 Unknown (test code = 94151-9)  9.0 mg/dL  Unknown  8.6-10.3  F



Ordering Physician [facility] Nrdndnz7722-46-77 08:13:00Identifier [id number]-
6 Result Time 2019 08:13:00Unknown





 Test Item  Value  Reference Range  Comments

 

 Unknown (test code = 3094-0)  24 mg/dL  Unknown  6-24  F



Ordering Physician [facility] Klqhrpu5805-61-68 08:13:00Identifier [id number]-
6 Result Time 2019 08:13:00Unknown





 Test Item  Value  Reference Range  Comments

 

 Unknown (test code = 3097-3)  16.8   Unknown  8-20  F



Ordering Physician [facility] Oxzonzk4892-62-85 08:13:00Identifier [id number]-
6 Result Time 2019 08:13:00Unknown





 Test Item  Value  Reference Range  Comments

 

 Unknown (test code = 33037-3)  8 mmol/L  Unknown  2-11  F



Ordering Physician [facility] Cjnivlt7177-50-65 08:13:00Identifier [id number]-
6 Result Time 2019 08:13:00Unknown





 Test Item  Value  Reference Range  Comments

 

 Unknown (test code = 71541-9)  27.4 10^3/uL  Unknown  3.5-10.8  F



Ordering Physician [facility] Bhaycku7583-22-07 08:13:00Identifier [id number]-
6 Result Time 2019 08:13:00Unknown





 Test Item  Value  Reference Range  Comments

 

 Unknown (test code = 788-0)  24 %  Unknown  10.5-15  F



Ordering Physician [facility] Tpzirkr4643-06-34 08:13:00Identifier [id number]-
6 Result Time 2019 08:13:00Unknown





 Test Item  Value  Reference Range  Comments

 

 Unknown (test code = 789-8)  5.19 10^6 /uL  Unknown  4.18-5.48  F



Ordering Physician [facility] Btdnxbc6890-92-71 08:13:00Identifier [id number]-
6 Result Time 2019 08:13:00Unknown





 Test Item  Value  Reference Range  Comments

 

 Unknown (test code = 777-3)  330 10^3/uL  Unknown  150-450  F



Ordering Physician [facility] Ujpfugq0586-43-50 08:13:00Identifier [id number]-
6 Result Time 2019 08:13:00Unknown





 Test Item  Value  Reference Range  Comments

 

 Unknown (test code = 14712-4)  0   Unknown  Unknown  F



Ordering Physician [facility] Ohemgmt7134-98-52 08:13:00Identifier [id number]-
6 Result Time 2019 08:13:00Unknown





 Test Item  Value  Reference Range  Comments

 

 Unknown (test code = 771-6)  0 10^3/ul  Unknown  Unknown  F



Ordering Physician [facility] Xryxgbj6817-19-40 08:13:00Identifier [id number]-
6 Result Time 2019 08:13:00Unknown





 Test Item  Value  Reference Range  Comments

 

 Unknown (test code = 770-8)  84.7 %  Unknown  Unknown  F



Ordering Physician [facility] Sywxypb9370-84-23 08:13:00Identifier [id number]-
6 Result Time 2019 08:13:00Unknown





 Test Item  Value  Reference Range  Comments

 

 Unknown (test code = 5905-5)  8.6 %  Unknown  Unknown  F



Ordering Physician [facility] Wtlblcc1732-51-74 08:13:00Identifier [id number]-
6 Result Time 2019 08:13:00Unknown





 Test Item  Value  Reference Range  Comments

 

 Unknown (test code = 05983-4)  8.6 fL  Unknown  7.4-10.4  F



Ordering Physician [facility] Slkkksc9989-95-04 08:13:00Identifier [id number]-
6 Result Time 2019 08:13:00Unknown





 Test Item  Value  Reference Range  Comments

 

 Unknown (test code = 787-2)  78 fL  Unknown  80-94  F



Ordering Physician [facility] Slvrxqk5768-30-13 08:13:00Identifier [id number]-
6 Result Time 2019 08:13:00Unknown





 Test Item  Value  Reference Range  Comments

 

 Unknown (test code = 786-4)  31 g/dL  Unknown  31-36  F



Ordering Physician [facility] Sifyweu1983-64-58 08:13:00Identifier [id number]-
6 Result Time 2019 08:13:00Unknown





 Test Item  Value  Reference Range  Comments

 

 Unknown (test code = 785-6)  24 pg  Unknown  27-31  F



Ordering Physician [facility] Celinrv5377-91-10 08:13:00Identifier [id number]-
6 Result Time 2019 08:13:00Unknown





 Test Item  Value  Reference Range  Comments

 

 Unknown (test code = 736-9)  5.4 %  Unknown  Unknown  F



Ordering Physician [facility] Qmoahrt7610-12-40 08:13:00Identifier [id number]-
6 Result Time 2019 08:13:00Unknown





 Test Item  Value  Reference Range  Comments

 

 Unknown (test code = 718-7)  12.3 g/dL  Unknown  14.0-18.0  F



Ordering Physician [facility] Rekviuj7083-22-40 08:13:00Identifier [id number]-
6 Result Time 2019 08:13:00Unknown





 Test Item  Value  Reference Range  Comments

 

 Unknown (test code = 4544-3)  40 %  Unknown  36-46  F



Ordering Physician [facility] Mldtszj9267-19-28 08:13:00Identifier [id number]-
6 Result Time 2019 08:13:00Unknown





 Test Item  Value  Reference Range  Comments

 

 Unknown (test code = 713-8)  0.8 %  Unknown  Unknown  F



Ordering Physician [facility] Skjawnq2377-12-61 08:13:00Identifier [id number]-
6 Result Time 2019 08:13:00Unknown





 Test Item  Value  Reference Range  Comments

 

 Unknown (test code = 706-2)  0.5 %  Unknown  Unknown  F



Ordering Physician [facility] Qzfhfqq3380-31-88 08:13:00Identifier [id number]-
6 Result Time 2019 08:13:00Unknown





 Test Item  Value  Reference Range  Comments

 

 Unknown (test code = ZIY9832)  23.2 10^3/ul  Unknown  1.5-7.7  F



Ordering Physician [facility] Izoguzm4985-90-45 08:13:00Identifier [id number]-
6 Result Time 2019 08:13:00Unknown





 Test Item  Value  Reference Range  Comments

 

 Unknown (test code = 742-7)  2.4 10^3/ul  Unknown  0-0.8  F



Ordering Physician [facility] Ptyuiwt2055-63-97 08:13:00Identifier [id number]-
6 Result Time 2019 08:13:00Unknown





 Test Item  Value  Reference Range  Comments

 

 Unknown (test code = 731-0)  1.5 10^3/ul  Unknown  1.0-4.8  F



Ordering Physician [facility] Vyykcjg5993-73-98 08:13:00Identifier [id number]-
6 Result Time 2019 08:13:00Unknown





 Test Item  Value  Reference Range  Comments

 

 Unknown (test code = 711-2)  0.2 10^3/ul  Unknown  0-0.6  F



Ordering Physician [facility] Ezwxwmz3643-16-55 08:13:00Identifier [id number]-
6 Result Time 2019 08:13:00Unknown





 Test Item  Value  Reference Range  Comments

 

 Unknown (test code = 704-7)  0.1 10^3/ul  Unknown  0-0.2  F



Ordering Physician [facility] Utmzeeh8175-74-12 06:46:00Identifier [id number]-
6 Result Time 2019 06:46:00Unknown





 Test Item  Value  Reference Range  Comments

 

 Unknown (test code = 20070-8)  0.05 ng/mL  Unknown  Unknown  F



Ordering Physician [facility] Ydjwcfi2034-02-80 06:46:00Identifier [id number]-
6 Result Time 2019 06:46:00Unknown





 Test Item  Value  Reference Range  Comments

 

 Unknown (test code = 2777-1)  2.8 mg/dL  Unknown  2.5-5.0  F



Ordering Physician [facility] Wwpqxue4625-05-96 06:46:00Identifier [id number]-
6 Result Time 2019 06:46:00Unknown





 Test Item  Value  Reference Range  Comments

 

 Unknown (test code = 99491-3)  2.5 mg/dL  Unknown  1.9-2.7  F



Ordering Physician [facility] Studies2019-04-15 10:48:00Identifier [id number]-
6 Result Time 2019-04-15 10:48:00Unknown





 Test Item  Value  Reference Range  Comments

 

 Unknown (test code = NullTestCode)  6.0   Unknown  5-9  F



Ordering Physician [facility] Studies2019-04-15 10:48:00Identifier [id number]-
6 Result Time 2019-04-15 10:48:00Unknown





 Test Item  Value  Reference Range  Comments

 

 Unknown (test code = 16444-6)  1.013   Unknown  1.010-1.030  F



Ordering Physician [facility] Studies2019-04-15 10:15:00Identifier [id number]
6 Result Time 2019-04-15 10:15:00Unknown





 Test Item  Value  Reference Range  Comments

 

 Unknown (test code = 3016-3)  3.38 mcIU/mL  Unknown  0.34-5.60  F



Ordering Physician [facility] Studies2019-04-15 10:15:00Identifier [id number]-
6 Result Time 2019-04-15 10:15:00Unknown





 Test Item  Value  Reference Range  Comments

 

 Unknown (test code = 72387-0)  1.14   Unknown  0.77-1.02  F



Ordering Physician [facility] Studies2019-04-15 10:15:00Identifier [id number]-
6 Result Time 2019-04-15 10:15:00Unknown





 Test Item  Value  Reference Range  Comments

 

 Unknown (test code = 13526-5)  365 pg/mL  Unknown  Unknown  F



Ordering Physician [facility] Studies2019-04-15 10:15:00Identifier [id number]-
6 Result Time 2019-04-15 10:15:00Unknown





 Test Item  Value  Reference Range  Comments

 

 Unknown (test code = 2885-2)  8.9 g/dL  Unknown  6.4-8.9  F



Ordering Physician [facility] Studies2019-04-15 10:15:00Identifier [id number]-
6 Result Time 2019-04-15 10:15:00Unknown





 Test Item  Value  Reference Range  Comments

 

 Unknown (test code = 1975-2)  0.60 mg/dL  Unknown  0.2-1.0  F



Ordering Physician [facility] Studies2019-04-15 10:15:00Identifier [id number]-
6 Result Time 2019-04-15 10:15:00Unknown





 Test Item  Value  Reference Range  Comments

 

 Unknown (test code = NullTestCode)  4.9 g/dL  Unknown  2-4  F



Ordering Physician [facility] Studies2019-04-15 10:15:00Identifier [id number]
6 Result Time 2019-04-15 10:15:00Unknown





 Test Item  Value  Reference Range  Comments

 

 Unknown (test code = 1988-5)  11.17 mg/L  Unknown  0-8.00  F



Ordering Physician [facility] Studies2019-04-15 10:15:00Identifier [id number]-
6 Result Time 2019-04-15 10:15:00Unknown





 Test Item  Value  Reference Range  Comments

 

 Unknown (test code = 1920-8)  25 U/L  Unknown  13-39  F



Ordering Physician [facility] Studies2019-04-15 10:15:00Identifier [id number]-
6 Result Time 2019-04-15 10:15:00Unknown





 Test Item  Value  Reference Range  Comments

 

 Unknown (test code = 6768-6)  153 U/L  Unknown    F



Ordering Physician [facility] Studies2019-04-15 10:15:00Identifier [id number]
6 Result Time 2019-04-15 10:15:00Unknown





 Test Item  Value  Reference Range  Comments

 

 Unknown (test code = 1759-0)  0.8   Unknown  1-3  F



Ordering Physician [facility] Studies2019-04-15 10:15:00Identifier [id number]-
6 Result Time 2019-04-15 10:15:00Unknown





 Test Item  Value  Reference Range  Comments

 

 Unknown (test code = 18074-7)  4.0 g/dL  Unknown  3.2-5.2  F



Ordering Physician [facility] Studies2019-04-15 10:15:00Identifier [id number]
6 Result Time 2019-04-15 10:15:00Unknown





 Test Item  Value  Reference Range  Comments

 

 Unknown (test code = 1742-6)  13 U/L  Unknown  7-52  F



Ordering Physician [facility] Studies2019-04-15 10:15:00Identifier [id number]-
6 Result Time 2019-04-15 10:15:00Unknown





 Test Item  Value  Reference Range  Comments

 

 Unknown (test code = 2524-7)  0.9 mmol/L  Unknown  0.5-2.0  F



Ordering Physician Unknown

## 2020-04-24 NOTE — XMS REPORT
Patient Summary Document

 Created on:2020



Patient:BERTHA SOLIS

Sex:Male

:1933

External Reference #:[id number]





Demographics







 Address  [address] [place] [address]

 

 Home Phone  [phone]

 

 Preferred Language  English

 

 Marital Status  Unknown

 

 Zoroastrianism Affiliation  Unknown

 

 Race  Unknown

 

 Ethnic Group  Unknown









Author







 Organization  [facility]









Support







 Name  Relationship  Address  Phone

 

 IRAIS SOLIS, Unknown Name  Suha  [address]  [phone]



     [place] [address]  









Care Team Providers







 Name  Role  Phone

 

 Unavailable  Unavailable  Unavailable









Problems

This patient has no known problems.



Allergies, Adverse Reactions, Alerts







 Allergy  Allergy  Status  Severity  Reaction(s)  Onset  Inactive  Treating  
Comments



 Name  Type        Date  Date  Clinician  

 

 Uncoded  Unknown  Active  Unknown  Reaction  2019    Interface  



 free-text        Unknown  -18      



 allergy                







Medications







 Ordered  Filled  Start  Stop  Current  Ordering  Indication  Dosage  Frequency
  Signature  Comments  Components



 Medication  Medication  Date  Date  Medication?  Clinician        (SIG)    



 Name  Name                    

 

 Multivitami  Multivitami  2012    No  Unknown    Unknown  Unknown      



 ns/Minerals  ns/Minerals  -                  



 Tab*  Tab*                    

 

 amLODIPine  amLODIPine      No  Unknown    Unknown  Unknown      



 5 mg tablet  5 mg tablet  4-15                  

 

 acetaminoph  acetaminoph      No  Unknown    Unknown  Unknown      



 en 500 mg  en 500 mg  4-15                  



 tablet  tablet                    

 

 magnesium  magnesium      No  Unknown    Unknown  Unknown      



 oxide 400  oxide 400  4-15                  



 mg (241.3  mg (241.3                    



 mg  mg                    



 magnesium)  magnesium)                    



 tablet  tablet                    

 

 omeprazole  omeprazole      No  Unknown    Unknown  Unknown      



 40 mg  40 mg  4-15                  



 capsule,del  capsule,del                    



 ayed  ayed                    



 release  release                    

 

 verapamil  verapamil      No  Unknown    Unknown  Unknown      



  mg   mg  4-15                  



 capsule,ext  capsule,ext                    



 ended  ended                    



 release  release                    

 

 Calcium  Calcium      No  Unknown    Unknown  Unknown      



 Carbonate/V  Carbonate/V  4-15                  



 itamin D3  itamin D3                    

 

 cefdinir  cefdinir      No  Unknown    Unknown  Unknown      



 300 mg  300 mg  4-17                  



 capsule  capsule                    







Vital Signs







 Vital Name  Observation Time  Observation Value  Comments

 

 SYSTOLIC mm[Hg]  2019 18:05:21  135 mm[Hg] mm[Hg]  Method: Sit

 

 DIASTOLIC mm[Hg]  2019 18:05:21  48 mm[Hg] mm[Hg]  Method: Sit

 

 PULSE  2019 18:05:21  63 /min /min  

 

 RESP RATE  2019 18:05:21  16 /min /min  

 

 TEMP  2019 18:05:21  97.4 [degF]  







Procedures

This patient has no known procedures.



Results







 Test Description  Test Time  Test Comments  Text Results  Atomic Results  
Result Comments









 Laboratory Studies  2019 08:13:00  Identifier [id number] Result Time  
Unknown



     2019 08:13:00  









   Test Item  Value  Reference Range  Comments









 Unknown (test code = 2951-2)  136 mmol/L  Unknown  135-145  F



Ordering Physician [facility] Ldbrjqi7463-45-51 08:13:00Identifier [id number]
6 Result Time 2019 08:13:00Unknown





 Test Item  Value  Reference Range  Comments

 

 Unknown (test code = 2823-3)  4.0 mmol/L  Unknown  3.5-5.0  F



Ordering Physician [facility] Bemhpdt8122-78-61 08:13:00Identifier [id number]
6 Result Time 2019 08:13:00Unknown





 Test Item  Value  Reference Range  Comments

 

 Unknown (test code = 2345-7)  87 mg/dL  Unknown    F



Ordering Physician [facility] Eykwaqg1616-48-27 08:13:00Identifier [id number]-
6 Result Time 2019 08:13:00Unknown





 Test Item  Value  Reference Range  Comments

 

 Unknown (test code = 48642-3)  46.9   Unknown  Unknown  F



Ordering Physician [facility] Llupdth9057-20-31 08:13:00Identifier [id number]-
6 Result Time 2019 08:13:00Unknown





 Test Item  Value  Reference Range  Comments

 

 Unknown (test code = NullTestCode)  56.7   Unknown  Unknown  F



Ordering Physician [facility] Wllbtjb6573-04-92 08:13:00Identifier [id number]-
6 Result Time 2019 08:13:00Unknown





 Test Item  Value  Reference Range  Comments

 

 Unknown (test code = 2160-0)  1.43 mg/dL  Unknown  0.67-1.17  F



Ordering Physician [facility] Wxxspul2977-52-66 08:13:00Identifier [id number]-
6 Result Time 2019 08:13:00Unknown





 Test Item  Value  Reference Range  Comments

 

 Unknown (test code = 2075-0)  104 mmol/L  Unknown  101-111  F



Ordering Physician [facility] Mqqtetc2670-64-23 08:13:00Identifier [id number]-
6 Result Time 2019 08:13:00Unknown





 Test Item  Value  Reference Range  Comments

 

 Unknown (test code = 2028-9)  24 mmol/L  Unknown  22-32  F



Ordering Physician [facility] Muyinrw2893-21-95 08:13:00Identifier [id number]-
6 Result Time 2019 08:13:00Unknown





 Test Item  Value  Reference Range  Comments

 

 Unknown (test code = 14100-4)  9.0 mg/dL  Unknown  8.6-10.3  F



Ordering Physician [facility] Lavvnfl4269-43-99 08:13:00Identifier [id number]-
6 Result Time 2019 08:13:00Unknown





 Test Item  Value  Reference Range  Comments

 

 Unknown (test code = 3094-0)  24 mg/dL  Unknown  6-24  F



Ordering Physician [facility] Ulgevdc6162-55-01 08:13:00Identifier [id number]
6 Result Time 2019 08:13:00Unknown





 Test Item  Value  Reference Range  Comments

 

 Unknown (test code = 3097-3)  16.8   Unknown  8-20  F



Ordering Physician [facility] Ijfbsxa3156-63-26 08:13:00Identifier [id number]-
6 Result Time 2019 08:13:00Unknown





 Test Item  Value  Reference Range  Comments

 

 Unknown (test code = 33037-3)  8 mmol/L  Unknown  2-11  F



Ordering Physician [facility] Vetjeap1264-99-24 08:13:00Identifier [id number]-
6 Result Time 2019 08:13:00Unknown





 Test Item  Value  Reference Range  Comments

 

 Unknown (test code = 50463-5)  27.4 10^3/uL  Unknown  3.5-10.8  F



Ordering Physician [facility] Sqjppnn1804-21-47 08:13:00Identifier [id number]-
6 Result Time 2019 08:13:00Unknown





 Test Item  Value  Reference Range  Comments

 

 Unknown (test code = 788-0)  24 %  Unknown  10.5-15  F



Ordering Physician [facility] Unqdeif5749-10-32 08:13:00Identifier [id number]-
6 Result Time 2019 08:13:00Unknown





 Test Item  Value  Reference Range  Comments

 

 Unknown (test code = 789-8)  5.19 10^6 /uL  Unknown  4.18-5.48  F



Ordering Physician [facility] Tqiudmj0455-43-60 08:13:00Identifier [id number]-
6 Result Time 2019 08:13:00Unknown





 Test Item  Value  Reference Range  Comments

 

 Unknown (test code = 777-3)  330 10^3/uL  Unknown  150-450  F



Ordering Physician [facility] Csnvklx7602-98-50 08:13:00Identifier [id number]-
6 Result Time 2019 08:13:00Unknown





 Test Item  Value  Reference Range  Comments

 

 Unknown (test code = 97297-8)  0   Unknown  Unknown  F



Ordering Physician [facility] Omuuhsk7451-49-77 08:13:00Identifier [id number]-
6 Result Time 2019 08:13:00Unknown





 Test Item  Value  Reference Range  Comments

 

 Unknown (test code = 771-6)  0 10^3/ul  Unknown  Unknown  F



Ordering Physician [facility] Awnycqa7101-70-14 08:13:00Identifier [id number]-
6 Result Time 2019 08:13:00Unknown





 Test Item  Value  Reference Range  Comments

 

 Unknown (test code = 770-8)  84.7 %  Unknown  Unknown  F



Ordering Physician [facility] Lyryogf5553-62-98 08:13:00Identifier [id number]-
6 Result Time 2019 08:13:00Unknown





 Test Item  Value  Reference Range  Comments

 

 Unknown (test code = 5905-5)  8.6 %  Unknown  Unknown  F



Ordering Physician [facility] Lryeqyy4549-66-83 08:13:00Identifier [id number]-
6 Result Time 2019 08:13:00Unknown





 Test Item  Value  Reference Range  Comments

 

 Unknown (test code = 98761-1)  8.6 fL  Unknown  7.4-10.4  F



Ordering Physician [facility] Asegbjp8454-73-18 08:13:00Identifier [id number]-
6 Result Time 2019 08:13:00Unknown





 Test Item  Value  Reference Range  Comments

 

 Unknown (test code = 787-2)  78 fL  Unknown  80-94  F



Ordering Physician [facility] Zaoofpm8947-04-86 08:13:00Identifier [id number]-
6 Result Time 2019 08:13:00Unknown





 Test Item  Value  Reference Range  Comments

 

 Unknown (test code = 786-4)  31 g/dL  Unknown  31-36  F



Ordering Physician [facility] Vdcndbg8993-05-69 08:13:00Identifier [id number]-
6 Result Time 2019 08:13:00Unknown





 Test Item  Value  Reference Range  Comments

 

 Unknown (test code = 785-6)  24 pg  Unknown  27-31  F



Ordering Physician [facility] Vekfsij3598-26-09 08:13:00Identifier [id number]-
6 Result Time 2019 08:13:00Unknown





 Test Item  Value  Reference Range  Comments

 

 Unknown (test code = 736-9)  5.4 %  Unknown  Unknown  F



Ordering Physician [facility] Ngjmojd2072-31-02 08:13:00Identifier [id number]-
6 Result Time 2019 08:13:00Unknown





 Test Item  Value  Reference Range  Comments

 

 Unknown (test code = 718-7)  12.3 g/dL  Unknown  14.0-18.0  F



Ordering Physician [facility] Jvxrkzf9746-97-47 08:13:00Identifier [id number]-
6 Result Time 2019 08:13:00Unknown





 Test Item  Value  Reference Range  Comments

 

 Unknown (test code = 4544-3)  40 %  Unknown  36-46  F



Ordering Physician [facility] Eaabntt8925-23-46 08:13:00Identifier [id number]-
6 Result Time 2019 08:13:00Unknown





 Test Item  Value  Reference Range  Comments

 

 Unknown (test code = 713-8)  0.8 %  Unknown  Unknown  F



Ordering Physician [facility] Vhlisxo8881-32-13 08:13:00Identifier [id number]-
6 Result Time 2019 08:13:00Unknown





 Test Item  Value  Reference Range  Comments

 

 Unknown (test code = 706-2)  0.5 %  Unknown  Unknown  F



Ordering Physician [facility] Whavvpn8839-42-13 08:13:00Identifier [id number]-
6 Result Time 2019 08:13:00Unknown





 Test Item  Value  Reference Range  Comments

 

 Unknown (test code = OLG6171)  23.2 10^3/ul  Unknown  1.5-7.7  F



Ordering Physician [facility] Ckwhlux9685-09-43 08:13:00Identifier [id number]-
6 Result Time 2019 08:13:00Unknown





 Test Item  Value  Reference Range  Comments

 

 Unknown (test code = 742-7)  2.4 10^3/ul  Unknown  0-0.8  F



Ordering Physician [facility] Jcnpvwr9169-21-77 08:13:00Identifier [id number]-
6 Result Time 2019 08:13:00Unknown





 Test Item  Value  Reference Range  Comments

 

 Unknown (test code = 731-0)  1.5 10^3/ul  Unknown  1.0-4.8  F



Ordering Physician [facility] Jxccbcl7437-00-88 08:13:00Identifier [id number]-
6 Result Time 2019 08:13:00Unknown





 Test Item  Value  Reference Range  Comments

 

 Unknown (test code = 711-2)  0.2 10^3/ul  Unknown  0-0.6  F



Ordering Physician [facility] Pkkblyj3379-85-90 08:13:00Identifier [id number]-
6 Result Time 2019 08:13:00Unknown





 Test Item  Value  Reference Range  Comments

 

 Unknown (test code = 704-7)  0.1 10^3/ul  Unknown  0-0.2  F



Ordering Physician [facility] Ymvptsf8766-40-30 06:46:00Identifier [id number]-
6 Result Time 2019 06:46:00Unknown





 Test Item  Value  Reference Range  Comments

 

 Unknown (test code = 74263-0)  0.05 ng/mL  Unknown  Unknown  F



Ordering Physician [facility] Fwmesww9219-84-16 06:46:00Identifier [id number]-
6 Result Time 2019 06:46:00Unknown





 Test Item  Value  Reference Range  Comments

 

 Unknown (test code = 2777-1)  2.8 mg/dL  Unknown  2.5-5.0  F



Ordering Physician [facility] Zdwtcwb4633-91-15 06:46:00Identifier [id number]-
6 Result Time 2019 06:46:00Unknown





 Test Item  Value  Reference Range  Comments

 

 Unknown (test code = 58523-3)  2.5 mg/dL  Unknown  1.9-2.7  F



Ordering Physician [facility] Studies2019-04-15 10:48:00Identifier [id number]-
6 Result Time 2019-04-15 10:48:00Unknown





 Test Item  Value  Reference Range  Comments

 

 Unknown (test code = NullTestCode)  6.0   Unknown  5-9  F



Ordering Physician [facility] Studies2019-04-15 10:48:00Identifier [id number]-
6 Result Time 2019-04-15 10:48:00Unknown





 Test Item  Value  Reference Range  Comments

 

 Unknown (test code = 71178-0)  1.013   Unknown  1.010-1.030  F



Ordering Physician [facility] Studies2019-04-15 10:15:00Identifier [id number]
6 Result Time 2019-04-15 10:15:00Unknown





 Test Item  Value  Reference Range  Comments

 

 Unknown (test code = 3016-3)  3.38 mcIU/mL  Unknown  0.34-5.60  F



Ordering Physician [facility] Studies2019-04-15 10:15:00Identifier [id number]-
6 Result Time 2019-04-15 10:15:00Unknown





 Test Item  Value  Reference Range  Comments

 

 Unknown (test code = 55335-4)  1.14   Unknown  0.77-1.02  F



Ordering Physician [facility] Studies2019-04-15 10:15:00Identifier [id number]-
6 Result Time 2019-04-15 10:15:00Unknown





 Test Item  Value  Reference Range  Comments

 

 Unknown (test code = 81118-4)  365 pg/mL  Unknown  Unknown  F



Ordering Physician [facility] Studies2019-04-15 10:15:00Identifier [id number]-
6 Result Time 2019-04-15 10:15:00Unknown





 Test Item  Value  Reference Range  Comments

 

 Unknown (test code = 2885-2)  8.9 g/dL  Unknown  6.4-8.9  F



Ordering Physician [facility] Studies2019-04-15 10:15:00Identifier [id number]-
6 Result Time 2019-04-15 10:15:00Unknown





 Test Item  Value  Reference Range  Comments

 

 Unknown (test code = 1975-2)  0.60 mg/dL  Unknown  0.2-1.0  F



Ordering Physician [facility] Studies2019-04-15 10:15:00Identifier [id number]-
6 Result Time 2019-04-15 10:15:00Unknown





 Test Item  Value  Reference Range  Comments

 

 Unknown (test code = NullTestCode)  4.9 g/dL  Unknown  2-4  F



Ordering Physician [facility] Studies2019-04-15 10:15:00Identifier [id number]-
6 Result Time 2019-04-15 10:15:00Unknown





 Test Item  Value  Reference Range  Comments

 

 Unknown (test code = 1988-5)  11.17 mg/L  Unknown  0-8.00  F



Ordering Physician [facility] Studies2019-04-15 10:15:00Identifier [id number]-
6 Result Time 2019-04-15 10:15:00Unknown





 Test Item  Value  Reference Range  Comments

 

 Unknown (test code = 1920-8)  25 U/L  Unknown  13-39  F



Ordering Physician [facility] Studies2019-04-15 10:15:00Identifier [id number]-
6 Result Time 2019-04-15 10:15:00Unknown





 Test Item  Value  Reference Range  Comments

 

 Unknown (test code = 6768-6)  153 U/L  Unknown    F



Ordering Physician [facility] Studies2019-04-15 10:15:00Identifier [id number]-
6 Result Time 2019-04-15 10:15:00Unknown





 Test Item  Value  Reference Range  Comments

 

 Unknown (test code = 1759-0)  0.8   Unknown  1-3  F



Ordering Physician [facility] Studies2019-04-15 10:15:00Identifier [id number]-
6 Result Time 2019-04-15 10:15:00Unknown





 Test Item  Value  Reference Range  Comments

 

 Unknown (test code = 62008-6)  4.0 g/dL  Unknown  3.2-5.2  F



Ordering Physician [facility] Studies2019-04-15 10:15:00Identifier [id number]-
6 Result Time 2019-04-15 10:15:00Unknown





 Test Item  Value  Reference Range  Comments

 

 Unknown (test code = 1742-6)  13 U/L  Unknown  7-52  F



Ordering Physician [facility] Studies2019-04-15 10:15:00Identifier [id number]-
6 Result Time 2019-04-15 10:15:00Unknown





 Test Item  Value  Reference Range  Comments

 

 Unknown (test code = 2524-7)  0.9 mmol/L  Unknown  0.5-2.0  F



Ordering Physician Unknown

## 2020-04-24 NOTE — XMS REPORT
Patient Summary Document

 Created on:2020



Patient:BERTHA SOLIS

Sex:Male

:1933

External Reference #:[id number]





Demographics







 Address  [address] [place] [address]

 

 Home Phone  [phone]

 

 Preferred Language  English

 

 Marital Status  Unknown

 

 Religion Affiliation  Unknown

 

 Race  Unknown

 

 Ethnic Group  Unknown









Author







 Organization  [facility]









Support







 Name  Relationship  Address  Phone

 

 IRAIS SOLIS, Unknown Name  Suha  [address] [facility]  [phone]



     [place] [address]  









Care Team Providers







 Name  Role  Phone

 

 Unavailable  Unavailable  Unavailable









Problems

This patient has no known problems.



Allergies, Adverse Reactions, Alerts







 Allergy  Allergy  Status  Severity  Reaction(s)  Onset  Inactive  Treating  
Comments



 Name  Type        Date  Date  Clinician  

 

 Uncoded  Unknown  Active  Unknown  Reaction  2019    Interface  



 free-text        Unknown  -18      



 allergy                







Medications







 Ordered  Filled  Start  Stop  Current  Ordering  Indication  Dosage  Frequency
  Signature  Comments  Components



 Medication  Medication  Date  Date  Medication?  Clinician        (SIG)    



 Name  Name                    

 

 Multivitami  Multivitami  2012    No  Unknown    Unknown  Unknown      



 ns/Minerals  ns/Minerals  -                  



 Tab*  Tab*                    

 

 amLODIPine  amLODIPine      No  Unknown    Unknown  Unknown      



 5 mg tablet  5 mg tablet  4-15                  

 

 acetaminoph  acetaminoph      No  Unknown    Unknown  Unknown      



 en 500 mg  en 500 mg  4-15                  



 tablet  tablet                    

 

 magnesium  magnesium      No  Unknown    Unknown  Unknown      



 oxide 400  oxide 400  4-15                  



 mg (241.3  mg (241.3                    



 mg  mg                    



 magnesium)  magnesium)                    



 tablet  tablet                    

 

 omeprazole  omeprazole      No  Unknown    Unknown  Unknown      



 40 mg  40 mg  4-15                  



 capsule,del  capsule,del                    



 ayed  ayed                    



 release  release                    

 

 verapamil  verapamil      No  Unknown    Unknown  Unknown      



  mg   mg  4-15                  



 capsule,ext  capsule,ext                    



 ended  ended                    



 release  release                    

 

 Calcium  Calcium      No  Unknown    Unknown  Unknown      



 Carbonate/V  Carbonate/V  4-15                  



 itamin D3  itamin D3                    

 

 cefdinir  cefdinir      No  Unknown    Unknown  Unknown      



 300 mg  300 mg  4-17                  



 capsule  capsule                    







Vital Signs







 Vital Name  Observation Time  Observation Value  Comments

 

 SYSTOLIC mm[Hg]  2019 18:05:21  135 mm[Hg] mm[Hg]  Method: Sit

 

 DIASTOLIC mm[Hg]  2019 18:05:21  48 mm[Hg] mm[Hg]  Method: Sit

 

 PULSE  2019 18:05:21  63 /min /min  

 

 RESP RATE  2019 18:05:21  16 /min /min  

 

 TEMP  2019 18:05:21  97.4 [degF]  







Procedures

This patient has no known procedures.



Results







 Test Description  Test Time  Test Comments  Text Results  Atomic Results  
Result Comments









 Laboratory Studies  2019 08:13:00  Identifier [id number] Result Time  
Unknown



     2019 08:13:00  









   Test Item  Value  Reference Range  Comments









 Unknown (test code = 2951-2)  136 mmol/L  Unknown  135-145  F



Ordering Physician [facility] Nsiemna9066-68-30 08:13:00Identifier [id number]
6 Result Time 2019 08:13:00Unknown





 Test Item  Value  Reference Range  Comments

 

 Unknown (test code = 2823-3)  4.0 mmol/L  Unknown  3.5-5.0  F



Ordering Physician [facility] Tbjjrdw4619-62-36 08:13:00Identifier [id number]-
6 Result Time 2019 08:13:00Unknown





 Test Item  Value  Reference Range  Comments

 

 Unknown (test code = 2345-7)  87 mg/dL  Unknown    F



Ordering Physician [facility] Njjxfru1411-69-20 08:13:00Identifier [id number]
6 Result Time 2019 08:13:00Unknown





 Test Item  Value  Reference Range  Comments

 

 Unknown (test code = 48642-3)  46.9   Unknown  Unknown  F



Ordering Physician [facility] Udbboij2950-91-32 08:13:00Identifier [id number]-
6 Result Time 2019 08:13:00Unknown





 Test Item  Value  Reference Range  Comments

 

 Unknown (test code = NullTestCode)  56.7   Unknown  Unknown  F



Ordering Physician [facility] Cxkiiah3277-25-86 08:13:00Identifier [id number]-
6 Result Time 2019 08:13:00Unknown





 Test Item  Value  Reference Range  Comments

 

 Unknown (test code = 2160-0)  1.43 mg/dL  Unknown  0.67-1.17  F



Ordering Physician [facility] Fycztpw1517-49-03 08:13:00Identifier [id number]-
6 Result Time 2019 08:13:00Unknown





 Test Item  Value  Reference Range  Comments

 

 Unknown (test code = 2075-0)  104 mmol/L  Unknown  101-111  F



Ordering Physician [facility] Mmqraox2348-17-35 08:13:00Identifier [id number]-
6 Result Time 2019 08:13:00Unknown





 Test Item  Value  Reference Range  Comments

 

 Unknown (test code = 2028-9)  24 mmol/L  Unknown  22-32  F



Ordering Physician [facility] Snlxzxx2756-97-41 08:13:00Identifier [id number]-
6 Result Time 2019 08:13:00Unknown





 Test Item  Value  Reference Range  Comments

 

 Unknown (test code = 03568-5)  9.0 mg/dL  Unknown  8.6-10.3  F



Ordering Physician [facility] Tlpjdcm2503-38-87 08:13:00Identifier [id number]-
6 Result Time 2019 08:13:00Unknown





 Test Item  Value  Reference Range  Comments

 

 Unknown (test code = 3094-0)  24 mg/dL  Unknown  6-24  F



Ordering Physician [facility] Okntjpj3849-85-46 08:13:00Identifier [id number]-
6 Result Time 2019 08:13:00Unknown





 Test Item  Value  Reference Range  Comments

 

 Unknown (test code = 3097-3)  16.8   Unknown  8-20  F



Ordering Physician [facility] Mymxlxb6968-42-97 08:13:00Identifier [id number]-
6 Result Time 2019 08:13:00Unknown





 Test Item  Value  Reference Range  Comments

 

 Unknown (test code = 33037-3)  8 mmol/L  Unknown  2-11  F



Ordering Physician [facility] Yobavio0532-44-03 08:13:00Identifier [id number]-
6 Result Time 2019 08:13:00Unknown





 Test Item  Value  Reference Range  Comments

 

 Unknown (test code = 84410-7)  27.4 10^3/uL  Unknown  3.5-10.8  F



Ordering Physician [facility] Gathyvl6307-85-78 08:13:00Identifier [id number]-
6 Result Time 2019 08:13:00Unknown





 Test Item  Value  Reference Range  Comments

 

 Unknown (test code = 788-0)  24 %  Unknown  10.5-15  F



Ordering Physician [facility] Gyanbik6214-48-02 08:13:00Identifier [id number]-
6 Result Time 2019 08:13:00Unknown





 Test Item  Value  Reference Range  Comments

 

 Unknown (test code = 789-8)  5.19 10^6 /uL  Unknown  4.18-5.48  F



Ordering Physician [facility] Psfrfhv5922-32-31 08:13:00Identifier [id number]-
6 Result Time 2019 08:13:00Unknown





 Test Item  Value  Reference Range  Comments

 

 Unknown (test code = 777-3)  330 10^3/uL  Unknown  150-450  F



Ordering Physician [facility] Uyadeyo3782-97-50 08:13:00Identifier [id number]-
6 Result Time 2019 08:13:00Unknown





 Test Item  Value  Reference Range  Comments

 

 Unknown (test code = 96834-3)  0   Unknown  Unknown  F



Ordering Physician [facility] Jlsxvkc9276-64-72 08:13:00Identifier [id number]-
6 Result Time 2019 08:13:00Unknown





 Test Item  Value  Reference Range  Comments

 

 Unknown (test code = 771-6)  0 10^3/ul  Unknown  Unknown  F



Ordering Physician [facility] Wuduezq8869-35-89 08:13:00Identifier [id number]-
6 Result Time 2019 08:13:00Unknown





 Test Item  Value  Reference Range  Comments

 

 Unknown (test code = 770-8)  84.7 %  Unknown  Unknown  F



Ordering Physician [facility] Zbjcjfi0963-57-50 08:13:00Identifier [id number]-
6 Result Time 2019 08:13:00Unknown





 Test Item  Value  Reference Range  Comments

 

 Unknown (test code = 5905-5)  8.6 %  Unknown  Unknown  F



Ordering Physician [facility] Sibksrb7000-48-13 08:13:00Identifier [id number]-
6 Result Time 2019 08:13:00Unknown





 Test Item  Value  Reference Range  Comments

 

 Unknown (test code = 00432-5)  8.6 fL  Unknown  7.4-10.4  F



Ordering Physician [facility] Ckixgtd7623-52-53 08:13:00Identifier [id number]-
6 Result Time 2019 08:13:00Unknown





 Test Item  Value  Reference Range  Comments

 

 Unknown (test code = 787-2)  78 fL  Unknown  80-94  F



Ordering Physician [facility] Djcjbhw6055-88-03 08:13:00Identifier [id number]-
6 Result Time 2019 08:13:00Unknown





 Test Item  Value  Reference Range  Comments

 

 Unknown (test code = 786-4)  31 g/dL  Unknown  31-36  F



Ordering Physician [facility] Fsghhmh4220-29-14 08:13:00Identifier [id number]-
6 Result Time 2019 08:13:00Unknown





 Test Item  Value  Reference Range  Comments

 

 Unknown (test code = 785-6)  24 pg  Unknown  27-31  F



Ordering Physician [facility] Ejbwbnp9596-42-96 08:13:00Identifier [id number]-
6 Result Time 2019 08:13:00Unknown





 Test Item  Value  Reference Range  Comments

 

 Unknown (test code = 736-9)  5.4 %  Unknown  Unknown  F



Ordering Physician [facility] Icjyupl2267-71-49 08:13:00Identifier [id number]-
6 Result Time 2019 08:13:00Unknown





 Test Item  Value  Reference Range  Comments

 

 Unknown (test code = 718-7)  12.3 g/dL  Unknown  14.0-18.0  F



Ordering Physician [facility] Wyxfzgh1613-07-46 08:13:00Identifier [id number]-
6 Result Time 2019 08:13:00Unknown





 Test Item  Value  Reference Range  Comments

 

 Unknown (test code = 4544-3)  40 %  Unknown  36-46  F



Ordering Physician [facility] Loqsxuq6205-89-88 08:13:00Identifier [id number]-
6 Result Time 2019 08:13:00Unknown





 Test Item  Value  Reference Range  Comments

 

 Unknown (test code = 713-8)  0.8 %  Unknown  Unknown  F



Ordering Physician [facility] Eddypna7488-87-67 08:13:00Identifier [id number]-
6 Result Time 2019 08:13:00Unknown





 Test Item  Value  Reference Range  Comments

 

 Unknown (test code = 706-2)  0.5 %  Unknown  Unknown  F



Ordering Physician [facility] Unbjtpv3691-31-65 08:13:00Identifier [id number]-
6 Result Time 2019 08:13:00Unknown





 Test Item  Value  Reference Range  Comments

 

 Unknown (test code = KAZ0044)  23.2 10^3/ul  Unknown  1.5-7.7  F



Ordering Physician [facility] Hsudgth8173-07-90 08:13:00Identifier [id number]-
6 Result Time 2019 08:13:00Unknown





 Test Item  Value  Reference Range  Comments

 

 Unknown (test code = 742-7)  2.4 10^3/ul  Unknown  0-0.8  F



Ordering Physician [facility] Slkydso2892-74-56 08:13:00Identifier [id number]-
6 Result Time 2019 08:13:00Unknown





 Test Item  Value  Reference Range  Comments

 

 Unknown (test code = 731-0)  1.5 10^3/ul  Unknown  1.0-4.8  F



Ordering Physician [facility] Mchwzfp5272-51-50 08:13:00Identifier [id number]-
6 Result Time 2019 08:13:00Unknown





 Test Item  Value  Reference Range  Comments

 

 Unknown (test code = 711-2)  0.2 10^3/ul  Unknown  0-0.6  F



Ordering Physician [facility] Kokkcvu2563-45-55 08:13:00Identifier [id number]-
6 Result Time 2019 08:13:00Unknown





 Test Item  Value  Reference Range  Comments

 

 Unknown (test code = 704-7)  0.1 10^3/ul  Unknown  0-0.2  F



Ordering Physician [facility] Ctqtacl1043-13-96 06:46:00Identifier [id number]-
6 Result Time 2019 06:46:00Unknown





 Test Item  Value  Reference Range  Comments

 

 Unknown (test code = 99108-9)  0.05 ng/mL  Unknown  Unknown  F



Ordering Physician [facility] Naaqnff6322-09-58 06:46:00Identifier [id number]-
6 Result Time 2019 06:46:00Unknown





 Test Item  Value  Reference Range  Comments

 

 Unknown (test code = 2777-1)  2.8 mg/dL  Unknown  2.5-5.0  F



Ordering Physician [facility] Mmukafd5693-82-28 06:46:00Identifier [id number]-
6 Result Time 2019 06:46:00Unknown





 Test Item  Value  Reference Range  Comments

 

 Unknown (test code = 55366-3)  2.5 mg/dL  Unknown  1.9-2.7  F



Ordering Physician [facility] Studies2019-04-15 10:48:00Identifier [id number]-
6 Result Time 2019-04-15 10:48:00Unknown





 Test Item  Value  Reference Range  Comments

 

 Unknown (test code = NullTestCode)  6.0   Unknown  5-9  F



Ordering Physician [facility] Studies2019-04-15 10:48:00Identifier [id number]-
6 Result Time 2019-04-15 10:48:00Unknown





 Test Item  Value  Reference Range  Comments

 

 Unknown (test code = 87204-6)  1.013   Unknown  1.010-1.030  F



Ordering Physician [facility] Studies2019-04-15 10:15:00Identifier [id number]
6 Result Time 2019-04-15 10:15:00Unknown





 Test Item  Value  Reference Range  Comments

 

 Unknown (test code = 3016-3)  3.38 mcIU/mL  Unknown  0.34-5.60  F



Ordering Physician [facility] Studies2019-04-15 10:15:00Identifier [id number]
6 Result Time 2019-04-15 10:15:00Unknown





 Test Item  Value  Reference Range  Comments

 

 Unknown (test code = 95571-1)  1.14   Unknown  0.77-1.02  F



Ordering Physician [facility] Studies2019-04-15 10:15:00Identifier [id number]
6 Result Time 2019-04-15 10:15:00Unknown





 Test Item  Value  Reference Range  Comments

 

 Unknown (test code = 86639-0)  365 pg/mL  Unknown  Unknown  F



Ordering Physician [facility] Studies2019-04-15 10:15:00Identifier [id number]
6 Result Time 2019-04-15 10:15:00Unknown





 Test Item  Value  Reference Range  Comments

 

 Unknown (test code = 2885-2)  8.9 g/dL  Unknown  6.4-8.9  F



Ordering Physician [facility] Studies2019-04-15 10:15:00Identifier [id number]-
6 Result Time 2019-04-15 10:15:00Unknown





 Test Item  Value  Reference Range  Comments

 

 Unknown (test code = 1975-2)  0.60 mg/dL  Unknown  0.2-1.0  F



Ordering Physician [facility] Studies2019-04-15 10:15:00Identifier [id number]-
6 Result Time 2019-04-15 10:15:00Unknown





 Test Item  Value  Reference Range  Comments

 

 Unknown (test code = NullTestCode)  4.9 g/dL  Unknown  2-4  F



Ordering Physician [facility] Studies2019-04-15 10:15:00Identifier [id number]-
6 Result Time 2019-04-15 10:15:00Unknown





 Test Item  Value  Reference Range  Comments

 

 Unknown (test code = 1988-5)  11.17 mg/L  Unknown  0-8.00  F



Ordering Physician [facility] Studies2019-04-15 10:15:00Identifier [id number]-
6 Result Time 2019-04-15 10:15:00Unknown





 Test Item  Value  Reference Range  Comments

 

 Unknown (test code = 1920-8)  25 U/L  Unknown  13-39  F



Ordering Physician [facility] Studies2019-04-15 10:15:00Identifier [id number]
6 Result Time 2019-04-15 10:15:00Unknown





 Test Item  Value  Reference Range  Comments

 

 Unknown (test code = 6768-6)  153 U/L  Unknown    F



Ordering Physician [facility] Studies2019-04-15 10:15:00Identifier [id number]-
6 Result Time 2019-04-15 10:15:00Unknown





 Test Item  Value  Reference Range  Comments

 

 Unknown (test code = 1759-0)  0.8   Unknown  1-3  F



Ordering Physician [facility] Studies2019-04-15 10:15:00Identifier [id number]-
6 Result Time 2019-04-15 10:15:00Unknown





 Test Item  Value  Reference Range  Comments

 

 Unknown (test code = 39802-5)  4.0 g/dL  Unknown  3.2-5.2  F



Ordering Physician [facility] Studies2019-04-15 10:15:00Identifier [id number]-
6 Result Time 2019-04-15 10:15:00Unknown





 Test Item  Value  Reference Range  Comments

 

 Unknown (test code = 1742-6)  13 U/L  Unknown  7-52  F



Ordering Physician [facility] Studies2019-04-15 10:15:00Identifier [id number]-
6 Result Time 2019-04-15 10:15:00Unknown





 Test Item  Value  Reference Range  Comments

 

 Unknown (test code = 2524-7)  0.9 mmol/L  Unknown  0.5-2.0  F



Ordering Physician Unknown

## 2020-04-24 NOTE — XMS REPORT
Patient Summary Document

 Created on:2020



Patient:BERTHA SOLIS

Sex:Male

:1933

External Reference #:[id number]





Demographics







 Address  [address] [place] [address]

 

 Home Phone  [phone]

 

 Preferred Language  English

 

 Marital Status  Unknown

 

 Jehovah's witness Affiliation  Unknown

 

 Race  Unknown

 

 Ethnic Group  Unknown









Author







 Organization  [facility]









Support







 Name  Relationship  Address  Phone

 

 IRAIS SOLIS, Unknown Name  Suha  [address]  [phone]



     [place] [address]  









Care Team Providers







 Name  Role  Phone

 

 Unavailable  Unavailable  Unavailable









Problems

This patient has no known problems.



Allergies, Adverse Reactions, Alerts







 Allergy  Allergy  Status  Severity  Reaction(s)  Onset  Inactive  Treating  
Comments



 Name  Type        Date  Date  Clinician  

 

 Uncoded  Unknown  Active  Unknown  Reaction  2019    Interface  



 free-text        Unknown  -18      



 allergy                







Medications







 Ordered  Filled  Start  Stop  Current  Ordering  Indication  Dosage  Frequency
  Signature  Comments  Components



 Medication  Medication  Date  Date  Medication?  Clinician        (SIG)    



 Name  Name                    

 

 Multivitami  Multivitami  2012    No  Unknown    Unknown  Unknown      



 ns/Minerals  ns/Minerals  -                  



 Tab*  Tab*                    

 

 amLODIPine  amLODIPine      No  Unknown    Unknown  Unknown      



 5 mg tablet  5 mg tablet  4-15                  

 

 acetaminoph  acetaminoph      No  Unknown    Unknown  Unknown      



 en 500 mg  en 500 mg  4-15                  



 tablet  tablet                    

 

 magnesium  magnesium      No  Unknown    Unknown  Unknown      



 oxide 400  oxide 400  4-15                  



 mg (241.3  mg (241.3                    



 mg  mg                    



 magnesium)  magnesium)                    



 tablet  tablet                    

 

 omeprazole  omeprazole      No  Unknown    Unknown  Unknown      



 40 mg  40 mg  4-15                  



 capsule,del  capsule,del                    



 ayed  ayed                    



 release  release                    

 

 verapamil  verapamil      No  Unknown    Unknown  Unknown      



  mg   mg  4-15                  



 capsule,ext  capsule,ext                    



 ended  ended                    



 release  release                    

 

 Calcium  Calcium      No  Unknown    Unknown  Unknown      



 Carbonate/V  Carbonate/V  4-15                  



 itamin D3  itamin D3                    

 

 cefdinir  cefdinir      No  Unknown    Unknown  Unknown      



 300 mg  300 mg  4-17                  



 capsule  capsule                    







Vital Signs







 Vital Name  Observation Time  Observation Value  Comments

 

 SYSTOLIC mm[Hg]  2019 18:05:21  135 mm[Hg] mm[Hg]  Method: Sit

 

 DIASTOLIC mm[Hg]  2019 18:05:21  48 mm[Hg] mm[Hg]  Method: Sit

 

 PULSE  2019 18:05:21  63 /min /min  

 

 RESP RATE  2019 18:05:21  16 /min /min  

 

 TEMP  2019 18:05:21  97.4 [degF]  







Procedures

This patient has no known procedures.



Results







 Test Description  Test Time  Test Comments  Text Results  Atomic Results  
Result Comments









 Laboratory Studies  2019 08:13:00  Identifier [id number] Result Time  
Unknown



     2019 08:13:00  









   Test Item  Value  Reference Range  Comments









 Unknown (test code = 2951-2)  136 mmol/L  Unknown  135-145  F



Ordering Physician [facility] Putgltf7499-69-66 08:13:00Identifier [id number]
6 Result Time 2019 08:13:00Unknown





 Test Item  Value  Reference Range  Comments

 

 Unknown (test code = 2823-3)  4.0 mmol/L  Unknown  3.5-5.0  F



Ordering Physician [facility] Jupwvpm3862-82-75 08:13:00Identifier [id number]-
6 Result Time 2019 08:13:00Unknown





 Test Item  Value  Reference Range  Comments

 

 Unknown (test code = 2345-7)  87 mg/dL  Unknown    F



Ordering Physician [facility] Llbmenz4576-14-97 08:13:00Identifier [id number]-
6 Result Time 2019 08:13:00Unknown





 Test Item  Value  Reference Range  Comments

 

 Unknown (test code = 48642-3)  46.9   Unknown  Unknown  F



Ordering Physician [facility] Byqsmvh5227-59-22 08:13:00Identifier [id number]-
6 Result Time 2019 08:13:00Unknown





 Test Item  Value  Reference Range  Comments

 

 Unknown (test code = NullTestCode)  56.7   Unknown  Unknown  F



Ordering Physician [facility] Dqwhymc2031-00-67 08:13:00Identifier [id number]-
6 Result Time 2019 08:13:00Unknown





 Test Item  Value  Reference Range  Comments

 

 Unknown (test code = 2160-0)  1.43 mg/dL  Unknown  0.67-1.17  F



Ordering Physician [facility] Zxevfpu7485-47-76 08:13:00Identifier [id number]-
6 Result Time 2019 08:13:00Unknown





 Test Item  Value  Reference Range  Comments

 

 Unknown (test code = 2075-0)  104 mmol/L  Unknown  101-111  F



Ordering Physician [facility] Ojevopk5855-34-18 08:13:00Identifier [id number]-
6 Result Time 2019 08:13:00Unknown





 Test Item  Value  Reference Range  Comments

 

 Unknown (test code = 2028-9)  24 mmol/L  Unknown  22-32  F



Ordering Physician [facility] Pcxihal7144-88-89 08:13:00Identifier [id number]-
6 Result Time 2019 08:13:00Unknown





 Test Item  Value  Reference Range  Comments

 

 Unknown (test code = 23177-3)  9.0 mg/dL  Unknown  8.6-10.3  F



Ordering Physician [facility] Iazpztd0003-27-66 08:13:00Identifier [id number]-
6 Result Time 2019 08:13:00Unknown





 Test Item  Value  Reference Range  Comments

 

 Unknown (test code = 3094-0)  24 mg/dL  Unknown  6-24  F



Ordering Physician [facility] Qxcjmsr0021-65-89 08:13:00Identifier [id number]-
6 Result Time 2019 08:13:00Unknown





 Test Item  Value  Reference Range  Comments

 

 Unknown (test code = 3097-3)  16.8   Unknown  8-20  F



Ordering Physician [facility] Mszksmr6930-27-81 08:13:00Identifier [id number]-
6 Result Time 2019 08:13:00Unknown





 Test Item  Value  Reference Range  Comments

 

 Unknown (test code = 33037-3)  8 mmol/L  Unknown  2-11  F



Ordering Physician [facility] Pemkgkd6582-17-48 08:13:00Identifier [id number]-
6 Result Time 2019 08:13:00Unknown





 Test Item  Value  Reference Range  Comments

 

 Unknown (test code = 27041-9)  27.4 10^3/uL  Unknown  3.5-10.8  F



Ordering Physician [facility] Nwrkeaf8020-66-84 08:13:00Identifier [id number]-
6 Result Time 2019 08:13:00Unknown





 Test Item  Value  Reference Range  Comments

 

 Unknown (test code = 788-0)  24 %  Unknown  10.5-15  F



Ordering Physician [facility] Kxsrtyh3499-13-19 08:13:00Identifier [id number]-
6 Result Time 2019 08:13:00Unknown





 Test Item  Value  Reference Range  Comments

 

 Unknown (test code = 789-8)  5.19 10^6 /uL  Unknown  4.18-5.48  F



Ordering Physician [facility] Hwttjws3724-49-85 08:13:00Identifier [id number]-
6 Result Time 2019 08:13:00Unknown





 Test Item  Value  Reference Range  Comments

 

 Unknown (test code = 777-3)  330 10^3/uL  Unknown  150-450  F



Ordering Physician [facility] Wrceswf0216-48-84 08:13:00Identifier [id number]-
6 Result Time 2019 08:13:00Unknown





 Test Item  Value  Reference Range  Comments

 

 Unknown (test code = 12342-5)  0   Unknown  Unknown  F



Ordering Physician [facility] Vukcocb3319-79-01 08:13:00Identifier [id number]-
6 Result Time 2019 08:13:00Unknown





 Test Item  Value  Reference Range  Comments

 

 Unknown (test code = 771-6)  0 10^3/ul  Unknown  Unknown  F



Ordering Physician [facility] Ioeiksh3365-16-27 08:13:00Identifier [id number]-
6 Result Time 2019 08:13:00Unknown





 Test Item  Value  Reference Range  Comments

 

 Unknown (test code = 770-8)  84.7 %  Unknown  Unknown  F



Ordering Physician [facility] Dhdagtd2763-54-14 08:13:00Identifier [id number]-
6 Result Time 2019 08:13:00Unknown





 Test Item  Value  Reference Range  Comments

 

 Unknown (test code = 5905-5)  8.6 %  Unknown  Unknown  F



Ordering Physician [facility] Usztsdu6409-76-39 08:13:00Identifier [id number]-
6 Result Time 2019 08:13:00Unknown





 Test Item  Value  Reference Range  Comments

 

 Unknown (test code = 10090-0)  8.6 fL  Unknown  7.4-10.4  F



Ordering Physician [facility] Qvlfgra3368-39-25 08:13:00Identifier [id number]-
6 Result Time 2019 08:13:00Unknown





 Test Item  Value  Reference Range  Comments

 

 Unknown (test code = 787-2)  78 fL  Unknown  80-94  F



Ordering Physician [facility] Mgtdnsx6029-10-17 08:13:00Identifier [id number]-
6 Result Time 2019 08:13:00Unknown





 Test Item  Value  Reference Range  Comments

 

 Unknown (test code = 786-4)  31 g/dL  Unknown  31-36  F



Ordering Physician [facility] Cbtjhto3692-89-81 08:13:00Identifier [id number]-
6 Result Time 2019 08:13:00Unknown





 Test Item  Value  Reference Range  Comments

 

 Unknown (test code = 785-6)  24 pg  Unknown  27-31  F



Ordering Physician [facility] Uxnrmqw0250-78-28 08:13:00Identifier [id number]-
6 Result Time 2019 08:13:00Unknown





 Test Item  Value  Reference Range  Comments

 

 Unknown (test code = 736-9)  5.4 %  Unknown  Unknown  F



Ordering Physician [facility] Ifqblzb0726-37-14 08:13:00Identifier [id number]-
6 Result Time 2019 08:13:00Unknown





 Test Item  Value  Reference Range  Comments

 

 Unknown (test code = 718-7)  12.3 g/dL  Unknown  14.0-18.0  F



Ordering Physician [facility] Wzwkzum1650-11-32 08:13:00Identifier [id number]-
6 Result Time 2019 08:13:00Unknown





 Test Item  Value  Reference Range  Comments

 

 Unknown (test code = 4544-3)  40 %  Unknown  36-46  F



Ordering Physician [facility] Ydgsydl7339-35-08 08:13:00Identifier [id number]-
6 Result Time 2019 08:13:00Unknown





 Test Item  Value  Reference Range  Comments

 

 Unknown (test code = 713-8)  0.8 %  Unknown  Unknown  F



Ordering Physician [facility] Lusikki0107-94-33 08:13:00Identifier [id number]-
6 Result Time 2019 08:13:00Unknown





 Test Item  Value  Reference Range  Comments

 

 Unknown (test code = 706-2)  0.5 %  Unknown  Unknown  F



Ordering Physician [facility] Iazdtin2798-72-03 08:13:00Identifier [id number]-
6 Result Time 2019 08:13:00Unknown





 Test Item  Value  Reference Range  Comments

 

 Unknown (test code = KRV4523)  23.2 10^3/ul  Unknown  1.5-7.7  F



Ordering Physician [facility] Gdsrrdh7581-71-86 08:13:00Identifier [id number]-
6 Result Time 2019 08:13:00Unknown





 Test Item  Value  Reference Range  Comments

 

 Unknown (test code = 742-7)  2.4 10^3/ul  Unknown  0-0.8  F



Ordering Physician [facility] Fgtqkwz0214-94-60 08:13:00Identifier [id number]-
6 Result Time 2019 08:13:00Unknown





 Test Item  Value  Reference Range  Comments

 

 Unknown (test code = 731-0)  1.5 10^3/ul  Unknown  1.0-4.8  F



Ordering Physician [facility] Iivxdhr3282-98-58 08:13:00Identifier [id number]-
6 Result Time 2019 08:13:00Unknown





 Test Item  Value  Reference Range  Comments

 

 Unknown (test code = 711-2)  0.2 10^3/ul  Unknown  0-0.6  F



Ordering Physician [facility] Wkuqtrq0020-48-24 08:13:00Identifier [id number]-
6 Result Time 2019 08:13:00Unknown





 Test Item  Value  Reference Range  Comments

 

 Unknown (test code = 704-7)  0.1 10^3/ul  Unknown  0-0.2  F



Ordering Physician [facility] Phzrull2227-01-21 06:46:00Identifier [id number]-
6 Result Time 2019 06:46:00Unknown





 Test Item  Value  Reference Range  Comments

 

 Unknown (test code = 00233-1)  0.05 ng/mL  Unknown  Unknown  F



Ordering Physician [facility] Ucntvit9337-31-66 06:46:00Identifier [id number]-
6 Result Time 2019 06:46:00Unknown





 Test Item  Value  Reference Range  Comments

 

 Unknown (test code = 2777-1)  2.8 mg/dL  Unknown  2.5-5.0  F



Ordering Physician [facility] Omtvcyt7285-64-93 06:46:00Identifier [id number]-
6 Result Time 2019 06:46:00Unknown





 Test Item  Value  Reference Range  Comments

 

 Unknown (test code = 67618-7)  2.5 mg/dL  Unknown  1.9-2.7  F



Ordering Physician [facility] Studies2019-04-15 10:48:00Identifier [id number]-
6 Result Time 2019-04-15 10:48:00Unknown





 Test Item  Value  Reference Range  Comments

 

 Unknown (test code = NullTestCode)  6.0   Unknown  5-9  F



Ordering Physician [facility] Studies2019-04-15 10:48:00Identifier [id number]-
6 Result Time 2019-04-15 10:48:00Unknown





 Test Item  Value  Reference Range  Comments

 

 Unknown (test code = 53459-0)  1.013   Unknown  1.010-1.030  F



Ordering Physician [facility] Studies2019-04-15 10:15:00Identifier [id number]-
6 Result Time 2019-04-15 10:15:00Unknown





 Test Item  Value  Reference Range  Comments

 

 Unknown (test code = 3016-3)  3.38 mcIU/mL  Unknown  0.34-5.60  F



Ordering Physician [facility] Studies2019-04-15 10:15:00Identifier [id number]-
6 Result Time 2019-04-15 10:15:00Unknown





 Test Item  Value  Reference Range  Comments

 

 Unknown (test code = 25813-7)  1.14   Unknown  0.77-1.02  F



Ordering Physician [facility] Studies2019-04-15 10:15:00Identifier [id number]
6 Result Time 2019-04-15 10:15:00Unknown





 Test Item  Value  Reference Range  Comments

 

 Unknown (test code = 42985-4)  365 pg/mL  Unknown  Unknown  F



Ordering Physician [facility] Studies2019-04-15 10:15:00Identifier [id number]-
6 Result Time 2019-04-15 10:15:00Unknown





 Test Item  Value  Reference Range  Comments

 

 Unknown (test code = 2885-2)  8.9 g/dL  Unknown  6.4-8.9  F



Ordering Physician [facility] Studies2019-04-15 10:15:00Identifier [id number]-
6 Result Time 2019-04-15 10:15:00Unknown





 Test Item  Value  Reference Range  Comments

 

 Unknown (test code = 1975-2)  0.60 mg/dL  Unknown  0.2-1.0  F



Ordering Physician [facility] Studies2019-04-15 10:15:00Identifier [id number]
6 Result Time 2019-04-15 10:15:00Unknown





 Test Item  Value  Reference Range  Comments

 

 Unknown (test code = NullTestCode)  4.9 g/dL  Unknown  2-4  F



Ordering Physician [facility] Studies2019-04-15 10:15:00Identifier [id number]-
6 Result Time 2019-04-15 10:15:00Unknown





 Test Item  Value  Reference Range  Comments

 

 Unknown (test code = 1988-5)  11.17 mg/L  Unknown  0-8.00  F



Ordering Physician [facility] Studies2019-04-15 10:15:00Identifier [id number]-
6 Result Time 2019-04-15 10:15:00Unknown





 Test Item  Value  Reference Range  Comments

 

 Unknown (test code = 1920-8)  25 U/L  Unknown  13-39  F



Ordering Physician [facility] Studies2019-04-15 10:15:00Identifier [id number]
6 Result Time 2019-04-15 10:15:00Unknown





 Test Item  Value  Reference Range  Comments

 

 Unknown (test code = 6768-6)  153 U/L  Unknown    F



Ordering Physician [facility] Studies2019-04-15 10:15:00Identifier [id number]-
6 Result Time 2019-04-15 10:15:00Unknown





 Test Item  Value  Reference Range  Comments

 

 Unknown (test code = 1759-0)  0.8   Unknown  1-3  F



Ordering Physician [facility] Studies2019-04-15 10:15:00Identifier [id number]-
6 Result Time 2019-04-15 10:15:00Unknown





 Test Item  Value  Reference Range  Comments

 

 Unknown (test code = 98099-6)  4.0 g/dL  Unknown  3.2-5.2  F



Ordering Physician [facility] Studies2019-04-15 10:15:00Identifier [id number]-
6 Result Time 2019-04-15 10:15:00Unknown





 Test Item  Value  Reference Range  Comments

 

 Unknown (test code = 1742-6)  13 U/L  Unknown  7-52  F



Ordering Physician [facility] Studies2019-04-15 10:15:00Identifier [id number]-
6 Result Time 2019-04-15 10:15:00Unknown





 Test Item  Value  Reference Range  Comments

 

 Unknown (test code = 2524-7)  0.9 mmol/L  Unknown  0.5-2.0  F



Ordering Physician Unknown

## 2020-04-24 NOTE — XMS REPORT
Patient Summary Document

 Created on:2020



Patient:BERTHA SOLIS

Sex:Male

:1933

External Reference #:[id number]





Demographics







 Address  [address] [place] [id number]

 

 Home Phone  [phone]

 

 Preferred Language  English

 

 Marital Status  Unknown

 

 Pent[employer]al Affiliation  Unknown

 

 Race  Unknown

 

 Ethnic Group  Unknown









Author







 Organization  [facility]









Support







 Name  Relationship  Address  Phone

 

 IRAIS SOLIS, Unknown Name  Suha  [address] [facility]  [phone]



     [place] [address]  









Care Team Providers







 Name  Role  Phone

 

 Unavailable  Unavailable  Unavailable









Problems

This patient has no known problems.



Allergies, Adverse Reactions, Alerts







 Allergy  Allergy  Status  Severity  Reaction(s)  Onset  Inactive  Treating  
Comments



 Name  Type        Date  Date  Clinician  

 

 Uncoded  Unknown  Active  Unknown  Reaction  2019    Interface  



 free-text        Unknown  -18      



 allergy                







Medications







 Ordered  Filled  Start  Stop  Current  Ordering  Indication  Dosage  Frequency
  Signature  Comments  Components



 Medication  Medication  Date  Date  Medication?  Clinician        (SIG)    



 Name  Name                    

 

 Multivitami  Multivitami  2012    No  Unknown    Unknown  Unknown      



 ns/Minerals  ns/Minerals  -                  



 Tab*  Tab*                    

 

 amLODIPine  amLODIPine      No  Unknown    Unknown  Unknown      



 5 mg tablet  5 mg tablet  4-15                  

 

 acetaminoph  acetaminoph      No  Unknown    Unknown  Unknown      



 en 500 mg  en 500 mg  4-15                  



 tablet  tablet                    

 

 magnesium  magnesium      No  Unknown    Unknown  Unknown      



 oxide 400  oxide 400  4-15                  



 mg (241.3  mg (241.3                    



 mg  mg                    



 magnesium)  magnesium)                    



 tablet  tablet                    

 

 omeprazole  omeprazole      No  Unknown    Unknown  Unknown      



 40 mg  40 mg  4-15                  



 capsule,del  capsule,del                    



 ayed  ayed                    



 release  release                    

 

 verapamil  verapamil      No  Unknown    Unknown  Unknown      



  mg   mg  4-15                  



 capsule,ext  capsule,ext                    



 ended  ended                    



 release  release                    

 

 Calcium  Calcium      No  Unknown    Unknown  Unknown      



 Carbonate/V  Carbonate/V  4-15                  



 itamin D3  itamin D3                    

 

 cefdinir  cefdinir      No  Unknown    Unknown  Unknown      



 300 mg  300 mg  4-17                  



 capsule  capsule                    







Vital Signs







 Vital Name  Observation Time  Observation Value  Comments

 

 SYSTOLIC mm[Hg]  2019 18:05:21  135 mm[Hg] mm[Hg]  Method: Sit

 

 DIASTOLIC mm[Hg]  2019 18:05:21  48 mm[Hg] mm[Hg]  Method: Sit

 

 PULSE  2019 18:05:21  63 /min /min  

 

 RESP RATE  2019 18:05:21  16 /min /min  

 

 TEMP  2019 18:05:21  97.4 [degF]  







Procedures

This patient has no known procedures.



Results







 Test Description  Test Time  Test Comments  Text Results  Atomic Results  
Result Comments









 Laboratory Studies  2019 08:13:00  Identifier [id number] Result Time  
Unknown



     2019 08:13:00  









   Test Item  Value  Reference Range  Comments









 Unknown (test code = 2951-2)  136 mmol/L  Unknown  135-145  F



Ordering Physician [facility] Hohbvdq4276-09-89 08:13:00Identifier [id number]
6 Result Time 2019 08:13:00Unknown





 Test Item  Value  Reference Range  Comments

 

 Unknown (test code = 2823-3)  4.0 mmol/L  Unknown  3.5-5.0  F



Ordering Physician [facility] Mpyftre1662-22-35 08:13:00Identifier [id number]
6 Result Time 2019 08:13:00Unknown





 Test Item  Value  Reference Range  Comments

 

 Unknown (test code = 2345-7)  87 mg/dL  Unknown    F



Ordering Physician [facility] Uxjhnyq4774-74-28 08:13:00Identifier [id number]
6 Result Time 2019 08:13:00Unknown





 Test Item  Value  Reference Range  Comments

 

 Unknown (test code = 48642-3)  46.9   Unknown  Unknown  F



Ordering Physician [facility] Mypwovl5930-46-54 08:13:00Identifier [id number]-
6 Result Time 2019 08:13:00Unknown





 Test Item  Value  Reference Range  Comments

 

 Unknown (test code = NullTestCode)  56.7   Unknown  Unknown  F



Ordering Physician [facility] Hyvgzum4924-65-89 08:13:00Identifier [id number]
6 Result Time 2019 08:13:00Unknown





 Test Item  Value  Reference Range  Comments

 

 Unknown (test code = 2160-0)  1.43 mg/dL  Unknown  0.67-1.17  F



Ordering Physician [facility] Bageamx3436-60-61 08:13:00Identifier [id number]-
6 Result Time 2019 08:13:00Unknown





 Test Item  Value  Reference Range  Comments

 

 Unknown (test code = 2075-0)  104 mmol/L  Unknown  101-111  F



Ordering Physician [facility] Usithxt0944-99-58 08:13:00Identifier [id number]-
6 Result Time 2019 08:13:00Unknown





 Test Item  Value  Reference Range  Comments

 

 Unknown (test code = 2028-9)  24 mmol/L  Unknown  22-32  F



Ordering Physician [facility] Jbeujvk9590-95-11 08:13:00Identifier [id number]
6 Result Time 2019 08:13:00Unknown





 Test Item  Value  Reference Range  Comments

 

 Unknown (test code = 21019-6)  9.0 mg/dL  Unknown  8.6-10.3  F



Ordering Physician [facility] Kgdmtsh1365-95-77 08:13:00Identifier [id number]
6 Result Time 2019 08:13:00Unknown





 Test Item  Value  Reference Range  Comments

 

 Unknown (test code = 3094-0)  24 mg/dL  Unknown  6-24  F



Ordering Physician [facility] Srfpzqf3457-49-09 08:13:00Identifier [id number]-
6 Result Time 2019 08:13:00Unknown





 Test Item  Value  Reference Range  Comments

 

 Unknown (test code = 3097-3)  16.8   Unknown  8-20  F



Ordering Physician [facility] Ajqmzcm3409-27-65 08:13:00Identifier [id number]
6 Result Time 2019 08:13:00Unknown





 Test Item  Value  Reference Range  Comments

 

 Unknown (test code = 33037-3)  8 mmol/L  Unknown  2-11  F



Ordering Physician [facility] Stuirnv7349-78-76 08:13:00Identifier [id number]-
6 Result Time 2019 08:13:00Unknown





 Test Item  Value  Reference Range  Comments

 

 Unknown (test code = 14208-9)  27.4 10^3/uL  Unknown  3.5-10.8  F



Ordering Physician [facility] Hnpiajr3740-03-24 08:13:00Identifier [id number]-
6 Result Time 2019 08:13:00Unknown





 Test Item  Value  Reference Range  Comments

 

 Unknown (test code = 788-0)  24 %  Unknown  10.5-15  F



Ordering Physician [facility] Vzdxpeo0792-23-71 08:13:00Identifier [id number]-
6 Result Time 2019 08:13:00Unknown





 Test Item  Value  Reference Range  Comments

 

 Unknown (test code = 789-8)  5.19 10^6 /uL  Unknown  4.18-5.48  F



Ordering Physician [facility] Lxmasdq9464-57-18 08:13:00Identifier [id number]-
6 Result Time 2019 08:13:00Unknown





 Test Item  Value  Reference Range  Comments

 

 Unknown (test code = 777-3)  330 10^3/uL  Unknown  150-450  F



Ordering Physician [facility] Wxtqfxa1136-76-78 08:13:00Identifier [id number]-
6 Result Time 2019 08:13:00Unknown





 Test Item  Value  Reference Range  Comments

 

 Unknown (test code = 50247-9)  0   Unknown  Unknown  F



Ordering Physician [facility] Foptvdg3944-79-99 08:13:00Identifier [id number]-
6 Result Time 2019 08:13:00Unknown





 Test Item  Value  Reference Range  Comments

 

 Unknown (test code = 771-6)  0 10^3/ul  Unknown  Unknown  F



Ordering Physician [facility] Xxdxvhg9615-88-76 08:13:00Identifier [id number]-
6 Result Time 2019 08:13:00Unknown





 Test Item  Value  Reference Range  Comments

 

 Unknown (test code = 770-8)  84.7 %  Unknown  Unknown  F



Ordering Physician [facility] Xkmuhju1673-18-58 08:13:00Identifier [id number]-
6 Result Time 2019 08:13:00Unknown





 Test Item  Value  Reference Range  Comments

 

 Unknown (test code = 5905-5)  8.6 %  Unknown  Unknown  F



Ordering Physician [facility] Ouzjyes5365-23-34 08:13:00Identifier [id number]-
6 Result Time 2019 08:13:00Unknown





 Test Item  Value  Reference Range  Comments

 

 Unknown (test code = 14035-3)  8.6 fL  Unknown  7.4-10.4  F



Ordering Physician [facility] Pzmvvga9709-07-22 08:13:00Identifier [id number]-
6 Result Time 2019 08:13:00Unknown





 Test Item  Value  Reference Range  Comments

 

 Unknown (test code = 787-2)  78 fL  Unknown  80-94  F



Ordering Physician [facility] Vnpuprl1225-04-81 08:13:00Identifier [id number]-
6 Result Time 2019 08:13:00Unknown





 Test Item  Value  Reference Range  Comments

 

 Unknown (test code = 786-4)  31 g/dL  Unknown  31-36  F



Ordering Physician [facility] Jkoaqto6628-74-97 08:13:00Identifier [id number]-
6 Result Time 2019 08:13:00Unknown





 Test Item  Value  Reference Range  Comments

 

 Unknown (test code = 785-6)  24 pg  Unknown  27-31  F



Ordering Physician [facility] Ukaipna1669-02-36 08:13:00Identifier [id number]-
6 Result Time 2019 08:13:00Unknown





 Test Item  Value  Reference Range  Comments

 

 Unknown (test code = 736-9)  5.4 %  Unknown  Unknown  F



Ordering Physician [facility] Buuefbk4460-29-15 08:13:00Identifier [id number]-
6 Result Time 2019 08:13:00Unknown





 Test Item  Value  Reference Range  Comments

 

 Unknown (test code = 718-7)  12.3 g/dL  Unknown  14.0-18.0  F



Ordering Physician [facility] Dyyiseh3726-41-13 08:13:00Identifier [id number]-
6 Result Time 2019 08:13:00Unknown





 Test Item  Value  Reference Range  Comments

 

 Unknown (test code = 4544-3)  40 %  Unknown  36-46  F



Ordering Physician [facility] Qzpioii3259-31-48 08:13:00Identifier [id number]-
6 Result Time 2019 08:13:00Unknown





 Test Item  Value  Reference Range  Comments

 

 Unknown (test code = 713-8)  0.8 %  Unknown  Unknown  F



Ordering Physician [facility] Lrpjzwq9553-92-29 08:13:00Identifier [id number]-
6 Result Time 2019 08:13:00Unknown





 Test Item  Value  Reference Range  Comments

 

 Unknown (test code = 706-2)  0.5 %  Unknown  Unknown  F



Ordering Physician [facility] Yyhufkk3628-11-60 08:13:00Identifier [id number]-
6 Result Time 2019 08:13:00Unknown





 Test Item  Value  Reference Range  Comments

 

 Unknown (test code = OKK3198)  23.2 10^3/ul  Unknown  1.5-7.7  F



Ordering Physician [facility] Mzdizud0062-36-34 08:13:00Identifier [id number]-
6 Result Time 2019 08:13:00Unknown





 Test Item  Value  Reference Range  Comments

 

 Unknown (test code = 742-7)  2.4 10^3/ul  Unknown  0-0.8  F



Ordering Physician [facility] Qtwqlen4764-91-93 08:13:00Identifier [id number]-
6 Result Time 2019 08:13:00Unknown





 Test Item  Value  Reference Range  Comments

 

 Unknown (test code = 731-0)  1.5 10^3/ul  Unknown  1.0-4.8  F



Ordering Physician [facility] Wxncodi7599-31-93 08:13:00Identifier [id number]-
6 Result Time 2019 08:13:00Unknown





 Test Item  Value  Reference Range  Comments

 

 Unknown (test code = 711-2)  0.2 10^3/ul  Unknown  0-0.6  F



Ordering Physician [facility] Lupuaxv0646-79-22 08:13:00Identifier [id number]-
6 Result Time 2019 08:13:00Unknown





 Test Item  Value  Reference Range  Comments

 

 Unknown (test code = 704-7)  0.1 10^3/ul  Unknown  0-0.2  F



Ordering Physician [facility] Tjfzjzg2603-58-49 06:46:00Identifier [id number]-
6 Result Time 2019 06:46:00Unknown





 Test Item  Value  Reference Range  Comments

 

 Unknown (test code = 24454-2)  0.05 ng/mL  Unknown  Unknown  F



Ordering Physician [facility] Ljtuyuj0798-94-17 06:46:00Identifier [id number]-
6 Result Time 2019 06:46:00Unknown





 Test Item  Value  Reference Range  Comments

 

 Unknown (test code = 2777-1)  2.8 mg/dL  Unknown  2.5-5.0  F



Ordering Physician [facility] Cppdftn9625-23-65 06:46:00Identifier [id number]-
6 Result Time 2019 06:46:00Unknown





 Test Item  Value  Reference Range  Comments

 

 Unknown (test code = 74966-3)  2.5 mg/dL  Unknown  1.9-2.7  F



Ordering Physician [facility] Studies2019-04-15 10:48:00Identifier [id number]
6 Result Time 2019-04-15 10:48:00Unknown





 Test Item  Value  Reference Range  Comments

 

 Unknown (test code = NullTestCode)  6.0   Unknown  5-9  F



Ordering Physician [facility] Studies2019-04-15 10:48:00Identifier [id number]-
6 Result Time 2019-04-15 10:48:00Unknown





 Test Item  Value  Reference Range  Comments

 

 Unknown (test code = 35535-7)  1.013   Unknown  1.010-1.030  F



Ordering Physician [facility] Studies2019-04-15 10:15:00Identifier [id number]
6 Result Time 2019-04-15 10:15:00Unknown





 Test Item  Value  Reference Range  Comments

 

 Unknown (test code = 3016-3)  3.38 mcIU/mL  Unknown  0.34-5.60  F



Ordering Physician [facility] Studies2019-04-15 10:15:00Identifier [id number]
6 Result Time 2019-04-15 10:15:00Unknown





 Test Item  Value  Reference Range  Comments

 

 Unknown (test code = 15809-9)  1.14   Unknown  0.77-1.02  F



Ordering Physician [facility] Studies2019-04-15 10:15:00Identifier [id number]
6 Result Time 2019-04-15 10:15:00Unknown





 Test Item  Value  Reference Range  Comments

 

 Unknown (test code = 01482-7)  365 pg/mL  Unknown  Unknown  F



Ordering Physician [facility] Studies2019-04-15 10:15:00Identifier [id number]-
6 Result Time 2019-04-15 10:15:00Unknown





 Test Item  Value  Reference Range  Comments

 

 Unknown (test code = 2885-2)  8.9 g/dL  Unknown  6.4-8.9  F



Ordering Physician [facility] Studies2019-04-15 10:15:00Identifier [id number]-
6 Result Time 2019-04-15 10:15:00Unknown





 Test Item  Value  Reference Range  Comments

 

 Unknown (test code = 1975-2)  0.60 mg/dL  Unknown  0.2-1.0  F



Ordering Physician [facility] Studies2019-04-15 10:15:00Identifier [id number]-
6 Result Time 2019-04-15 10:15:00Unknown





 Test Item  Value  Reference Range  Comments

 

 Unknown (test code = NullTestCode)  4.9 g/dL  Unknown  2-4  F



Ordering Physician [facility] Studies2019-04-15 10:15:00Identifier [id number]-
6 Result Time 2019-04-15 10:15:00Unknown





 Test Item  Value  Reference Range  Comments

 

 Unknown (test code = 1988-5)  11.17 mg/L  Unknown  0-8.00  F



Ordering Physician [facility] Studies2019-04-15 10:15:00Identifier [id number]-
6 Result Time 2019-04-15 10:15:00Unknown





 Test Item  Value  Reference Range  Comments

 

 Unknown (test code = 1920-8)  25 U/L  Unknown  13-39  F



Ordering Physician [facility] Studies2019-04-15 10:15:00Identifier [id number]-
6 Result Time 2019-04-15 10:15:00Unknown





 Test Item  Value  Reference Range  Comments

 

 Unknown (test code = 6768-6)  153 U/L  Unknown    F



Ordering Physician [facility] Studies2019-04-15 10:15:00Identifier [id number]-
6 Result Time 2019-04-15 10:15:00Unknown





 Test Item  Value  Reference Range  Comments

 

 Unknown (test code = 1759-0)  0.8   Unknown  1-3  F



Ordering Physician [facility] Studies2019-04-15 10:15:00Identifier [id number]
6 Result Time 2019-04-15 10:15:00Unknown





 Test Item  Value  Reference Range  Comments

 

 Unknown (test code = 69371-4)  4.0 g/dL  Unknown  3.2-5.2  F



Ordering Physician [facility] Studies2019-04-15 10:15:00Identifier [id number]-
6 Result Time 2019-04-15 10:15:00Unknown





 Test Item  Value  Reference Range  Comments

 

 Unknown (test code = 1742-6)  13 U/L  Unknown  7-52  F



Ordering Physician [facility] Studies2019-04-15 10:15:00Identifier [id number]
6 Result Time 2019-04-15 10:15:00Unknown





 Test Item  Value  Reference Range  Comments

 

 Unknown (test code = 2524-7)  0.9 mmol/L  Unknown  0.5-2.0  F



Ordering Physician Unknown

## 2020-04-24 NOTE — XMS REPORT
Patient Summary Document

 Created on:2020



Patient:BERTHA SOLIS

Sex:Male

:1933

External Reference #:[id number]





Demographics







 Address  [address] [place] [address]

 

 Home Phone  [phone]

 

 Preferred Language  English

 

 Marital Status  Unknown

 

 Taoism Affiliation  Unknown

 

 Race  Unknown

 

 Ethnic Group  Unknown









Author







 Organization  [facility]









Support







 Name  Relationship  Address  Phone

 

 IRAIS SOLIS, Unknown Name  Suha  [address] [facility]  [phone]



     [place] [address]  









Care Team Providers







 Name  Role  Phone

 

 Unavailable  Unavailable  Unavailable









Problems

This patient has no known problems.



Allergies, Adverse Reactions, Alerts







 Allergy  Allergy  Status  Severity  Reaction(s)  Onset  Inactive  Treating  
Comments



 Name  Type        Date  Date  Clinician  

 

 Uncoded  Unknown  Active  Unknown  Reaction  2019    Interface  



 free-text        Unknown  -18      



 allergy                







Medications







 Ordered  Filled  Start  Stop  Current  Ordering  Indication  Dosage  Frequency
  Signature  Comments  Components



 Medication  Medication  Date  Date  Medication?  Clinician        (SIG)    



 Name  Name                    

 

 Multivitami  Multivitami  2012    No  Unknown    Unknown  Unknown      



 ns/Minerals  ns/Minerals  -                  



 Tab*  Tab*                    

 

 amLODIPine  amLODIPine      No  Unknown    Unknown  Unknown      



 5 mg tablet  5 mg tablet  4-15                  

 

 acetaminoph  acetaminoph      No  Unknown    Unknown  Unknown      



 en 500 mg  en 500 mg  4-15                  



 tablet  tablet                    

 

 magnesium  magnesium      No  Unknown    Unknown  Unknown      



 oxide 400  oxide 400  4-15                  



 mg (241.3  mg (241.3                    



 mg  mg                    



 magnesium)  magnesium)                    



 tablet  tablet                    

 

 omeprazole  omeprazole      No  Unknown    Unknown  Unknown      



 40 mg  40 mg  4-15                  



 capsule,del  capsule,del                    



 ayed  ayed                    



 release  release                    

 

 verapamil  verapamil      No  Unknown    Unknown  Unknown      



  mg   mg  4-15                  



 capsule,ext  capsule,ext                    



 ended  ended                    



 release  release                    

 

 Calcium  Calcium      No  Unknown    Unknown  Unknown      



 Carbonate/V  Carbonate/V  4-15                  



 itamin D3  itamin D3                    

 

 cefdinir  cefdinir      No  Unknown    Unknown  Unknown      



 300 mg  300 mg  4-17                  



 capsule  capsule                    







Vital Signs







 Vital Name  Observation Time  Observation Value  Comments

 

 SYSTOLIC mm[Hg]  2019 18:05:21  135 mm[Hg] mm[Hg]  Method: Sit

 

 DIASTOLIC mm[Hg]  2019 18:05:21  48 mm[Hg] mm[Hg]  Method: Sit

 

 PULSE  2019 18:05:21  63 /min /min  

 

 RESP RATE  2019 18:05:21  16 /min /min  

 

 TEMP  2019 18:05:21  97.4 [degF]  







Procedures

This patient has no known procedures.



Results







 Test Description  Test Time  Test Comments  Text Results  Atomic Results  
Result Comments









 Laboratory Studies  2019 08:13:00  Identifier [id number] Result Time  
Unknown



     2019 08:13:00  









   Test Item  Value  Reference Range  Comments









 Unknown (test code = 2951-2)  136 mmol/L  Unknown  135-145  F



Ordering Physician [facility] Xgoiocq0425-78-66 08:13:00Identifier [id number]
6 Result Time 2019 08:13:00Unknown





 Test Item  Value  Reference Range  Comments

 

 Unknown (test code = 2823-3)  4.0 mmol/L  Unknown  3.5-5.0  F



Ordering Physician [facility] Xydzhjy4092-90-76 08:13:00Identifier [id number]-
6 Result Time 2019 08:13:00Unknown





 Test Item  Value  Reference Range  Comments

 

 Unknown (test code = 2345-7)  87 mg/dL  Unknown    F



Ordering Physician [facility] Wsacozj4209-63-94 08:13:00Identifier [id number]
6 Result Time 2019 08:13:00Unknown





 Test Item  Value  Reference Range  Comments

 

 Unknown (test code = 48642-3)  46.9   Unknown  Unknown  F



Ordering Physician [facility] Zcjxial3654-12-77 08:13:00Identifier [id number]-
6 Result Time 2019 08:13:00Unknown





 Test Item  Value  Reference Range  Comments

 

 Unknown (test code = NullTestCode)  56.7   Unknown  Unknown  F



Ordering Physician [facility] Fldzcse0637-11-91 08:13:00Identifier [id number]-
6 Result Time 2019 08:13:00Unknown





 Test Item  Value  Reference Range  Comments

 

 Unknown (test code = 2160-0)  1.43 mg/dL  Unknown  0.67-1.17  F



Ordering Physician [facility] Wxfzrmw3289-07-18 08:13:00Identifier [id number]
6 Result Time 2019 08:13:00Unknown





 Test Item  Value  Reference Range  Comments

 

 Unknown (test code = 2075-0)  104 mmol/L  Unknown  101-111  F



Ordering Physician [facility] Jdmnouw3070-67-39 08:13:00Identifier [id number]-
6 Result Time 2019 08:13:00Unknown





 Test Item  Value  Reference Range  Comments

 

 Unknown (test code = 2028-9)  24 mmol/L  Unknown  22-32  F



Ordering Physician [facility] Dzahnbf7017-19-71 08:13:00Identifier [id number]-
6 Result Time 2019 08:13:00Unknown





 Test Item  Value  Reference Range  Comments

 

 Unknown (test code = 02150-4)  9.0 mg/dL  Unknown  8.6-10.3  F



Ordering Physician [facility] Femclks6295-04-08 08:13:00Identifier [id number]
6 Result Time 2019 08:13:00Unknown





 Test Item  Value  Reference Range  Comments

 

 Unknown (test code = 3094-0)  24 mg/dL  Unknown  6-24  F



Ordering Physician [facility] Enveedd7449-29-15 08:13:00Identifier [id number]-
6 Result Time 2019 08:13:00Unknown





 Test Item  Value  Reference Range  Comments

 

 Unknown (test code = 3097-3)  16.8   Unknown  8-20  F



Ordering Physician [facility] Sqgyhcf6750-32-71 08:13:00Identifier [id number]
6 Result Time 2019 08:13:00Unknown





 Test Item  Value  Reference Range  Comments

 

 Unknown (test code = 33037-3)  8 mmol/L  Unknown  2-11  F



Ordering Physician [facility] Gqrwhvg2466-91-98 08:13:00Identifier [id number]-
6 Result Time 2019 08:13:00Unknown





 Test Item  Value  Reference Range  Comments

 

 Unknown (test code = 25998-7)  27.4 10^3/uL  Unknown  3.5-10.8  F



Ordering Physician [facility] Kxbwjbb9969-17-07 08:13:00Identifier [id number]-
6 Result Time 2019 08:13:00Unknown





 Test Item  Value  Reference Range  Comments

 

 Unknown (test code = 788-0)  24 %  Unknown  10.5-15  F



Ordering Physician [facility] Dusmjjo2784-10-59 08:13:00Identifier [id number]-
6 Result Time 2019 08:13:00Unknown





 Test Item  Value  Reference Range  Comments

 

 Unknown (test code = 789-8)  5.19 10^6 /uL  Unknown  4.18-5.48  F



Ordering Physician [facility] Wvfohti9575-80-08 08:13:00Identifier [id number]-
6 Result Time 2019 08:13:00Unknown





 Test Item  Value  Reference Range  Comments

 

 Unknown (test code = 777-3)  330 10^3/uL  Unknown  150-450  F



Ordering Physician [facility] Xegiiht6025-14-09 08:13:00Identifier [id number]
6 Result Time 2019 08:13:00Unknown





 Test Item  Value  Reference Range  Comments

 

 Unknown (test code = 16194-9)  0   Unknown  Unknown  F



Ordering Physician [facility] Ynjbgfk7626-04-89 08:13:00Identifier [id number]-
6 Result Time 2019 08:13:00Unknown





 Test Item  Value  Reference Range  Comments

 

 Unknown (test code = 771-6)  0 10^3/ul  Unknown  Unknown  F



Ordering Physician [facility] Kriavgu1534-86-56 08:13:00Identifier [id number]-
6 Result Time 2019 08:13:00Unknown





 Test Item  Value  Reference Range  Comments

 

 Unknown (test code = 770-8)  84.7 %  Unknown  Unknown  F



Ordering Physician [facility] Wbnobkl7029-98-05 08:13:00Identifier [id number]-
6 Result Time 2019 08:13:00Unknown





 Test Item  Value  Reference Range  Comments

 

 Unknown (test code = 5905-5)  8.6 %  Unknown  Unknown  F



Ordering Physician [facility] Czyozwr3943-87-15 08:13:00Identifier [id number]-
6 Result Time 2019 08:13:00Unknown





 Test Item  Value  Reference Range  Comments

 

 Unknown (test code = 43278-3)  8.6 fL  Unknown  7.4-10.4  F



Ordering Physician [facility] Wnezctd1170-05-28 08:13:00Identifier [id number]
6 Result Time 2019 08:13:00Unknown





 Test Item  Value  Reference Range  Comments

 

 Unknown (test code = 787-2)  78 fL  Unknown  80-94  F



Ordering Physician [facility] Xtjmwir5577-24-24 08:13:00Identifier [id number]-
6 Result Time 2019 08:13:00Unknown





 Test Item  Value  Reference Range  Comments

 

 Unknown (test code = 786-4)  31 g/dL  Unknown  31-36  F



Ordering Physician [facility] Sljuxpj3040-66-75 08:13:00Identifier [id number]-
6 Result Time 2019 08:13:00Unknown





 Test Item  Value  Reference Range  Comments

 

 Unknown (test code = 785-6)  24 pg  Unknown  27-31  F



Ordering Physician [facility] Nhdvkqz7758-23-14 08:13:00Identifier [id number]-
6 Result Time 2019 08:13:00Unknown





 Test Item  Value  Reference Range  Comments

 

 Unknown (test code = 736-9)  5.4 %  Unknown  Unknown  F



Ordering Physician [facility] Nysajeh7729-37-74 08:13:00Identifier [id number]-
6 Result Time 2019 08:13:00Unknown





 Test Item  Value  Reference Range  Comments

 

 Unknown (test code = 718-7)  12.3 g/dL  Unknown  14.0-18.0  F



Ordering Physician [facility] Wlakomq2921-59-08 08:13:00Identifier [id number]
6 Result Time 2019 08:13:00Unknown





 Test Item  Value  Reference Range  Comments

 

 Unknown (test code = 4544-3)  40 %  Unknown  36-46  F



Ordering Physician [facility] Ngjjxzl9310-95-74 08:13:00Identifier [id number]-
6 Result Time 2019 08:13:00Unknown





 Test Item  Value  Reference Range  Comments

 

 Unknown (test code = 713-8)  0.8 %  Unknown  Unknown  F



Ordering Physician [facility] Yxzqwmj4922-43-91 08:13:00Identifier [id number]-
6 Result Time 2019 08:13:00Unknown





 Test Item  Value  Reference Range  Comments

 

 Unknown (test code = 706-2)  0.5 %  Unknown  Unknown  F



Ordering Physician [facility] Bbpntwv2074-26-87 08:13:00Identifier [id number]-
6 Result Time 2019 08:13:00Unknown





 Test Item  Value  Reference Range  Comments

 

 Unknown (test code = OEK1272)  23.2 10^3/ul  Unknown  1.5-7.7  F



Ordering Physician [facility] Mpeuvqt1675-92-35 08:13:00Identifier [id number]-
6 Result Time 2019 08:13:00Unknown





 Test Item  Value  Reference Range  Comments

 

 Unknown (test code = 742-7)  2.4 10^3/ul  Unknown  0-0.8  F



Ordering Physician [facility] Lxkmbbr9127-04-24 08:13:00Identifier [id number]-
6 Result Time 2019 08:13:00Unknown





 Test Item  Value  Reference Range  Comments

 

 Unknown (test code = 731-0)  1.5 10^3/ul  Unknown  1.0-4.8  F



Ordering Physician [facility] Noqfdwh4074-55-61 08:13:00Identifier [id number]-
6 Result Time 2019 08:13:00Unknown





 Test Item  Value  Reference Range  Comments

 

 Unknown (test code = 711-2)  0.2 10^3/ul  Unknown  0-0.6  F



Ordering Physician [facility] Nzxguur6641-57-80 08:13:00Identifier [id number]-
6 Result Time 2019 08:13:00Unknown





 Test Item  Value  Reference Range  Comments

 

 Unknown (test code = 704-7)  0.1 10^3/ul  Unknown  0-0.2  F



Ordering Physician [facility] Jgagpiz8603-82-43 06:46:00Identifier [id number]-
6 Result Time 2019 06:46:00Unknown





 Test Item  Value  Reference Range  Comments

 

 Unknown (test code = 33335-1)  0.05 ng/mL  Unknown  Unknown  F



Ordering Physician [facility] Usrwaqy8304-94-93 06:46:00Identifier [id number]-
6 Result Time 2019 06:46:00Unknown





 Test Item  Value  Reference Range  Comments

 

 Unknown (test code = 2777-1)  2.8 mg/dL  Unknown  2.5-5.0  F



Ordering Physician [facility] Juhyifx2246-48-87 06:46:00Identifier [id number]-
6 Result Time 2019 06:46:00Unknown





 Test Item  Value  Reference Range  Comments

 

 Unknown (test code = 31707-0)  2.5 mg/dL  Unknown  1.9-2.7  F



Ordering Physician [facility] Studies2019-04-15 10:48:00Identifier [id number]-
6 Result Time 2019-04-15 10:48:00Unknown





 Test Item  Value  Reference Range  Comments

 

 Unknown (test code = NullTestCode)  6.0   Unknown  5-9  F



Ordering Physician [facility] Studies2019-04-15 10:48:00Identifier [id number]-
6 Result Time 2019-04-15 10:48:00Unknown





 Test Item  Value  Reference Range  Comments

 

 Unknown (test code = 21244-8)  1.013   Unknown  1.010-1.030  F



Ordering Physician [facility] Studies2019-04-15 10:15:00Identifier [id number]
6 Result Time 2019-04-15 10:15:00Unknown





 Test Item  Value  Reference Range  Comments

 

 Unknown (test code = 3016-3)  3.38 mcIU/mL  Unknown  0.34-5.60  F



Ordering Physician [facility] Studies2019-04-15 10:15:00Identifier [id number]-
6 Result Time 2019-04-15 10:15:00Unknown





 Test Item  Value  Reference Range  Comments

 

 Unknown (test code = 76041-1)  1.14   Unknown  0.77-1.02  F



Ordering Physician [facility] Studies2019-04-15 10:15:00Identifier [id number]-
6 Result Time 2019-04-15 10:15:00Unknown





 Test Item  Value  Reference Range  Comments

 

 Unknown (test code = 15658-2)  365 pg/mL  Unknown  Unknown  F



Ordering Physician [facility] Studies2019-04-15 10:15:00Identifier [id number]-
6 Result Time 2019-04-15 10:15:00Unknown





 Test Item  Value  Reference Range  Comments

 

 Unknown (test code = 2885-2)  8.9 g/dL  Unknown  6.4-8.9  F



Ordering Physician [facility] Studies2019-04-15 10:15:00Identifier [id number]
6 Result Time 2019-04-15 10:15:00Unknown





 Test Item  Value  Reference Range  Comments

 

 Unknown (test code = 1975-2)  0.60 mg/dL  Unknown  0.2-1.0  F



Ordering Physician [facility] Studies2019-04-15 10:15:00Identifier [id number]-
6 Result Time 2019-04-15 10:15:00Unknown





 Test Item  Value  Reference Range  Comments

 

 Unknown (test code = NullTestCode)  4.9 g/dL  Unknown  2-4  F



Ordering Physician [facility] Studies2019-04-15 10:15:00Identifier [id number]
6 Result Time 2019-04-15 10:15:00Unknown





 Test Item  Value  Reference Range  Comments

 

 Unknown (test code = 1988-5)  11.17 mg/L  Unknown  0-8.00  F



Ordering Physician [facility] Studies2019-04-15 10:15:00Identifier [id number]-
6 Result Time 2019-04-15 10:15:00Unknown





 Test Item  Value  Reference Range  Comments

 

 Unknown (test code = 1920-8)  25 U/L  Unknown  13-39  F



Ordering Physician [facility] Studies2019-04-15 10:15:00Identifier [id number]
6 Result Time 2019-04-15 10:15:00Unknown





 Test Item  Value  Reference Range  Comments

 

 Unknown (test code = 6768-6)  153 U/L  Unknown    F



Ordering Physician [facility] Studies2019-04-15 10:15:00Identifier [id number]-
6 Result Time 2019-04-15 10:15:00Unknown





 Test Item  Value  Reference Range  Comments

 

 Unknown (test code = 1759-0)  0.8   Unknown  1-3  F



Ordering Physician [facility] Studies2019-04-15 10:15:00Identifier [id number]-
6 Result Time 2019-04-15 10:15:00Unknown





 Test Item  Value  Reference Range  Comments

 

 Unknown (test code = 65965-3)  4.0 g/dL  Unknown  3.2-5.2  F



Ordering Physician [facility] Studies2019-04-15 10:15:00Identifier [id number]-
6 Result Time 2019-04-15 10:15:00Unknown





 Test Item  Value  Reference Range  Comments

 

 Unknown (test code = 1742-6)  13 U/L  Unknown  7-52  F



Ordering Physician [facility] Studies2019-04-15 10:15:00Identifier [id number]-
6 Result Time 2019-04-15 10:15:00Unknown





 Test Item  Value  Reference Range  Comments

 

 Unknown (test code = 2524-7)  0.9 mmol/L  Unknown  0.5-2.0  F



Ordering Physician Unknown

## 2020-04-24 NOTE — XMS REPORT
Patient Summary Document

 Created on:2020



Patient:BERTHA SOLIS

Sex:Male

:1933

External Reference #:[id number]





Demographics







 Address  [address] [place] [id number]

 

 Home Phone  [phone]

 

 Preferred Language  English

 

 Marital Status  Unknown

 

 Rastafarian Affiliation  Unknown

 

 Race  Unknown

 

 Ethnic Group  Unknown









Author







 Organization  [facility]









Support







 Name  Relationship  Address  Phone

 

 IRAIS SOLIS, Unknown Name  Suha  [address] [facility]  [phone]



     [place] [address]  









Care Team Providers







 Name  Role  Phone

 

 Unavailable  Unavailable  Unavailable









Problems

This patient has no known problems.



Allergies, Adverse Reactions, Alerts







 Allergy  Allergy  Status  Severity  Reaction(s)  Onset  Inactive  Treating  
Comments



 Name  Type        Date  Date  Clinician  

 

 Uncoded  Unknown  Active  Unknown  Reaction  2019    Interface  



 free-text        Unknown  -18      



 allergy                







Medications







 Ordered  Filled  Start  Stop  Current  Ordering  Indication  Dosage  Frequency
  Signature  Comments  Components



 Medication  Medication  Date  Date  Medication?  Clinician        (SIG)    



 Name  Name                    

 

 Multivitami  Multivitami  2012    No  Unknown    Unknown  Unknown      



 ns/Minerals  ns/Minerals  -                  



 Tab*  Tab*                    

 

 amLODIPine  amLODIPine      No  Unknown    Unknown  Unknown      



 5 mg tablet  5 mg tablet  4-15                  

 

 acetaminoph  acetaminoph      No  Unknown    Unknown  Unknown      



 en 500 mg  en 500 mg  4-15                  



 tablet  tablet                    

 

 magnesium  magnesium      No  Unknown    Unknown  Unknown      



 oxide 400  oxide 400  4-15                  



 mg (241.3  mg (241.3                    



 mg  mg                    



 magnesium)  magnesium)                    



 tablet  tablet                    

 

 omeprazole  omeprazole      No  Unknown    Unknown  Unknown      



 40 mg  40 mg  4-15                  



 capsule,del  capsule,del                    



 ayed  ayed                    



 release  release                    

 

 verapamil  verapamil      No  Unknown    Unknown  Unknown      



  mg   mg  4-15                  



 capsule,ext  capsule,ext                    



 ended  ended                    



 release  release                    

 

 Calcium  Calcium      No  Unknown    Unknown  Unknown      



 Carbonate/V  Carbonate/V  4-15                  



 itamin D3  itamin D3                    

 

 cefdinir  cefdinir      No  Unknown    Unknown  Unknown      



 300 mg  300 mg  4-17                  



 capsule  capsule                    







Vital Signs







 Vital Name  Observation Time  Observation Value  Comments

 

 SYSTOLIC mm[Hg]  2019 18:05:21  135 mm[Hg] mm[Hg]  Method: Sit

 

 DIASTOLIC mm[Hg]  2019 18:05:21  48 mm[Hg] mm[Hg]  Method: Sit

 

 PULSE  2019 18:05:21  63 /min /min  

 

 RESP RATE  2019 18:05:21  16 /min /min  

 

 TEMP  2019 18:05:21  97.4 [degF]  







Procedures

This patient has no known procedures.



Results







 Test Description  Test Time  Test Comments  Text Results  Atomic Results  
Result Comments









 Laboratory Studies  2019 08:13:00  Identifier [id number] Result Time  
Unknown



     2019 08:13:00  









   Test Item  Value  Reference Range  Comments









 Unknown (test code = 2951-2)  136 mmol/L  Unknown  135-145  F



Ordering Physician [facility] Gdfygxx5927-38-75 08:13:00Identifier [id number]-
6 Result Time 2019 08:13:00Unknown





 Test Item  Value  Reference Range  Comments

 

 Unknown (test code = 2823-3)  4.0 mmol/L  Unknown  3.5-5.0  F



Ordering Physician [facility] Clnyyjj6871-82-50 08:13:00Identifier [id number]-
6 Result Time 2019 08:13:00Unknown





 Test Item  Value  Reference Range  Comments

 

 Unknown (test code = 2345-7)  87 mg/dL  Unknown    F



Ordering Physician [facility] Qwjniaf8635-99-58 08:13:00Identifier [id number]
6 Result Time 2019 08:13:00Unknown





 Test Item  Value  Reference Range  Comments

 

 Unknown (test code = 48642-3)  46.9   Unknown  Unknown  F



Ordering Physician [facility] Nwcxhsx3430-02-05 08:13:00Identifier [id number]
6 Result Time 2019 08:13:00Unknown





 Test Item  Value  Reference Range  Comments

 

 Unknown (test code = NullTestCode)  56.7   Unknown  Unknown  F



Ordering Physician [facility] Kmcshdf5522-91-31 08:13:00Identifier [id number]
6 Result Time 2019 08:13:00Unknown





 Test Item  Value  Reference Range  Comments

 

 Unknown (test code = 2160-0)  1.43 mg/dL  Unknown  0.67-1.17  F



Ordering Physician [facility] Ngmxpdm3495-94-43 08:13:00Identifier [id number]
6 Result Time 2019 08:13:00Unknown





 Test Item  Value  Reference Range  Comments

 

 Unknown (test code = 2075-0)  104 mmol/L  Unknown  101-111  F



Ordering Physician [facility] Vzxqpdw5266-71-09 08:13:00Identifier [id number]-
6 Result Time 2019 08:13:00Unknown





 Test Item  Value  Reference Range  Comments

 

 Unknown (test code = 2028-9)  24 mmol/L  Unknown  22-32  F



Ordering Physician [facility] Juefmal0438-05-13 08:13:00Identifier [id number]
6 Result Time 2019 08:13:00Unknown





 Test Item  Value  Reference Range  Comments

 

 Unknown (test code = 50390-6)  9.0 mg/dL  Unknown  8.6-10.3  F



Ordering Physician [facility] Dikwrej6702-97-66 08:13:00Identifier [id number]-
6 Result Time 2019 08:13:00Unknown





 Test Item  Value  Reference Range  Comments

 

 Unknown (test code = 3094-0)  24 mg/dL  Unknown  6-24  F



Ordering Physician [facility] Rdziovd4221-41-69 08:13:00Identifier [id number]-
6 Result Time 2019 08:13:00Unknown





 Test Item  Value  Reference Range  Comments

 

 Unknown (test code = 3097-3)  16.8   Unknown  8-20  F



Ordering Physician [facility] Dkgmqjg9475-47-85 08:13:00Identifier [id number]-
6 Result Time 2019 08:13:00Unknown





 Test Item  Value  Reference Range  Comments

 

 Unknown (test code = 33037-3)  8 mmol/L  Unknown  2-11  F



Ordering Physician [facility] Lynidif3010-31-87 08:13:00Identifier [id number]-
6 Result Time 2019 08:13:00Unknown





 Test Item  Value  Reference Range  Comments

 

 Unknown (test code = 37266-2)  27.4 10^3/uL  Unknown  3.5-10.8  F



Ordering Physician [facility] Bmqctiz4544-08-24 08:13:00Identifier [id number]-
6 Result Time 2019 08:13:00Unknown





 Test Item  Value  Reference Range  Comments

 

 Unknown (test code = 788-0)  24 %  Unknown  10.5-15  F



Ordering Physician [facility] Rdrsrqu6310-34-21 08:13:00Identifier [id number]-
6 Result Time 2019 08:13:00Unknown





 Test Item  Value  Reference Range  Comments

 

 Unknown (test code = 789-8)  5.19 10^6 /uL  Unknown  4.18-5.48  F



Ordering Physician [facility] Wqpsnec0859-97-94 08:13:00Identifier [id number]-
6 Result Time 2019 08:13:00Unknown





 Test Item  Value  Reference Range  Comments

 

 Unknown (test code = 777-3)  330 10^3/uL  Unknown  150-450  F



Ordering Physician [facility] Jrzgmho8137-70-13 08:13:00Identifier [id number]-
6 Result Time 2019 08:13:00Unknown





 Test Item  Value  Reference Range  Comments

 

 Unknown (test code = 67522-0)  0   Unknown  Unknown  F



Ordering Physician [facility] Nbynwrv6161-40-99 08:13:00Identifier [id number]-
6 Result Time 2019 08:13:00Unknown





 Test Item  Value  Reference Range  Comments

 

 Unknown (test code = 771-6)  0 10^3/ul  Unknown  Unknown  F



Ordering Physician [facility] Fgzxfhg2239-78-75 08:13:00Identifier [id number]-
6 Result Time 2019 08:13:00Unknown





 Test Item  Value  Reference Range  Comments

 

 Unknown (test code = 770-8)  84.7 %  Unknown  Unknown  F



Ordering Physician [facility] Sjvmajj1349-73-63 08:13:00Identifier [id number]-
6 Result Time 2019 08:13:00Unknown





 Test Item  Value  Reference Range  Comments

 

 Unknown (test code = 5905-5)  8.6 %  Unknown  Unknown  F



Ordering Physician [facility] Kckbzif8163-85-53 08:13:00Identifier [id number]-
6 Result Time 2019 08:13:00Unknown





 Test Item  Value  Reference Range  Comments

 

 Unknown (test code = 31678-2)  8.6 fL  Unknown  7.4-10.4  F



Ordering Physician [facility] Dwnldzv7474-18-09 08:13:00Identifier [id number]-
6 Result Time 2019 08:13:00Unknown





 Test Item  Value  Reference Range  Comments

 

 Unknown (test code = 787-2)  78 fL  Unknown  80-94  F



Ordering Physician [facility] Upennid8309-06-73 08:13:00Identifier [id number]-
6 Result Time 2019 08:13:00Unknown





 Test Item  Value  Reference Range  Comments

 

 Unknown (test code = 786-4)  31 g/dL  Unknown  31-36  F



Ordering Physician [facility] Aebsyvi4519-00-78 08:13:00Identifier [id number]-
6 Result Time 2019 08:13:00Unknown





 Test Item  Value  Reference Range  Comments

 

 Unknown (test code = 785-6)  24 pg  Unknown  27-31  F



Ordering Physician [facility] Ltrqfyw3454-92-05 08:13:00Identifier [id number]-
6 Result Time 2019 08:13:00Unknown





 Test Item  Value  Reference Range  Comments

 

 Unknown (test code = 736-9)  5.4 %  Unknown  Unknown  F



Ordering Physician [facility] Uumdpie9172-40-89 08:13:00Identifier [id number]-
6 Result Time 2019 08:13:00Unknown





 Test Item  Value  Reference Range  Comments

 

 Unknown (test code = 718-7)  12.3 g/dL  Unknown  14.0-18.0  F



Ordering Physician [facility] Nclggxc3465-27-09 08:13:00Identifier [id number]-
6 Result Time 2019 08:13:00Unknown





 Test Item  Value  Reference Range  Comments

 

 Unknown (test code = 4544-3)  40 %  Unknown  36-46  F



Ordering Physician [facility] Sgxmozb7933-42-12 08:13:00Identifier [id number]-
6 Result Time 2019 08:13:00Unknown





 Test Item  Value  Reference Range  Comments

 

 Unknown (test code = 713-8)  0.8 %  Unknown  Unknown  F



Ordering Physician [facility] Xiuashu9876-66-21 08:13:00Identifier [id number]-
6 Result Time 2019 08:13:00Unknown





 Test Item  Value  Reference Range  Comments

 

 Unknown (test code = 706-2)  0.5 %  Unknown  Unknown  F



Ordering Physician [facility] Xninshi6361-85-61 08:13:00Identifier [id number]-
6 Result Time 2019 08:13:00Unknown





 Test Item  Value  Reference Range  Comments

 

 Unknown (test code = VIW9013)  23.2 10^3/ul  Unknown  1.5-7.7  F



Ordering Physician [facility] Ycacazj6023-11-08 08:13:00Identifier [id number]-
6 Result Time 2019 08:13:00Unknown





 Test Item  Value  Reference Range  Comments

 

 Unknown (test code = 742-7)  2.4 10^3/ul  Unknown  0-0.8  F



Ordering Physician [facility] Spyftae6504-27-50 08:13:00Identifier [id number]-
6 Result Time 2019 08:13:00Unknown





 Test Item  Value  Reference Range  Comments

 

 Unknown (test code = 731-0)  1.5 10^3/ul  Unknown  1.0-4.8  F



Ordering Physician [facility] Cqzlrpt8340-04-37 08:13:00Identifier [id number]-
6 Result Time 2019 08:13:00Unknown





 Test Item  Value  Reference Range  Comments

 

 Unknown (test code = 711-2)  0.2 10^3/ul  Unknown  0-0.6  F



Ordering Physician [facility] Evwymrq2618-51-17 08:13:00Identifier [id number]-
6 Result Time 2019 08:13:00Unknown





 Test Item  Value  Reference Range  Comments

 

 Unknown (test code = 704-7)  0.1 10^3/ul  Unknown  0-0.2  F



Ordering Physician [facility] Faheurm2809-86-17 06:46:00Identifier [id number]-
6 Result Time 2019 06:46:00Unknown





 Test Item  Value  Reference Range  Comments

 

 Unknown (test code = 95039-5)  0.05 ng/mL  Unknown  Unknown  F



Ordering Physician [facility] Peyvmcb9555-55-27 06:46:00Identifier [id number]-
6 Result Time 2019 06:46:00Unknown





 Test Item  Value  Reference Range  Comments

 

 Unknown (test code = 2777-1)  2.8 mg/dL  Unknown  2.5-5.0  F



Ordering Physician [facility] Nqygisi7065-41-80 06:46:00Identifier [id number]-
6 Result Time 2019 06:46:00Unknown





 Test Item  Value  Reference Range  Comments

 

 Unknown (test code = 04375-6)  2.5 mg/dL  Unknown  1.9-2.7  F



Ordering Physician [facility] Studies2019-04-15 10:48:00Identifier [id number]-
6 Result Time 2019-04-15 10:48:00Unknown





 Test Item  Value  Reference Range  Comments

 

 Unknown (test code = NullTestCode)  6.0   Unknown  5-9  F



Ordering Physician [facility] Studies2019-04-15 10:48:00Identifier [id number]-
6 Result Time 2019-04-15 10:48:00Unknown





 Test Item  Value  Reference Range  Comments

 

 Unknown (test code = 82998-4)  1.013   Unknown  1.010-1.030  F



Ordering Physician [facility] Studies2019-04-15 10:15:00Identifier [id number]-
6 Result Time 2019-04-15 10:15:00Unknown





 Test Item  Value  Reference Range  Comments

 

 Unknown (test code = 3016-3)  3.38 mcIU/mL  Unknown  0.34-5.60  F



Ordering Physician [facility] Studies2019-04-15 10:15:00Identifier [id number]-
6 Result Time 2019-04-15 10:15:00Unknown





 Test Item  Value  Reference Range  Comments

 

 Unknown (test code = 56299-4)  1.14   Unknown  0.77-1.02  F



Ordering Physician [facility] Studies2019-04-15 10:15:00Identifier [id number]
6 Result Time 2019-04-15 10:15:00Unknown





 Test Item  Value  Reference Range  Comments

 

 Unknown (test code = 33152-9)  365 pg/mL  Unknown  Unknown  F



Ordering Physician [facility] Studies2019-04-15 10:15:00Identifier [id number]-
6 Result Time 2019-04-15 10:15:00Unknown





 Test Item  Value  Reference Range  Comments

 

 Unknown (test code = 2885-2)  8.9 g/dL  Unknown  6.4-8.9  F



Ordering Physician [facility] Studies2019-04-15 10:15:00Identifier [id number]-
6 Result Time 2019-04-15 10:15:00Unknown





 Test Item  Value  Reference Range  Comments

 

 Unknown (test code = 1975-2)  0.60 mg/dL  Unknown  0.2-1.0  F



Ordering Physician [facility] Studies2019-04-15 10:15:00Identifier [id number]-
6 Result Time 2019-04-15 10:15:00Unknown





 Test Item  Value  Reference Range  Comments

 

 Unknown (test code = NullTestCode)  4.9 g/dL  Unknown  2-4  F



Ordering Physician [facility] Studies2019-04-15 10:15:00Identifier [id number]-
6 Result Time 2019-04-15 10:15:00Unknown





 Test Item  Value  Reference Range  Comments

 

 Unknown (test code = 1988-5)  11.17 mg/L  Unknown  0-8.00  F



Ordering Physician [facility] Studies2019-04-15 10:15:00Identifier [id number]-
6 Result Time 2019-04-15 10:15:00Unknown





 Test Item  Value  Reference Range  Comments

 

 Unknown (test code = 1920-8)  25 U/L  Unknown  13-39  F



Ordering Physician [facility] Studies2019-04-15 10:15:00Identifier [id number]-
6 Result Time 2019-04-15 10:15:00Unknown





 Test Item  Value  Reference Range  Comments

 

 Unknown (test code = 6768-6)  153 U/L  Unknown    F



Ordering Physician [facility] Studies2019-04-15 10:15:00Identifier [id number]-
6 Result Time 2019-04-15 10:15:00Unknown





 Test Item  Value  Reference Range  Comments

 

 Unknown (test code = 1759-0)  0.8   Unknown  1-3  F



Ordering Physician [facility] Studies2019-04-15 10:15:00Identifier [id number]-
6 Result Time 2019-04-15 10:15:00Unknown





 Test Item  Value  Reference Range  Comments

 

 Unknown (test code = 59159-6)  4.0 g/dL  Unknown  3.2-5.2  F



Ordering Physician [facility] Studies2019-04-15 10:15:00Identifier [id number]-
6 Result Time 2019-04-15 10:15:00Unknown





 Test Item  Value  Reference Range  Comments

 

 Unknown (test code = 1742-6)  13 U/L  Unknown  7-52  F



Ordering Physician [facility] Studies2019-04-15 10:15:00Identifier [id number]-
6 Result Time 2019-04-15 10:15:00Unknown





 Test Item  Value  Reference Range  Comments

 

 Unknown (test code = 2524-7)  0.9 mmol/L  Unknown  0.5-2.0  F



Ordering Physician Unknown

## 2020-04-24 NOTE — XMS REPORT
Patient Summary Document

 Created on:2020



Patient:BERTHA SOLIS

Sex:Male

:1933

External Reference #:[id number]





Demographics







 Address  [address] [place] [address]

 

 Home Phone  [phone]

 

 Preferred Language  English

 

 Marital Status  Unknown

 

 [employer] Affiliation  Unknown

 

 Race  Unknown

 

 Ethnic Group  Unknown









Author







 Organization  [facility]









Support







 Name  Relationship  Address  Phone

 

 IRAIS SOLIS, Unknown Name  Suha  [address]  [phone]



     [place] [address]  









Care Team Providers







 Name  Role  Phone

 

 Unavailable  Unavailable  Unavailable









Problems

This patient has no known problems.



Allergies, Adverse Reactions, Alerts







 Allergy  Allergy  Status  Severity  Reaction(s)  Onset  Inactive  Treating  
Comments



 Name  Type        Date  Date  Clinician  

 

 Uncoded  Unknown  Active  Unknown  Reaction  2019    Interface  



 free-text        Unknown  -18      



 allergy                







Medications







 Ordered  Filled  Start  Stop  Current  Ordering  Indication  Dosage  Frequency
  Signature  Comments  Components



 Medication  Medication  Date  Date  Medication?  Clinician        (SIG)    



 Name  Name                    

 

 Multivitami  Multivitami  2012    No  Unknown    Unknown  Unknown      



 ns/Minerals  ns/Minerals  -                  



 Tab*  Tab*                    

 

 amLODIPine  amLODIPine      No  Unknown    Unknown  Unknown      



 5 mg tablet  5 mg tablet  4-15                  

 

 acetaminoph  acetaminoph      No  Unknown    Unknown  Unknown      



 en 500 mg  en 500 mg  4-15                  



 tablet  tablet                    

 

 magnesium  magnesium      No  Unknown    Unknown  Unknown      



 oxide 400  oxide 400  4-15                  



 mg (241.3  mg (241.3                    



 mg  mg                    



 magnesium)  magnesium)                    



 tablet  tablet                    

 

 omeprazole  omeprazole      No  Unknown    Unknown  Unknown      



 40 mg  40 mg  4-15                  



 capsule,del  capsule,del                    



 ayed  ayed                    



 release  release                    

 

 verapamil  verapamil      No  Unknown    Unknown  Unknown      



  mg   mg  4-15                  



 capsule,ext  capsule,ext                    



 ended  ended                    



 release  release                    

 

 Calcium  Calcium      No  Unknown    Unknown  Unknown      



 Carbonate/V  Carbonate/V  4-15                  



 itamin D3  itamin D3                    

 

 cefdinir  cefdinir      No  Unknown    Unknown  Unknown      



 300 mg  300 mg  4-17                  



 capsule  capsule                    







Vital Signs







 Vital Name  Observation Time  Observation Value  Comments

 

 SYSTOLIC mm[Hg]  2019 18:05:21  135 mm[Hg] mm[Hg]  Method: Sit

 

 DIASTOLIC mm[Hg]  2019 18:05:21  48 mm[Hg] mm[Hg]  Method: Sit

 

 PULSE  2019 18:05:21  63 /min /min  

 

 RESP RATE  2019 18:05:21  16 /min /min  

 

 TEMP  2019 18:05:21  97.4 [degF]  







Procedures

This patient has no known procedures.



Results







 Test Description  Test Time  Test Comments  Text Results  Atomic Results  
Result Comments









 Laboratory Studies  2019 08:13:00  Identifier [id number] Result Time  
Unknown



     2019 08:13:00  









   Test Item  Value  Reference Range  Comments









 Unknown (test code = 2951-2)  136 mmol/L  Unknown  135-145  F



Ordering Physician [facility] Ipardez9598-11-67 08:13:00Identifier [id number]
6 Result Time 2019 08:13:00Unknown





 Test Item  Value  Reference Range  Comments

 

 Unknown (test code = 2823-3)  4.0 mmol/L  Unknown  3.5-5.0  F



Ordering Physician [facility] Rejkxce5659-17-90 08:13:00Identifier [id number]
6 Result Time 2019 08:13:00Unknown





 Test Item  Value  Reference Range  Comments

 

 Unknown (test code = 2345-7)  87 mg/dL  Unknown    F



Ordering Physician [facility] Kbdqkhs1840-09-70 08:13:00Identifier [id number]
6 Result Time 2019 08:13:00Unknown





 Test Item  Value  Reference Range  Comments

 

 Unknown (test code = 48642-3)  46.9   Unknown  Unknown  F



Ordering Physician [facility] Zjqrefp7618-72-83 08:13:00Identifier [id number]-
6 Result Time 2019 08:13:00Unknown





 Test Item  Value  Reference Range  Comments

 

 Unknown (test code = NullTestCode)  56.7   Unknown  Unknown  F



Ordering Physician [facility] Rimrklr8393-82-04 08:13:00Identifier [id number]-
6 Result Time 2019 08:13:00Unknown





 Test Item  Value  Reference Range  Comments

 

 Unknown (test code = 2160-0)  1.43 mg/dL  Unknown  0.67-1.17  F



Ordering Physician [facility] Jbnckjn1984-08-98 08:13:00Identifier [id number]-
6 Result Time 2019 08:13:00Unknown





 Test Item  Value  Reference Range  Comments

 

 Unknown (test code = 2075-0)  104 mmol/L  Unknown  101-111  F



Ordering Physician [facility] Nghvhyx6522-87-53 08:13:00Identifier [id number]-
6 Result Time 2019 08:13:00Unknown





 Test Item  Value  Reference Range  Comments

 

 Unknown (test code = 2028-9)  24 mmol/L  Unknown  22-32  F



Ordering Physician [facility] Zecsjow3806-87-87 08:13:00Identifier [id number]
6 Result Time 2019 08:13:00Unknown





 Test Item  Value  Reference Range  Comments

 

 Unknown (test code = 25946-6)  9.0 mg/dL  Unknown  8.6-10.3  F



Ordering Physician [facility] Sjqtjdj0763-05-07 08:13:00Identifier [id number]
6 Result Time 2019 08:13:00Unknown





 Test Item  Value  Reference Range  Comments

 

 Unknown (test code = 3094-0)  24 mg/dL  Unknown  6-24  F



Ordering Physician [facility] Fzvwxkh5059-36-96 08:13:00Identifier [id number]-
6 Result Time 2019 08:13:00Unknown





 Test Item  Value  Reference Range  Comments

 

 Unknown (test code = 3097-3)  16.8   Unknown  8-20  F



Ordering Physician [facility] Euvxlyp5481-49-71 08:13:00Identifier [id number]-
6 Result Time 2019 08:13:00Unknown





 Test Item  Value  Reference Range  Comments

 

 Unknown (test code = 33037-3)  8 mmol/L  Unknown  2-11  F



Ordering Physician [facility] Vxlsfbw9199-01-90 08:13:00Identifier [id number]-
6 Result Time 2019 08:13:00Unknown





 Test Item  Value  Reference Range  Comments

 

 Unknown (test code = 03909-8)  27.4 10^3/uL  Unknown  3.5-10.8  F



Ordering Physician [facility] Exqtrct7464-42-83 08:13:00Identifier [id number]-
6 Result Time 2019 08:13:00Unknown





 Test Item  Value  Reference Range  Comments

 

 Unknown (test code = 788-0)  24 %  Unknown  10.5-15  F



Ordering Physician [facility] Avbpsop3547-81-34 08:13:00Identifier [id number]-
6 Result Time 2019 08:13:00Unknown





 Test Item  Value  Reference Range  Comments

 

 Unknown (test code = 789-8)  5.19 10^6 /uL  Unknown  4.18-5.48  F



Ordering Physician [facility] Nckfgfz2596-56-79 08:13:00Identifier [id number]-
6 Result Time 2019 08:13:00Unknown





 Test Item  Value  Reference Range  Comments

 

 Unknown (test code = 777-3)  330 10^3/uL  Unknown  150-450  F



Ordering Physician [facility] Mklrbiw8282-89-80 08:13:00Identifier [id number]-
6 Result Time 2019 08:13:00Unknown





 Test Item  Value  Reference Range  Comments

 

 Unknown (test code = 12938-9)  0   Unknown  Unknown  F



Ordering Physician [facility] Ompalxe3339-03-73 08:13:00Identifier [id number]-
6 Result Time 2019 08:13:00Unknown





 Test Item  Value  Reference Range  Comments

 

 Unknown (test code = 771-6)  0 10^3/ul  Unknown  Unknown  F



Ordering Physician [facility] Nvccjqm2803-02-08 08:13:00Identifier [id number]-
6 Result Time 2019 08:13:00Unknown





 Test Item  Value  Reference Range  Comments

 

 Unknown (test code = 770-8)  84.7 %  Unknown  Unknown  F



Ordering Physician [facility] Kyjcpdj3067-45-40 08:13:00Identifier [id number]-
6 Result Time 2019 08:13:00Unknown





 Test Item  Value  Reference Range  Comments

 

 Unknown (test code = 5905-5)  8.6 %  Unknown  Unknown  F



Ordering Physician [facility] Xozmulw7829-26-94 08:13:00Identifier [id number]-
6 Result Time 2019 08:13:00Unknown





 Test Item  Value  Reference Range  Comments

 

 Unknown (test code = 39996-5)  8.6 fL  Unknown  7.4-10.4  F



Ordering Physician [facility] Erhafag1048-95-50 08:13:00Identifier [id number]-
6 Result Time 2019 08:13:00Unknown





 Test Item  Value  Reference Range  Comments

 

 Unknown (test code = 787-2)  78 fL  Unknown  80-94  F



Ordering Physician [facility] Dasitdo4686-15-99 08:13:00Identifier [id number]-
6 Result Time 2019 08:13:00Unknown





 Test Item  Value  Reference Range  Comments

 

 Unknown (test code = 786-4)  31 g/dL  Unknown  31-36  F



Ordering Physician [facility] Cugobpd4881-52-72 08:13:00Identifier [id number]-
6 Result Time 2019 08:13:00Unknown





 Test Item  Value  Reference Range  Comments

 

 Unknown (test code = 785-6)  24 pg  Unknown  27-31  F



Ordering Physician [facility] Rakmvjq2747-57-85 08:13:00Identifier [id number]-
6 Result Time 2019 08:13:00Unknown





 Test Item  Value  Reference Range  Comments

 

 Unknown (test code = 736-9)  5.4 %  Unknown  Unknown  F



Ordering Physician [facility] Gsonmyy4062-20-53 08:13:00Identifier [id number]-
6 Result Time 2019 08:13:00Unknown





 Test Item  Value  Reference Range  Comments

 

 Unknown (test code = 718-7)  12.3 g/dL  Unknown  14.0-18.0  F



Ordering Physician [facility] Azmyevh9287-73-76 08:13:00Identifier [id number]-
6 Result Time 2019 08:13:00Unknown





 Test Item  Value  Reference Range  Comments

 

 Unknown (test code = 4544-3)  40 %  Unknown  36-46  F



Ordering Physician [facility] Xmsrgtk7316-36-86 08:13:00Identifier [id number]-
6 Result Time 2019 08:13:00Unknown





 Test Item  Value  Reference Range  Comments

 

 Unknown (test code = 713-8)  0.8 %  Unknown  Unknown  F



Ordering Physician [facility] Iscbotn1059-92-42 08:13:00Identifier [id number]-
6 Result Time 2019 08:13:00Unknown





 Test Item  Value  Reference Range  Comments

 

 Unknown (test code = 706-2)  0.5 %  Unknown  Unknown  F



Ordering Physician [facility] Ejefqxk3224-67-47 08:13:00Identifier [id number]-
6 Result Time 2019 08:13:00Unknown





 Test Item  Value  Reference Range  Comments

 

 Unknown (test code = PWC6500)  23.2 10^3/ul  Unknown  1.5-7.7  F



Ordering Physician [facility] Iwnmfbh8305-18-06 08:13:00Identifier [id number]-
6 Result Time 2019 08:13:00Unknown





 Test Item  Value  Reference Range  Comments

 

 Unknown (test code = 742-7)  2.4 10^3/ul  Unknown  0-0.8  F



Ordering Physician [facility] Dtcundf4315-83-07 08:13:00Identifier [id number]-
6 Result Time 2019 08:13:00Unknown





 Test Item  Value  Reference Range  Comments

 

 Unknown (test code = 731-0)  1.5 10^3/ul  Unknown  1.0-4.8  F



Ordering Physician [facility] Btukxtb0560-59-42 08:13:00Identifier [id number]-
6 Result Time 2019 08:13:00Unknown





 Test Item  Value  Reference Range  Comments

 

 Unknown (test code = 711-2)  0.2 10^3/ul  Unknown  0-0.6  F



Ordering Physician [facility] Nbpquxn6375-33-90 08:13:00Identifier [id number]-
6 Result Time 2019 08:13:00Unknown





 Test Item  Value  Reference Range  Comments

 

 Unknown (test code = 704-7)  0.1 10^3/ul  Unknown  0-0.2  F



Ordering Physician [facility] Aslgnjw4813-17-65 06:46:00Identifier [id number]-
6 Result Time 2019 06:46:00Unknown





 Test Item  Value  Reference Range  Comments

 

 Unknown (test code = 97972-8)  0.05 ng/mL  Unknown  Unknown  F



Ordering Physician [facility] Lojpzik6818-72-49 06:46:00Identifier [id number]-
6 Result Time 2019 06:46:00Unknown





 Test Item  Value  Reference Range  Comments

 

 Unknown (test code = 2777-1)  2.8 mg/dL  Unknown  2.5-5.0  F



Ordering Physician [facility] Ifdpdti9448-72-58 06:46:00Identifier [id number]-
6 Result Time 2019 06:46:00Unknown





 Test Item  Value  Reference Range  Comments

 

 Unknown (test code = 72350-8)  2.5 mg/dL  Unknown  1.9-2.7  F



Ordering Physician [facility] Studies2019-04-15 10:48:00Identifier [id number]-
6 Result Time 2019-04-15 10:48:00Unknown





 Test Item  Value  Reference Range  Comments

 

 Unknown (test code = NullTestCode)  6.0   Unknown  5-9  F



Ordering Physician [facility] Studies2019-04-15 10:48:00Identifier [id number]
6 Result Time 2019-04-15 10:48:00Unknown





 Test Item  Value  Reference Range  Comments

 

 Unknown (test code = 96590-7)  1.013   Unknown  1.010-1.030  F



Ordering Physician [facility] Studies2019-04-15 10:15:00Identifier [id number]-
6 Result Time 2019-04-15 10:15:00Unknown





 Test Item  Value  Reference Range  Comments

 

 Unknown (test code = 3016-3)  3.38 mcIU/mL  Unknown  0.34-5.60  F



Ordering Physician [facility] Studies2019-04-15 10:15:00Identifier [id number]-
6 Result Time 2019-04-15 10:15:00Unknown





 Test Item  Value  Reference Range  Comments

 

 Unknown (test code = 64361-0)  1.14   Unknown  0.77-1.02  F



Ordering Physician [facility] Studies2019-04-15 10:15:00Identifier [id number]
6 Result Time 2019-04-15 10:15:00Unknown





 Test Item  Value  Reference Range  Comments

 

 Unknown (test code = 96569-8)  365 pg/mL  Unknown  Unknown  F



Ordering Physician [facility] Studies2019-04-15 10:15:00Identifier [id number]
6 Result Time 2019-04-15 10:15:00Unknown





 Test Item  Value  Reference Range  Comments

 

 Unknown (test code = 2885-2)  8.9 g/dL  Unknown  6.4-8.9  F



Ordering Physician [facility] Studies2019-04-15 10:15:00Identifier [id number]-
6 Result Time 2019-04-15 10:15:00Unknown





 Test Item  Value  Reference Range  Comments

 

 Unknown (test code = 1975-2)  0.60 mg/dL  Unknown  0.2-1.0  F



Ordering Physician [facility] Studies2019-04-15 10:15:00Identifier [id number]-
6 Result Time 2019-04-15 10:15:00Unknown





 Test Item  Value  Reference Range  Comments

 

 Unknown (test code = NullTestCode)  4.9 g/dL  Unknown  2-4  F



Ordering Physician [facility] Studies2019-04-15 10:15:00Identifier [id number]-
6 Result Time 2019-04-15 10:15:00Unknown





 Test Item  Value  Reference Range  Comments

 

 Unknown (test code = 1988-5)  11.17 mg/L  Unknown  0-8.00  F



Ordering Physician [facility] Studies2019-04-15 10:15:00Identifier [id number]
6 Result Time 2019-04-15 10:15:00Unknown





 Test Item  Value  Reference Range  Comments

 

 Unknown (test code = 1920-8)  25 U/L  Unknown  13-39  F



Ordering Physician [facility] Studies2019-04-15 10:15:00Identifier [id number]-
6 Result Time 2019-04-15 10:15:00Unknown





 Test Item  Value  Reference Range  Comments

 

 Unknown (test code = 6768-6)  153 U/L  Unknown    F



Ordering Physician [facility] Studies2019-04-15 10:15:00Identifier [id number]
6 Result Time 2019-04-15 10:15:00Unknown





 Test Item  Value  Reference Range  Comments

 

 Unknown (test code = 1759-0)  0.8   Unknown  1-3  F



Ordering Physician [facility] Studies2019-04-15 10:15:00Identifier [id number]
6 Result Time 2019-04-15 10:15:00Unknown





 Test Item  Value  Reference Range  Comments

 

 Unknown (test code = 84422-9)  4.0 g/dL  Unknown  3.2-5.2  F



Ordering Physician [facility] Studies2019-04-15 10:15:00Identifier [id number]-
6 Result Time 2019-04-15 10:15:00Unknown





 Test Item  Value  Reference Range  Comments

 

 Unknown (test code = 1742-6)  13 U/L  Unknown  7-52  F



Ordering Physician [facility] Studies2019-04-15 10:15:00Identifier [id number]
6 Result Time 2019-04-15 10:15:00Unknown





 Test Item  Value  Reference Range  Comments

 

 Unknown (test code = 2524-7)  0.9 mmol/L  Unknown  0.5-2.0  F



Ordering Physician Unknown

## 2020-04-24 NOTE — XMS REPORT
Patient Summary Document

 Created on:2020



Patient:BERTHA SOLIS

Sex:Male

:1933

External Reference #:[id number]





Demographics







 Address  [address] [place] [id number]

 

 Home Phone  [phone]

 

 Preferred Language  English

 

 Marital Status  Unknown

 

 Yarsanism Affiliation  Unknown

 

 Race  Unknown

 

 Ethnic Group  Unknown









Author







 Organization  [facility]









Support







 Name  Relationship  Address  Phone

 

 IRAIS SOLIS, Unknown Name  Suha  [address]  [phone]



     [place] [address]  









Care Team Providers







 Name  Role  Phone

 

 Unavailable  Unavailable  Unavailable









Problems

This patient has no known problems.



Allergies, Adverse Reactions, Alerts







 Allergy  Allergy  Status  Severity  Reaction(s)  Onset  Inactive  Treating  
Comments



 Name  Type        Date  Date  Clinician  

 

 Uncoded  Unknown  Active  Unknown  Reaction  2019    Interface  



 free-text        Unknown  -18      



 allergy                







Medications







 Ordered  Filled  Start  Stop  Current  Ordering  Indication  Dosage  Frequency
  Signature  Comments  Components



 Medication  Medication  Date  Date  Medication?  Clinician        (SIG)    



 Name  Name                    

 

 Multivitami  Multivitami  2012    No  Unknown    Unknown  Unknown      



 ns/Minerals  ns/Minerals  -                  



 Tab*  Tab*                    

 

 amLODIPine  amLODIPine      No  Unknown    Unknown  Unknown      



 5 mg tablet  5 mg tablet  4-15                  

 

 acetaminoph  acetaminoph      No  Unknown    Unknown  Unknown      



 en 500 mg  en 500 mg  4-15                  



 tablet  tablet                    

 

 magnesium  magnesium      No  Unknown    Unknown  Unknown      



 oxide 400  oxide 400  4-15                  



 mg (241.3  mg (241.3                    



 mg  mg                    



 magnesium)  magnesium)                    



 tablet  tablet                    

 

 omeprazole  omeprazole      No  Unknown    Unknown  Unknown      



 40 mg  40 mg  4-15                  



 capsule,del  capsule,del                    



 ayed  ayed                    



 release  release                    

 

 verapamil  verapamil      No  Unknown    Unknown  Unknown      



  mg   mg  4-15                  



 capsule,ext  capsule,ext                    



 ended  ended                    



 release  release                    

 

 Calcium  Calcium      No  Unknown    Unknown  Unknown      



 Carbonate/V  Carbonate/V  4-15                  



 itamin D3  itamin D3                    

 

 cefdinir  cefdinir      No  Unknown    Unknown  Unknown      



 300 mg  300 mg  4-17                  



 capsule  capsule                    







Vital Signs







 Vital Name  Observation Time  Observation Value  Comments

 

 SYSTOLIC mm[Hg]  2019 18:05:21  135 mm[Hg] mm[Hg]  Method: Sit

 

 DIASTOLIC mm[Hg]  2019 18:05:21  48 mm[Hg] mm[Hg]  Method: Sit

 

 PULSE  2019 18:05:21  63 /min /min  

 

 RESP RATE  2019 18:05:21  16 /min /min  

 

 TEMP  2019 18:05:21  97.4 [degF]  







Procedures

This patient has no known procedures.



Results







 Test Description  Test Time  Test Comments  Text Results  Atomic Results  
Result Comments









 Laboratory Studies  2019 08:13:00  Identifier [id number] Result Time  
Unknown



     2019 08:13:00  









   Test Item  Value  Reference Range  Comments









 Unknown (test code = 2951-2)  136 mmol/L  Unknown  135-145  F



Ordering Physician [facility] Iqsmsne1917-00-34 08:13:00Identifier [id number]-
6 Result Time 2019 08:13:00Unknown





 Test Item  Value  Reference Range  Comments

 

 Unknown (test code = 2823-3)  4.0 mmol/L  Unknown  3.5-5.0  F



Ordering Physician [facility] Zwevdel7171-25-20 08:13:00Identifier [id number]-
6 Result Time 2019 08:13:00Unknown





 Test Item  Value  Reference Range  Comments

 

 Unknown (test code = 2345-7)  87 mg/dL  Unknown    F



Ordering Physician [facility] Gpnrlkn8856-33-91 08:13:00Identifier [id number]-
6 Result Time 2019 08:13:00Unknown





 Test Item  Value  Reference Range  Comments

 

 Unknown (test code = 48642-3)  46.9   Unknown  Unknown  F



Ordering Physician [facility] Bniluob5173-90-78 08:13:00Identifier [id number]-
6 Result Time 2019 08:13:00Unknown





 Test Item  Value  Reference Range  Comments

 

 Unknown (test code = NullTestCode)  56.7   Unknown  Unknown  F



Ordering Physician [facility] Cisyjrv7066-32-16 08:13:00Identifier [id number]-
6 Result Time 2019 08:13:00Unknown





 Test Item  Value  Reference Range  Comments

 

 Unknown (test code = 2160-0)  1.43 mg/dL  Unknown  0.67-1.17  F



Ordering Physician [facility] Dogrdzh3070-34-95 08:13:00Identifier [id number]
6 Result Time 2019 08:13:00Unknown





 Test Item  Value  Reference Range  Comments

 

 Unknown (test code = 2075-0)  104 mmol/L  Unknown  101-111  F



Ordering Physician [facility] Vecjphf4092-46-89 08:13:00Identifier [id number]
6 Result Time 2019 08:13:00Unknown





 Test Item  Value  Reference Range  Comments

 

 Unknown (test code = 2028-9)  24 mmol/L  Unknown  22-32  F



Ordering Physician [facility] Tuhreyw0067-58-77 08:13:00Identifier [id number]
6 Result Time 2019 08:13:00Unknown





 Test Item  Value  Reference Range  Comments

 

 Unknown (test code = 97592-1)  9.0 mg/dL  Unknown  8.6-10.3  F



Ordering Physician [facility] Hipvxwi8582-89-89 08:13:00Identifier [id number]-
6 Result Time 2019 08:13:00Unknown





 Test Item  Value  Reference Range  Comments

 

 Unknown (test code = 3094-0)  24 mg/dL  Unknown  6-24  F



Ordering Physician [facility] Twvqxaa1649-45-03 08:13:00Identifier [id number]
6 Result Time 2019 08:13:00Unknown





 Test Item  Value  Reference Range  Comments

 

 Unknown (test code = 3097-3)  16.8   Unknown  8-20  F



Ordering Physician [facility] Opfaphv0835-02-36 08:13:00Identifier [id number]
6 Result Time 2019 08:13:00Unknown





 Test Item  Value  Reference Range  Comments

 

 Unknown (test code = 33037-3)  8 mmol/L  Unknown  2-11  F



Ordering Physician [facility] Dmwkjuf1030-89-63 08:13:00Identifier [id number]-
6 Result Time 2019 08:13:00Unknown





 Test Item  Value  Reference Range  Comments

 

 Unknown (test code = 17845-7)  27.4 10^3/uL  Unknown  3.5-10.8  F



Ordering Physician [facility] Pbxrdmh7578-78-04 08:13:00Identifier [id number]-
6 Result Time 2019 08:13:00Unknown





 Test Item  Value  Reference Range  Comments

 

 Unknown (test code = 788-0)  24 %  Unknown  10.5-15  F



Ordering Physician [facility] Xmdfsno0593-83-34 08:13:00Identifier [id number]-
6 Result Time 2019 08:13:00Unknown





 Test Item  Value  Reference Range  Comments

 

 Unknown (test code = 789-8)  5.19 10^6 /uL  Unknown  4.18-5.48  F



Ordering Physician [facility] Psfgxjw4006-37-33 08:13:00Identifier [id number]-
6 Result Time 2019 08:13:00Unknown





 Test Item  Value  Reference Range  Comments

 

 Unknown (test code = 777-3)  330 10^3/uL  Unknown  150-450  F



Ordering Physician [facility] Omqtlnt1689-51-85 08:13:00Identifier [id number]-
6 Result Time 2019 08:13:00Unknown





 Test Item  Value  Reference Range  Comments

 

 Unknown (test code = 40637-0)  0   Unknown  Unknown  F



Ordering Physician [facility] Zjorwje3144-56-36 08:13:00Identifier [id number]-
6 Result Time 2019 08:13:00Unknown





 Test Item  Value  Reference Range  Comments

 

 Unknown (test code = 771-6)  0 10^3/ul  Unknown  Unknown  F



Ordering Physician [facility] Geljbwk8326-13-43 08:13:00Identifier [id number]-
6 Result Time 2019 08:13:00Unknown





 Test Item  Value  Reference Range  Comments

 

 Unknown (test code = 770-8)  84.7 %  Unknown  Unknown  F



Ordering Physician [facility] Zwdznfb3736-67-92 08:13:00Identifier [id number]-
6 Result Time 2019 08:13:00Unknown





 Test Item  Value  Reference Range  Comments

 

 Unknown (test code = 5905-5)  8.6 %  Unknown  Unknown  F



Ordering Physician [facility] Shafkgy9361-81-65 08:13:00Identifier [id number]-
6 Result Time 2019 08:13:00Unknown





 Test Item  Value  Reference Range  Comments

 

 Unknown (test code = 82184-0)  8.6 fL  Unknown  7.4-10.4  F



Ordering Physician [facility] Khdpsqc0199-83-58 08:13:00Identifier [id number]
6 Result Time 2019 08:13:00Unknown





 Test Item  Value  Reference Range  Comments

 

 Unknown (test code = 787-2)  78 fL  Unknown  80-94  F



Ordering Physician [facility] Rrdrquz7751-15-02 08:13:00Identifier [id number]-
6 Result Time 2019 08:13:00Unknown





 Test Item  Value  Reference Range  Comments

 

 Unknown (test code = 786-4)  31 g/dL  Unknown  31-36  F



Ordering Physician [facility] Ijjraze9706-38-81 08:13:00Identifier [id number]-
6 Result Time 2019 08:13:00Unknown





 Test Item  Value  Reference Range  Comments

 

 Unknown (test code = 785-6)  24 pg  Unknown  27-31  F



Ordering Physician [facility] Povuvip0378-43-52 08:13:00Identifier [id number]-
6 Result Time 2019 08:13:00Unknown





 Test Item  Value  Reference Range  Comments

 

 Unknown (test code = 736-9)  5.4 %  Unknown  Unknown  F



Ordering Physician [facility] Dlfwnfm4952-45-48 08:13:00Identifier [id number]-
6 Result Time 2019 08:13:00Unknown





 Test Item  Value  Reference Range  Comments

 

 Unknown (test code = 718-7)  12.3 g/dL  Unknown  14.0-18.0  F



Ordering Physician [facility] Bebhwow4107-72-56 08:13:00Identifier [id number]-
6 Result Time 2019 08:13:00Unknown





 Test Item  Value  Reference Range  Comments

 

 Unknown (test code = 4544-3)  40 %  Unknown  36-46  F



Ordering Physician [facility] Qlzueaa3317-73-76 08:13:00Identifier [id number]-
6 Result Time 2019 08:13:00Unknown





 Test Item  Value  Reference Range  Comments

 

 Unknown (test code = 713-8)  0.8 %  Unknown  Unknown  F



Ordering Physician [facility] Ovrsclf6221-91-42 08:13:00Identifier [id number]-
6 Result Time 2019 08:13:00Unknown





 Test Item  Value  Reference Range  Comments

 

 Unknown (test code = 706-2)  0.5 %  Unknown  Unknown  F



Ordering Physician [facility] Bycxcyb1951-51-49 08:13:00Identifier [id number]-
6 Result Time 2019 08:13:00Unknown





 Test Item  Value  Reference Range  Comments

 

 Unknown (test code = QIR9674)  23.2 10^3/ul  Unknown  1.5-7.7  F



Ordering Physician [facility] Zyhnnou9074-81-29 08:13:00Identifier [id number]-
6 Result Time 2019 08:13:00Unknown





 Test Item  Value  Reference Range  Comments

 

 Unknown (test code = 742-7)  2.4 10^3/ul  Unknown  0-0.8  F



Ordering Physician [facility] Sfbohdp7084-09-56 08:13:00Identifier [id number]-
6 Result Time 2019 08:13:00Unknown





 Test Item  Value  Reference Range  Comments

 

 Unknown (test code = 731-0)  1.5 10^3/ul  Unknown  1.0-4.8  F



Ordering Physician [facility] Jtfkmtk5006-63-18 08:13:00Identifier [id number]-
6 Result Time 2019 08:13:00Unknown





 Test Item  Value  Reference Range  Comments

 

 Unknown (test code = 711-2)  0.2 10^3/ul  Unknown  0-0.6  F



Ordering Physician [facility] Lyxists6581-73-57 08:13:00Identifier [id number]-
6 Result Time 2019 08:13:00Unknown





 Test Item  Value  Reference Range  Comments

 

 Unknown (test code = 704-7)  0.1 10^3/ul  Unknown  0-0.2  F



Ordering Physician [facility] Gzbrywc7411-09-19 06:46:00Identifier [id number]-
6 Result Time 2019 06:46:00Unknown





 Test Item  Value  Reference Range  Comments

 

 Unknown (test code = 23577-0)  0.05 ng/mL  Unknown  Unknown  F



Ordering Physician [facility] Leeittp9094-57-77 06:46:00Identifier [id number]-
6 Result Time 2019 06:46:00Unknown





 Test Item  Value  Reference Range  Comments

 

 Unknown (test code = 2777-1)  2.8 mg/dL  Unknown  2.5-5.0  F



Ordering Physician [facility] Pbmirwl8733-43-94 06:46:00Identifier [id number]-
6 Result Time 2019 06:46:00Unknown





 Test Item  Value  Reference Range  Comments

 

 Unknown (test code = 97365-9)  2.5 mg/dL  Unknown  1.9-2.7  F



Ordering Physician [facility] Studies2019-04-15 10:48:00Identifier [id number]-
6 Result Time 2019-04-15 10:48:00Unknown





 Test Item  Value  Reference Range  Comments

 

 Unknown (test code = NullTestCode)  6.0   Unknown  5-9  F



Ordering Physician [facility] Studies2019-04-15 10:48:00Identifier [id number]-
6 Result Time 2019-04-15 10:48:00Unknown





 Test Item  Value  Reference Range  Comments

 

 Unknown (test code = 00793-1)  1.013   Unknown  1.010-1.030  F



Ordering Physician [facility] Studies2019-04-15 10:15:00Identifier [id number]-
6 Result Time 2019-04-15 10:15:00Unknown





 Test Item  Value  Reference Range  Comments

 

 Unknown (test code = 3016-3)  3.38 mcIU/mL  Unknown  0.34-5.60  F



Ordering Physician [facility] Studies2019-04-15 10:15:00Identifier [id number]-
6 Result Time 2019-04-15 10:15:00Unknown





 Test Item  Value  Reference Range  Comments

 

 Unknown (test code = 93034-3)  1.14   Unknown  0.77-1.02  F



Ordering Physician [facility] Studies2019-04-15 10:15:00Identifier [id number]-
6 Result Time 2019-04-15 10:15:00Unknown





 Test Item  Value  Reference Range  Comments

 

 Unknown (test code = 65610-9)  365 pg/mL  Unknown  Unknown  F



Ordering Physician [facility] Studies2019-04-15 10:15:00Identifier [id number]-
6 Result Time 2019-04-15 10:15:00Unknown





 Test Item  Value  Reference Range  Comments

 

 Unknown (test code = 2885-2)  8.9 g/dL  Unknown  6.4-8.9  F



Ordering Physician [facility] Studies2019-04-15 10:15:00Identifier [id number]-
6 Result Time 2019-04-15 10:15:00Unknown





 Test Item  Value  Reference Range  Comments

 

 Unknown (test code = 1975-2)  0.60 mg/dL  Unknown  0.2-1.0  F



Ordering Physician [facility] Studies2019-04-15 10:15:00Identifier [id number]-
6 Result Time 2019-04-15 10:15:00Unknown





 Test Item  Value  Reference Range  Comments

 

 Unknown (test code = NullTestCode)  4.9 g/dL  Unknown  2-4  F



Ordering Physician [facility] Studies2019-04-15 10:15:00Identifier [id number]-
6 Result Time 2019-04-15 10:15:00Unknown





 Test Item  Value  Reference Range  Comments

 

 Unknown (test code = 1988-5)  11.17 mg/L  Unknown  0-8.00  F



Ordering Physician [facility] Studies2019-04-15 10:15:00Identifier [id number]-
6 Result Time 2019-04-15 10:15:00Unknown





 Test Item  Value  Reference Range  Comments

 

 Unknown (test code = 1920-8)  25 U/L  Unknown  13-39  F



Ordering Physician [facility] Studies2019-04-15 10:15:00Identifier [id number]
6 Result Time 2019-04-15 10:15:00Unknown





 Test Item  Value  Reference Range  Comments

 

 Unknown (test code = 6768-6)  153 U/L  Unknown    F



Ordering Physician [facility] Studies2019-04-15 10:15:00Identifier [id number]-
6 Result Time 2019-04-15 10:15:00Unknown





 Test Item  Value  Reference Range  Comments

 

 Unknown (test code = 1759-0)  0.8   Unknown  1-3  F



Ordering Physician [facility] Studies2019-04-15 10:15:00Identifier [id number]-
6 Result Time 2019-04-15 10:15:00Unknown





 Test Item  Value  Reference Range  Comments

 

 Unknown (test code = 11943-1)  4.0 g/dL  Unknown  3.2-5.2  F



Ordering Physician [facility] Studies2019-04-15 10:15:00Identifier [id number]-
6 Result Time 2019-04-15 10:15:00Unknown





 Test Item  Value  Reference Range  Comments

 

 Unknown (test code = 1742-6)  13 U/L  Unknown  7-52  F



Ordering Physician [facility] Studies2019-04-15 10:15:00Identifier [id number]
6 Result Time 2019-04-15 10:15:00Unknown





 Test Item  Value  Reference Range  Comments

 

 Unknown (test code = 2524-7)  0.9 mmol/L  Unknown  0.5-2.0  F



Ordering Physician Unknown

## 2020-04-24 NOTE — XMS REPORT
Patient Summary Document

 Created on:2020



Patient:BERTHA SOLIS

Sex:Male

:1933

External Reference #:[id number]





Demographics







 Address  [address] [place] [address]

 

 Home Phone  [phone]

 

 Preferred Language  English

 

 Marital Status  Unknown

 

 Zoroastrianism Affiliation  Unknown

 

 Race  Unknown

 

 Ethnic Group  Unknown









Author







 Organization  [facility]









Support







 Name  Relationship  Address  Phone

 

 IRAIS SOLIS, Unknown Name  Suha  [address]  [phone]



     [place] [address]  









Care Team Providers







 Name  Role  Phone

 

 Unavailable  Unavailable  Unavailable









Problems

This patient has no known problems.



Allergies, Adverse Reactions, Alerts







 Allergy  Allergy  Status  Severity  Reaction(s)  Onset  Inactive  Treating  
Comments



 Name  Type        Date  Date  Clinician  

 

 Uncoded  Unknown  Active  Unknown  Reaction  2019    Interface  



 free-text        Unknown  -18      



 allergy                







Medications







 Ordered  Filled  Start  Stop  Current  Ordering  Indication  Dosage  Frequency
  Signature  Comments  Components



 Medication  Medication  Date  Date  Medication?  Clinician        (SIG)    



 Name  Name                    

 

 Multivitami  Multivitami  2012    No  Unknown    Unknown  Unknown      



 ns/Minerals  ns/Minerals  -                  



 Tab*  Tab*                    

 

 amLODIPine  amLODIPine      No  Unknown    Unknown  Unknown      



 5 mg tablet  5 mg tablet  4-15                  

 

 acetaminoph  acetaminoph      No  Unknown    Unknown  Unknown      



 en 500 mg  en 500 mg  4-15                  



 tablet  tablet                    

 

 magnesium  magnesium      No  Unknown    Unknown  Unknown      



 oxide 400  oxide 400  4-15                  



 mg (241.3  mg (241.3                    



 mg  mg                    



 magnesium)  magnesium)                    



 tablet  tablet                    

 

 omeprazole  omeprazole      No  Unknown    Unknown  Unknown      



 40 mg  40 mg  4-15                  



 capsule,del  capsule,del                    



 ayed  ayed                    



 release  release                    

 

 verapamil  verapamil      No  Unknown    Unknown  Unknown      



  mg   mg  4-15                  



 capsule,ext  capsule,ext                    



 ended  ended                    



 release  release                    

 

 Calcium  Calcium      No  Unknown    Unknown  Unknown      



 Carbonate/V  Carbonate/V  4-15                  



 itamin D3  itamin D3                    

 

 cefdinir  cefdinir      No  Unknown    Unknown  Unknown      



 300 mg  300 mg  4-17                  



 capsule  capsule                    







Vital Signs







 Vital Name  Observation Time  Observation Value  Comments

 

 SYSTOLIC mm[Hg]  2019 18:05:21  135 mm[Hg] mm[Hg]  Method: Sit

 

 DIASTOLIC mm[Hg]  2019 18:05:21  48 mm[Hg] mm[Hg]  Method: Sit

 

 PULSE  2019 18:05:21  63 /min /min  

 

 RESP RATE  2019 18:05:21  16 /min /min  

 

 TEMP  2019 18:05:21  97.4 [degF]  







Procedures

This patient has no known procedures.



Results







 Test Description  Test Time  Test Comments  Text Results  Atomic Results  
Result Comments









 Laboratory Studies  2019 08:13:00  Identifier [id number] Result Time  
Unknown



     2019 08:13:00  









   Test Item  Value  Reference Range  Comments









 Unknown (test code = 2951-2)  136 mmol/L  Unknown  135-145  F



Ordering Physician [facility] Uzusfad5398-00-93 08:13:00Identifier [id number]-
6 Result Time 2019 08:13:00Unknown





 Test Item  Value  Reference Range  Comments

 

 Unknown (test code = 2823-3)  4.0 mmol/L  Unknown  3.5-5.0  F



Ordering Physician [facility] Dsjpcyz4848-40-82 08:13:00Identifier [id number]-
6 Result Time 2019 08:13:00Unknown





 Test Item  Value  Reference Range  Comments

 

 Unknown (test code = 2345-7)  87 mg/dL  Unknown    F



Ordering Physician [facility] Yjawgzf5268-70-71 08:13:00Identifier [id number]-
6 Result Time 2019 08:13:00Unknown





 Test Item  Value  Reference Range  Comments

 

 Unknown (test code = 48642-3)  46.9   Unknown  Unknown  F



Ordering Physician [facility] Grbuzyz1400-86-81 08:13:00Identifier [id number]-
6 Result Time 2019 08:13:00Unknown





 Test Item  Value  Reference Range  Comments

 

 Unknown (test code = NullTestCode)  56.7   Unknown  Unknown  F



Ordering Physician [facility] Fdnhndd8436-33-36 08:13:00Identifier [id number]
6 Result Time 2019 08:13:00Unknown





 Test Item  Value  Reference Range  Comments

 

 Unknown (test code = 2160-0)  1.43 mg/dL  Unknown  0.67-1.17  F



Ordering Physician [facility] Adzjbxs3655-59-54 08:13:00Identifier [id number]-
6 Result Time 2019 08:13:00Unknown





 Test Item  Value  Reference Range  Comments

 

 Unknown (test code = 2075-0)  104 mmol/L  Unknown  101-111  F



Ordering Physician [facility] Naktaxg2122-64-92 08:13:00Identifier [id number]-
6 Result Time 2019 08:13:00Unknown





 Test Item  Value  Reference Range  Comments

 

 Unknown (test code = 2028-9)  24 mmol/L  Unknown  22-32  F



Ordering Physician [facility] Dtchpaa1863-71-30 08:13:00Identifier [id number]-
6 Result Time 2019 08:13:00Unknown





 Test Item  Value  Reference Range  Comments

 

 Unknown (test code = 38139-7)  9.0 mg/dL  Unknown  8.6-10.3  F



Ordering Physician [facility] Mtfhoer6419-69-90 08:13:00Identifier [id number]-
6 Result Time 2019 08:13:00Unknown





 Test Item  Value  Reference Range  Comments

 

 Unknown (test code = 3094-0)  24 mg/dL  Unknown  6-24  F



Ordering Physician [facility] Hfcujie2813-37-45 08:13:00Identifier [id number]-
6 Result Time 2019 08:13:00Unknown





 Test Item  Value  Reference Range  Comments

 

 Unknown (test code = 3097-3)  16.8   Unknown  8-20  F



Ordering Physician [facility] Bjfrcqo0344-29-49 08:13:00Identifier [id number]-
6 Result Time 2019 08:13:00Unknown





 Test Item  Value  Reference Range  Comments

 

 Unknown (test code = 33037-3)  8 mmol/L  Unknown  2-11  F



Ordering Physician [facility] Wknonce9172-43-73 08:13:00Identifier [id number]-
6 Result Time 2019 08:13:00Unknown





 Test Item  Value  Reference Range  Comments

 

 Unknown (test code = 16206-4)  27.4 10^3/uL  Unknown  3.5-10.8  F



Ordering Physician [facility] Jalzjkd3633-25-90 08:13:00Identifier [id number]-
6 Result Time 2019 08:13:00Unknown





 Test Item  Value  Reference Range  Comments

 

 Unknown (test code = 788-0)  24 %  Unknown  10.5-15  F



Ordering Physician [facility] Yhmsaub8212-72-71 08:13:00Identifier [id number]-
6 Result Time 2019 08:13:00Unknown





 Test Item  Value  Reference Range  Comments

 

 Unknown (test code = 789-8)  5.19 10^6 /uL  Unknown  4.18-5.48  F



Ordering Physician [facility] Krbnces1170-14-81 08:13:00Identifier [id number]-
6 Result Time 2019 08:13:00Unknown





 Test Item  Value  Reference Range  Comments

 

 Unknown (test code = 777-3)  330 10^3/uL  Unknown  150-450  F



Ordering Physician [facility] Xkthfsr3046-07-18 08:13:00Identifier [id number]-
6 Result Time 2019 08:13:00Unknown





 Test Item  Value  Reference Range  Comments

 

 Unknown (test code = 04320-3)  0   Unknown  Unknown  F



Ordering Physician [facility] Jyjycxt7893-62-67 08:13:00Identifier [id number]-
6 Result Time 2019 08:13:00Unknown





 Test Item  Value  Reference Range  Comments

 

 Unknown (test code = 771-6)  0 10^3/ul  Unknown  Unknown  F



Ordering Physician [facility] Xrffejv7784-63-85 08:13:00Identifier [id number]-
6 Result Time 2019 08:13:00Unknown





 Test Item  Value  Reference Range  Comments

 

 Unknown (test code = 770-8)  84.7 %  Unknown  Unknown  F



Ordering Physician [facility] Euxfgrx1100-03-38 08:13:00Identifier [id number]-
6 Result Time 2019 08:13:00Unknown





 Test Item  Value  Reference Range  Comments

 

 Unknown (test code = 5905-5)  8.6 %  Unknown  Unknown  F



Ordering Physician [facility] Xzwyosm9012-58-88 08:13:00Identifier [id number]-
6 Result Time 2019 08:13:00Unknown





 Test Item  Value  Reference Range  Comments

 

 Unknown (test code = 96952-8)  8.6 fL  Unknown  7.4-10.4  F



Ordering Physician [facility] Saeqiqy9155-05-06 08:13:00Identifier [id number]-
6 Result Time 2019 08:13:00Unknown





 Test Item  Value  Reference Range  Comments

 

 Unknown (test code = 787-2)  78 fL  Unknown  80-94  F



Ordering Physician [facility] Kbvirmj4781-49-75 08:13:00Identifier [id number]-
6 Result Time 2019 08:13:00Unknown





 Test Item  Value  Reference Range  Comments

 

 Unknown (test code = 786-4)  31 g/dL  Unknown  31-36  F



Ordering Physician [facility] Byzruuw8986-58-48 08:13:00Identifier [id number]-
6 Result Time 2019 08:13:00Unknown





 Test Item  Value  Reference Range  Comments

 

 Unknown (test code = 785-6)  24 pg  Unknown  27-31  F



Ordering Physician [facility] Pydokfz6592-43-57 08:13:00Identifier [id number]-
6 Result Time 2019 08:13:00Unknown





 Test Item  Value  Reference Range  Comments

 

 Unknown (test code = 736-9)  5.4 %  Unknown  Unknown  F



Ordering Physician [facility] Ytipwpg9992-52-89 08:13:00Identifier [id number]-
6 Result Time 2019 08:13:00Unknown





 Test Item  Value  Reference Range  Comments

 

 Unknown (test code = 718-7)  12.3 g/dL  Unknown  14.0-18.0  F



Ordering Physician [facility] Dsgdtiz9858-39-95 08:13:00Identifier [id number]-
6 Result Time 2019 08:13:00Unknown





 Test Item  Value  Reference Range  Comments

 

 Unknown (test code = 4544-3)  40 %  Unknown  36-46  F



Ordering Physician [facility] Tnlmnzl0666-31-73 08:13:00Identifier [id number]-
6 Result Time 2019 08:13:00Unknown





 Test Item  Value  Reference Range  Comments

 

 Unknown (test code = 713-8)  0.8 %  Unknown  Unknown  F



Ordering Physician [facility] Ksfiwxb2994-44-30 08:13:00Identifier [id number]-
6 Result Time 2019 08:13:00Unknown





 Test Item  Value  Reference Range  Comments

 

 Unknown (test code = 706-2)  0.5 %  Unknown  Unknown  F



Ordering Physician [facility] Syuyfqw2651-73-59 08:13:00Identifier [id number]-
6 Result Time 2019 08:13:00Unknown





 Test Item  Value  Reference Range  Comments

 

 Unknown (test code = BQD7342)  23.2 10^3/ul  Unknown  1.5-7.7  F



Ordering Physician [facility] Ogirqqu2233-47-60 08:13:00Identifier [id number]-
6 Result Time 2019 08:13:00Unknown





 Test Item  Value  Reference Range  Comments

 

 Unknown (test code = 742-7)  2.4 10^3/ul  Unknown  0-0.8  F



Ordering Physician [facility] Necywdu3299-98-90 08:13:00Identifier [id number]-
6 Result Time 2019 08:13:00Unknown





 Test Item  Value  Reference Range  Comments

 

 Unknown (test code = 731-0)  1.5 10^3/ul  Unknown  1.0-4.8  F



Ordering Physician [facility] Lksutdu9258-02-41 08:13:00Identifier [id number]-
6 Result Time 2019 08:13:00Unknown





 Test Item  Value  Reference Range  Comments

 

 Unknown (test code = 711-2)  0.2 10^3/ul  Unknown  0-0.6  F



Ordering Physician [facility] Teftlkm0448-82-67 08:13:00Identifier [id number]-
6 Result Time 2019 08:13:00Unknown





 Test Item  Value  Reference Range  Comments

 

 Unknown (test code = 704-7)  0.1 10^3/ul  Unknown  0-0.2  F



Ordering Physician [facility] Pwuhxiu9368-15-18 06:46:00Identifier [id number]-
6 Result Time 2019 06:46:00Unknown





 Test Item  Value  Reference Range  Comments

 

 Unknown (test code = 14468-2)  0.05 ng/mL  Unknown  Unknown  F



Ordering Physician [facility] Zpjmkxp9949-21-85 06:46:00Identifier [id number]-
6 Result Time 2019 06:46:00Unknown





 Test Item  Value  Reference Range  Comments

 

 Unknown (test code = 2777-1)  2.8 mg/dL  Unknown  2.5-5.0  F



Ordering Physician [facility] Mdfshmh4791-81-10 06:46:00Identifier [id number]-
6 Result Time 2019 06:46:00Unknown





 Test Item  Value  Reference Range  Comments

 

 Unknown (test code = 23145-1)  2.5 mg/dL  Unknown  1.9-2.7  F



Ordering Physician [facility] Studies2019-04-15 10:48:00Identifier [id number]-
6 Result Time 2019-04-15 10:48:00Unknown





 Test Item  Value  Reference Range  Comments

 

 Unknown (test code = NullTestCode)  6.0   Unknown  5-9  F



Ordering Physician [facility] Studies2019-04-15 10:48:00Identifier [id number]-
6 Result Time 2019-04-15 10:48:00Unknown





 Test Item  Value  Reference Range  Comments

 

 Unknown (test code = 50562-6)  1.013   Unknown  1.010-1.030  F



Ordering Physician [facility] Studies2019-04-15 10:15:00Identifier [id number]-
6 Result Time 2019-04-15 10:15:00Unknown





 Test Item  Value  Reference Range  Comments

 

 Unknown (test code = 3016-3)  3.38 mcIU/mL  Unknown  0.34-5.60  F



Ordering Physician [facility] Studies2019-04-15 10:15:00Identifier [id number]-
6 Result Time 2019-04-15 10:15:00Unknown





 Test Item  Value  Reference Range  Comments

 

 Unknown (test code = 96762-3)  1.14   Unknown  0.77-1.02  F



Ordering Physician [facility] Studies2019-04-15 10:15:00Identifier [id number]-
6 Result Time 2019-04-15 10:15:00Unknown





 Test Item  Value  Reference Range  Comments

 

 Unknown (test code = 09057-4)  365 pg/mL  Unknown  Unknown  F



Ordering Physician [facility] Studies2019-04-15 10:15:00Identifier [id number]-
6 Result Time 2019-04-15 10:15:00Unknown





 Test Item  Value  Reference Range  Comments

 

 Unknown (test code = 2885-2)  8.9 g/dL  Unknown  6.4-8.9  F



Ordering Physician [facility] Studies2019-04-15 10:15:00Identifier [id number]-
6 Result Time 2019-04-15 10:15:00Unknown





 Test Item  Value  Reference Range  Comments

 

 Unknown (test code = 1975-2)  0.60 mg/dL  Unknown  0.2-1.0  F



Ordering Physician [facility] Studies2019-04-15 10:15:00Identifier [id number]-
6 Result Time 2019-04-15 10:15:00Unknown





 Test Item  Value  Reference Range  Comments

 

 Unknown (test code = NullTestCode)  4.9 g/dL  Unknown  2-4  F



Ordering Physician [facility] Studies2019-04-15 10:15:00Identifier [id number]-
6 Result Time 2019-04-15 10:15:00Unknown





 Test Item  Value  Reference Range  Comments

 

 Unknown (test code = 1988-5)  11.17 mg/L  Unknown  0-8.00  F



Ordering Physician [facility] Studies2019-04-15 10:15:00Identifier [id number]-
6 Result Time 2019-04-15 10:15:00Unknown





 Test Item  Value  Reference Range  Comments

 

 Unknown (test code = 1920-8)  25 U/L  Unknown  13-39  F



Ordering Physician [facility] Studies2019-04-15 10:15:00Identifier [id number]-
6 Result Time 2019-04-15 10:15:00Unknown





 Test Item  Value  Reference Range  Comments

 

 Unknown (test code = 6768-6)  153 U/L  Unknown    F



Ordering Physician [facility] Studies2019-04-15 10:15:00Identifier [id number]-
6 Result Time 2019-04-15 10:15:00Unknown





 Test Item  Value  Reference Range  Comments

 

 Unknown (test code = 1759-0)  0.8   Unknown  1-3  F



Ordering Physician [facility] Studies2019-04-15 10:15:00Identifier [id number]-
6 Result Time 2019-04-15 10:15:00Unknown





 Test Item  Value  Reference Range  Comments

 

 Unknown (test code = 40733-6)  4.0 g/dL  Unknown  3.2-5.2  F



Ordering Physician [facility] Studies2019-04-15 10:15:00Identifier [id number]-
6 Result Time 2019-04-15 10:15:00Unknown





 Test Item  Value  Reference Range  Comments

 

 Unknown (test code = 1742-6)  13 U/L  Unknown  7-52  F



Ordering Physician [facility] Studies2019-04-15 10:15:00Identifier [id number]-
6 Result Time 2019-04-15 10:15:00Unknown





 Test Item  Value  Reference Range  Comments

 

 Unknown (test code = 2524-7)  0.9 mmol/L  Unknown  0.5-2.0  F



Ordering Physician Unknown

## 2020-04-24 NOTE — XMS REPORT
Patient Summary Document

 Created on:2020



Patient:BERTHA SOLIS

Sex:Male

:1933

External Reference #:[id number]





Demographics







 Address  [address] [place] [address]

 

 Home Phone  [phone]

 

 Preferred Language  English

 

 Marital Status  Unknown

 

 Buddhist Affiliation  Unknown

 

 Race  Unknown

 

 Ethnic Group  Unknown









Author







 Organization  [facility]









Support







 Name  Relationship  Address  Phone

 

 IRAIS SOLIS, Unknown Name  Suha  [address]  [phone]



     [place] [address]  









Care Team Providers







 Name  Role  Phone

 

 Unavailable  Unavailable  Unavailable









Problems

This patient has no known problems.



Allergies, Adverse Reactions, Alerts







 Allergy  Allergy  Status  Severity  Reaction(s)  Onset  Inactive  Treating  
Comments



 Name  Type        Date  Date  Clinician  

 

 Uncoded  Unknown  Active  Unknown  Reaction  2019    Interface  



 free-text        Unknown  -18      



 allergy                







Medications







 Ordered  Filled  Start  Stop  Current  Ordering  Indication  Dosage  Frequency
  Signature  Comments  Components



 Medication  Medication  Date  Date  Medication?  Clinician        (SIG)    



 Name  Name                    

 

 Multivitami  Multivitami  2012    No  Unknown    Unknown  Unknown      



 ns/Minerals  ns/Minerals  -                  



 Tab*  Tab*                    

 

 amLODIPine  amLODIPine      No  Unknown    Unknown  Unknown      



 5 mg tablet  5 mg tablet  4-15                  

 

 acetaminoph  acetaminoph      No  Unknown    Unknown  Unknown      



 en 500 mg  en 500 mg  4-15                  



 tablet  tablet                    

 

 magnesium  magnesium      No  Unknown    Unknown  Unknown      



 oxide 400  oxide 400  4-15                  



 mg (241.3  mg (241.3                    



 mg  mg                    



 magnesium)  magnesium)                    



 tablet  tablet                    

 

 omeprazole  omeprazole      No  Unknown    Unknown  Unknown      



 40 mg  40 mg  4-15                  



 capsule,del  capsule,del                    



 ayed  ayed                    



 release  release                    

 

 verapamil  verapamil      No  Unknown    Unknown  Unknown      



  mg   mg  4-15                  



 capsule,ext  capsule,ext                    



 ended  ended                    



 release  release                    

 

 Calcium  Calcium      No  Unknown    Unknown  Unknown      



 Carbonate/V  Carbonate/V  4-15                  



 itamin D3  itamin D3                    

 

 cefdinir  cefdinir      No  Unknown    Unknown  Unknown      



 300 mg  300 mg  4-17                  



 capsule  capsule                    







Vital Signs







 Vital Name  Observation Time  Observation Value  Comments

 

 SYSTOLIC mm[Hg]  2019 18:05:21  135 mm[Hg] mm[Hg]  Method: Sit

 

 DIASTOLIC mm[Hg]  2019 18:05:21  48 mm[Hg] mm[Hg]  Method: Sit

 

 PULSE  2019 18:05:21  63 /min /min  

 

 RESP RATE  2019 18:05:21  16 /min /min  

 

 TEMP  2019 18:05:21  97.4 [degF]  







Procedures

This patient has no known procedures.



Results







 Test Description  Test Time  Test Comments  Text Results  Atomic Results  
Result Comments









 Laboratory Studies  2019 08:13:00  Identifier [id number] Result Time  
Unknown



     2019 08:13:00  









   Test Item  Value  Reference Range  Comments









 Unknown (test code = 2951-2)  136 mmol/L  Unknown  135-145  F



Ordering Physician [facility] Samqxbg0466-48-08 08:13:00Identifier [id number]
6 Result Time 2019 08:13:00Unknown





 Test Item  Value  Reference Range  Comments

 

 Unknown (test code = 2823-3)  4.0 mmol/L  Unknown  3.5-5.0  F



Ordering Physician [facility] Mvyohfw8576-82-57 08:13:00Identifier [id number]
6 Result Time 2019 08:13:00Unknown





 Test Item  Value  Reference Range  Comments

 

 Unknown (test code = 2345-7)  87 mg/dL  Unknown    F



Ordering Physician [facility] Ypweknh6890-71-09 08:13:00Identifier [id number]-
6 Result Time 2019 08:13:00Unknown





 Test Item  Value  Reference Range  Comments

 

 Unknown (test code = 48642-3)  46.9   Unknown  Unknown  F



Ordering Physician [facility] Efjaodk2099-33-40 08:13:00Identifier [id number]
6 Result Time 2019 08:13:00Unknown





 Test Item  Value  Reference Range  Comments

 

 Unknown (test code = NullTestCode)  56.7   Unknown  Unknown  F



Ordering Physician [facility] Omqhkxm0113-67-01 08:13:00Identifier [id number]-
6 Result Time 2019 08:13:00Unknown





 Test Item  Value  Reference Range  Comments

 

 Unknown (test code = 2160-0)  1.43 mg/dL  Unknown  0.67-1.17  F



Ordering Physician [facility] Ilkjqyh8791-44-01 08:13:00Identifier [id number]-
6 Result Time 2019 08:13:00Unknown





 Test Item  Value  Reference Range  Comments

 

 Unknown (test code = 2075-0)  104 mmol/L  Unknown  101-111  F



Ordering Physician [facility] Jenmdaz3220-38-55 08:13:00Identifier [id number]-
6 Result Time 2019 08:13:00Unknown





 Test Item  Value  Reference Range  Comments

 

 Unknown (test code = 2028-9)  24 mmol/L  Unknown  22-32  F



Ordering Physician [facility] Rfszzmt8054-31-35 08:13:00Identifier [id number]-
6 Result Time 2019 08:13:00Unknown





 Test Item  Value  Reference Range  Comments

 

 Unknown (test code = 43816-7)  9.0 mg/dL  Unknown  8.6-10.3  F



Ordering Physician [facility] Srpdhpa9805-45-75 08:13:00Identifier [id number]
6 Result Time 2019 08:13:00Unknown





 Test Item  Value  Reference Range  Comments

 

 Unknown (test code = 3094-0)  24 mg/dL  Unknown  6-24  F



Ordering Physician [facility] Mlgqbzy7580-34-88 08:13:00Identifier [id number]-
6 Result Time 2019 08:13:00Unknown





 Test Item  Value  Reference Range  Comments

 

 Unknown (test code = 3097-3)  16.8   Unknown  8-20  F



Ordering Physician [facility] Nbnvvah6339-22-81 08:13:00Identifier [id number]
6 Result Time 2019 08:13:00Unknown





 Test Item  Value  Reference Range  Comments

 

 Unknown (test code = 33037-3)  8 mmol/L  Unknown  2-11  F



Ordering Physician [facility] Mheblsq2563-97-72 08:13:00Identifier [id number]-
6 Result Time 2019 08:13:00Unknown





 Test Item  Value  Reference Range  Comments

 

 Unknown (test code = 32038-1)  27.4 10^3/uL  Unknown  3.5-10.8  F



Ordering Physician [facility] Defnmzt3166-35-38 08:13:00Identifier [id number]-
6 Result Time 2019 08:13:00Unknown





 Test Item  Value  Reference Range  Comments

 

 Unknown (test code = 788-0)  24 %  Unknown  10.5-15  F



Ordering Physician [facility] Cddiwhr0443-36-39 08:13:00Identifier [id number]-
6 Result Time 2019 08:13:00Unknown





 Test Item  Value  Reference Range  Comments

 

 Unknown (test code = 789-8)  5.19 10^6 /uL  Unknown  4.18-5.48  F



Ordering Physician [facility] Ogqviec0861-42-14 08:13:00Identifier [id number]-
6 Result Time 2019 08:13:00Unknown





 Test Item  Value  Reference Range  Comments

 

 Unknown (test code = 777-3)  330 10^3/uL  Unknown  150-450  F



Ordering Physician [facility] Ijoaxsy4883-88-27 08:13:00Identifier [id number]-
6 Result Time 2019 08:13:00Unknown





 Test Item  Value  Reference Range  Comments

 

 Unknown (test code = 32849-4)  0   Unknown  Unknown  F



Ordering Physician [facility] Mbowsxw0107-16-14 08:13:00Identifier [id number]-
6 Result Time 2019 08:13:00Unknown





 Test Item  Value  Reference Range  Comments

 

 Unknown (test code = 771-6)  0 10^3/ul  Unknown  Unknown  F



Ordering Physician [facility] Wugsjhg8347-26-56 08:13:00Identifier [id number]-
6 Result Time 2019 08:13:00Unknown





 Test Item  Value  Reference Range  Comments

 

 Unknown (test code = 770-8)  84.7 %  Unknown  Unknown  F



Ordering Physician [facility] Jimjgbg0596-29-90 08:13:00Identifier [id number]-
6 Result Time 2019 08:13:00Unknown





 Test Item  Value  Reference Range  Comments

 

 Unknown (test code = 5905-5)  8.6 %  Unknown  Unknown  F



Ordering Physician [facility] Wffidbb1872-11-28 08:13:00Identifier [id number]-
6 Result Time 2019 08:13:00Unknown





 Test Item  Value  Reference Range  Comments

 

 Unknown (test code = 10948-5)  8.6 fL  Unknown  7.4-10.4  F



Ordering Physician [facility] Exfozsd4731-67-23 08:13:00Identifier [id number]-
6 Result Time 2019 08:13:00Unknown





 Test Item  Value  Reference Range  Comments

 

 Unknown (test code = 787-2)  78 fL  Unknown  80-94  F



Ordering Physician [facility] Whzdbbg5701-53-28 08:13:00Identifier [id number]-
6 Result Time 2019 08:13:00Unknown





 Test Item  Value  Reference Range  Comments

 

 Unknown (test code = 786-4)  31 g/dL  Unknown  31-36  F



Ordering Physician [facility] Yehaigj6477-52-08 08:13:00Identifier [id number]-
6 Result Time 2019 08:13:00Unknown





 Test Item  Value  Reference Range  Comments

 

 Unknown (test code = 785-6)  24 pg  Unknown  27-31  F



Ordering Physician [facility] Lnbmqfq4301-48-47 08:13:00Identifier [id number]-
6 Result Time 2019 08:13:00Unknown





 Test Item  Value  Reference Range  Comments

 

 Unknown (test code = 736-9)  5.4 %  Unknown  Unknown  F



Ordering Physician [facility] Opptsoq0505-24-04 08:13:00Identifier [id number]-
6 Result Time 2019 08:13:00Unknown





 Test Item  Value  Reference Range  Comments

 

 Unknown (test code = 718-7)  12.3 g/dL  Unknown  14.0-18.0  F



Ordering Physician [facility] Tlahzgo2021-97-74 08:13:00Identifier [id number]-
6 Result Time 2019 08:13:00Unknown





 Test Item  Value  Reference Range  Comments

 

 Unknown (test code = 4544-3)  40 %  Unknown  36-46  F



Ordering Physician [facility] Koraujo5678-17-57 08:13:00Identifier [id number]-
6 Result Time 2019 08:13:00Unknown





 Test Item  Value  Reference Range  Comments

 

 Unknown (test code = 713-8)  0.8 %  Unknown  Unknown  F



Ordering Physician [facility] Cwvqsxt7883-07-54 08:13:00Identifier [id number]-
6 Result Time 2019 08:13:00Unknown





 Test Item  Value  Reference Range  Comments

 

 Unknown (test code = 706-2)  0.5 %  Unknown  Unknown  F



Ordering Physician [facility] Qeudtsx8003-83-85 08:13:00Identifier [id number]-
6 Result Time 2019 08:13:00Unknown





 Test Item  Value  Reference Range  Comments

 

 Unknown (test code = VUP7241)  23.2 10^3/ul  Unknown  1.5-7.7  F



Ordering Physician [facility] Xibswnb6121-46-93 08:13:00Identifier [id number]-
6 Result Time 2019 08:13:00Unknown





 Test Item  Value  Reference Range  Comments

 

 Unknown (test code = 742-7)  2.4 10^3/ul  Unknown  0-0.8  F



Ordering Physician [facility] Shklvqy1344-35-14 08:13:00Identifier [id number]-
6 Result Time 2019 08:13:00Unknown





 Test Item  Value  Reference Range  Comments

 

 Unknown (test code = 731-0)  1.5 10^3/ul  Unknown  1.0-4.8  F



Ordering Physician [facility] Gdwwdua4860-14-35 08:13:00Identifier [id number]-
6 Result Time 2019 08:13:00Unknown





 Test Item  Value  Reference Range  Comments

 

 Unknown (test code = 711-2)  0.2 10^3/ul  Unknown  0-0.6  F



Ordering Physician [facility] Giuxkrv5206-89-65 08:13:00Identifier [id number]-
6 Result Time 2019 08:13:00Unknown





 Test Item  Value  Reference Range  Comments

 

 Unknown (test code = 704-7)  0.1 10^3/ul  Unknown  0-0.2  F



Ordering Physician [facility] Qakktkz5097-60-29 06:46:00Identifier [id number]-
6 Result Time 2019 06:46:00Unknown





 Test Item  Value  Reference Range  Comments

 

 Unknown (test code = 98385-3)  0.05 ng/mL  Unknown  Unknown  F



Ordering Physician [facility] Edlakgg7206-07-84 06:46:00Identifier [id number]-
6 Result Time 2019 06:46:00Unknown





 Test Item  Value  Reference Range  Comments

 

 Unknown (test code = 2777-1)  2.8 mg/dL  Unknown  2.5-5.0  F



Ordering Physician [facility] Tbofmur7395-42-05 06:46:00Identifier [id number]-
6 Result Time 2019 06:46:00Unknown





 Test Item  Value  Reference Range  Comments

 

 Unknown (test code = 32082-7)  2.5 mg/dL  Unknown  1.9-2.7  F



Ordering Physician [facility] Studies2019-04-15 10:48:00Identifier [id number]
6 Result Time 2019-04-15 10:48:00Unknown





 Test Item  Value  Reference Range  Comments

 

 Unknown (test code = NullTestCode)  6.0   Unknown  5-9  F



Ordering Physician [facility] Studies2019-04-15 10:48:00Identifier [id number]
6 Result Time 2019-04-15 10:48:00Unknown





 Test Item  Value  Reference Range  Comments

 

 Unknown (test code = 70543-3)  1.013   Unknown  1.010-1.030  F



Ordering Physician [facility] Studies2019-04-15 10:15:00Identifier [id number]
6 Result Time 2019-04-15 10:15:00Unknown





 Test Item  Value  Reference Range  Comments

 

 Unknown (test code = 3016-3)  3.38 mcIU/mL  Unknown  0.34-5.60  F



Ordering Physician [facility] Studies2019-04-15 10:15:00Identifier [id number]
6 Result Time 2019-04-15 10:15:00Unknown





 Test Item  Value  Reference Range  Comments

 

 Unknown (test code = 88789-9)  1.14   Unknown  0.77-1.02  F



Ordering Physician [facility] Studies2019-04-15 10:15:00Identifier [id number]
6 Result Time 2019-04-15 10:15:00Unknown





 Test Item  Value  Reference Range  Comments

 

 Unknown (test code = 21615-6)  365 pg/mL  Unknown  Unknown  F



Ordering Physician [facility] Studies2019-04-15 10:15:00Identifier [id number]-
6 Result Time 2019-04-15 10:15:00Unknown





 Test Item  Value  Reference Range  Comments

 

 Unknown (test code = 2885-2)  8.9 g/dL  Unknown  6.4-8.9  F



Ordering Physician [facility] Studies2019-04-15 10:15:00Identifier [id number]
6 Result Time 2019-04-15 10:15:00Unknown





 Test Item  Value  Reference Range  Comments

 

 Unknown (test code = 1975-2)  0.60 mg/dL  Unknown  0.2-1.0  F



Ordering Physician [facility] Studies2019-04-15 10:15:00Identifier [id number]
6 Result Time 2019-04-15 10:15:00Unknown





 Test Item  Value  Reference Range  Comments

 

 Unknown (test code = NullTestCode)  4.9 g/dL  Unknown  2-4  F



Ordering Physician [facility] Studies2019-04-15 10:15:00Identifier [id number]-
6 Result Time 2019-04-15 10:15:00Unknown





 Test Item  Value  Reference Range  Comments

 

 Unknown (test code = 1988-5)  11.17 mg/L  Unknown  0-8.00  F



Ordering Physician [facility] Studies2019-04-15 10:15:00Identifier [id number]-
6 Result Time 2019-04-15 10:15:00Unknown





 Test Item  Value  Reference Range  Comments

 

 Unknown (test code = 1920-8)  25 U/L  Unknown  13-39  F



Ordering Physician [facility] Studies2019-04-15 10:15:00Identifier [id number]
6 Result Time 2019-04-15 10:15:00Unknown





 Test Item  Value  Reference Range  Comments

 

 Unknown (test code = 6768-6)  153 U/L  Unknown    F



Ordering Physician [facility] Studies2019-04-15 10:15:00Identifier [id number]-
6 Result Time 2019-04-15 10:15:00Unknown





 Test Item  Value  Reference Range  Comments

 

 Unknown (test code = 1759-0)  0.8   Unknown  1-3  F



Ordering Physician [facility] Studies2019-04-15 10:15:00Identifier [id number]
6 Result Time 2019-04-15 10:15:00Unknown





 Test Item  Value  Reference Range  Comments

 

 Unknown (test code = 45154-4)  4.0 g/dL  Unknown  3.2-5.2  F



Ordering Physician [facility] Studies2019-04-15 10:15:00Identifier [id number]
6 Result Time 2019-04-15 10:15:00Unknown





 Test Item  Value  Reference Range  Comments

 

 Unknown (test code = 1742-6)  13 U/L  Unknown  7-52  F



Ordering Physician [facility] Studies2019-04-15 10:15:00Identifier [id number]-
6 Result Time 2019-04-15 10:15:00Unknown





 Test Item  Value  Reference Range  Comments

 

 Unknown (test code = 2524-7)  0.9 mmol/L  Unknown  0.5-2.0  F



Ordering Physician Unknown

## 2020-04-24 NOTE — XMS REPORT
Patient Summary Document

 Created on:2020



Patient:BERTHA SOLIS

Sex:Male

:1933

External Reference #:[id number]





Demographics







 Address  [address] [place] [id number]

 

 Home Phone  [phone]

 

 Preferred Language  English

 

 Marital Status  Unknown

 

 [employer] Affiliation  Unknown

 

 Race  Unknown

 

 Ethnic Group  Unknown









Author







 Organization  [facility]









Support







 Name  Relationship  Address  Phone

 

 IRAIS SOLIS, Unknown Name  Suha  [address] [facility]  [phone]



     [place] [address]  









Care Team Providers







 Name  Role  Phone

 

 Unavailable  Unavailable  Unavailable









Problems

This patient has no known problems.



Allergies, Adverse Reactions, Alerts







 Allergy  Allergy  Status  Severity  Reaction(s)  Onset  Inactive  Treating  
Comments



 Name  Type        Date  Date  Clinician  

 

 Uncoded  Unknown  Active  Unknown  Reaction  2019    Interface  



 free-text        Unknown  -18      



 allergy                







Medications







 Ordered  Filled  Start  Stop  Current  Ordering  Indication  Dosage  Frequency
  Signature  Comments  Components



 Medication  Medication  Date  Date  Medication?  Clinician        (SIG)    



 Name  Name                    

 

 Multivitami  Multivitami  2012    No  Unknown    Unknown  Unknown      



 ns/Minerals  ns/Minerals  -                  



 Tab*  Tab*                    

 

 amLODIPine  amLODIPine      No  Unknown    Unknown  Unknown      



 5 mg tablet  5 mg tablet  4-15                  

 

 acetaminoph  acetaminoph      No  Unknown    Unknown  Unknown      



 en 500 mg  en 500 mg  4-15                  



 tablet  tablet                    

 

 magnesium  magnesium      No  Unknown    Unknown  Unknown      



 oxide 400  oxide 400  4-15                  



 mg (241.3  mg (241.3                    



 mg  mg                    



 magnesium)  magnesium)                    



 tablet  tablet                    

 

 omeprazole  omeprazole      No  Unknown    Unknown  Unknown      



 40 mg  40 mg  4-15                  



 capsule,del  capsule,del                    



 ayed  ayed                    



 release  release                    

 

 verapamil  verapamil      No  Unknown    Unknown  Unknown      



  mg   mg  4-15                  



 capsule,ext  capsule,ext                    



 ended  ended                    



 release  release                    

 

 Calcium  Calcium      No  Unknown    Unknown  Unknown      



 Carbonate/V  Carbonate/V  4-15                  



 itamin D3  itamin D3                    

 

 cefdinir  cefdinir      No  Unknown    Unknown  Unknown      



 300 mg  300 mg  4-17                  



 capsule  capsule                    







Vital Signs







 Vital Name  Observation Time  Observation Value  Comments

 

 SYSTOLIC mm[Hg]  2019 18:05:21  135 mm[Hg] mm[Hg]  Method: Sit

 

 DIASTOLIC mm[Hg]  2019 18:05:21  48 mm[Hg] mm[Hg]  Method: Sit

 

 PULSE  2019 18:05:21  63 /min /min  

 

 RESP RATE  2019 18:05:21  16 /min /min  

 

 TEMP  2019 18:05:21  97.4 [degF]  







Procedures

This patient has no known procedures.



Results







 Test Description  Test Time  Test Comments  Text Results  Atomic Results  
Result Comments









 Laboratory Studies  2019 08:13:00  Identifier [id number] Result Time  
Unknown



     2019 08:13:00  









   Test Item  Value  Reference Range  Comments









 Unknown (test code = 2951-2)  136 mmol/L  Unknown  135-145  F



Ordering Physician [facility] Vwvvhtp4963-98-75 08:13:00Identifier [id number]-
6 Result Time 2019 08:13:00Unknown





 Test Item  Value  Reference Range  Comments

 

 Unknown (test code = 2823-3)  4.0 mmol/L  Unknown  3.5-5.0  F



Ordering Physician [facility] Gflhmbo8836-58-92 08:13:00Identifier [id number]
6 Result Time 2019 08:13:00Unknown





 Test Item  Value  Reference Range  Comments

 

 Unknown (test code = 2345-7)  87 mg/dL  Unknown    F



Ordering Physician [facility] Grtrcgh6869-54-89 08:13:00Identifier [id number]-
6 Result Time 2019 08:13:00Unknown





 Test Item  Value  Reference Range  Comments

 

 Unknown (test code = 48642-3)  46.9   Unknown  Unknown  F



Ordering Physician [facility] Uwqvydj2800-64-10 08:13:00Identifier [id number]-
6 Result Time 2019 08:13:00Unknown





 Test Item  Value  Reference Range  Comments

 

 Unknown (test code = NullTestCode)  56.7   Unknown  Unknown  F



Ordering Physician [facility] Beusmnh8139-56-54 08:13:00Identifier [id number]
6 Result Time 2019 08:13:00Unknown





 Test Item  Value  Reference Range  Comments

 

 Unknown (test code = 2160-0)  1.43 mg/dL  Unknown  0.67-1.17  F



Ordering Physician [facility] Ibwfrgl2410-48-48 08:13:00Identifier [id number]-
6 Result Time 2019 08:13:00Unknown





 Test Item  Value  Reference Range  Comments

 

 Unknown (test code = 2075-0)  104 mmol/L  Unknown  101-111  F



Ordering Physician [facility] Wonnapx4094-34-98 08:13:00Identifier [id number]-
6 Result Time 2019 08:13:00Unknown





 Test Item  Value  Reference Range  Comments

 

 Unknown (test code = 2028-9)  24 mmol/L  Unknown  22-32  F



Ordering Physician [facility] Agdiqhx6965-92-96 08:13:00Identifier [id number]-
6 Result Time 2019 08:13:00Unknown





 Test Item  Value  Reference Range  Comments

 

 Unknown (test code = 42332-9)  9.0 mg/dL  Unknown  8.6-10.3  F



Ordering Physician [facility] Fduegen1280-46-42 08:13:00Identifier [id number]-
6 Result Time 2019 08:13:00Unknown





 Test Item  Value  Reference Range  Comments

 

 Unknown (test code = 3094-0)  24 mg/dL  Unknown  6-24  F



Ordering Physician [facility] Nihkykn6782-35-15 08:13:00Identifier [id number]-
6 Result Time 2019 08:13:00Unknown





 Test Item  Value  Reference Range  Comments

 

 Unknown (test code = 3097-3)  16.8   Unknown  8-20  F



Ordering Physician [facility] Trvzjfr3378-35-21 08:13:00Identifier [id number]-
6 Result Time 2019 08:13:00Unknown





 Test Item  Value  Reference Range  Comments

 

 Unknown (test code = 33037-3)  8 mmol/L  Unknown  2-11  F



Ordering Physician [facility] Koelzow3827-89-30 08:13:00Identifier [id number]-
6 Result Time 2019 08:13:00Unknown





 Test Item  Value  Reference Range  Comments

 

 Unknown (test code = 53980-2)  27.4 10^3/uL  Unknown  3.5-10.8  F



Ordering Physician [facility] Wjisedn9739-83-42 08:13:00Identifier [id number]-
6 Result Time 2019 08:13:00Unknown





 Test Item  Value  Reference Range  Comments

 

 Unknown (test code = 788-0)  24 %  Unknown  10.5-15  F



Ordering Physician [facility] Wjkyueu8612-09-07 08:13:00Identifier [id number]-
6 Result Time 2019 08:13:00Unknown





 Test Item  Value  Reference Range  Comments

 

 Unknown (test code = 789-8)  5.19 10^6 /uL  Unknown  4.18-5.48  F



Ordering Physician [facility] Zxclsfe6081-66-42 08:13:00Identifier [id number]
6 Result Time 2019 08:13:00Unknown





 Test Item  Value  Reference Range  Comments

 

 Unknown (test code = 777-3)  330 10^3/uL  Unknown  150-450  F



Ordering Physician [facility] Avhvuzf3982-20-66 08:13:00Identifier [id number]
6 Result Time 2019 08:13:00Unknown





 Test Item  Value  Reference Range  Comments

 

 Unknown (test code = 53719-9)  0   Unknown  Unknown  F



Ordering Physician [facility] Txacniw8706-67-38 08:13:00Identifier [id number]-
6 Result Time 2019 08:13:00Unknown





 Test Item  Value  Reference Range  Comments

 

 Unknown (test code = 771-6)  0 10^3/ul  Unknown  Unknown  F



Ordering Physician [facility] Fqgrahf7907-43-38 08:13:00Identifier [id number]-
6 Result Time 2019 08:13:00Unknown





 Test Item  Value  Reference Range  Comments

 

 Unknown (test code = 770-8)  84.7 %  Unknown  Unknown  F



Ordering Physician [facility] Ihsnhhf3645-57-99 08:13:00Identifier [id number]-
6 Result Time 2019 08:13:00Unknown





 Test Item  Value  Reference Range  Comments

 

 Unknown (test code = 5905-5)  8.6 %  Unknown  Unknown  F



Ordering Physician [facility] Lefwayq6687-02-45 08:13:00Identifier [id number]-
6 Result Time 2019 08:13:00Unknown





 Test Item  Value  Reference Range  Comments

 

 Unknown (test code = 00335-9)  8.6 fL  Unknown  7.4-10.4  F



Ordering Physician [facility] Tsxjqiq9016-19-71 08:13:00Identifier [id number]-
6 Result Time 2019 08:13:00Unknown





 Test Item  Value  Reference Range  Comments

 

 Unknown (test code = 787-2)  78 fL  Unknown  80-94  F



Ordering Physician [facility] Gdtfkwe5118-36-25 08:13:00Identifier [id number]-
6 Result Time 2019 08:13:00Unknown





 Test Item  Value  Reference Range  Comments

 

 Unknown (test code = 786-4)  31 g/dL  Unknown  31-36  F



Ordering Physician [facility] Wakbuyg1482-79-18 08:13:00Identifier [id number]-
6 Result Time 2019 08:13:00Unknown





 Test Item  Value  Reference Range  Comments

 

 Unknown (test code = 785-6)  24 pg  Unknown  27-31  F



Ordering Physician [facility] Zkobalp4405-50-45 08:13:00Identifier [id number]-
6 Result Time 2019 08:13:00Unknown





 Test Item  Value  Reference Range  Comments

 

 Unknown (test code = 736-9)  5.4 %  Unknown  Unknown  F



Ordering Physician [facility] Poeviye1515-08-58 08:13:00Identifier [id number]-
6 Result Time 2019 08:13:00Unknown





 Test Item  Value  Reference Range  Comments

 

 Unknown (test code = 718-7)  12.3 g/dL  Unknown  14.0-18.0  F



Ordering Physician [facility] Jbvgbtk6250-84-82 08:13:00Identifier [id number]-
6 Result Time 2019 08:13:00Unknown





 Test Item  Value  Reference Range  Comments

 

 Unknown (test code = 4544-3)  40 %  Unknown  36-46  F



Ordering Physician [facility] Uzevoub8492-29-95 08:13:00Identifier [id number]-
6 Result Time 2019 08:13:00Unknown





 Test Item  Value  Reference Range  Comments

 

 Unknown (test code = 713-8)  0.8 %  Unknown  Unknown  F



Ordering Physician [facility] Awtxias7807-19-67 08:13:00Identifier [id number]-
6 Result Time 2019 08:13:00Unknown





 Test Item  Value  Reference Range  Comments

 

 Unknown (test code = 706-2)  0.5 %  Unknown  Unknown  F



Ordering Physician [facility] Kgarwzn2925-02-00 08:13:00Identifier [id number]-
6 Result Time 2019 08:13:00Unknown





 Test Item  Value  Reference Range  Comments

 

 Unknown (test code = GUS4274)  23.2 10^3/ul  Unknown  1.5-7.7  F



Ordering Physician [facility] Belsfvt6151-35-79 08:13:00Identifier [id number]-
6 Result Time 2019 08:13:00Unknown





 Test Item  Value  Reference Range  Comments

 

 Unknown (test code = 742-7)  2.4 10^3/ul  Unknown  0-0.8  F



Ordering Physician [facility] Xzgxoay0868-08-31 08:13:00Identifier [id number]-
6 Result Time 2019 08:13:00Unknown





 Test Item  Value  Reference Range  Comments

 

 Unknown (test code = 731-0)  1.5 10^3/ul  Unknown  1.0-4.8  F



Ordering Physician [facility] Vtbsfvm2498-39-15 08:13:00Identifier [id number]
6 Result Time 2019 08:13:00Unknown





 Test Item  Value  Reference Range  Comments

 

 Unknown (test code = 711-2)  0.2 10^3/ul  Unknown  0-0.6  F



Ordering Physician [facility] Jkintdy9796-41-99 08:13:00Identifier [id number]-
6 Result Time 2019 08:13:00Unknown





 Test Item  Value  Reference Range  Comments

 

 Unknown (test code = 704-7)  0.1 10^3/ul  Unknown  0-0.2  F



Ordering Physician [facility] Gqznwlr1702-65-36 06:46:00Identifier [id number]-
6 Result Time 2019 06:46:00Unknown





 Test Item  Value  Reference Range  Comments

 

 Unknown (test code = 25252-5)  0.05 ng/mL  Unknown  Unknown  F



Ordering Physician [facility] Lkgbsiq0683-60-51 06:46:00Identifier [id number]-
6 Result Time 2019 06:46:00Unknown





 Test Item  Value  Reference Range  Comments

 

 Unknown (test code = 2777-1)  2.8 mg/dL  Unknown  2.5-5.0  F



Ordering Physician [facility] Wydsgzb8815-61-52 06:46:00Identifier [id number]-
6 Result Time 2019 06:46:00Unknown





 Test Item  Value  Reference Range  Comments

 

 Unknown (test code = 20032-2)  2.5 mg/dL  Unknown  1.9-2.7  F



Ordering Physician [facility] Studies2019-04-15 10:48:00Identifier [id number]
6 Result Time 2019-04-15 10:48:00Unknown





 Test Item  Value  Reference Range  Comments

 

 Unknown (test code = NullTestCode)  6.0   Unknown  5-9  F



Ordering Physician [facility] Studies2019-04-15 10:48:00Identifier [id number]
6 Result Time 2019-04-15 10:48:00Unknown





 Test Item  Value  Reference Range  Comments

 

 Unknown (test code = 89643-0)  1.013   Unknown  1.010-1.030  F



Ordering Physician [facility] Studies2019-04-15 10:15:00Identifier [id number]
6 Result Time 2019-04-15 10:15:00Unknown





 Test Item  Value  Reference Range  Comments

 

 Unknown (test code = 3016-3)  3.38 mcIU/mL  Unknown  0.34-5.60  F



Ordering Physician [facility] Studies2019-04-15 10:15:00Identifier [id number]
6 Result Time 2019-04-15 10:15:00Unknown





 Test Item  Value  Reference Range  Comments

 

 Unknown (test code = 09449-3)  1.14   Unknown  0.77-1.02  F



Ordering Physician [facility] Studies2019-04-15 10:15:00Identifier [id number]
6 Result Time 2019-04-15 10:15:00Unknown





 Test Item  Value  Reference Range  Comments

 

 Unknown (test code = 45831-9)  365 pg/mL  Unknown  Unknown  F



Ordering Physician [facility] Studies2019-04-15 10:15:00Identifier [id number]
6 Result Time 2019-04-15 10:15:00Unknown





 Test Item  Value  Reference Range  Comments

 

 Unknown (test code = 2885-2)  8.9 g/dL  Unknown  6.4-8.9  F



Ordering Physician [facility] Studies2019-04-15 10:15:00Identifier [id number]
6 Result Time 2019-04-15 10:15:00Unknown





 Test Item  Value  Reference Range  Comments

 

 Unknown (test code = 1975-2)  0.60 mg/dL  Unknown  0.2-1.0  F



Ordering Physician [facility] Studies2019-04-15 10:15:00Identifier [id number]
6 Result Time 2019-04-15 10:15:00Unknown





 Test Item  Value  Reference Range  Comments

 

 Unknown (test code = NullTestCode)  4.9 g/dL  Unknown  2-4  F



Ordering Physician [facility] Studies2019-04-15 10:15:00Identifier [id number]-
6 Result Time 2019-04-15 10:15:00Unknown





 Test Item  Value  Reference Range  Comments

 

 Unknown (test code = 1988-5)  11.17 mg/L  Unknown  0-8.00  F



Ordering Physician [facility] Studies2019-04-15 10:15:00Identifier [id number]-
6 Result Time 2019-04-15 10:15:00Unknown





 Test Item  Value  Reference Range  Comments

 

 Unknown (test code = 1920-8)  25 U/L  Unknown  13-39  F



Ordering Physician [facility] Studies2019-04-15 10:15:00Identifier [id number]
6 Result Time 2019-04-15 10:15:00Unknown





 Test Item  Value  Reference Range  Comments

 

 Unknown (test code = 6768-6)  153 U/L  Unknown    F



Ordering Physician [facility] Studies2019-04-15 10:15:00Identifier [id number]
6 Result Time 2019-04-15 10:15:00Unknown





 Test Item  Value  Reference Range  Comments

 

 Unknown (test code = 1759-0)  0.8   Unknown  1-3  F



Ordering Physician [facility] Studies2019-04-15 10:15:00Identifier [id number]
6 Result Time 2019-04-15 10:15:00Unknown





 Test Item  Value  Reference Range  Comments

 

 Unknown (test code = 43717-5)  4.0 g/dL  Unknown  3.2-5.2  F



Ordering Physician [facility] Studies2019-04-15 10:15:00Identifier [id number]
6 Result Time 2019-04-15 10:15:00Unknown





 Test Item  Value  Reference Range  Comments

 

 Unknown (test code = 1742-6)  13 U/L  Unknown  7-52  F



Ordering Physician [facility] Studies2019-04-15 10:15:00Identifier [id number]
6 Result Time 2019-04-15 10:15:00Unknown





 Test Item  Value  Reference Range  Comments

 

 Unknown (test code = 2524-7)  0.9 mmol/L  Unknown  0.5-2.0  F



Ordering Physician Unknown

## 2020-04-24 NOTE — XMS REPORT
Patient Summary Document

 Created on:2020



Patient:BERTHA SOLIS

Sex:Male

:1933

External Reference #:[id number]





Demographics







 Address  [address] [place] [id number]

 

 Home Phone  [phone]

 

 Preferred Language  English

 

 Marital Status  Unknown

 

 Adventism Affiliation  Unknown

 

 Race  Unknown

 

 Ethnic Group  Unknown









Author







 Organization  [facility]









Support







 Name  Relationship  Address  Phone

 

 IRAIS SOLIS, Unknown Name  Suha  [address] [facility]  [phone]



     [place] [address]  









Care Team Providers







 Name  Role  Phone

 

 Unavailable  Unavailable  Unavailable









Problems

This patient has no known problems.



Allergies, Adverse Reactions, Alerts







 Allergy  Allergy  Status  Severity  Reaction(s)  Onset  Inactive  Treating  
Comments



 Name  Type        Date  Date  Clinician  

 

 Uncoded  Unknown  Active  Unknown  Reaction  2019    Interface  



 free-text        Unknown  -18      



 allergy                







Medications







 Ordered  Filled  Start  Stop  Current  Ordering  Indication  Dosage  Frequency
  Signature  Comments  Components



 Medication  Medication  Date  Date  Medication?  Clinician        (SIG)    



 Name  Name                    

 

 Multivitami  Multivitami  2012    No  Unknown    Unknown  Unknown      



 ns/Minerals  ns/Minerals  -                  



 Tab*  Tab*                    

 

 amLODIPine  amLODIPine      No  Unknown    Unknown  Unknown      



 5 mg tablet  5 mg tablet  4-15                  

 

 acetaminoph  acetaminoph      No  Unknown    Unknown  Unknown      



 en 500 mg  en 500 mg  4-15                  



 tablet  tablet                    

 

 magnesium  magnesium      No  Unknown    Unknown  Unknown      



 oxide 400  oxide 400  4-15                  



 mg (241.3  mg (241.3                    



 mg  mg                    



 magnesium)  magnesium)                    



 tablet  tablet                    

 

 omeprazole  omeprazole      No  Unknown    Unknown  Unknown      



 40 mg  40 mg  4-15                  



 capsule,del  capsule,del                    



 ayed  ayed                    



 release  release                    

 

 verapamil  verapamil      No  Unknown    Unknown  Unknown      



  mg   mg  4-15                  



 capsule,ext  capsule,ext                    



 ended  ended                    



 release  release                    

 

 Calcium  Calcium      No  Unknown    Unknown  Unknown      



 Carbonate/V  Carbonate/V  4-15                  



 itamin D3  itamin D3                    

 

 cefdinir  cefdinir      No  Unknown    Unknown  Unknown      



 300 mg  300 mg  4-17                  



 capsule  capsule                    







Vital Signs







 Vital Name  Observation Time  Observation Value  Comments

 

 SYSTOLIC mm[Hg]  2019 18:05:21  135 mm[Hg] mm[Hg]  Method: Sit

 

 DIASTOLIC mm[Hg]  2019 18:05:21  48 mm[Hg] mm[Hg]  Method: Sit

 

 PULSE  2019 18:05:21  63 /min /min  

 

 RESP RATE  2019 18:05:21  16 /min /min  

 

 TEMP  2019 18:05:21  97.4 [degF]  







Procedures

This patient has no known procedures.



Results







 Test Description  Test Time  Test Comments  Text Results  Atomic Results  
Result Comments









 Laboratory Studies  2019 08:13:00  Identifier [id number] Result Time  
Unknown



     2019 08:13:00  









   Test Item  Value  Reference Range  Comments









 Unknown (test code = 2951-2)  136 mmol/L  Unknown  135-145  F



Ordering Physician [facility] Kglrkcw4630-10-64 08:13:00Identifier [id number]
6 Result Time 2019 08:13:00Unknown





 Test Item  Value  Reference Range  Comments

 

 Unknown (test code = 2823-3)  4.0 mmol/L  Unknown  3.5-5.0  F



Ordering Physician [facility] Qmcouhs3716-43-68 08:13:00Identifier [id number]-
6 Result Time 2019 08:13:00Unknown





 Test Item  Value  Reference Range  Comments

 

 Unknown (test code = 2345-7)  87 mg/dL  Unknown    F



Ordering Physician [facility] Jpzexrt5151-34-86 08:13:00Identifier [id number]-
6 Result Time 2019 08:13:00Unknown





 Test Item  Value  Reference Range  Comments

 

 Unknown (test code = 48642-3)  46.9   Unknown  Unknown  F



Ordering Physician [facility] Daegsbu6664-06-73 08:13:00Identifier [id number]
6 Result Time 2019 08:13:00Unknown





 Test Item  Value  Reference Range  Comments

 

 Unknown (test code = NullTestCode)  56.7   Unknown  Unknown  F



Ordering Physician [facility] Bdtlkgn9710-27-31 08:13:00Identifier [id number]-
6 Result Time 2019 08:13:00Unknown





 Test Item  Value  Reference Range  Comments

 

 Unknown (test code = 2160-0)  1.43 mg/dL  Unknown  0.67-1.17  F



Ordering Physician [facility] Tooxgkv3156-09-23 08:13:00Identifier [id number]-
6 Result Time 2019 08:13:00Unknown





 Test Item  Value  Reference Range  Comments

 

 Unknown (test code = 2075-0)  104 mmol/L  Unknown  101-111  F



Ordering Physician [facility] Ywpoole9307-03-56 08:13:00Identifier [id number]-
6 Result Time 2019 08:13:00Unknown





 Test Item  Value  Reference Range  Comments

 

 Unknown (test code = 2028-9)  24 mmol/L  Unknown  22-32  F



Ordering Physician [facility] Qixhojp3629-15-38 08:13:00Identifier [id number]-
6 Result Time 2019 08:13:00Unknown





 Test Item  Value  Reference Range  Comments

 

 Unknown (test code = 83970-7)  9.0 mg/dL  Unknown  8.6-10.3  F



Ordering Physician [facility] Howbcyi7920-71-65 08:13:00Identifier [id number]-
6 Result Time 2019 08:13:00Unknown





 Test Item  Value  Reference Range  Comments

 

 Unknown (test code = 3094-0)  24 mg/dL  Unknown  6-24  F



Ordering Physician [facility] Wkfgcuo9506-31-32 08:13:00Identifier [id number]-
6 Result Time 2019 08:13:00Unknown





 Test Item  Value  Reference Range  Comments

 

 Unknown (test code = 3097-3)  16.8   Unknown  8-20  F



Ordering Physician [facility] Lcflqeh9310-67-90 08:13:00Identifier [id number]-
6 Result Time 2019 08:13:00Unknown





 Test Item  Value  Reference Range  Comments

 

 Unknown (test code = 33037-3)  8 mmol/L  Unknown  2-11  F



Ordering Physician [facility] Xnegfrn8495-70-70 08:13:00Identifier [id number]-
6 Result Time 2019 08:13:00Unknown





 Test Item  Value  Reference Range  Comments

 

 Unknown (test code = 96726-3)  27.4 10^3/uL  Unknown  3.5-10.8  F



Ordering Physician [facility] Uokncje9526-44-06 08:13:00Identifier [id number]-
6 Result Time 2019 08:13:00Unknown





 Test Item  Value  Reference Range  Comments

 

 Unknown (test code = 788-0)  24 %  Unknown  10.5-15  F



Ordering Physician [facility] Hapjsbr2396-83-88 08:13:00Identifier [id number]-
6 Result Time 2019 08:13:00Unknown





 Test Item  Value  Reference Range  Comments

 

 Unknown (test code = 789-8)  5.19 10^6 /uL  Unknown  4.18-5.48  F



Ordering Physician [facility] Jqkmrtv6184-11-76 08:13:00Identifier [id number]-
6 Result Time 2019 08:13:00Unknown





 Test Item  Value  Reference Range  Comments

 

 Unknown (test code = 777-3)  330 10^3/uL  Unknown  150-450  F



Ordering Physician [facility] Pymlwmd4559-97-07 08:13:00Identifier [id number]
6 Result Time 2019 08:13:00Unknown





 Test Item  Value  Reference Range  Comments

 

 Unknown (test code = 60619-1)  0   Unknown  Unknown  F



Ordering Physician [facility] Rinbucu4521-81-14 08:13:00Identifier [id number]-
6 Result Time 2019 08:13:00Unknown





 Test Item  Value  Reference Range  Comments

 

 Unknown (test code = 771-6)  0 10^3/ul  Unknown  Unknown  F



Ordering Physician [facility] Twpdkyb3038-16-29 08:13:00Identifier [id number]-
6 Result Time 2019 08:13:00Unknown





 Test Item  Value  Reference Range  Comments

 

 Unknown (test code = 770-8)  84.7 %  Unknown  Unknown  F



Ordering Physician [facility] Qzunviq9428-68-08 08:13:00Identifier [id number]-
6 Result Time 2019 08:13:00Unknown





 Test Item  Value  Reference Range  Comments

 

 Unknown (test code = 5905-5)  8.6 %  Unknown  Unknown  F



Ordering Physician [facility] Utnkast0047-36-54 08:13:00Identifier [id number]-
6 Result Time 2019 08:13:00Unknown





 Test Item  Value  Reference Range  Comments

 

 Unknown (test code = 53607-3)  8.6 fL  Unknown  7.4-10.4  F



Ordering Physician [facility] Applytp7898-40-90 08:13:00Identifier [id number]-
6 Result Time 2019 08:13:00Unknown





 Test Item  Value  Reference Range  Comments

 

 Unknown (test code = 787-2)  78 fL  Unknown  80-94  F



Ordering Physician [facility] Rmlcxmy4079-14-82 08:13:00Identifier [id number]-
6 Result Time 2019 08:13:00Unknown





 Test Item  Value  Reference Range  Comments

 

 Unknown (test code = 786-4)  31 g/dL  Unknown  31-36  F



Ordering Physician [facility] Gkdouhr3253-02-87 08:13:00Identifier [id number]-
6 Result Time 2019 08:13:00Unknown





 Test Item  Value  Reference Range  Comments

 

 Unknown (test code = 785-6)  24 pg  Unknown  27-31  F



Ordering Physician [facility] Zoyejbk4609-12-16 08:13:00Identifier [id number]-
6 Result Time 2019 08:13:00Unknown





 Test Item  Value  Reference Range  Comments

 

 Unknown (test code = 736-9)  5.4 %  Unknown  Unknown  F



Ordering Physician [facility] Ruzrnns7406-13-33 08:13:00Identifier [id number]-
6 Result Time 2019 08:13:00Unknown





 Test Item  Value  Reference Range  Comments

 

 Unknown (test code = 718-7)  12.3 g/dL  Unknown  14.0-18.0  F



Ordering Physician [facility] Utyolmk6248-87-53 08:13:00Identifier [id number]-
6 Result Time 2019 08:13:00Unknown





 Test Item  Value  Reference Range  Comments

 

 Unknown (test code = 4544-3)  40 %  Unknown  36-46  F



Ordering Physician [facility] Zgvzjoh2995-21-04 08:13:00Identifier [id number]-
6 Result Time 2019 08:13:00Unknown





 Test Item  Value  Reference Range  Comments

 

 Unknown (test code = 713-8)  0.8 %  Unknown  Unknown  F



Ordering Physician [facility] Msjahnn1532-65-36 08:13:00Identifier [id number]-
6 Result Time 2019 08:13:00Unknown





 Test Item  Value  Reference Range  Comments

 

 Unknown (test code = 706-2)  0.5 %  Unknown  Unknown  F



Ordering Physician [facility] Mltgsus1477-51-26 08:13:00Identifier [id number]-
6 Result Time 2019 08:13:00Unknown





 Test Item  Value  Reference Range  Comments

 

 Unknown (test code = AAK8463)  23.2 10^3/ul  Unknown  1.5-7.7  F



Ordering Physician [facility] Omebbte9712-58-31 08:13:00Identifier [id number]-
6 Result Time 2019 08:13:00Unknown





 Test Item  Value  Reference Range  Comments

 

 Unknown (test code = 742-7)  2.4 10^3/ul  Unknown  0-0.8  F



Ordering Physician [facility] Mcczjhb9006-66-72 08:13:00Identifier [id number]-
6 Result Time 2019 08:13:00Unknown





 Test Item  Value  Reference Range  Comments

 

 Unknown (test code = 731-0)  1.5 10^3/ul  Unknown  1.0-4.8  F



Ordering Physician [facility] Bcwqqah1067-11-98 08:13:00Identifier [id number]-
6 Result Time 2019 08:13:00Unknown





 Test Item  Value  Reference Range  Comments

 

 Unknown (test code = 711-2)  0.2 10^3/ul  Unknown  0-0.6  F



Ordering Physician [facility] Nxbirvf4412-27-19 08:13:00Identifier [id number]-
6 Result Time 2019 08:13:00Unknown





 Test Item  Value  Reference Range  Comments

 

 Unknown (test code = 704-7)  0.1 10^3/ul  Unknown  0-0.2  F



Ordering Physician [facility] Pxyrjly3447-65-47 06:46:00Identifier [id number]-
6 Result Time 2019 06:46:00Unknown





 Test Item  Value  Reference Range  Comments

 

 Unknown (test code = 72488-8)  0.05 ng/mL  Unknown  Unknown  F



Ordering Physician [facility] Wmurydg3431-01-37 06:46:00Identifier [id number]-
6 Result Time 2019 06:46:00Unknown





 Test Item  Value  Reference Range  Comments

 

 Unknown (test code = 2777-1)  2.8 mg/dL  Unknown  2.5-5.0  F



Ordering Physician [facility] Ydzccsx1011-42-12 06:46:00Identifier [id number]-
6 Result Time 2019 06:46:00Unknown





 Test Item  Value  Reference Range  Comments

 

 Unknown (test code = 34629-4)  2.5 mg/dL  Unknown  1.9-2.7  F



Ordering Physician [facility] Studies2019-04-15 10:48:00Identifier [id number]-
6 Result Time 2019-04-15 10:48:00Unknown





 Test Item  Value  Reference Range  Comments

 

 Unknown (test code = NullTestCode)  6.0   Unknown  5-9  F



Ordering Physician [facility] Studies2019-04-15 10:48:00Identifier [id number]
6 Result Time 2019-04-15 10:48:00Unknown





 Test Item  Value  Reference Range  Comments

 

 Unknown (test code = 77661-1)  1.013   Unknown  1.010-1.030  F



Ordering Physician [facility] Studies2019-04-15 10:15:00Identifier [id number]-
6 Result Time 2019-04-15 10:15:00Unknown





 Test Item  Value  Reference Range  Comments

 

 Unknown (test code = 3016-3)  3.38 mcIU/mL  Unknown  0.34-5.60  F



Ordering Physician [facility] Studies2019-04-15 10:15:00Identifier [id number]-
6 Result Time 2019-04-15 10:15:00Unknown





 Test Item  Value  Reference Range  Comments

 

 Unknown (test code = 66878-7)  1.14   Unknown  0.77-1.02  F



Ordering Physician [facility] Studies2019-04-15 10:15:00Identifier [id number]
6 Result Time 2019-04-15 10:15:00Unknown





 Test Item  Value  Reference Range  Comments

 

 Unknown (test code = 28408-5)  365 pg/mL  Unknown  Unknown  F



Ordering Physician [facility] Studies2019-04-15 10:15:00Identifier [id number]-
6 Result Time 2019-04-15 10:15:00Unknown





 Test Item  Value  Reference Range  Comments

 

 Unknown (test code = 2885-2)  8.9 g/dL  Unknown  6.4-8.9  F



Ordering Physician [facility] Studies2019-04-15 10:15:00Identifier [id number]-
6 Result Time 2019-04-15 10:15:00Unknown





 Test Item  Value  Reference Range  Comments

 

 Unknown (test code = 1975-2)  0.60 mg/dL  Unknown  0.2-1.0  F



Ordering Physician [facility] Studies2019-04-15 10:15:00Identifier [id number]-
6 Result Time 2019-04-15 10:15:00Unknown





 Test Item  Value  Reference Range  Comments

 

 Unknown (test code = NullTestCode)  4.9 g/dL  Unknown  2-4  F



Ordering Physician [facility] Studies2019-04-15 10:15:00Identifier [id number]-
6 Result Time 2019-04-15 10:15:00Unknown





 Test Item  Value  Reference Range  Comments

 

 Unknown (test code = 1988-5)  11.17 mg/L  Unknown  0-8.00  F



Ordering Physician [facility] Studies2019-04-15 10:15:00Identifier [id number]-
6 Result Time 2019-04-15 10:15:00Unknown





 Test Item  Value  Reference Range  Comments

 

 Unknown (test code = 1920-8)  25 U/L  Unknown  13-39  F



Ordering Physician [facility] Studies2019-04-15 10:15:00Identifier [id number]-
6 Result Time 2019-04-15 10:15:00Unknown





 Test Item  Value  Reference Range  Comments

 

 Unknown (test code = 6768-6)  153 U/L  Unknown    F



Ordering Physician [facility] Studies2019-04-15 10:15:00Identifier [id number]-
6 Result Time 2019-04-15 10:15:00Unknown





 Test Item  Value  Reference Range  Comments

 

 Unknown (test code = 1759-0)  0.8   Unknown  1-3  F



Ordering Physician [facility] Studies2019-04-15 10:15:00Identifier [id number]-
6 Result Time 2019-04-15 10:15:00Unknown





 Test Item  Value  Reference Range  Comments

 

 Unknown (test code = 04911-5)  4.0 g/dL  Unknown  3.2-5.2  F



Ordering Physician [facility] Studies2019-04-15 10:15:00Identifier [id number]-
6 Result Time 2019-04-15 10:15:00Unknown





 Test Item  Value  Reference Range  Comments

 

 Unknown (test code = 1742-6)  13 U/L  Unknown  7-52  F



Ordering Physician [facility] Studies2019-04-15 10:15:00Identifier [id number]
6 Result Time 2019-04-15 10:15:00Unknown





 Test Item  Value  Reference Range  Comments

 

 Unknown (test code = 2524-7)  0.9 mmol/L  Unknown  0.5-2.0  F



Ordering Physician Unknown

## 2020-04-24 NOTE — XMS REPORT
Patient Summary Document

 Created on:2020



Patient:BERTHA SOLIS

Sex:Male

:1933

External Reference #:[id number]





Demographics







 Address  [address] [place] [address]

 

 Home Phone  [phone]

 

 Preferred Language  English

 

 Marital Status  Unknown

 

 Pent[employer]al Affiliation  Unknown

 

 Race  Unknown

 

 Ethnic Group  Unknown









Author







 Organization  [facility]









Support







 Name  Relationship  Address  Phone

 

 IRAIS SOLIS, Unknown Name  Suha  [address] [facility]  [phone]



     [place] [address]  









Care Team Providers







 Name  Role  Phone

 

 Unavailable  Unavailable  Unavailable









Problems

This patient has no known problems.



Allergies, Adverse Reactions, Alerts







 Allergy  Allergy  Status  Severity  Reaction(s)  Onset  Inactive  Treating  
Comments



 Name  Type        Date  Date  Clinician  

 

 Uncoded  Unknown  Active  Unknown  Reaction  2019    Interface  



 free-text        Unknown  -18      



 allergy                







Medications







 Ordered  Filled  Start  Stop  Current  Ordering  Indication  Dosage  Frequency
  Signature  Comments  Components



 Medication  Medication  Date  Date  Medication?  Clinician        (SIG)    



 Name  Name                    

 

 Multivitami  Multivitami  2012    No  Unknown    Unknown  Unknown      



 ns/Minerals  ns/Minerals  -                  



 Tab*  Tab*                    

 

 amLODIPine  amLODIPine      No  Unknown    Unknown  Unknown      



 5 mg tablet  5 mg tablet  4-15                  

 

 acetaminoph  acetaminoph      No  Unknown    Unknown  Unknown      



 en 500 mg  en 500 mg  4-15                  



 tablet  tablet                    

 

 magnesium  magnesium      No  Unknown    Unknown  Unknown      



 oxide 400  oxide 400  4-15                  



 mg (241.3  mg (241.3                    



 mg  mg                    



 magnesium)  magnesium)                    



 tablet  tablet                    

 

 omeprazole  omeprazole      No  Unknown    Unknown  Unknown      



 40 mg  40 mg  4-15                  



 capsule,del  capsule,del                    



 ayed  ayed                    



 release  release                    

 

 verapamil  verapamil      No  Unknown    Unknown  Unknown      



  mg   mg  4-15                  



 capsule,ext  capsule,ext                    



 ended  ended                    



 release  release                    

 

 Calcium  Calcium      No  Unknown    Unknown  Unknown      



 Carbonate/V  Carbonate/V  4-15                  



 itamin D3  itamin D3                    

 

 cefdinir  cefdinir      No  Unknown    Unknown  Unknown      



 300 mg  300 mg  4-17                  



 capsule  capsule                    







Vital Signs







 Vital Name  Observation Time  Observation Value  Comments

 

 SYSTOLIC mm[Hg]  2019 18:05:21  135 mm[Hg] mm[Hg]  Method: Sit

 

 DIASTOLIC mm[Hg]  2019 18:05:21  48 mm[Hg] mm[Hg]  Method: Sit

 

 PULSE  2019 18:05:21  63 /min /min  

 

 RESP RATE  2019 18:05:21  16 /min /min  

 

 TEMP  2019 18:05:21  97.4 [degF]  







Procedures

This patient has no known procedures.



Results







 Test Description  Test Time  Test Comments  Text Results  Atomic Results  
Result Comments









 Laboratory Studies  2019 08:13:00  Identifier [id number] Result Time  
Unknown



     2019 08:13:00  









   Test Item  Value  Reference Range  Comments









 Unknown (test code = 2951-2)  136 mmol/L  Unknown  135-145  F



Ordering Physician [facility] Qqhvvqh9616-94-70 08:13:00Identifier [id number]
6 Result Time 2019 08:13:00Unknown





 Test Item  Value  Reference Range  Comments

 

 Unknown (test code = 2823-3)  4.0 mmol/L  Unknown  3.5-5.0  F



Ordering Physician [facility] Umvoitp0156-07-81 08:13:00Identifier [id number]-
6 Result Time 2019 08:13:00Unknown





 Test Item  Value  Reference Range  Comments

 

 Unknown (test code = 2345-7)  87 mg/dL  Unknown    F



Ordering Physician [facility] Wodudti7196-04-32 08:13:00Identifier [id number]-
6 Result Time 2019 08:13:00Unknown





 Test Item  Value  Reference Range  Comments

 

 Unknown (test code = 48642-3)  46.9   Unknown  Unknown  F



Ordering Physician [facility] Fdlhvrv3004-99-21 08:13:00Identifier [id number]
6 Result Time 2019 08:13:00Unknown





 Test Item  Value  Reference Range  Comments

 

 Unknown (test code = NullTestCode)  56.7   Unknown  Unknown  F



Ordering Physician [facility] Pslhjau4418-97-93 08:13:00Identifier [id number]
6 Result Time 2019 08:13:00Unknown





 Test Item  Value  Reference Range  Comments

 

 Unknown (test code = 2160-0)  1.43 mg/dL  Unknown  0.67-1.17  F



Ordering Physician [facility] Jqsdayz9221-14-53 08:13:00Identifier [id number]-
6 Result Time 2019 08:13:00Unknown





 Test Item  Value  Reference Range  Comments

 

 Unknown (test code = 2075-0)  104 mmol/L  Unknown  101-111  F



Ordering Physician [facility] Hbksttg3757-93-92 08:13:00Identifier [id number]-
6 Result Time 2019 08:13:00Unknown





 Test Item  Value  Reference Range  Comments

 

 Unknown (test code = 2028-9)  24 mmol/L  Unknown  22-32  F



Ordering Physician [facility] Qgczutc1806-82-22 08:13:00Identifier [id number]-
6 Result Time 2019 08:13:00Unknown





 Test Item  Value  Reference Range  Comments

 

 Unknown (test code = 62742-5)  9.0 mg/dL  Unknown  8.6-10.3  F



Ordering Physician [facility] Zrdcidd7812-36-90 08:13:00Identifier [id number]-
6 Result Time 2019 08:13:00Unknown





 Test Item  Value  Reference Range  Comments

 

 Unknown (test code = 3094-0)  24 mg/dL  Unknown  6-24  F



Ordering Physician [facility] Xawwdkv6565-80-56 08:13:00Identifier [id number]-
6 Result Time 2019 08:13:00Unknown





 Test Item  Value  Reference Range  Comments

 

 Unknown (test code = 3097-3)  16.8   Unknown  8-20  F



Ordering Physician [facility] Osimark2773-98-51 08:13:00Identifier [id number]-
6 Result Time 2019 08:13:00Unknown





 Test Item  Value  Reference Range  Comments

 

 Unknown (test code = 33037-3)  8 mmol/L  Unknown  2-11  F



Ordering Physician [facility] Jracyye9336-00-65 08:13:00Identifier [id number]-
6 Result Time 2019 08:13:00Unknown





 Test Item  Value  Reference Range  Comments

 

 Unknown (test code = 94024-1)  27.4 10^3/uL  Unknown  3.5-10.8  F



Ordering Physician [facility] Qtsgifw3996-50-09 08:13:00Identifier [id number]-
6 Result Time 2019 08:13:00Unknown





 Test Item  Value  Reference Range  Comments

 

 Unknown (test code = 788-0)  24 %  Unknown  10.5-15  F



Ordering Physician [facility] Oidoqfo8278-70-64 08:13:00Identifier [id number]-
6 Result Time 2019 08:13:00Unknown





 Test Item  Value  Reference Range  Comments

 

 Unknown (test code = 789-8)  5.19 10^6 /uL  Unknown  4.18-5.48  F



Ordering Physician [facility] Ddvrejb1034-77-37 08:13:00Identifier [id number]-
6 Result Time 2019 08:13:00Unknown





 Test Item  Value  Reference Range  Comments

 

 Unknown (test code = 777-3)  330 10^3/uL  Unknown  150-450  F



Ordering Physician [facility] Ijvhifa8793-35-77 08:13:00Identifier [id number]-
6 Result Time 2019 08:13:00Unknown





 Test Item  Value  Reference Range  Comments

 

 Unknown (test code = 62055-7)  0   Unknown  Unknown  F



Ordering Physician [facility] Yxfprdp5187-78-32 08:13:00Identifier [id number]-
6 Result Time 2019 08:13:00Unknown





 Test Item  Value  Reference Range  Comments

 

 Unknown (test code = 771-6)  0 10^3/ul  Unknown  Unknown  F



Ordering Physician [facility] Rikzjuh1438-12-52 08:13:00Identifier [id number]-
6 Result Time 2019 08:13:00Unknown





 Test Item  Value  Reference Range  Comments

 

 Unknown (test code = 770-8)  84.7 %  Unknown  Unknown  F



Ordering Physician [facility] Gzdszvu9663-31-15 08:13:00Identifier [id number]-
6 Result Time 2019 08:13:00Unknown





 Test Item  Value  Reference Range  Comments

 

 Unknown (test code = 5905-5)  8.6 %  Unknown  Unknown  F



Ordering Physician [facility] Nvopavf0807-67-07 08:13:00Identifier [id number]-
6 Result Time 2019 08:13:00Unknown





 Test Item  Value  Reference Range  Comments

 

 Unknown (test code = 71963-9)  8.6 fL  Unknown  7.4-10.4  F



Ordering Physician [facility] Hvxegla0871-68-03 08:13:00Identifier [id number]-
6 Result Time 2019 08:13:00Unknown





 Test Item  Value  Reference Range  Comments

 

 Unknown (test code = 787-2)  78 fL  Unknown  80-94  F



Ordering Physician [facility] [id number] 08:13:00Identifier [id number]-
6 Result Time 2019 08:13:00Unknown





 Test Item  Value  Reference Range  Comments

 

 Unknown (test code = 786-4)  31 g/dL  Unknown  31-36  F



Ordering Physician [facility] Cxvbrqh0073-61-99 08:13:00Identifier [id number]-
6 Result Time 2019 08:13:00Unknown





 Test Item  Value  Reference Range  Comments

 

 Unknown (test code = 785-6)  24 pg  Unknown  27-31  F



Ordering Physician [facility] Mqnpkcc1906-73-31 08:13:00Identifier [id number]
6 Result Time 2019 08:13:00Unknown





 Test Item  Value  Reference Range  Comments

 

 Unknown (test code = 736-9)  5.4 %  Unknown  Unknown  F



Ordering Physician [facility] Nqqvuii5120-88-52 08:13:00Identifier [id number]-
6 Result Time 2019 08:13:00Unknown





 Test Item  Value  Reference Range  Comments

 

 Unknown (test code = 718-7)  12.3 g/dL  Unknown  14.0-18.0  F



Ordering Physician [facility] Estcqcp6942-42-87 08:13:00Identifier [id number]-
6 Result Time 2019 08:13:00Unknown





 Test Item  Value  Reference Range  Comments

 

 Unknown (test code = 4544-3)  40 %  Unknown  36-46  F



Ordering Physician [facility] Frshrxh2723-30-97 08:13:00Identifier [id number]-
6 Result Time 2019 08:13:00Unknown





 Test Item  Value  Reference Range  Comments

 

 Unknown (test code = 713-8)  0.8 %  Unknown  Unknown  F



Ordering Physician [facility] Hxdaidj5053-80-81 08:13:00Identifier [id number]-
6 Result Time 2019 08:13:00Unknown





 Test Item  Value  Reference Range  Comments

 

 Unknown (test code = 706-2)  0.5 %  Unknown  Unknown  F



Ordering Physician [facility] Mzkoepp3165-64-93 08:13:00Identifier [id number]-
6 Result Time 2019 08:13:00Unknown





 Test Item  Value  Reference Range  Comments

 

 Unknown (test code = YVZ9715)  23.2 10^3/ul  Unknown  1.5-7.7  F



Ordering Physician [facility] Wszzanw6664-05-97 08:13:00Identifier [id number]-
6 Result Time 2019 08:13:00Unknown





 Test Item  Value  Reference Range  Comments

 

 Unknown (test code = 742-7)  2.4 10^3/ul  Unknown  0-0.8  F



Ordering Physician [facility] Amimrbm2572-46-18 08:13:00Identifier [id number]-
6 Result Time 2019 08:13:00Unknown





 Test Item  Value  Reference Range  Comments

 

 Unknown (test code = 731-0)  1.5 10^3/ul  Unknown  1.0-4.8  F



Ordering Physician [facility] Rdakzqb1240-79-84 08:13:00Identifier [id number]-
6 Result Time 2019 08:13:00Unknown





 Test Item  Value  Reference Range  Comments

 

 Unknown (test code = 711-2)  0.2 10^3/ul  Unknown  0-0.6  F



Ordering Physician [facility] Eucwjmy7061-91-67 08:13:00Identifier [id number]-
6 Result Time 2019 08:13:00Unknown





 Test Item  Value  Reference Range  Comments

 

 Unknown (test code = 704-7)  0.1 10^3/ul  Unknown  0-0.2  F



Ordering Physician [facility] Zjyclfk6942-84-99 06:46:00Identifier [id number]-
6 Result Time 2019 06:46:00Unknown





 Test Item  Value  Reference Range  Comments

 

 Unknown (test code = 43024-7)  0.05 ng/mL  Unknown  Unknown  F



Ordering Physician [facility] Fsxijil4549-88-42 06:46:00Identifier [id number]-
6 Result Time 2019 06:46:00Unknown





 Test Item  Value  Reference Range  Comments

 

 Unknown (test code = 2777-1)  2.8 mg/dL  Unknown  2.5-5.0  F



Ordering Physician [facility] Edrhozo1317-79-12 06:46:00Identifier [id number]-
6 Result Time 2019 06:46:00Unknown





 Test Item  Value  Reference Range  Comments

 

 Unknown (test code = 84101-6)  2.5 mg/dL  Unknown  1.9-2.7  F



Ordering Physician [facility] Studies2019-04-15 10:48:00Identifier [id number]
6 Result Time 2019-04-15 10:48:00Unknown





 Test Item  Value  Reference Range  Comments

 

 Unknown (test code = NullTestCode)  6.0   Unknown  5-9  F



Ordering Physician [facility] Studies2019-04-15 10:48:00Identifier [id number]-
6 Result Time 2019-04-15 10:48:00Unknown





 Test Item  Value  Reference Range  Comments

 

 Unknown (test code = 58176-7)  1.013   Unknown  1.010-1.030  F



Ordering Physician [facility] Studies2019-04-15 10:15:00Identifier [id number]
6 Result Time 2019-04-15 10:15:00Unknown





 Test Item  Value  Reference Range  Comments

 

 Unknown (test code = 3016-3)  3.38 mcIU/mL  Unknown  0.34-5.60  F



Ordering Physician [facility] Studies2019-04-15 10:15:00Identifier [id number]-
6 Result Time 2019-04-15 10:15:00Unknown





 Test Item  Value  Reference Range  Comments

 

 Unknown (test code = 56856-7)  1.14   Unknown  0.77-1.02  F



Ordering Physician [facility] Studies2019-04-15 10:15:00Identifier [id number]
6 Result Time 2019-04-15 10:15:00Unknown





 Test Item  Value  Reference Range  Comments

 

 Unknown (test code = 53084-2)  365 pg/mL  Unknown  Unknown  F



Ordering Physician [facility] Studies2019-04-15 10:15:00Identifier [id number]
6 Result Time 2019-04-15 10:15:00Unknown





 Test Item  Value  Reference Range  Comments

 

 Unknown (test code = 2885-2)  8.9 g/dL  Unknown  6.4-8.9  F



Ordering Physician [facility] Studies2019-04-15 10:15:00Identifier [id number]
6 Result Time 2019-04-15 10:15:00Unknown





 Test Item  Value  Reference Range  Comments

 

 Unknown (test code = 1975-2)  0.60 mg/dL  Unknown  0.2-1.0  F



Ordering Physician [facility] Studies2019-04-15 10:15:00Identifier [id number]-
6 Result Time 2019-04-15 10:15:00Unknown





 Test Item  Value  Reference Range  Comments

 

 Unknown (test code = NullTestCode)  4.9 g/dL  Unknown  2-4  F



Ordering Physician [facility] Studies2019-04-15 10:15:00Identifier [id number]
6 Result Time 2019-04-15 10:15:00Unknown





 Test Item  Value  Reference Range  Comments

 

 Unknown (test code = 1988-5)  11.17 mg/L  Unknown  0-8.00  F



Ordering Physician [facility] Studies2019-04-15 10:15:00Identifier [id number]
6 Result Time 2019-04-15 10:15:00Unknown





 Test Item  Value  Reference Range  Comments

 

 Unknown (test code = 1920-8)  25 U/L  Unknown  13-39  F



Ordering Physician [facility] Studies2019-04-15 10:15:00Identifier [id number]
6 Result Time 2019-04-15 10:15:00Unknown





 Test Item  Value  Reference Range  Comments

 

 Unknown (test code = 6768-6)  153 U/L  Unknown    F



Ordering Physician [facility] Studies2019-04-15 10:15:00Identifier [id number]
6 Result Time 2019-04-15 10:15:00Unknown





 Test Item  Value  Reference Range  Comments

 

 Unknown (test code = 1759-0)  0.8   Unknown  1-3  F



Ordering Physician [facility] Studies2019-04-15 10:15:00Identifier [id number]
6 Result Time 2019-04-15 10:15:00Unknown





 Test Item  Value  Reference Range  Comments

 

 Unknown (test code = 76341-9)  4.0 g/dL  Unknown  3.2-5.2  F



Ordering Physician [facility] Studies2019-04-15 10:15:00Identifier [id number]-
6 Result Time 2019-04-15 10:15:00Unknown





 Test Item  Value  Reference Range  Comments

 

 Unknown (test code = 1742-6)  13 U/L  Unknown  7-52  F



Ordering Physician [facility] Studies2019-04-15 10:15:00Identifier [id number]
6 Result Time 2019-04-15 10:15:00Unknown





 Test Item  Value  Reference Range  Comments

 

 Unknown (test code = 2524-7)  0.9 mmol/L  Unknown  0.5-2.0  F



Ordering Physician Unknown

## 2020-04-24 NOTE — ED
Throat Pain/Nasal Congestion





- HPI Summary


HPI Summary: 





87-year-old male with a significant past medical history of anemia, 

polycythemia vera, hypertension, aortic stenosis, benign prostatic hypertrophy, 

TIA whom is taking Plavix presents to the ED today with a CC of right sided 

epistaxis. Pt currently denies SOB, light headedness, chest pain, weakness.  

Patient is resting on Hospital stretcher applying pressure to his nose in no 

acute distress.  The patient endorses history of nosebleeds and states when 

this episode occurred he was cleaning his nose with a washcloth.  Patient is 

otherwise well and denies fever, shortness of breath, chest pain, abdominal pain

, nausea, vomiting diarrhea, sore throat, nasal congestion, rash.





- History of Current Complaint


Chief Complaint: EDEpistaxis


Hx Obtained From: Patient


Onset/Duration: Sudden Onset, Lasting Hours


Severity: Mild





- Allergies/Home Medications


Allergies/Adverse Reactions: 


 Allergies











Allergy/AdvReac Type Severity Reaction Status Date / Time


 


No Known Allergies Allergy   Verified 04/24/20 09:30











Home Medications: 


 Home Medications





Multivitamins/Minerals TAB* [Theragran/minerals TAB*] 1 tab PO DAILY 11/13/12 [

History Confirmed 04/24/20]


Calcium Carbonate/Vitamin D3 [Calcium 600-Vit D3 800 Caplet] 1 tab PO DAILY 04/

15/19 [History Confirmed 04/24/20]


Magnesium Oxide TAB* [MagOx 400 TAB*] 400 mg PO DAILY 04/15/19 [History 

Confirmed 04/24/20]


Verapamil HCl [Verapamil ER] 60 mg PO DAILY 04/15/19 [History Confirmed 04/24/20

]


Atorvastatin* [Lipitor*] 40 mg PO QPM 12/03/19 [History Confirmed 04/24/20]


Calcium Carb/D3/Magnesium/Zinc [Bandar Mag Zinc + D3 Tablet] 1 each PO DAILY 12/03/ 19 [History Confirmed 04/24/20]


Clopidogrel TAB* [Plavix TAB*] 75 mg PO DAILY 12/03/19 [History Confirmed 04/24/ 20]


HydroxyUREA CAP* [Hydrea CAP*] 500 mg PO EVERY OTHER DAY 12/03/19 [History 

Confirmed 04/24/20]


Pantoprazole TAB * [Protonix TAB*] 80 mg PO DAILY 12/03/19 [History Confirmed 04 /24/20]


Topiramate TAB(*) [Topamax 25 MG tab] 50 mg PO BID 12/03/19 [History Confirmed 

04/24/20]











PMH/Surg Hx/FS Hx/Imm Hx


Endocrine/Hematology History: Reports: Hx Anemia - iron def., taking supplement

, Other Endocrine/Hematological Disorders - Polycythemia vera


   Denies: Hx Anticoagulant Therapy, Hx Diabetes


Cardiovascular History: Reports: Hx Hypertension, Other Cardiovascular Problems/

Disorders - mild aortic stenosis


   Denies: Hx Angina, Hx Coronary Artery Disease, Hx Hypercholesterolemia, Hx 

Myocardial Infarction, Hx Pacemaker/ICD


Respiratory History: Reports: Other Respiratory Problems/Disorders - RECENT 

PNEUMONIA 6/2017, and also in Jan. 2017, DR. NIELSEN AWARE


   Denies: Hx Asthma, Hx Chronic Obstructive Pulmonary Disease (COPD)


GI History: Reports: Hx Gastroesophageal Reflux Disease - FUNDOPLICATION 

SURGERY 2001, ON NEXIUM., Hx Hiatal Hernia


 History: Reports: Other  Problems/Disorders - BPH


Musculoskeletal History: Reports: Hx Arthritis, Other Musculoskeletal History - 

polymyalgia rheumatica


Sensory History: Reports: Hx Cataracts, Hx Contacts or Glasses, Hx Hearing Aid, 

Hx Hearing Problem


   Denies: Hx Legally Blind, Hx Deafness, Other Sensory Impairments


Opthamlomology History: Reports: Hx Cataracts, Hx Contacts or Glasses


   Denies: Hx Legally Blind, Other Sensory Impairments


Neurological History: Reports: Hx Headaches - HA once a day, "poor circulation 

back of head", Hx Transient Ischemic Attacks (TIA), Other Neuro Impairments/

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


Psychiatric History: 


   Denies: Hx Panic Disorder





- Surgical History


Surgery Procedure, Year, and Place: 2001 LAP NISSEN FUNDOPLICATION [facility].  2001 L 

HIP FRACTURE [facility].  6/10 RIGHT INGUINAL HERNAI REPAIR


Hx Anesthesia Reactions: No





- Immunization History


Date of Tetanus Vaccine: utd


Date of Influenza Vaccine: fall 2017


Infectious Disease History: No


Infectious Disease History: 


   Denies: Traveled Outside the [place] in Last 30 Days





- Family History


Known Family History: Positive: None, Other - aneurysm, alcoholism





- Social History


Alcohol Use: Occasionally


Alcohol Amount: wine or beer


Hx Substance Use: No


Substance Use Type: Reports: None


Hx Tobacco Use: Yes


Smoking Status (MU): Former Smoker


Amount Used/How Often: light smoker for approx 6 yrs





Review of Systems


Constitutional: Negative


Eyes: Negative


Positive: Epistaxis.  Negative: Dental Pain, Sore Throat, Ear Ache, Nasal 

Discharge


Cardiovascular: Negative


Respiratory: Negative


Gastrointestinal: Negative


Genitourinary: Negative


Musculoskeletal: Negative


Skin: Negative


Neurological/Mental Status: Negative


Psychological: Normal


All Other Systems Reviewed And Are Negative: Yes





Physical Exam





- Summary


Physical Exam Summary: 





Patient is in no acute distress.  Patient has no evidence of labored breathing 

and is able to speak in full non-broken sentences.  There is evidence of 

epistaxis originating from the right naris consistent with right anterior 

epistaxis.  Patient has no significant postnasal drip.  Patient does not appear 

extremely pallorous not suggestive of acute blood loss anemia.


Triage Information Reviewed: Yes


Vital Signs On Initial Exam: 


 Initial Vitals











Temp Pulse Resp BP Pulse Ox


 


 98.1 F   48   18   170/85   100 


 


 04/24/20 09:25  04/24/20 09:25  04/24/20 09:25  04/24/20 09:25  04/24/20 09:25











Vital Signs Reviewed: Yes


Appearance: Positive: Well-Appearing, No Pain Distress, Well-Nourished


Skin: Positive: Warm, Skin Color Reflects Adequate Perfusion


Eyes: Positive: EOMI, AR


ENT: Positive: Hearing grossly normal


Respiratory/Lung Sounds: Positive: Clear to Auscultation, Breath Sounds Present


Cardiovascular: Positive: RRR, S1, S2


Abdomen Description: Positive: Nontender, Soft


Bowel Sounds: Positive: Present


Musculoskeletal: Positive: Strength/ROM Intact


Neurological: Positive: Sensory/Motor Intact, Alert, Oriented to Person Place, 

Time, Normal Gait, Facial Symmetry, Speech Normal


Psychiatric: Positive: Normal, Affect/Mood Appropriate


AVPU Assessment: Alert





Procedures





- Sedation


Patient Received Moderate/Deep Sedation with Procedure: No





Diagnostics





- Vital Signs


 Vital Signs











  Temp Pulse Resp BP Pulse Ox


 


 04/24/20 09:25  98.1 F  48  18  170/85  100














- Laboratory


Result Diagrams: 


 04/24/20 12:11





 04/24/20 12:11


Lab Statement: Any lab studies that have been ordered have been reviewed, and 

results considered in the medical decision making process.





EENT Course/Dx





- Course


Course Of Treatment: Patient was evaluated in the emergency department today 

for a right anterior nosebleed.  Vitals are noted and stable.  Patient appeared 

to be hemodynamically unstable.  Patient had no respiratory distress.  Pressure 

was applied for approximately 15 minutes a nasal clamp which did not 

successfully stop bleeding.  A 4 x 4 soaked with TXA was inserted into the 

patient's right naris for 20 minutes which was able to control bleeding for 1 

hour of observation.  Patient labs returned showing no significant anemia and 

no other concerning findings.  Patient was given instructions to follow up with 

ENT and how to prevent nosebleeds in the future.  Patient discharged to 

outpatient follow-up with resolved epistaxis.





- Differential Diagnoses


Differential Diagnoses: Epistaxis





- Diagnoses


Provider Diagnoses: 


 Epistaxis








- Critical Care Time


Critical Care Statement: Critical care time is provided exclusive of any time 

spent performing procedures.





Discharge ED





- Sign-Out/Discharge


Documenting (check all that apply): Patient Departure





- Discharge Plan


Condition: Stable


Disposition: HOME


Patient Education Materials:  Nosebleed (ED)


Referrals: 


Cryer,Jonathan, MD [Medical Doctor] - 3 Days


Additional Instructions: 


Please be sure not to pick your nose or blow your nose for the next 24-48 hours 

to prevent recurrence of nosebleed.  Please follow-up with ENT in 3-5 days for 

further evaluation and management of nosebleeds.  Please return to this 

emergency department immediately should you develop any new or worsening 

symptoms.





- Billing Disposition and Condition


Condition: STABLE


Disposition: Home

## 2020-04-24 NOTE — XMS REPORT
[address]   [facility] [address]  Phone: [phone]    March 20, 2018        Ramon Wilkinson  [address]   [facility] [address]          To whom it may concern:    RE: Ramon Wilkinson    Patient was seen and treated today at our clinic. May return to work on Monday March 26 th. May return earlier if symptoms are improved.     Please contact me for questions or concerns.      Sincerely,        ELAINE Khanna CNP   Patient Summary Document

 Created on:2020



Patient:BERTHA SOLIS

Sex:Male

:1933

External Reference #:[id number]





Demographics







 Address  [address] [place] [address]

 

 Home Phone  [phone]

 

 Preferred Language  English

 

 Marital Status  Unknown

 

 Christian Affiliation  Unknown

 

 Race  Unknown

 

 Ethnic Group  Unknown









Author







 Organization  [facility]









Support







 Name  Relationship  Address  Phone

 

 IRAIS SOLIS, Unknown Name  Suha  [address] [facility] [address]  [phone]



     [place] [address]  









Care Team Providers







 Name  Role  Phone

 

 Unavailable  Unavailable  Unavailable









Problems

This patient has no known problems.



Allergies, Adverse Reactions, Alerts







 Allergy  Allergy  Status  Severity  Reaction(s)  Onset  Inactive  Treating  
Comments



 Name  Type        Date  Date  Clinician  

 

 Uncoded  Unknown  Active  Unknown  Reaction  2019    Interface  



 free-text        Unknown  -18      



 allergy                







Medications







 Ordered  Filled  Start  Stop  Current  Ordering  Indication  Dosage  Frequency
  Signature  Comments  Components



 Medication  Medication  Date  Date  Medication?  Clinician        (SIG)    



 Name  Name                    

 

 Multivitami  Multivitami  2012    No  Unknown    Unknown  Unknown      



 ns/Minerals  ns/Minerals  -                  



 Tab*  Tab*                    

 

 amLODIPine  amLODIPine      No  Unknown    Unknown  Unknown      



 5 mg tablet  5 mg tablet  4-15                  

 

 acetaminoph  acetaminoph      No  Unknown    Unknown  Unknown      



 en 500 mg  en 500 mg  4-15                  



 tablet  tablet                    

 

 magnesium  magnesium      No  Unknown    Unknown  Unknown      



 oxide 400  oxide 400  4-15                  



 mg (241.3  mg (241.3                    



 mg  mg                    



 magnesium)  magnesium)                    



 tablet  tablet                    

 

 omeprazole  omeprazole      No  Unknown    Unknown  Unknown      



 40 mg  40 mg  4-15                  



 capsule,del  capsule,del                    



 ayed  ayed                    



 release  release                    

 

 verapamil  verapamil      No  Unknown    Unknown  Unknown      



  mg   mg  4-15                  



 capsule,ext  capsule,ext                    



 ended  ended                    



 release  release                    

 

 Calcium  Calcium      No  Unknown    Unknown  Unknown      



 Carbonate/V  Carbonate/V  4-15                  



 itamin D3  itamin D3                    

 

 cefdinir  cefdinir      No  Unknown    Unknown  Unknown      



 300 mg  300 mg  4-17                  



 capsule  capsule                    







Vital Signs







 Vital Name  Observation Time  Observation Value  Comments

 

 SYSTOLIC mm[Hg]  2019 18:05:21  135 mm[Hg] mm[Hg]  Method: Sit

 

 DIASTOLIC mm[Hg]  2019 18:05:21  48 mm[Hg] mm[Hg]  Method: Sit

 

 PULSE  2019 18:05:21  63 /min /min  

 

 RESP RATE  2019 18:05:21  16 /min /min  

 

 TEMP  2019 18:05:21  97.4 [degF]  







Procedures

This patient has no known procedures.



Results







 Test Description  Test Time  Test Comments  Text Results  Atomic Results  
Result Comments









 Laboratory Studies  2019 08:13:00  Identifier [id number] Result Time  
Unknown



     2019 08:13:00  









   Test Item  Value  Reference Range  Comments









 Unknown (test code = 2951-2)  136 mmol/L  Unknown  135-145  F



Ordering Physician [facility] Drncxwz2443-19-86 08:13:00Identifier [id number]-
6 Result Time 2019 08:13:00Unknown





 Test Item  Value  Reference Range  Comments

 

 Unknown (test code = 2823-3)  4.0 mmol/L  Unknown  3.5-5.0  F



Ordering Physician [facility] Brmkuxj3532-25-73 08:13:00Identifier [id number]
6 Result Time 2019 08:13:00Unknown





 Test Item  Value  Reference Range  Comments

 

 Unknown (test code = 2345-7)  87 mg/dL  Unknown    F



Ordering Physician [facility] Khzttko8071-99-13 08:13:00Identifier [id number]
6 Result Time 2019 08:13:00Unknown





 Test Item  Value  Reference Range  Comments

 

 Unknown (test code = 48642-3)  46.9   Unknown  Unknown  F



Ordering Physician [facility] Ppbulpy6400-20-78 08:13:00Identifier [id number]-
6 Result Time 2019 08:13:00Unknown





 Test Item  Value  Reference Range  Comments

 

 Unknown (test code = NullTestCode)  56.7   Unknown  Unknown  F



Ordering Physician [facility] Otwqphs9892-62-26 08:13:00Identifier [id number]-
6 Result Time 2019 08:13:00Unknown





 Test Item  Value  Reference Range  Comments

 

 Unknown (test code = 2160-0)  1.43 mg/dL  Unknown  0.67-1.17  F



Ordering Physician [facility] Atizcgt5160-49-70 08:13:00Identifier [id number]-
6 Result Time 2019 08:13:00Unknown





 Test Item  Value  Reference Range  Comments

 

 Unknown (test code = 2075-0)  104 mmol/L  Unknown  101-111  F



Ordering Physician [facility] Csiclev1387-02-56 08:13:00Identifier [id number]-
6 Result Time 2019 08:13:00Unknown





 Test Item  Value  Reference Range  Comments

 

 Unknown (test code = 2028-9)  24 mmol/L  Unknown  22-32  F



Ordering Physician [facility] Ihbabdy8416-92-61 08:13:00Identifier [id number]-
6 Result Time 2019 08:13:00Unknown





 Test Item  Value  Reference Range  Comments

 

 Unknown (test code = 40795-7)  9.0 mg/dL  Unknown  8.6-10.3  F



Ordering Physician [facility] Hcebkpq3980-90-83 08:13:00Identifier [id number]-
6 Result Time 2019 08:13:00Unknown





 Test Item  Value  Reference Range  Comments

 

 Unknown (test code = 3094-0)  24 mg/dL  Unknown  6-24  F



Ordering Physician [facility] Qnfgzgh3924-36-81 08:13:00Identifier [id number]-
6 Result Time 2019 08:13:00Unknown





 Test Item  Value  Reference Range  Comments

 

 Unknown (test code = 3097-3)  16.8   Unknown  8-20  F



Ordering Physician [facility] Feyutbr1692-89-55 08:13:00Identifier [id number]-
6 Result Time 2019 08:13:00Unknown





 Test Item  Value  Reference Range  Comments

 

 Unknown (test code = 33037-3)  8 mmol/L  Unknown  2-11  F



Ordering Physician [facility] Pjgrpyc6844-02-44 08:13:00Identifier [id number]-
6 Result Time 2019 08:13:00Unknown





 Test Item  Value  Reference Range  Comments

 

 Unknown (test code = 18644-1)  27.4 10^3/uL  Unknown  3.5-10.8  F



Ordering Physician [facility] Qrtdvax4358-25-14 08:13:00Identifier [id number]-
6 Result Time 2019 08:13:00Unknown





 Test Item  Value  Reference Range  Comments

 

 Unknown (test code = 788-0)  24 %  Unknown  10.5-15  F



Ordering Physician [facility] Jrhqqch4681-40-15 08:13:00Identifier [id number]-
6 Result Time 2019 08:13:00Unknown





 Test Item  Value  Reference Range  Comments

 

 Unknown (test code = 789-8)  5.19 10^6 /uL  Unknown  4.18-5.48  F



Ordering Physician [facility] Tjsivpa5303-05-87 08:13:00Identifier [id number]-
6 Result Time 2019 08:13:00Unknown





 Test Item  Value  Reference Range  Comments

 

 Unknown (test code = 777-3)  330 10^3/uL  Unknown  150-450  F



Ordering Physician [facility] Raougkf9399-26-32 08:13:00Identifier [id number]-
6 Result Time 2019 08:13:00Unknown





 Test Item  Value  Reference Range  Comments

 

 Unknown (test code = 90177-8)  0   Unknown  Unknown  F



Ordering Physician [facility] Wfksljv3015-66-15 08:13:00Identifier [id number]-
6 Result Time 2019 08:13:00Unknown





 Test Item  Value  Reference Range  Comments

 

 Unknown (test code = 771-6)  0 10^3/ul  Unknown  Unknown  F



Ordering Physician [facility] Ttihjts8262-92-66 08:13:00Identifier [id number]-
6 Result Time 2019 08:13:00Unknown





 Test Item  Value  Reference Range  Comments

 

 Unknown (test code = 770-8)  84.7 %  Unknown  Unknown  F



Ordering Physician [facility] Zloxiux1473-80-92 08:13:00Identifier [id number]-
6 Result Time 2019 08:13:00Unknown





 Test Item  Value  Reference Range  Comments

 

 Unknown (test code = 5905-5)  8.6 %  Unknown  Unknown  F



Ordering Physician [facility] Ngffgxc4362-29-79 08:13:00Identifier [id number]-
6 Result Time 2019 08:13:00Unknown





 Test Item  Value  Reference Range  Comments

 

 Unknown (test code = 96362-4)  8.6 fL  Unknown  7.4-10.4  F



Ordering Physician [facility] Fevwsck2324-52-81 08:13:00Identifier [id number]-
6 Result Time 2019 08:13:00Unknown





 Test Item  Value  Reference Range  Comments

 

 Unknown (test code = 787-2)  78 fL  Unknown  80-94  F



Ordering Physician [facility] Mpzdtcb2285-61-65 08:13:00Identifier [id number]-
6 Result Time 2019 08:13:00Unknown





 Test Item  Value  Reference Range  Comments

 

 Unknown (test code = 786-4)  31 g/dL  Unknown  31-36  F



Ordering Physician [facility] Oktcbfn7892-51-81 08:13:00Identifier [id number]-
6 Result Time 2019 08:13:00Unknown





 Test Item  Value  Reference Range  Comments

 

 Unknown (test code = 785-6)  24 pg  Unknown  27-31  F



Ordering Physician [facility] Hciftds0035-39-57 08:13:00Identifier [id number]-
6 Result Time 2019 08:13:00Unknown





 Test Item  Value  Reference Range  Comments

 

 Unknown (test code = 736-9)  5.4 %  Unknown  Unknown  F



Ordering Physician [facility] Nunraeo2863-35-09 08:13:00Identifier [id number]-
6 Result Time 2019 08:13:00Unknown





 Test Item  Value  Reference Range  Comments

 

 Unknown (test code = 718-7)  12.3 g/dL  Unknown  14.0-18.0  F



Ordering Physician [facility] Fmxipfn5624-33-56 08:13:00Identifier [id number]-
6 Result Time 2019 08:13:00Unknown





 Test Item  Value  Reference Range  Comments

 

 Unknown (test code = 4544-3)  40 %  Unknown  36-46  F



Ordering Physician [facility] Sppueod4791-41-22 08:13:00Identifier [id number]-
6 Result Time 2019 08:13:00Unknown





 Test Item  Value  Reference Range  Comments

 

 Unknown (test code = 713-8)  0.8 %  Unknown  Unknown  F



Ordering Physician [facility] Xftknew5439-80-82 08:13:00Identifier [id number]-
6 Result Time 2019 08:13:00Unknown





 Test Item  Value  Reference Range  Comments

 

 Unknown (test code = 706-2)  0.5 %  Unknown  Unknown  F



Ordering Physician [facility] Ugkebkk3901-18-83 08:13:00Identifier [id number]-
6 Result Time 2019 08:13:00Unknown





 Test Item  Value  Reference Range  Comments

 

 Unknown (test code = WDI6123)  23.2 10^3/ul  Unknown  1.5-7.7  F



Ordering Physician [facility] Fufwhhs6010-47-21 08:13:00Identifier [id number]-
6 Result Time 2019 08:13:00Unknown





 Test Item  Value  Reference Range  Comments

 

 Unknown (test code = 742-7)  2.4 10^3/ul  Unknown  0-0.8  F



Ordering Physician [facility] Ijbjvyu9925-22-85 08:13:00Identifier [id number]-
6 Result Time 2019 08:13:00Unknown





 Test Item  Value  Reference Range  Comments

 

 Unknown (test code = 731-0)  1.5 10^3/ul  Unknown  1.0-4.8  F



Ordering Physician [facility] Oswxcwl1402-13-60 08:13:00Identifier [id number]-
6 Result Time 2019 08:13:00Unknown





 Test Item  Value  Reference Range  Comments

 

 Unknown (test code = 711-2)  0.2 10^3/ul  Unknown  0-0.6  F



Ordering Physician [facility] Webdhhe3507-01-26 08:13:00Identifier [id number]-
6 Result Time 2019 08:13:00Unknown





 Test Item  Value  Reference Range  Comments

 

 Unknown (test code = 704-7)  0.1 10^3/ul  Unknown  0-0.2  F



Ordering Physician [facility] Jtaadgm8012-98-69 06:46:00Identifier [id number]-
6 Result Time 2019 06:46:00Unknown





 Test Item  Value  Reference Range  Comments

 

 Unknown (test code = 78594-7)  0.05 ng/mL  Unknown  Unknown  F



Ordering Physician [facility] Ymynkbk5440-93-74 06:46:00Identifier [id number]-
6 Result Time 2019 06:46:00Unknown





 Test Item  Value  Reference Range  Comments

 

 Unknown (test code = 2777-1)  2.8 mg/dL  Unknown  2.5-5.0  F



Ordering Physician [facility] Puguawy0800-63-10 06:46:00Identifier [id number]-
6 Result Time 2019 06:46:00Unknown





 Test Item  Value  Reference Range  Comments

 

 Unknown (test code = 40568-0)  2.5 mg/dL  Unknown  1.9-2.7  F



Ordering Physician [facility] Studies2019-04-15 10:48:00Identifier [id number]-
6 Result Time 2019-04-15 10:48:00Unknown





 Test Item  Value  Reference Range  Comments

 

 Unknown (test code = NullTestCode)  6.0   Unknown  5-9  F



Ordering Physician [facility] Studies2019-04-15 10:48:00Identifier [id number]-
6 Result Time 2019-04-15 10:48:00Unknown





 Test Item  Value  Reference Range  Comments

 

 Unknown (test code = 42879-5)  1.013   Unknown  1.010-1.030  F



Ordering Physician [facility] Studies2019-04-15 10:15:00Identifier [id number]-
6 Result Time 2019-04-15 10:15:00Unknown





 Test Item  Value  Reference Range  Comments

 

 Unknown (test code = 3016-3)  3.38 mcIU/mL  Unknown  0.34-5.60  F



Ordering Physician [facility] Studies2019-04-15 10:15:00Identifier [id number]-
6 Result Time 2019-04-15 10:15:00Unknown





 Test Item  Value  Reference Range  Comments

 

 Unknown (test code = 84518-4)  1.14   Unknown  0.77-1.02  F



Ordering Physician [facility] Studies2019-04-15 10:15:00Identifier [id number]-
6 Result Time 2019-04-15 10:15:00Unknown





 Test Item  Value  Reference Range  Comments

 

 Unknown (test code = 65291-5)  365 pg/mL  Unknown  Unknown  F



Ordering Physician [facility] Studies2019-04-15 10:15:00Identifier [id number]-
6 Result Time 2019-04-15 10:15:00Unknown





 Test Item  Value  Reference Range  Comments

 

 Unknown (test code = 2885-2)  8.9 g/dL  Unknown  6.4-8.9  F



Ordering Physician [facility] Studies2019-04-15 10:15:00Identifier [id number]-
6 Result Time 2019-04-15 10:15:00Unknown





 Test Item  Value  Reference Range  Comments

 

 Unknown (test code = 1975-2)  0.60 mg/dL  Unknown  0.2-1.0  F



Ordering Physician [facility] Studies2019-04-15 10:15:00Identifier [id number]-
6 Result Time 2019-04-15 10:15:00Unknown





 Test Item  Value  Reference Range  Comments

 

 Unknown (test code = NullTestCode)  4.9 g/dL  Unknown  2-4  F



Ordering Physician [facility] Studies2019-04-15 10:15:00Identifier [id number]-
6 Result Time 2019-04-15 10:15:00Unknown





 Test Item  Value  Reference Range  Comments

 

 Unknown (test code = 1988-5)  11.17 mg/L  Unknown  0-8.00  F



Ordering Physician [facility] Studies2019-04-15 10:15:00Identifier [id number]-
6 Result Time 2019-04-15 10:15:00Unknown





 Test Item  Value  Reference Range  Comments

 

 Unknown (test code = 1920-8)  25 U/L  Unknown  13-39  F



Ordering Physician [facility] Studies2019-04-15 10:15:00Identifier [id number]
6 Result Time 2019-04-15 10:15:00Unknown





 Test Item  Value  Reference Range  Comments

 

 Unknown (test code = 6768-6)  153 U/L  Unknown    F



Ordering Physician [facility] Studies2019-04-15 10:15:00Identifier [id number]-
6 Result Time 2019-04-15 10:15:00Unknown





 Test Item  Value  Reference Range  Comments

 

 Unknown (test code = 1759-0)  0.8   Unknown  1-3  F



Ordering Physician [facility] Studies2019-04-15 10:15:00Identifier [id number]-
6 Result Time 2019-04-15 10:15:00Unknown





 Test Item  Value  Reference Range  Comments

 

 Unknown (test code = 43671-3)  4.0 g/dL  Unknown  3.2-5.2  F



Ordering Physician [facility] Studies2019-04-15 10:15:00Identifier [id number]-
6 Result Time 2019-04-15 10:15:00Unknown





 Test Item  Value  Reference Range  Comments

 

 Unknown (test code = 1742-6)  13 U/L  Unknown  7-52  F



Ordering Physician [facility] Studies2019-04-15 10:15:00Identifier [id number]
6 Result Time 2019-04-15 10:15:00Unknown





 Test Item  Value  Reference Range  Comments

 

 Unknown (test code = 2524-7)  0.9 mmol/L  Unknown  0.5-2.0  F



Ordering Physician Unknown

## 2020-04-24 NOTE — XMS REPORT
Patient Summary Document

 Created on:March 15, 2020



Patient:BERTHA SOLIS

Sex:Male

:1933

External Reference #:[id number]





Demographics







 Address  [address] [place], [place] [address]

 

 Home Phone  [phone]

 

 Preferred Language  English

 

 Marital Status  Unknown

 

 Synagogue Affiliation  Unknown

 

 Race  Unknown

 

 Ethnic Group  Unknown









Author







 Organization  [facility]









Support







 Name  Relationship  Address  Phone

 

 IRAIS SOLIS, Unknown Name  Suha  [address]  [phone]



     [place] [address]  









Care Team Providers







 Name  Role  Phone

 

 Unavailable  Unavailable  Unavailable









Problems

This patient has no known problems.



Allergies, Adverse Reactions, Alerts







 Allergy  Allergy  Status  Severity  Reaction(s)  Onset  Inactive  Treating  
Comments



 Name  Type        Date  Date  Clinician  

 

 Uncoded  Unknown  Active  Unknown  Reaction  2019    Interface  



 free-text        Unknown  -18      



 allergy                







Medications







 Ordered  Filled  Start  Stop  Current  Ordering  Indication  Dosage  Frequency
  Signature  Comments  Components



 Medication  Medication  Date  Date  Medication?  Clinician        (SIG)    



 Name  Name                    

 

 Multivitami  Multivitami  2012    No  Unknown    Unknown  Unknown      



 ns/Minerals  ns/Minerals  -                  



 Tab*  Tab*                    

 

 amLODIPine  amLODIPine      No  Unknown    Unknown  Unknown      



 5 mg tablet  5 mg tablet  4-15                  

 

 acetaminoph  acetaminoph      No  Unknown    Unknown  Unknown      



 en 500 mg  en 500 mg  4-15                  



 tablet  tablet                    

 

 magnesium  magnesium      No  Unknown    Unknown  Unknown      



 oxide 400  oxide 400  4-15                  



 mg (241.3  mg (241.3                    



 mg  mg                    



 magnesium)  magnesium)                    



 tablet  tablet                    

 

 omeprazole  omeprazole      No  Unknown    Unknown  Unknown      



 40 mg  40 mg  4-15                  



 capsule,del  capsule,del                    



 ayed  ayed                    



 release  release                    

 

 verapamil  verapamil      No  Unknown    Unknown  Unknown      



  mg   mg  4-15                  



 capsule,ext  capsule,ext                    



 ended  ended                    



 release  release                    

 

 Calcium  Calcium      No  Unknown    Unknown  Unknown      



 Carbonate/V  Carbonate/V  4-15                  



 itamin D3  itamin D3                    

 

 cefdinir  cefdinir      No  Unknown    Unknown  Unknown      



 300 mg  300 mg  4-17                  



 capsule  capsule                    







Vital Signs







 Vital Name  Observation Time  Observation Value  Comments

 

 SYSTOLIC mm[Hg]  2019 18:05:21  135 mm[Hg] mm[Hg]  Method: Sit

 

 DIASTOLIC mm[Hg]  2019 18:05:21  48 mm[Hg] mm[Hg]  Method: Sit

 

 PULSE  2019 18:05:21  63 /min /min  

 

 RESP RATE  2019 18:05:21  16 /min /min  

 

 TEMP  2019 18:05:21  97.4 [degF]  







Procedures

This patient has no known procedures.



Results







 Test Description  Test Time  Test Comments  Text Results  Atomic Results  
Result Comments









 Laboratory Studies  2019 08:13:00  Identifier [id number] Result Time  
Unknown



     2019 08:13:00  









   Test Item  Value  Reference Range  Comments









 Unknown (test code = 2951-2)  136 mmol/L  Unknown  135-145  F



Ordering Physician [facility] Xnmakqp9286-82-95 08:13:00Identifier [id number]
6 Result Time 2019 08:13:00Unknown





 Test Item  Value  Reference Range  Comments

 

 Unknown (test code = 2823-3)  4.0 mmol/L  Unknown  3.5-5.0  F



Ordering Physician [facility] Ckuuion9552-99-45 08:13:00Identifier [id number]
6 Result Time 2019 08:13:00Unknown





 Test Item  Value  Reference Range  Comments

 

 Unknown (test code = 2345-7)  87 mg/dL  Unknown    F



Ordering Physician [facility] Dcryxid7508-03-95 08:13:00Identifier [id number]-
6 Result Time 2019 08:13:00Unknown





 Test Item  Value  Reference Range  Comments

 

 Unknown (test code = 48642-3)  46.9   Unknown  Unknown  F



Ordering Physician [facility] Jjnhdnu4825-85-74 08:13:00Identifier [id number]-
6 Result Time 2019 08:13:00Unknown





 Test Item  Value  Reference Range  Comments

 

 Unknown (test code = NullTestCode)  56.7   Unknown  Unknown  F



Ordering Physician [facility] Snpgzis9726-71-81 08:13:00Identifier [id number]-
6 Result Time 2019 08:13:00Unknown





 Test Item  Value  Reference Range  Comments

 

 Unknown (test code = 2160-0)  1.43 mg/dL  Unknown  0.67-1.17  F



Ordering Physician [facility] Evkfjtu4610-39-59 08:13:00Identifier [id number]
6 Result Time 2019 08:13:00Unknown





 Test Item  Value  Reference Range  Comments

 

 Unknown (test code = 2075-0)  104 mmol/L  Unknown  101-111  F



Ordering Physician [facility] Jhehwif6942-21-96 08:13:00Identifier [id number]-
6 Result Time 2019 08:13:00Unknown





 Test Item  Value  Reference Range  Comments

 

 Unknown (test code = 2028-9)  24 mmol/L  Unknown  22-32  F



Ordering Physician [facility] Snjqend4589-23-60 08:13:00Identifier [id number]
6 Result Time 2019 08:13:00Unknown





 Test Item  Value  Reference Range  Comments

 

 Unknown (test code = 08179-3)  9.0 mg/dL  Unknown  8.6-10.3  F



Ordering Physician [facility] Mvqpebr8006-30-06 08:13:00Identifier [id number]
6 Result Time 2019 08:13:00Unknown





 Test Item  Value  Reference Range  Comments

 

 Unknown (test code = 3094-0)  24 mg/dL  Unknown  6-24  F



Ordering Physician [facility] Dtntjqv4672-73-25 08:13:00Identifier [id number]-
6 Result Time 2019 08:13:00Unknown





 Test Item  Value  Reference Range  Comments

 

 Unknown (test code = 3097-3)  16.8   Unknown  8-20  F



Ordering Physician [facility] Kmpvlhz3678-05-77 08:13:00Identifier [id number]-
6 Result Time 2019 08:13:00Unknown





 Test Item  Value  Reference Range  Comments

 

 Unknown (test code = 33037-3)  8 mmol/L  Unknown  2-11  F



Ordering Physician [facility] Dhayslx2341-16-91 08:13:00Identifier [id number]-
6 Result Time 2019 08:13:00Unknown





 Test Item  Value  Reference Range  Comments

 

 Unknown (test code = 45529-7)  27.4 10^3/uL  Unknown  3.5-10.8  F



Ordering Physician [facility] Rzaarog0871-26-12 08:13:00Identifier [id number]-
6 Result Time 2019 08:13:00Unknown





 Test Item  Value  Reference Range  Comments

 

 Unknown (test code = 788-0)  24 %  Unknown  10.5-15  F



Ordering Physician [facility] Byoqycc9599-68-51 08:13:00Identifier [id number]-
6 Result Time 2019 08:13:00Unknown





 Test Item  Value  Reference Range  Comments

 

 Unknown (test code = 789-8)  5.19 10^6 /uL  Unknown  4.18-5.48  F



Ordering Physician [facility] Dgidhke4847-80-15 08:13:00Identifier [id number]-
6 Result Time 2019 08:13:00Unknown





 Test Item  Value  Reference Range  Comments

 

 Unknown (test code = 777-3)  330 10^3/uL  Unknown  150-450  F



Ordering Physician [facility] Zypehhk7973-89-02 08:13:00Identifier [id number]-
6 Result Time 2019 08:13:00Unknown





 Test Item  Value  Reference Range  Comments

 

 Unknown (test code = 40537-8)  0   Unknown  Unknown  F



Ordering Physician [facility] Kvvcagt5561-01-93 08:13:00Identifier [id number]-
6 Result Time 2019 08:13:00Unknown





 Test Item  Value  Reference Range  Comments

 

 Unknown (test code = 771-6)  0 10^3/ul  Unknown  Unknown  F



Ordering Physician [facility] Aszlmtc6500-54-35 08:13:00Identifier [id number]-
6 Result Time 2019 08:13:00Unknown





 Test Item  Value  Reference Range  Comments

 

 Unknown (test code = 770-8)  84.7 %  Unknown  Unknown  F



Ordering Physician [facility] Pxxhbwy0778-95-29 08:13:00Identifier [id number]-
6 Result Time 2019 08:13:00Unknown





 Test Item  Value  Reference Range  Comments

 

 Unknown (test code = 5905-5)  8.6 %  Unknown  Unknown  F



Ordering Physician [facility] Ubeqqzk4771-17-31 08:13:00Identifier [id number]-
6 Result Time 2019 08:13:00Unknown





 Test Item  Value  Reference Range  Comments

 

 Unknown (test code = 05709-6)  8.6 fL  Unknown  7.4-10.4  F



Ordering Physician [facility] Vvngjhj5901-47-48 08:13:00Identifier [id number]-
6 Result Time 2019 08:13:00Unknown





 Test Item  Value  Reference Range  Comments

 

 Unknown (test code = 787-2)  78 fL  Unknown  80-94  F



Ordering Physician [facility] Blqfhdr9658-77-99 08:13:00Identifier [id number]-
6 Result Time 2019 08:13:00Unknown





 Test Item  Value  Reference Range  Comments

 

 Unknown (test code = 786-4)  31 g/dL  Unknown  31-36  F



Ordering Physician [facility] Dpnxfnj9562-73-30 08:13:00Identifier [id number]
6 Result Time 2019 08:13:00Unknown





 Test Item  Value  Reference Range  Comments

 

 Unknown (test code = 785-6)  24 pg  Unknown  27-31  F



Ordering Physician [facility] Oedzfnl2321-37-49 08:13:00Identifier [id number]-
6 Result Time 2019 08:13:00Unknown





 Test Item  Value  Reference Range  Comments

 

 Unknown (test code = 736-9)  5.4 %  Unknown  Unknown  F



Ordering Physician [facility] Jtiejlx3001-26-07 08:13:00Identifier [id number]-
6 Result Time 2019 08:13:00Unknown





 Test Item  Value  Reference Range  Comments

 

 Unknown (test code = 718-7)  12.3 g/dL  Unknown  14.0-18.0  F



Ordering Physician [facility] Glhtcxq5872-78-10 08:13:00Identifier [id number]-
6 Result Time 2019 08:13:00Unknown





 Test Item  Value  Reference Range  Comments

 

 Unknown (test code = 4544-3)  40 %  Unknown  36-46  F



Ordering Physician [facility] Ruxkyme3490-97-69 08:13:00Identifier [id number]-
6 Result Time 2019 08:13:00Unknown





 Test Item  Value  Reference Range  Comments

 

 Unknown (test code = 713-8)  0.8 %  Unknown  Unknown  F



Ordering Physician [facility] Qwcufti5637-64-96 08:13:00Identifier [id number]-
6 Result Time 2019 08:13:00Unknown





 Test Item  Value  Reference Range  Comments

 

 Unknown (test code = 706-2)  0.5 %  Unknown  Unknown  F



Ordering Physician [facility] Gypvtbz4757-88-86 08:13:00Identifier [id number]-
6 Result Time 2019 08:13:00Unknown





 Test Item  Value  Reference Range  Comments

 

 Unknown (test code = SDS4349)  23.2 10^3/ul  Unknown  1.5-7.7  F



Ordering Physician [facility] Pmakoym2588-25-62 08:13:00Identifier [id number]-
6 Result Time 2019 08:13:00Unknown





 Test Item  Value  Reference Range  Comments

 

 Unknown (test code = 742-7)  2.4 10^3/ul  Unknown  0-0.8  F



Ordering Physician [facility] Cprdtwb8528-09-53 08:13:00Identifier [id number]-
6 Result Time 2019 08:13:00Unknown





 Test Item  Value  Reference Range  Comments

 

 Unknown (test code = 731-0)  1.5 10^3/ul  Unknown  1.0-4.8  F



Ordering Physician [facility] Fwcgpzg9841-40-27 08:13:00Identifier [id number]-
6 Result Time 2019 08:13:00Unknown





 Test Item  Value  Reference Range  Comments

 

 Unknown (test code = 711-2)  0.2 10^3/ul  Unknown  0-0.6  F



Ordering Physician [facility] Qtudnhg8639-86-26 08:13:00Identifier [id number]-
6 Result Time 2019 08:13:00Unknown





 Test Item  Value  Reference Range  Comments

 

 Unknown (test code = 704-7)  0.1 10^3/ul  Unknown  0-0.2  F



Ordering Physician [facility] Akmwnbr0105-41-81 06:46:00Identifier [id number]-
6 Result Time 2019 06:46:00Unknown





 Test Item  Value  Reference Range  Comments

 

 Unknown (test code = 58224-8)  0.05 ng/mL  Unknown  Unknown  F



Ordering Physician [facility] Dbgjvyp0458-46-82 06:46:00Identifier [id number]-
6 Result Time 2019 06:46:00Unknown





 Test Item  Value  Reference Range  Comments

 

 Unknown (test code = 2777-1)  2.8 mg/dL  Unknown  2.5-5.0  F



Ordering Physician [facility] Zjjycjg6826-02-97 06:46:00Identifier [id number]-
6 Result Time 2019 06:46:00Unknown





 Test Item  Value  Reference Range  Comments

 

 Unknown (test code = 55550-6)  2.5 mg/dL  Unknown  1.9-2.7  F



Ordering Physician [facility] Studies2019-04-15 10:48:00Identifier [id number]-
6 Result Time 2019-04-15 10:48:00Unknown





 Test Item  Value  Reference Range  Comments

 

 Unknown (test code = NullTestCode)  6.0   Unknown  5-9  F



Ordering Physician [facility] Studies2019-04-15 10:48:00Identifier [id number]-
6 Result Time 2019-04-15 10:48:00Unknown





 Test Item  Value  Reference Range  Comments

 

 Unknown (test code = 98189-5)  1.013   Unknown  1.010-1.030  F



Ordering Physician [facility] Studies2019-04-15 10:15:00Identifier [id number]-
6 Result Time 2019-04-15 10:15:00Unknown





 Test Item  Value  Reference Range  Comments

 

 Unknown (test code = 3016-3)  3.38 mcIU/mL  Unknown  0.34-5.60  F



Ordering Physician [facility] Studies2019-04-15 10:15:00Identifier [id number]
6 Result Time 2019-04-15 10:15:00Unknown





 Test Item  Value  Reference Range  Comments

 

 Unknown (test code = 32834-6)  1.14   Unknown  0.77-1.02  F



Ordering Physician [facility] Studies2019-04-15 10:15:00Identifier [id number]
6 Result Time 2019-04-15 10:15:00Unknown





 Test Item  Value  Reference Range  Comments

 

 Unknown (test code = 44801-6)  365 pg/mL  Unknown  Unknown  F



Ordering Physician [facility] Studies2019-04-15 10:15:00Identifier [id number]-
6 Result Time 2019-04-15 10:15:00Unknown





 Test Item  Value  Reference Range  Comments

 

 Unknown (test code = 2885-2)  8.9 g/dL  Unknown  6.4-8.9  F



Ordering Physician [facility] Studies2019-04-15 10:15:00Identifier [id number]
6 Result Time 2019-04-15 10:15:00Unknown





 Test Item  Value  Reference Range  Comments

 

 Unknown (test code = 1975-2)  0.60 mg/dL  Unknown  0.2-1.0  F



Ordering Physician [facility] Studies2019-04-15 10:15:00Identifier [id number]-
6 Result Time 2019-04-15 10:15:00Unknown





 Test Item  Value  Reference Range  Comments

 

 Unknown (test code = NullTestCode)  4.9 g/dL  Unknown  2-4  F



Ordering Physician [facility] Studies2019-04-15 10:15:00Identifier [id number]-
6 Result Time 2019-04-15 10:15:00Unknown





 Test Item  Value  Reference Range  Comments

 

 Unknown (test code = 1988-5)  11.17 mg/L  Unknown  0-8.00  F



Ordering Physician [facility] Studies2019-04-15 10:15:00Identifier [id number]-
6 Result Time 2019-04-15 10:15:00Unknown





 Test Item  Value  Reference Range  Comments

 

 Unknown (test code = 1920-8)  25 U/L  Unknown  13-39  F



Ordering Physician [facility] Studies2019-04-15 10:15:00Identifier [id number]-
6 Result Time 2019-04-15 10:15:00Unknown





 Test Item  Value  Reference Range  Comments

 

 Unknown (test code = 6768-6)  153 U/L  Unknown    F



Ordering Physician [facility] Studies2019-04-15 10:15:00Identifier [id number]
6 Result Time 2019-04-15 10:15:00Unknown





 Test Item  Value  Reference Range  Comments

 

 Unknown (test code = 1759-0)  0.8   Unknown  1-3  F



Ordering Physician [facility] Studies2019-04-15 10:15:00Identifier [id number]
6 Result Time 2019-04-15 10:15:00Unknown





 Test Item  Value  Reference Range  Comments

 

 Unknown (test code = 33380-5)  4.0 g/dL  Unknown  3.2-5.2  F



Ordering Physician [facility] Studies2019-04-15 10:15:00Identifier [id number]
6 Result Time 2019-04-15 10:15:00Unknown





 Test Item  Value  Reference Range  Comments

 

 Unknown (test code = 1742-6)  13 U/L  Unknown  7-52  F



Ordering Physician [facility] Studies2019-04-15 10:15:00Identifier [id number]
6 Result Time 2019-04-15 10:15:00Unknown





 Test Item  Value  Reference Range  Comments

 

 Unknown (test code = 2524-7)  0.9 mmol/L  Unknown  0.5-2.0  F



Ordering Physician Unknown

## 2020-04-24 NOTE — XMS REPORT
Patient Summary Document

 Created on:2020



Patient:BERTHA SOLIS

Sex:Male

:1933

External Reference #:[id number]





Demographics







 Address  [address] [place] [address]

 

 Home Phone  [phone]

 

 Preferred Language  English

 

 Marital Status  Unknown

 

 Zoroastrian Affiliation  Unknown

 

 Race  Unknown

 

 Ethnic Group  Unknown









Author







 Organization  [facility]









Support







 Name  Relationship  Address  Phone

 

 IRAIS SOLSI, Unknown Name  Suha  [address]  [phone]



     [place] [address]  









Care Team Providers







 Name  Role  Phone

 

 Unavailable  Unavailable  Unavailable









Problems

This patient has no known problems.



Allergies, Adverse Reactions, Alerts







 Allergy  Allergy  Status  Severity  Reaction(s)  Onset  Inactive  Treating  
Comments



 Name  Type        Date  Date  Clinician  

 

 Uncoded  Unknown  Active  Unknown  Reaction  2019    Interface  



 free-text        Unknown  -18      



 allergy                







Medications







 Ordered  Filled  Start  Stop  Current  Ordering  Indication  Dosage  Frequency
  Signature  Comments  Components



 Medication  Medication  Date  Date  Medication?  Clinician        (SIG)    



 Name  Name                    

 

 Multivitami  Multivitami  2012    No  Unknown    Unknown  Unknown      



 ns/Minerals  ns/Minerals  -                  



 Tab*  Tab*                    

 

 amLODIPine  amLODIPine      No  Unknown    Unknown  Unknown      



 5 mg tablet  5 mg tablet  4-15                  

 

 acetaminoph  acetaminoph      No  Unknown    Unknown  Unknown      



 en 500 mg  en 500 mg  4-15                  



 tablet  tablet                    

 

 magnesium  magnesium      No  Unknown    Unknown  Unknown      



 oxide 400  oxide 400  4-15                  



 mg (241.3  mg (241.3                    



 mg  mg                    



 magnesium)  magnesium)                    



 tablet  tablet                    

 

 omeprazole  omeprazole      No  Unknown    Unknown  Unknown      



 40 mg  40 mg  4-15                  



 capsule,del  capsule,del                    



 ayed  ayed                    



 release  release                    

 

 verapamil  verapamil      No  Unknown    Unknown  Unknown      



  mg   mg  4-15                  



 capsule,ext  capsule,ext                    



 ended  ended                    



 release  release                    

 

 Calcium  Calcium      No  Unknown    Unknown  Unknown      



 Carbonate/V  Carbonate/V  4-15                  



 itamin D3  itamin D3                    

 

 cefdinir  cefdinir      No  Unknown    Unknown  Unknown      



 300 mg  300 mg  4-17                  



 capsule  capsule                    







Vital Signs







 Vital Name  Observation Time  Observation Value  Comments

 

 SYSTOLIC mm[Hg]  2019 18:05:21  135 mm[Hg] mm[Hg]  Method: Sit

 

 DIASTOLIC mm[Hg]  2019 18:05:21  48 mm[Hg] mm[Hg]  Method: Sit

 

 PULSE  2019 18:05:21  63 /min /min  

 

 RESP RATE  2019 18:05:21  16 /min /min  

 

 TEMP  2019 18:05:21  97.4 [degF]  







Procedures

This patient has no known procedures.



Results







 Test Description  Test Time  Test Comments  Text Results  Atomic Results  
Result Comments









 Laboratory Studies  2019 08:13:00  Identifier 13714-3 Result Time  
Unknown



     2019 08:13:00  









   Test Item  Value  Reference Range  Comments









 Unknown (test code = 2951-2)  136 mmol/L  Unknown  135-145  F



Ordering Physician [facility] Gmopvbn7698-36-32 08:13:00Identifier [id number]-
6 Result Time 2019 08:13:00Unknown





 Test Item  Value  Reference Range  Comments

 

 Unknown (test code = 2823-3)  4.0 mmol/L  Unknown  3.5-5.0  F



Ordering Physician [facility] Cvkacpq5204-73-08 08:13:00Identifier [id number]-
6 Result Time 2019 08:13:00Unknown





 Test Item  Value  Reference Range  Comments

 

 Unknown (test code = 2345-7)  87 mg/dL  Unknown    F



Ordering Physician [facility] Vquqgkc8594-03-88 08:13:00Identifier [id number]-
6 Result Time 2019 08:13:00Unknown





 Test Item  Value  Reference Range  Comments

 

 Unknown (test code = 48642-3)  46.9   Unknown  Unknown  F



Ordering Physician [facility] Dxpggij7812-97-68 08:13:00Identifier [id number]
6 Result Time 2019 08:13:00Unknown





 Test Item  Value  Reference Range  Comments

 

 Unknown (test code = NullTestCode)  56.7   Unknown  Unknown  F



Ordering Physician [facility] Wvoqcwb6289-13-55 08:13:00Identifier [id number]-
6 Result Time 2019 08:13:00Unknown





 Test Item  Value  Reference Range  Comments

 

 Unknown (test code = 2160-0)  1.43 mg/dL  Unknown  0.67-1.17  F



Ordering Physician [facility] Goezcag0702-21-96 08:13:00Identifier [id number]
6 Result Time 2019 08:13:00Unknown





 Test Item  Value  Reference Range  Comments

 

 Unknown (test code = 2075-0)  104 mmol/L  Unknown  101-111  F



Ordering Physician [facility] Xhljosd5848-05-61 08:13:00Identifier [id number]-
6 Result Time 2019 08:13:00Unknown





 Test Item  Value  Reference Range  Comments

 

 Unknown (test code = 2028-9)  24 mmol/L  Unknown  22-32  F



Ordering Physician [facility] Syjnidh7396-60-39 08:13:00Identifier [id number]-
6 Result Time 2019 08:13:00Unknown





 Test Item  Value  Reference Range  Comments

 

 Unknown (test code = 36621-7)  9.0 mg/dL  Unknown  8.6-10.3  F



Ordering Physician [facility] Fvnsave1735-96-61 08:13:00Identifier [id number]-
6 Result Time 2019 08:13:00Unknown





 Test Item  Value  Reference Range  Comments

 

 Unknown (test code = 3094-0)  24 mg/dL  Unknown  6-24  F



Ordering Physician [facility] Querlua7446-04-54 08:13:00Identifier [id number]
6 Result Time 2019 08:13:00Unknown





 Test Item  Value  Reference Range  Comments

 

 Unknown (test code = 3097-3)  16.8   Unknown  8-20  F



Ordering Physician [facility] Lgbdhml9026-58-98 08:13:00Identifier [id number]-
6 Result Time 2019 08:13:00Unknown





 Test Item  Value  Reference Range  Comments

 

 Unknown (test code = 33037-3)  8 mmol/L  Unknown  2-11  F



Ordering Physician [facility] Hkqvblo0561-62-64 08:13:00Identifier [id number]-
6 Result Time 2019 08:13:00Unknown





 Test Item  Value  Reference Range  Comments

 

 Unknown (test code = 04301-3)  27.4 10^3/uL  Unknown  3.5-10.8  F



Ordering Physician [facility] Drwitng7331-25-18 08:13:00Identifier [id number]-
6 Result Time 2019 08:13:00Unknown





 Test Item  Value  Reference Range  Comments

 

 Unknown (test code = 788-0)  24 %  Unknown  10.5-15  F



Ordering Physician [facility] Skntidx5511-90-05 08:13:00Identifier [id number]-
6 Result Time 2019 08:13:00Unknown





 Test Item  Value  Reference Range  Comments

 

 Unknown (test code = 789-8)  5.19 10^6 /uL  Unknown  4.18-5.48  F



Ordering Physician [facility] Ngihmjg5538-59-37 08:13:00Identifier [id number]-
6 Result Time 2019 08:13:00Unknown





 Test Item  Value  Reference Range  Comments

 

 Unknown (test code = 777-3)  330 10^3/uL  Unknown  150-450  F



Ordering Physician [facility] Wvgjhfp5464-42-93 08:13:00Identifier [id number]-
6 Result Time 2019 08:13:00Unknown





 Test Item  Value  Reference Range  Comments

 

 Unknown (test code = 85532-8)  0   Unknown  Unknown  F



Ordering Physician [facility] Yevnjlv2807-52-99 08:13:00Identifier [id number]-
6 Result Time 2019 08:13:00Unknown





 Test Item  Value  Reference Range  Comments

 

 Unknown (test code = 771-6)  0 10^3/ul  Unknown  Unknown  F



Ordering Physician [facility] Eirrcyz4816-14-38 08:13:00Identifier [id number]-
6 Result Time 2019 08:13:00Unknown





 Test Item  Value  Reference Range  Comments

 

 Unknown (test code = 770-8)  84.7 %  Unknown  Unknown  F



Ordering Physician [facility] Iccdvbl0727-03-13 08:13:00Identifier [id number]-
6 Result Time 2019 08:13:00Unknown





 Test Item  Value  Reference Range  Comments

 

 Unknown (test code = 5905-5)  8.6 %  Unknown  Unknown  F



Ordering Physician [facility] Dcvekpx2261-47-32 08:13:00Identifier [id number]-
6 Result Time 2019 08:13:00Unknown





 Test Item  Value  Reference Range  Comments

 

 Unknown (test code = 64805-4)  8.6 fL  Unknown  7.4-10.4  F



Ordering Physician [facility] Rbanxlw3682-88-47 08:13:00Identifier [id number]-
6 Result Time 2019 08:13:00Unknown





 Test Item  Value  Reference Range  Comments

 

 Unknown (test code = 787-2)  78 fL  Unknown  80-94  F



Ordering Physician [facility] Ipfamfj5750-68-72 08:13:00Identifier [id number]-
6 Result Time 2019 08:13:00Unknown





 Test Item  Value  Reference Range  Comments

 

 Unknown (test code = 786-4)  31 g/dL  Unknown  31-36  F



Ordering Physician [facility] Hreypbv0426-05-49 08:13:00Identifier [id number]-
6 Result Time 2019 08:13:00Unknown





 Test Item  Value  Reference Range  Comments

 

 Unknown (test code = 785-6)  24 pg  Unknown  27-31  F



Ordering Physician [facility] Vjttwuu4145-58-31 08:13:00Identifier [id number]-
6 Result Time 2019 08:13:00Unknown





 Test Item  Value  Reference Range  Comments

 

 Unknown (test code = 736-9)  5.4 %  Unknown  Unknown  F



Ordering Physician [facility] Jcnlpkt4498-70-43 08:13:00Identifier [id number]-
6 Result Time 2019 08:13:00Unknown





 Test Item  Value  Reference Range  Comments

 

 Unknown (test code = 718-7)  12.3 g/dL  Unknown  14.0-18.0  F



Ordering Physician [facility] Enqcryi5539-30-90 08:13:00Identifier [id number]-
6 Result Time 2019 08:13:00Unknown





 Test Item  Value  Reference Range  Comments

 

 Unknown (test code = 4544-3)  40 %  Unknown  36-46  F



Ordering Physician [facility] Jhsktpl6604-66-56 08:13:00Identifier [id number]-
6 Result Time 2019 08:13:00Unknown





 Test Item  Value  Reference Range  Comments

 

 Unknown (test code = 713-8)  0.8 %  Unknown  Unknown  F



Ordering Physician [facility] Ivetqdh9876-63-55 08:13:00Identifier [id number]-
6 Result Time 2019 08:13:00Unknown





 Test Item  Value  Reference Range  Comments

 

 Unknown (test code = 706-2)  0.5 %  Unknown  Unknown  F



Ordering Physician [facility] Qzfcqma8621-55-47 08:13:00Identifier [id number]-
6 Result Time 2019 08:13:00Unknown





 Test Item  Value  Reference Range  Comments

 

 Unknown (test code = WME0636)  23.2 10^3/ul  Unknown  1.5-7.7  F



Ordering Physician [facility] Dwkzsks9527-60-24 08:13:00Identifier [id number]-
6 Result Time 2019 08:13:00Unknown





 Test Item  Value  Reference Range  Comments

 

 Unknown (test code = 742-7)  2.4 10^3/ul  Unknown  0-0.8  F



Ordering Physician [facility] Awbopgb5214-39-20 08:13:00Identifier [id number]-
6 Result Time 2019 08:13:00Unknown





 Test Item  Value  Reference Range  Comments

 

 Unknown (test code = 731-0)  1.5 10^3/ul  Unknown  1.0-4.8  F



Ordering Physician [facility] Ukpvelc6037-68-15 08:13:00Identifier [id number]-
6 Result Time 2019 08:13:00Unknown





 Test Item  Value  Reference Range  Comments

 

 Unknown (test code = 711-2)  0.2 10^3/ul  Unknown  0-0.6  F



Ordering Physician [facility] Wtzyolm1555-98-85 08:13:00Identifier [id number]-
6 Result Time 2019 08:13:00Unknown





 Test Item  Value  Reference Range  Comments

 

 Unknown (test code = 704-7)  0.1 10^3/ul  Unknown  0-0.2  F



Ordering Physician [facility] Lwfybal3904-25-61 06:46:00Identifier [id number]-
6 Result Time 2019 06:46:00Unknown





 Test Item  Value  Reference Range  Comments

 

 Unknown (test code = 75477-5)  0.05 ng/mL  Unknown  Unknown  F



Ordering Physician [facility] Hrkypqw1910-14-82 06:46:00Identifier [id number]-
6 Result Time 2019 06:46:00Unknown





 Test Item  Value  Reference Range  Comments

 

 Unknown (test code = 2777-1)  2.8 mg/dL  Unknown  2.5-5.0  F



Ordering Physician [facility] Vkbtnwz3959-88-82 06:46:00Identifier [id number]-
6 Result Time 2019 06:46:00Unknown





 Test Item  Value  Reference Range  Comments

 

 Unknown (test code = 86834-0)  2.5 mg/dL  Unknown  1.9-2.7  F



Ordering Physician [facility] Studies2019-04-15 10:48:00Identifier [id number]-
6 Result Time 2019-04-15 10:48:00Unknown





 Test Item  Value  Reference Range  Comments

 

 Unknown (test code = NullTestCode)  6.0   Unknown  5-9  F



Ordering Physician [facility] Studies2019-04-15 10:48:00Identifier [id number]
6 Result Time 2019-04-15 10:48:00Unknown





 Test Item  Value  Reference Range  Comments

 

 Unknown (test code = 75958-2)  1.013   Unknown  1.010-1.030  F



Ordering Physician [facility] Studies2019-04-15 10:15:00Identifier [id number]-
6 Result Time 2019-04-15 10:15:00Unknown





 Test Item  Value  Reference Range  Comments

 

 Unknown (test code = 3016-3)  3.38 mcIU/mL  Unknown  0.34-5.60  F



Ordering Physician [facility] Studies2019-04-15 10:15:00Identifier [id number]-
6 Result Time 2019-04-15 10:15:00Unknown





 Test Item  Value  Reference Range  Comments

 

 Unknown (test code = 26208-1)  1.14   Unknown  0.77-1.02  F



Ordering Physician [facility] Studies2019-04-15 10:15:00Identifier [id number]
6 Result Time 2019-04-15 10:15:00Unknown





 Test Item  Value  Reference Range  Comments

 

 Unknown (test code = 61270-4)  365 pg/mL  Unknown  Unknown  F



Ordering Physician [facility] Studies2019-04-15 10:15:00Identifier [id number]-
6 Result Time 2019-04-15 10:15:00Unknown





 Test Item  Value  Reference Range  Comments

 

 Unknown (test code = 2885-2)  8.9 g/dL  Unknown  6.4-8.9  F



Ordering Physician [facility] Studies2019-04-15 10:15:00Identifier [id number]-
6 Result Time 2019-04-15 10:15:00Unknown





 Test Item  Value  Reference Range  Comments

 

 Unknown (test code = 1975-2)  0.60 mg/dL  Unknown  0.2-1.0  F



Ordering Physician [facility] Studies2019-04-15 10:15:00Identifier [id number]-
6 Result Time 2019-04-15 10:15:00Unknown





 Test Item  Value  Reference Range  Comments

 

 Unknown (test code = NullTestCode)  4.9 g/dL  Unknown  2-4  F



Ordering Physician [facility] Studies2019-04-15 10:15:00Identifier [id number]
6 Result Time 2019-04-15 10:15:00Unknown





 Test Item  Value  Reference Range  Comments

 

 Unknown (test code = 1988-5)  11.17 mg/L  Unknown  0-8.00  F



Ordering Physician [facility] Studies2019-04-15 10:15:00Identifier [id number]-
6 Result Time 2019-04-15 10:15:00Unknown





 Test Item  Value  Reference Range  Comments

 

 Unknown (test code = 1920-8)  25 U/L  Unknown  13-39  F



Ordering Physician [facility] Studies2019-04-15 10:15:00Identifier [id number]-
6 Result Time 2019-04-15 10:15:00Unknown





 Test Item  Value  Reference Range  Comments

 

 Unknown (test code = 6768-6)  153 U/L  Unknown    F



Ordering Physician [facility] Studies2019-04-15 10:15:00Identifier [id number]-
6 Result Time 2019-04-15 10:15:00Unknown





 Test Item  Value  Reference Range  Comments

 

 Unknown (test code = 1759-0)  0.8   Unknown  1-3  F



Ordering Physician [facility] Studies2019-04-15 10:15:00Identifier [id number]-
6 Result Time 2019-04-15 10:15:00Unknown





 Test Item  Value  Reference Range  Comments

 

 Unknown (test code = 95965-7)  4.0 g/dL  Unknown  3.2-5.2  F



Ordering Physician [facility] Studies2019-04-15 10:15:00Identifier [id number]-
6 Result Time 2019-04-15 10:15:00Unknown





 Test Item  Value  Reference Range  Comments

 

 Unknown (test code = 1742-6)  13 U/L  Unknown  7-52  F



Ordering Physician [facility] Studies2019-04-15 10:15:00Identifier [id number]
6 Result Time 2019-04-15 10:15:00Unknown





 Test Item  Value  Reference Range  Comments

 

 Unknown (test code = 2524-7)  0.9 mmol/L  Unknown  0.5-2.0  F



Ordering Physician Unknown

## 2020-04-24 NOTE — XMS REPORT
Patient Summary Document

 Created on:2020



Patient:BERTHA SOLIS

Sex:Male

:1933

External Reference #:[id number]





Demographics







 Address  [address] [place] [id number]

 

 Home Phone  [phone]

 

 Preferred Language  English

 

 Marital Status  Unknown

 

 Voodoo Affiliation  Unknown

 

 Race  Unknown

 

 Ethnic Group  Unknown









Author







 Organization  [facility]









Support







 Name  Relationship  Address  Phone

 

 IRAIS SOLIS, Unknown Name  Suha  [address]  [phone]



     [place] [address]  









Care Team Providers







 Name  Role  Phone

 

 Unavailable  Unavailable  Unavailable









Problems

This patient has no known problems.



Allergies, Adverse Reactions, Alerts







 Allergy  Allergy  Status  Severity  Reaction(s)  Onset  Inactive  Treating  
Comments



 Name  Type        Date  Date  Clinician  

 

 Uncoded  Unknown  Active  Unknown  Reaction  2019    Interface  



 free-text        Unknown  -18      



 allergy                







Medications







 Ordered  Filled  Start  Stop  Current  Ordering  Indication  Dosage  Frequency
  Signature  Comments  Components



 Medication  Medication  Date  Date  Medication?  Clinician        (SIG)    



 Name  Name                    

 

 Multivitami  Multivitami  2012    No  Unknown    Unknown  Unknown      



 ns/Minerals  ns/Minerals  -                  



 Tab*  Tab*                    

 

 amLODIPine  amLODIPine      No  Unknown    Unknown  Unknown      



 5 mg tablet  5 mg tablet  4-15                  

 

 acetaminoph  acetaminoph      No  Unknown    Unknown  Unknown      



 en 500 mg  en 500 mg  4-15                  



 tablet  tablet                    

 

 magnesium  magnesium      No  Unknown    Unknown  Unknown      



 oxide 400  oxide 400  4-15                  



 mg (241.3  mg (241.3                    



 mg  mg                    



 magnesium)  magnesium)                    



 tablet  tablet                    

 

 omeprazole  omeprazole      No  Unknown    Unknown  Unknown      



 40 mg  40 mg  4-15                  



 capsule,del  capsule,del                    



 ayed  ayed                    



 release  release                    

 

 verapamil  verapamil      No  Unknown    Unknown  Unknown      



  mg   mg  4-15                  



 capsule,ext  capsule,ext                    



 ended  ended                    



 release  release                    

 

 Calcium  Calcium      No  Unknown    Unknown  Unknown      



 Carbonate/V  Carbonate/V  4-15                  



 itamin D3  itamin D3                    

 

 cefdinir  cefdinir      No  Unknown    Unknown  Unknown      



 300 mg  300 mg  4-17                  



 capsule  capsule                    







Vital Signs







 Vital Name  Observation Time  Observation Value  Comments

 

 SYSTOLIC mm[Hg]  2019 18:05:21  135 mm[Hg] mm[Hg]  Method: Sit

 

 DIASTOLIC mm[Hg]  2019 18:05:21  48 mm[Hg] mm[Hg]  Method: Sit

 

 PULSE  2019 18:05:21  63 /min /min  

 

 RESP RATE  2019 18:05:21  16 /min /min  

 

 TEMP  2019 18:05:21  97.4 [degF]  







Procedures

This patient has no known procedures.



Results







 Test Description  Test Time  Test Comments  Text Results  Atomic Results  
Result Comments









 Laboratory Studies  2019 08:13:00  Identifier [id number] Result Time  
Unknown



     2019 08:13:00  









   Test Item  Value  Reference Range  Comments









 Unknown (test code = 2951-2)  136 mmol/L  Unknown  135-145  F



Ordering Physician [facility] Aclxjwb9491-37-19 08:13:00Identifier [id number]
6 Result Time 2019 08:13:00Unknown





 Test Item  Value  Reference Range  Comments

 

 Unknown (test code = 2823-3)  4.0 mmol/L  Unknown  3.5-5.0  F



Ordering Physician [facility] Ulwfgod5585-06-76 08:13:00Identifier [id number]
6 Result Time 2019 08:13:00Unknown





 Test Item  Value  Reference Range  Comments

 

 Unknown (test code = 2345-7)  87 mg/dL  Unknown    F



Ordering Physician [facility] Czpyhno5838-79-27 08:13:00Identifier [id number]-
6 Result Time 2019 08:13:00Unknown





 Test Item  Value  Reference Range  Comments

 

 Unknown (test code = 48642-3)  46.9   Unknown  Unknown  F



Ordering Physician [facility] Opxbcnt5972-92-04 08:13:00Identifier [id number]-
6 Result Time 2019 08:13:00Unknown





 Test Item  Value  Reference Range  Comments

 

 Unknown (test code = NullTestCode)  56.7   Unknown  Unknown  F



Ordering Physician [facility] Pnxhuis3203-26-82 08:13:00Identifier [id number]-
6 Result Time 2019 08:13:00Unknown





 Test Item  Value  Reference Range  Comments

 

 Unknown (test code = 2160-0)  1.43 mg/dL  Unknown  0.67-1.17  F



Ordering Physician [facility] Jqimvfx5363-98-23 08:13:00Identifier [id number]-
6 Result Time 2019 08:13:00Unknown





 Test Item  Value  Reference Range  Comments

 

 Unknown (test code = 2075-0)  104 mmol/L  Unknown  101-111  F



Ordering Physician [facility] Fsvuufx6049-14-36 08:13:00Identifier [id number]
6 Result Time 2019 08:13:00Unknown





 Test Item  Value  Reference Range  Comments

 

 Unknown (test code = 2028-9)  24 mmol/L  Unknown  22-32  F



Ordering Physician [facility] Gwaytsu4868-10-80 08:13:00Identifier [id number]
6 Result Time 2019 08:13:00Unknown





 Test Item  Value  Reference Range  Comments

 

 Unknown (test code = 65347-7)  9.0 mg/dL  Unknown  8.6-10.3  F



Ordering Physician [facility] Cjmunxu4415-74-79 08:13:00Identifier [id number]
6 Result Time 2019 08:13:00Unknown





 Test Item  Value  Reference Range  Comments

 

 Unknown (test code = 3094-0)  24 mg/dL  Unknown  6-24  F



Ordering Physician [facility] Fmdycjb6864-07-30 08:13:00Identifier [id number]-
6 Result Time 2019 08:13:00Unknown





 Test Item  Value  Reference Range  Comments

 

 Unknown (test code = 3097-3)  16.8   Unknown  8-20  F



Ordering Physician [facility] Qqezirs8558-68-87 08:13:00Identifier [id number]-
6 Result Time 2019 08:13:00Unknown





 Test Item  Value  Reference Range  Comments

 

 Unknown (test code = 33037-3)  8 mmol/L  Unknown  2-11  F



Ordering Physician [facility] Vhytcyj6488-49-92 08:13:00Identifier [id number]-
6 Result Time 2019 08:13:00Unknown





 Test Item  Value  Reference Range  Comments

 

 Unknown (test code = 11635-6)  27.4 10^3/uL  Unknown  3.5-10.8  F



Ordering Physician [facility] Ksgzenu6934-39-59 08:13:00Identifier [id number]-
6 Result Time 2019 08:13:00Unknown





 Test Item  Value  Reference Range  Comments

 

 Unknown (test code = 788-0)  24 %  Unknown  10.5-15  F



Ordering Physician [facility] Ihvpslx8282-94-40 08:13:00Identifier [id number]-
6 Result Time 2019 08:13:00Unknown





 Test Item  Value  Reference Range  Comments

 

 Unknown (test code = 789-8)  5.19 10^6 /uL  Unknown  4.18-5.48  F



Ordering Physician [facility] Bdijahl3434-24-71 08:13:00Identifier [id number]
6 Result Time 2019 08:13:00Unknown





 Test Item  Value  Reference Range  Comments

 

 Unknown (test code = 777-3)  330 10^3/uL  Unknown  150-450  F



Ordering Physician [facility] Sonzthw9091-28-87 08:13:00Identifier [id number]
6 Result Time 2019 08:13:00Unknown





 Test Item  Value  Reference Range  Comments

 

 Unknown (test code = 02840-3)  0   Unknown  Unknown  F



Ordering Physician [facility] Ptvfttu3952-79-05 08:13:00Identifier [id number]-
6 Result Time 2019 08:13:00Unknown





 Test Item  Value  Reference Range  Comments

 

 Unknown (test code = 771-6)  0 10^3/ul  Unknown  Unknown  F



Ordering Physician [facility] Vkfdfws2919-84-41 08:13:00Identifier [id number]-
6 Result Time 2019 08:13:00Unknown





 Test Item  Value  Reference Range  Comments

 

 Unknown (test code = 770-8)  84.7 %  Unknown  Unknown  F



Ordering Physician [facility] Hsejjlh4365-27-78 08:13:00Identifier [id number]-
6 Result Time 2019 08:13:00Unknown





 Test Item  Value  Reference Range  Comments

 

 Unknown (test code = 5905-5)  8.6 %  Unknown  Unknown  F



Ordering Physician [facility] Qnashqf7581-65-99 08:13:00Identifier [id number]-
6 Result Time 2019 08:13:00Unknown





 Test Item  Value  Reference Range  Comments

 

 Unknown (test code = 48227-6)  8.6 fL  Unknown  7.4-10.4  F



Ordering Physician [facility] Yodpxbe1905-08-94 08:13:00Identifier [id number]-
6 Result Time 2019 08:13:00Unknown





 Test Item  Value  Reference Range  Comments

 

 Unknown (test code = 787-2)  78 fL  Unknown  80-94  F



Ordering Physician [facility] Vxhjbvw2479-27-03 08:13:00Identifier [id number]-
6 Result Time 2019 08:13:00Unknown





 Test Item  Value  Reference Range  Comments

 

 Unknown (test code = 786-4)  31 g/dL  Unknown  31-36  F



Ordering Physician [facility] Limzcdw9889-23-57 08:13:00Identifier [id number]-
6 Result Time 2019 08:13:00Unknown





 Test Item  Value  Reference Range  Comments

 

 Unknown (test code = 785-6)  24 pg  Unknown  27-31  F



Ordering Physician [facility] Sogdtuz1542-75-15 08:13:00Identifier [id number]-
6 Result Time 2019 08:13:00Unknown





 Test Item  Value  Reference Range  Comments

 

 Unknown (test code = 736-9)  5.4 %  Unknown  Unknown  F



Ordering Physician [facility] Vjqtcvt8163-10-28 08:13:00Identifier [id number]-
6 Result Time 2019 08:13:00Unknown





 Test Item  Value  Reference Range  Comments

 

 Unknown (test code = 718-7)  12.3 g/dL  Unknown  14.0-18.0  F



Ordering Physician [facility] Pmpzvst0350-36-89 08:13:00Identifier [id number]-
6 Result Time 2019 08:13:00Unknown





 Test Item  Value  Reference Range  Comments

 

 Unknown (test code = 4544-3)  40 %  Unknown  36-46  F



Ordering Physician [facility] Eofucbn5053-89-65 08:13:00Identifier [id number]-
6 Result Time 2019 08:13:00Unknown





 Test Item  Value  Reference Range  Comments

 

 Unknown (test code = 713-8)  0.8 %  Unknown  Unknown  F



Ordering Physician [facility] Latbznt5230-36-20 08:13:00Identifier [id number]-
6 Result Time 2019 08:13:00Unknown





 Test Item  Value  Reference Range  Comments

 

 Unknown (test code = 706-2)  0.5 %  Unknown  Unknown  F



Ordering Physician [facility] Crtnazh6810-44-73 08:13:00Identifier [id number]-
6 Result Time 2019 08:13:00Unknown





 Test Item  Value  Reference Range  Comments

 

 Unknown (test code = IKN8259)  23.2 10^3/ul  Unknown  1.5-7.7  F



Ordering Physician [facility] Svuewih0400-00-97 08:13:00Identifier [id number]-
6 Result Time 2019 08:13:00Unknown





 Test Item  Value  Reference Range  Comments

 

 Unknown (test code = 742-7)  2.4 10^3/ul  Unknown  0-0.8  F



Ordering Physician [facility] Ihtmupz2727-24-40 08:13:00Identifier [id number]-
6 Result Time 2019 08:13:00Unknown





 Test Item  Value  Reference Range  Comments

 

 Unknown (test code = 731-0)  1.5 10^3/ul  Unknown  1.0-4.8  F



Ordering Physician [facility] Aybsfyp2989-94-53 08:13:00Identifier [id number]-
6 Result Time 2019 08:13:00Unknown





 Test Item  Value  Reference Range  Comments

 

 Unknown (test code = 711-2)  0.2 10^3/ul  Unknown  0-0.6  F



Ordering Physician [facility] Jchsayq0037-01-40 08:13:00Identifier [id number]-
6 Result Time 2019 08:13:00Unknown





 Test Item  Value  Reference Range  Comments

 

 Unknown (test code = 704-7)  0.1 10^3/ul  Unknown  0-0.2  F



Ordering Physician [facility] Gwkrvjc4426-81-52 06:46:00Identifier [id number]-
6 Result Time 2019 06:46:00Unknown





 Test Item  Value  Reference Range  Comments

 

 Unknown (test code = 23791-1)  0.05 ng/mL  Unknown  Unknown  F



Ordering Physician [facility] Ukkzffi9373-44-61 06:46:00Identifier [id number]-
6 Result Time 2019 06:46:00Unknown





 Test Item  Value  Reference Range  Comments

 

 Unknown (test code = 2777-1)  2.8 mg/dL  Unknown  2.5-5.0  F



Ordering Physician [facility] Bibnfiu9200-34-32 06:46:00Identifier [id number]-
6 Result Time 2019 06:46:00Unknown





 Test Item  Value  Reference Range  Comments

 

 Unknown (test code = 60465-4)  2.5 mg/dL  Unknown  1.9-2.7  F



Ordering Physician [facility] Studies2019-04-15 10:48:00Identifier [id number]-
6 Result Time 2019-04-15 10:48:00Unknown





 Test Item  Value  Reference Range  Comments

 

 Unknown (test code = NullTestCode)  6.0   Unknown  5-9  F



Ordering Physician [facility] Studies2019-04-15 10:48:00Identifier [id number]-
6 Result Time 2019-04-15 10:48:00Unknown





 Test Item  Value  Reference Range  Comments

 

 Unknown (test code = 13059-3)  1.013   Unknown  1.010-1.030  F



Ordering Physician [facility] Studies2019-04-15 10:15:00Identifier [id number]-
6 Result Time 2019-04-15 10:15:00Unknown





 Test Item  Value  Reference Range  Comments

 

 Unknown (test code = 3016-3)  3.38 mcIU/mL  Unknown  0.34-5.60  F



Ordering Physician [facility] Studies2019-04-15 10:15:00Identifier [id number]
6 Result Time 2019-04-15 10:15:00Unknown





 Test Item  Value  Reference Range  Comments

 

 Unknown (test code = 53569-9)  1.14   Unknown  0.77-1.02  F



Ordering Physician [facility] Studies2019-04-15 10:15:00Identifier [id number]-
6 Result Time 2019-04-15 10:15:00Unknown





 Test Item  Value  Reference Range  Comments

 

 Unknown (test code = 82946-4)  365 pg/mL  Unknown  Unknown  F



Ordering Physician [facility] Studies2019-04-15 10:15:00Identifier [id number]-
6 Result Time 2019-04-15 10:15:00Unknown





 Test Item  Value  Reference Range  Comments

 

 Unknown (test code = 2885-2)  8.9 g/dL  Unknown  6.4-8.9  F



Ordering Physician [facility] Studies2019-04-15 10:15:00Identifier [id number]
6 Result Time 2019-04-15 10:15:00Unknown





 Test Item  Value  Reference Range  Comments

 

 Unknown (test code = 1975-2)  0.60 mg/dL  Unknown  0.2-1.0  F



Ordering Physician [facility] Studies2019-04-15 10:15:00Identifier [id number]
6 Result Time 2019-04-15 10:15:00Unknown





 Test Item  Value  Reference Range  Comments

 

 Unknown (test code = NullTestCode)  4.9 g/dL  Unknown  2-4  F



Ordering Physician [facility] Studies2019-04-15 10:15:00Identifier [id number]-
6 Result Time 2019-04-15 10:15:00Unknown





 Test Item  Value  Reference Range  Comments

 

 Unknown (test code = 1988-5)  11.17 mg/L  Unknown  0-8.00  F



Ordering Physician [facility] Studies2019-04-15 10:15:00Identifier [id number]-
6 Result Time 2019-04-15 10:15:00Unknown





 Test Item  Value  Reference Range  Comments

 

 Unknown (test code = 1920-8)  25 U/L  Unknown  13-39  F



Ordering Physician [facility] Studies2019-04-15 10:15:00Identifier [id number]-
6 Result Time 2019-04-15 10:15:00Unknown





 Test Item  Value  Reference Range  Comments

 

 Unknown (test code = 6768-6)  153 U/L  Unknown    F



Ordering Physician [facility] Studies2019-04-15 10:15:00Identifier [id number]
6 Result Time 2019-04-15 10:15:00Unknown





 Test Item  Value  Reference Range  Comments

 

 Unknown (test code = 1759-0)  0.8   Unknown  1-3  F



Ordering Physician [facility] Studies2019-04-15 10:15:00Identifier [id number]
6 Result Time 2019-04-15 10:15:00Unknown





 Test Item  Value  Reference Range  Comments

 

 Unknown (test code = 61607-6)  4.0 g/dL  Unknown  3.2-5.2  F



Ordering Physician [facility] Studies2019-04-15 10:15:00Identifier [id number]
6 Result Time 2019-04-15 10:15:00Unknown





 Test Item  Value  Reference Range  Comments

 

 Unknown (test code = 1742-6)  13 U/L  Unknown  7-52  F



Ordering Physician [facility] Studies2019-04-15 10:15:00Identifier [id number]-
6 Result Time 2019-04-15 10:15:00Unknown





 Test Item  Value  Reference Range  Comments

 

 Unknown (test code = 2524-7)  0.9 mmol/L  Unknown  0.5-2.0  F



Ordering Physician Unknown

## 2020-04-24 NOTE — XMS REPORT
Patient Summary Document

 Created on:2020



Patient:BERTHA SOLIS

Sex:Male

:1933

External Reference #:[id number]





Demographics







 Address  [address] [place] [id number]

 

 Home Phone  [phone]

 

 Preferred Language  English

 

 Marital Status  Unknown

 

 Latter day Affiliation  Unknown

 

 Race  White

 

 Ethnic Group  Unknown









Author







 Organization  [facility]









Support







 Name  Relationship  Address  Phone

 

 IRAIS SOLIS, Unknown Name  Suha  [address] [facility]  [phone]



     [place] [address]  









Care Team Providers







 Name  Role  Phone

 

 Unavailable  Unavailable  Unavailable









Problems

This patient has no known problems.



Allergies, Adverse Reactions, Alerts







 Allergy  Allergy  Status  Severity  Reaction(s)  Onset  Inactive  Treating  
Comments



 Name  Type        Date  Date  Clinician  

 

 Uncoded  Unknown  Active  Unknown  Reaction  2019    Interface  



 free-text        Unknown  -18      



 allergy                







Medications







 Ordered  Filled  Start  Stop  Current  Ordering  Indication  Dosage  Frequency
  Signature  Comments  Components



 Medication  Medication  Date  Date  Medication?  Clinician        (SIG)    



 Name  Name                    

 

 Multivitami  Multivitami  2012    No  Unknown    Unknown  Unknown      



 ns/Minerals  ns/Minerals  -                  



 Tab*  Tab*                    

 

 amLODIPine  amLODIPine      No  Unknown    Unknown  Unknown      



 5 mg tablet  5 mg tablet  4-15                  

 

 acetaminoph  acetaminoph      No  Unknown    Unknown  Unknown      



 en 500 mg  en 500 mg  4-15                  



 tablet  tablet                    

 

 magnesium  magnesium      No  Unknown    Unknown  Unknown      



 oxide 400  oxide 400  4-15                  



 mg (241.3  mg (241.3                    



 mg  mg                    



 magnesium)  magnesium)                    



 tablet  tablet                    

 

 omeprazole  omeprazole      No  Unknown    Unknown  Unknown      



 40 mg  40 mg  4-15                  



 capsule,del  capsule,del                    



 ayed  ayed                    



 release  release                    

 

 verapamiL  verapamiL      No  Unknown    Unknown  Unknown      



  mg   mg  4-15                  



 capsule,ext  capsule,ext                    



 ended  ended                    



 release  release                    

 

 Calcium  Calcium      No  Unknown    Unknown  Unknown      



 Carbonate/V  Carbonate/V  4-15                  



 itamin D3  itamin D3                    

 

 cefdinir  cefdinir      No  Unknown    Unknown  Unknown      



 300 mg  300 mg  4-17                  



 capsule  capsule                    







Vital Signs







 Vital Name  Observation Time  Observation Value  Comments

 

 SYSTOLIC mm[Hg]  2019 18:05:21  135 mm[Hg] mm[Hg]  Method: Sit

 

 DIASTOLIC mm[Hg]  2019 18:05:21  48 mm[Hg] mm[Hg]  Method: Sit

 

 PULSE  2019 18:05:21  63 /min /min  

 

 RESP RATE  2019 18:05:21  16 /min /min  

 

 TEMP  2019 18:05:21  97.4 [degF]  







Procedures

This patient has no known procedures.



Results







 Test Description  Test Time  Test Comments  Text Results  Atomic Results  
Result Comments









 Laboratory Studies  2019 08:13:00  Identifier [id number] Result Time  
Unknown



     2019 08:13:00  









   Test Item  Value  Reference Range  Comments









 Unknown (test code = 2951-2)  136 mmol/L  Unknown  135-145  F



Ordering Physician [facility] Urvfdku2425-27-04 08:13:00Identifier [id number]
6 Result Time 2019 08:13:00Unknown





 Test Item  Value  Reference Range  Comments

 

 Unknown (test code = 2823-3)  4.0 mmol/L  Unknown  3.5-5.0  F



Ordering Physician [facility] Prekans4305-61-93 08:13:00Identifier [id number]-
6 Result Time 2019 08:13:00Unknown





 Test Item  Value  Reference Range  Comments

 

 Unknown (test code = 2345-7)  87 mg/dL  Unknown    F



Ordering Physician [facility] Pticvpy6521-56-34 08:13:00Identifier [id number]-
6 Result Time 2019 08:13:00Unknown





 Test Item  Value  Reference Range  Comments

 

 Unknown (test code = 48642-3)  46.9   Unknown  Unknown  F



Ordering Physician [facility] Crgukdm7757-23-93 08:13:00Identifier [id number]-
6 Result Time 2019 08:13:00Unknown





 Test Item  Value  Reference Range  Comments

 

 Unknown (test code = NullTestCode)  56.7   Unknown  Unknown  F



Ordering Physician [facility] Megigmw1987-05-47 08:13:00Identifier [id number]
6 Result Time 2019 08:13:00Unknown





 Test Item  Value  Reference Range  Comments

 

 Unknown (test code = 2160-0)  1.43 mg/dL  Unknown  0.67-1.17  F



Ordering Physician [facility] Jlgmsgl7727-53-13 08:13:00Identifier [id number]-
6 Result Time 2019 08:13:00Unknown





 Test Item  Value  Reference Range  Comments

 

 Unknown (test code = 2075-0)  104 mmol/L  Unknown  101-111  F



Ordering Physician [facility] Qyiidrx7550-63-45 08:13:00Identifier [id number]
6 Result Time 2019 08:13:00Unknown





 Test Item  Value  Reference Range  Comments

 

 Unknown (test code = 2028-9)  24 mmol/L  Unknown  22-32  F



Ordering Physician [facility] Eiaxluo5245-47-21 08:13:00Identifier [id number]
6 Result Time 2019 08:13:00Unknown





 Test Item  Value  Reference Range  Comments

 

 Unknown (test code = 46368-8)  9.0 mg/dL  Unknown  8.6-10.3  F



Ordering Physician [facility] Nyuthak4256-57-49 08:13:00Identifier [id number]-
6 Result Time 2019 08:13:00Unknown





 Test Item  Value  Reference Range  Comments

 

 Unknown (test code = 3094-0)  24 mg/dL  Unknown  6-24  F



Ordering Physician [facility] Hbmsafi2507-72-37 08:13:00Identifier [id number]
6 Result Time 2019 08:13:00Unknown





 Test Item  Value  Reference Range  Comments

 

 Unknown (test code = 3097-3)  16.8   Unknown  8-20  F



Ordering Physician [facility] Cifxxgd2432-24-28 08:13:00Identifier [id number]-
6 Result Time 2019 08:13:00Unknown





 Test Item  Value  Reference Range  Comments

 

 Unknown (test code = 33037-3)  8 mmol/L  Unknown  2-11  F



Ordering Physician [facility] Ikstltq7190-56-18 08:13:00Identifier [id number]-
6 Result Time 2019 08:13:00Unknown





 Test Item  Value  Reference Range  Comments

 

 Unknown (test code = 00901-1)  27.4 10^3/uL  Unknown  3.5-10.8  F



Ordering Physician [facility] Rjmdwkb5783-90-84 08:13:00Identifier [id number]-
6 Result Time 2019 08:13:00Unknown





 Test Item  Value  Reference Range  Comments

 

 Unknown (test code = 788-0)  24 %  Unknown  10.5-15  F



Ordering Physician [facility] Xcatjzh6053-65-16 08:13:00Identifier [id number]-
6 Result Time 2019 08:13:00Unknown





 Test Item  Value  Reference Range  Comments

 

 Unknown (test code = 789-8)  5.19 10^6 /uL  Unknown  4.18-5.48  F



Ordering Physician [facility] Rnqfvll0815-96-42 08:13:00Identifier [id number]-
6 Result Time 2019 08:13:00Unknown





 Test Item  Value  Reference Range  Comments

 

 Unknown (test code = 777-3)  330 10^3/uL  Unknown  150-450  F



Ordering Physician [facility] Skagffl5239-50-02 08:13:00Identifier [id number]-
6 Result Time 2019 08:13:00Unknown





 Test Item  Value  Reference Range  Comments

 

 Unknown (test code = 86992-6)  0   Unknown  Unknown  F



Ordering Physician [facility] Rjltwci8044-12-58 08:13:00Identifier [id number]-
6 Result Time 2019 08:13:00Unknown





 Test Item  Value  Reference Range  Comments

 

 Unknown (test code = 771-6)  0 10^3/ul  Unknown  Unknown  F



Ordering Physician [facility] Tznabpm8496-47-11 08:13:00Identifier [id number]-
6 Result Time 2019 08:13:00Unknown





 Test Item  Value  Reference Range  Comments

 

 Unknown (test code = 770-8)  84.7 %  Unknown  Unknown  F



Ordering Physician [facility] Krsjtaf5342-84-80 08:13:00Identifier [id number]-
6 Result Time 2019 08:13:00Unknown





 Test Item  Value  Reference Range  Comments

 

 Unknown (test code = 5905-5)  8.6 %  Unknown  Unknown  F



Ordering Physician [facility] Ookxkgi9753-08-30 08:13:00Identifier [id number]-
6 Result Time 2019 08:13:00Unknown





 Test Item  Value  Reference Range  Comments

 

 Unknown (test code = 24743-0)  8.6 fL  Unknown  7.4-10.4  F



Ordering Physician [facility] Zdcabbt0898-95-14 08:13:00Identifier [id number]-
6 Result Time 2019 08:13:00Unknown





 Test Item  Value  Reference Range  Comments

 

 Unknown (test code = 787-2)  78 fL  Unknown  80-94  F



Ordering Physician [facility] Bffpvsf7895-40-29 08:13:00Identifier [id number]-
6 Result Time 2019 08:13:00Unknown





 Test Item  Value  Reference Range  Comments

 

 Unknown (test code = 786-4)  31 g/dL  Unknown  31-36  F



Ordering Physician [facility] Noqyqsn4829-85-64 08:13:00Identifier [id number]-
6 Result Time 2019 08:13:00Unknown





 Test Item  Value  Reference Range  Comments

 

 Unknown (test code = 785-6)  24 pg  Unknown  27-31  F



Ordering Physician [facility] Ywuxxvj2724-97-38 08:13:00Identifier [id number]-
6 Result Time 2019 08:13:00Unknown





 Test Item  Value  Reference Range  Comments

 

 Unknown (test code = 736-9)  5.4 %  Unknown  Unknown  F



Ordering Physician [facility] Opgkpxz9990-25-34 08:13:00Identifier [id number]-
6 Result Time 2019 08:13:00Unknown





 Test Item  Value  Reference Range  Comments

 

 Unknown (test code = 718-7)  12.3 g/dL  Unknown  14.0-18.0  F



Ordering Physician [facility] Hjogfsi4878-31-84 08:13:00Identifier [id number]-
6 Result Time 2019 08:13:00Unknown





 Test Item  Value  Reference Range  Comments

 

 Unknown (test code = 4544-3)  40 %  Unknown  36-46  F



Ordering Physician [facility] Euopxod4761-03-19 08:13:00Identifier [id number]-
6 Result Time 2019 08:13:00Unknown





 Test Item  Value  Reference Range  Comments

 

 Unknown (test code = 713-8)  0.8 %  Unknown  Unknown  F



Ordering Physician [facility] Jqwmaoa7501-13-84 08:13:00Identifier [id number]-
6 Result Time 2019 08:13:00Unknown





 Test Item  Value  Reference Range  Comments

 

 Unknown (test code = 706-2)  0.5 %  Unknown  Unknown  F



Ordering Physician [facility] Fbwtnfv8608-07-41 08:13:00Identifier [id number]-
6 Result Time 2019 08:13:00Unknown





 Test Item  Value  Reference Range  Comments

 

 Unknown (test code = TPU7135)  23.2 10^3/ul  Unknown  1.5-7.7  F



Ordering Physician [facility] Vfesqgg5598-52-19 08:13:00Identifier [id number]-
6 Result Time 2019 08:13:00Unknown





 Test Item  Value  Reference Range  Comments

 

 Unknown (test code = 742-7)  2.4 10^3/ul  Unknown  0-0.8  F



Ordering Physician [facility] Qmohybv0675-90-67 08:13:00Identifier [id number]-
6 Result Time 2019 08:13:00Unknown





 Test Item  Value  Reference Range  Comments

 

 Unknown (test code = 731-0)  1.5 10^3/ul  Unknown  1.0-4.8  F



Ordering Physician [facility] Ktfuikm0190-85-89 08:13:00Identifier [id number]-
6 Result Time 2019 08:13:00Unknown





 Test Item  Value  Reference Range  Comments

 

 Unknown (test code = 711-2)  0.2 10^3/ul  Unknown  0-0.6  F



Ordering Physician [facility] Lxzacco7421-01-05 08:13:00Identifier [id number]-
6 Result Time 2019 08:13:00Unknown





 Test Item  Value  Reference Range  Comments

 

 Unknown (test code = 704-7)  0.1 10^3/ul  Unknown  0-0.2  F



Ordering Physician [facility] Mifonrj1701-39-52 06:46:00Identifier [id number]-
6 Result Time 2019 06:46:00Unknown





 Test Item  Value  Reference Range  Comments

 

 Unknown (test code = 45427-3)  0.05 ng/mL  Unknown  Unknown  F



Ordering Physician [facility] Lxurtvv1838-45-46 06:46:00Identifier [id number]-
6 Result Time 2019 06:46:00Unknown





 Test Item  Value  Reference Range  Comments

 

 Unknown (test code = 2777-1)  2.8 mg/dL  Unknown  2.5-5.0  F



Ordering Physician [facility] Rvtwtzq2859-28-10 06:46:00Identifier [id number]-
6 Result Time 2019 06:46:00Unknown





 Test Item  Value  Reference Range  Comments

 

 Unknown (test code = 46640-7)  2.5 mg/dL  Unknown  1.9-2.7  F



Ordering Physician [facility] Studies2019-04-15 10:48:00Identifier [id number]-
6 Result Time 2019-04-15 10:48:00Unknown





 Test Item  Value  Reference Range  Comments

 

 Unknown (test code = NullTestCode)  6.0   Unknown  5-9  F



Ordering Physician [facility] Studies2019-04-15 10:48:00Identifier [id number]-
6 Result Time 2019-04-15 10:48:00Unknown





 Test Item  Value  Reference Range  Comments

 

 Unknown (test code = 63610-5)  1.013   Unknown  1.010-1.030  F



Ordering Physician [facility] Studies2019-04-15 10:15:00Identifier [id number]-
6 Result Time 2019-04-15 10:15:00Unknown





 Test Item  Value  Reference Range  Comments

 

 Unknown (test code = 3016-3)  3.38 mcIU/mL  Unknown  0.34-5.60  F



Ordering Physician [facility] Studies2019-04-15 10:15:00Identifier [id number]-
6 Result Time 2019-04-15 10:15:00Unknown





 Test Item  Value  Reference Range  Comments

 

 Unknown (test code = 77670-1)  1.14   Unknown  0.77-1.02  F



Ordering Physician [facility] Studies2019-04-15 10:15:00Identifier [id number]-
6 Result Time 2019-04-15 10:15:00Unknown





 Test Item  Value  Reference Range  Comments

 

 Unknown (test code = 47438-4)  365 pg/mL  Unknown  Unknown  F



Ordering Physician [facility] Studies2019-04-15 10:15:00Identifier [id number]-
6 Result Time 2019-04-15 10:15:00Unknown





 Test Item  Value  Reference Range  Comments

 

 Unknown (test code = 2885-2)  8.9 g/dL  Unknown  6.4-8.9  F



Ordering Physician [facility] Studies2019-04-15 10:15:00Identifier [id number]-
6 Result Time 2019-04-15 10:15:00Unknown





 Test Item  Value  Reference Range  Comments

 

 Unknown (test code = 1975-2)  0.60 mg/dL  Unknown  0.2-1.0  F



Ordering Physician [facility] Studies2019-04-15 10:15:00Identifier [id number]
6 Result Time 2019-04-15 10:15:00Unknown





 Test Item  Value  Reference Range  Comments

 

 Unknown (test code = NullTestCode)  4.9 g/dL  Unknown  2-4  F



Ordering Physician [facility] Studies2019-04-15 10:15:00Identifier [id number]-
6 Result Time 2019-04-15 10:15:00Unknown





 Test Item  Value  Reference Range  Comments

 

 Unknown (test code = 1988-5)  11.17 mg/L  Unknown  0-8.00  F



Ordering Physician [facility] Studies2019-04-15 10:15:00Identifier [id number]
6 Result Time 2019-04-15 10:15:00Unknown





 Test Item  Value  Reference Range  Comments

 

 Unknown (test code = 1920-8)  25 U/L  Unknown  13-39  F



Ordering Physician [facility] Studies2019-04-15 10:15:00Identifier [id number]-
6 Result Time 2019-04-15 10:15:00Unknown





 Test Item  Value  Reference Range  Comments

 

 Unknown (test code = 6768-6)  153 U/L  Unknown    F



Ordering Physician [facility] Studies2019-04-15 10:15:00Identifier [id number]-
6 Result Time 2019-04-15 10:15:00Unknown





 Test Item  Value  Reference Range  Comments

 

 Unknown (test code = 1759-0)  0.8   Unknown  1-3  F



Ordering Physician [facility] Studies2019-04-15 10:15:00Identifier [id number]
6 Result Time 2019-04-15 10:15:00Unknown





 Test Item  Value  Reference Range  Comments

 

 Unknown (test code = 92841-3)  4.0 g/dL  Unknown  3.2-5.2  F



Ordering Physician [facility] Studies2019-04-15 10:15:00Identifier [id number]-
6 Result Time 2019-04-15 10:15:00Unknown





 Test Item  Value  Reference Range  Comments

 

 Unknown (test code = 1742-6)  13 U/L  Unknown  7-52  F



Ordering Physician [facility] Studies2019-04-15 10:15:00Identifier [id number]-
6 Result Time 2019-04-15 10:15:00Unknown





 Test Item  Value  Reference Range  Comments

 

 Unknown (test code = 2524-7)  0.9 mmol/L  Unknown  0.5-2.0  F



Ordering Physician Unknown

## 2020-04-24 NOTE — XMS REPORT
Patient Summary Document

 Created on:2020



Patient:BERTHA SOLIS

Sex:Male

:1933

External Reference #:[id number]





Demographics







 Address  [address] [place] [id number]

 

 Home Phone  [phone]

 

 Preferred Language  English

 

 Marital Status  Unknown

 

 [employer] Affiliation  Unknown

 

 Race  Unknown

 

 Ethnic Group  Unknown









Author







 Organization  [facility]









Support







 Name  Relationship  Address  Phone

 

 IRAIS SOLIS, Unknown Name  Suha  [address]  [phone]



     [place] [address]  









Care Team Providers







 Name  Role  Phone

 

 Unavailable  Unavailable  Unavailable









Problems

This patient has no known problems.



Allergies, Adverse Reactions, Alerts







 Allergy  Allergy  Status  Severity  Reaction(s)  Onset  Inactive  Treating  
Comments



 Name  Type        Date  Date  Clinician  

 

 Uncoded  Unknown  Active  Unknown  Reaction  2019    Interface  



 free-text        Unknown  -18      



 allergy                







Medications







 Ordered  Filled  Start  Stop  Current  Ordering  Indication  Dosage  Frequency
  Signature  Comments  Components



 Medication  Medication  Date  Date  Medication?  Clinician        (SIG)    



 Name  Name                    

 

 Multivitami  Multivitami  2012    No  Unknown    Unknown  Unknown      



 ns/Minerals  ns/Minerals  -                  



 Tab*  Tab*                    

 

 amLODIPine  amLODIPine      No  Unknown    Unknown  Unknown      



 5 mg tablet  5 mg tablet  4-15                  

 

 acetaminoph  acetaminoph      No  Unknown    Unknown  Unknown      



 en 500 mg  en 500 mg  4-15                  



 tablet  tablet                    

 

 magnesium  magnesium      No  Unknown    Unknown  Unknown      



 oxide 400  oxide 400  4-15                  



 mg (241.3  mg (241.3                    



 mg  mg                    



 magnesium)  magnesium)                    



 tablet  tablet                    

 

 omeprazole  omeprazole      No  Unknown    Unknown  Unknown      



 40 mg  40 mg  4-15                  



 capsule,del  capsule,del                    



 ayed  ayed                    



 release  release                    

 

 verapamil  verapamil      No  Unknown    Unknown  Unknown      



  mg   mg  4-15                  



 capsule,ext  capsule,ext                    



 ended  ended                    



 release  release                    

 

 Calcium  Calcium      No  Unknown    Unknown  Unknown      



 Carbonate/V  Carbonate/V  4-15                  



 itamin D3  itamin D3                    

 

 cefdinir  cefdinir      No  Unknown    Unknown  Unknown      



 300 mg  300 mg  4-17                  



 capsule  capsule                    







Vital Signs







 Vital Name  Observation Time  Observation Value  Comments

 

 SYSTOLIC mm[Hg]  2019 18:05:21  135 mm[Hg] mm[Hg]  Method: Sit

 

 DIASTOLIC mm[Hg]  2019 18:05:21  48 mm[Hg] mm[Hg]  Method: Sit

 

 PULSE  2019 18:05:21  63 /min /min  

 

 RESP RATE  2019 18:05:21  16 /min /min  

 

 TEMP  2019 18:05:21  97.4 [degF]  







Procedures

This patient has no known procedures.



Results







 Test Description  Test Time  Test Comments  Text Results  Atomic Results  
Result Comments









 Laboratory Studies  2019 08:13:00  Identifier [id number] Result Time  
Unknown



     2019 08:13:00  









   Test Item  Value  Reference Range  Comments









 Unknown (test code = 2951-2)  136 mmol/L  Unknown  135-145  F



Ordering Physician [facility] Ljcvtpq5785-69-36 08:13:00Identifier [id number]-
6 Result Time 2019 08:13:00Unknown





 Test Item  Value  Reference Range  Comments

 

 Unknown (test code = 2823-3)  4.0 mmol/L  Unknown  3.5-5.0  F



Ordering Physician [facility] Vcegwwn2802-90-56 08:13:00Identifier [id number]-
6 Result Time 2019 08:13:00Unknown





 Test Item  Value  Reference Range  Comments

 

 Unknown (test code = 2345-7)  87 mg/dL  Unknown    F



Ordering Physician [facility] Ossvbsp7177-37-17 08:13:00Identifier [id number]-
6 Result Time 2019 08:13:00Unknown





 Test Item  Value  Reference Range  Comments

 

 Unknown (test code = 48642-3)  46.9   Unknown  Unknown  F



Ordering Physician [facility] Rncsqxs2309-33-71 08:13:00Identifier [id number]-
6 Result Time 2019 08:13:00Unknown





 Test Item  Value  Reference Range  Comments

 

 Unknown (test code = NullTestCode)  56.7   Unknown  Unknown  F



Ordering Physician [facility] Buzsxqo4169-98-82 08:13:00Identifier [id number]
6 Result Time 2019 08:13:00Unknown





 Test Item  Value  Reference Range  Comments

 

 Unknown (test code = 2160-0)  1.43 mg/dL  Unknown  0.67-1.17  F



Ordering Physician [facility] Fqdbomv0896-16-30 08:13:00Identifier [id number]-
6 Result Time 2019 08:13:00Unknown





 Test Item  Value  Reference Range  Comments

 

 Unknown (test code = 2075-0)  104 mmol/L  Unknown  101-111  F



Ordering Physician [facility] Yytfone6014-14-44 08:13:00Identifier [id number]-
6 Result Time 2019 08:13:00Unknown





 Test Item  Value  Reference Range  Comments

 

 Unknown (test code = 2028-9)  24 mmol/L  Unknown  22-32  F



Ordering Physician [facility] Ilvpzlc3398-66-35 08:13:00Identifier [id number]-
6 Result Time 2019 08:13:00Unknown





 Test Item  Value  Reference Range  Comments

 

 Unknown (test code = 07323-0)  9.0 mg/dL  Unknown  8.6-10.3  F



Ordering Physician [facility] Hmtjkvb7999-28-05 08:13:00Identifier [id number]-
6 Result Time 2019 08:13:00Unknown





 Test Item  Value  Reference Range  Comments

 

 Unknown (test code = 3094-0)  24 mg/dL  Unknown  6-24  F



Ordering Physician [facility] Iorzybp9402-96-69 08:13:00Identifier [id number]-
6 Result Time 2019 08:13:00Unknown





 Test Item  Value  Reference Range  Comments

 

 Unknown (test code = 3097-3)  16.8   Unknown  8-20  F



Ordering Physician [facility] Zjlhqwd8343-93-11 08:13:00Identifier [id number]-
6 Result Time 2019 08:13:00Unknown





 Test Item  Value  Reference Range  Comments

 

 Unknown (test code = 33037-3)  8 mmol/L  Unknown  2-11  F



Ordering Physician [facility] Pcjznts3836-99-61 08:13:00Identifier [id number]-
6 Result Time 2019 08:13:00Unknown





 Test Item  Value  Reference Range  Comments

 

 Unknown (test code = 04290-2)  27.4 10^3/uL  Unknown  3.5-10.8  F



Ordering Physician [facility] Nesdnhn6043-79-68 08:13:00Identifier [id number]-
6 Result Time 2019 08:13:00Unknown





 Test Item  Value  Reference Range  Comments

 

 Unknown (test code = 788-0)  24 %  Unknown  10.5-15  F



Ordering Physician [facility] Xvdsbmc0020-39-70 08:13:00Identifier [id number]-
6 Result Time 2019 08:13:00Unknown





 Test Item  Value  Reference Range  Comments

 

 Unknown (test code = 789-8)  5.19 10^6 /uL  Unknown  4.18-5.48  F



Ordering Physician [facility] Moreowb5721-40-47 08:13:00Identifier [id number]-
6 Result Time 2019 08:13:00Unknown





 Test Item  Value  Reference Range  Comments

 

 Unknown (test code = 777-3)  330 10^3/uL  Unknown  150-450  F



Ordering Physician [facility] Cceugka1332-07-07 08:13:00Identifier [id number]-
6 Result Time 2019 08:13:00Unknown





 Test Item  Value  Reference Range  Comments

 

 Unknown (test code = 08518-4)  0   Unknown  Unknown  F



Ordering Physician [facility] Txbsdve3930-82-92 08:13:00Identifier [id number]-
6 Result Time 2019 08:13:00Unknown





 Test Item  Value  Reference Range  Comments

 

 Unknown (test code = 771-6)  0 10^3/ul  Unknown  Unknown  F



Ordering Physician [facility] Fpqcmsx4484-84-20 08:13:00Identifier [id number]-
6 Result Time 2019 08:13:00Unknown





 Test Item  Value  Reference Range  Comments

 

 Unknown (test code = 770-8)  84.7 %  Unknown  Unknown  F



Ordering Physician [facility] Ojshxke5212-78-94 08:13:00Identifier [id number]-
6 Result Time 2019 08:13:00Unknown





 Test Item  Value  Reference Range  Comments

 

 Unknown (test code = 5905-5)  8.6 %  Unknown  Unknown  F



Ordering Physician [facility] Aikcdun5750-22-27 08:13:00Identifier [id number]-
6 Result Time 2019 08:13:00Unknown





 Test Item  Value  Reference Range  Comments

 

 Unknown (test code = 74213-7)  8.6 fL  Unknown  7.4-10.4  F



Ordering Physician [facility] Cqutyoi7798-49-35 08:13:00Identifier [id number]-
6 Result Time 2019 08:13:00Unknown





 Test Item  Value  Reference Range  Comments

 

 Unknown (test code = 787-2)  78 fL  Unknown  80-94  F



Ordering Physician [facility] Xxqraxi6052-53-30 08:13:00Identifier [id number]-
6 Result Time 2019 08:13:00Unknown





 Test Item  Value  Reference Range  Comments

 

 Unknown (test code = 786-4)  31 g/dL  Unknown  31-36  F



Ordering Physician [facility] Qjztbhi6823-07-44 08:13:00Identifier [id number]-
6 Result Time 2019 08:13:00Unknown





 Test Item  Value  Reference Range  Comments

 

 Unknown (test code = 785-6)  24 pg  Unknown  27-31  F



Ordering Physician [facility] Xzrnthy0167-57-64 08:13:00Identifier [id number]-
6 Result Time 2019 08:13:00Unknown





 Test Item  Value  Reference Range  Comments

 

 Unknown (test code = 736-9)  5.4 %  Unknown  Unknown  F



Ordering Physician [facility] Xspnuex0138-51-10 08:13:00Identifier [id number]-
6 Result Time 2019 08:13:00Unknown





 Test Item  Value  Reference Range  Comments

 

 Unknown (test code = 718-7)  12.3 g/dL  Unknown  14.0-18.0  F



Ordering Physician [facility] Rczfqfh6312-43-31 08:13:00Identifier [id number]-
6 Result Time 2019 08:13:00Unknown





 Test Item  Value  Reference Range  Comments

 

 Unknown (test code = 4544-3)  40 %  Unknown  36-46  F



Ordering Physician [facility] Ruslukq3644-11-92 08:13:00Identifier [id number]-
6 Result Time 2019 08:13:00Unknown





 Test Item  Value  Reference Range  Comments

 

 Unknown (test code = 713-8)  0.8 %  Unknown  Unknown  F



Ordering Physician [facility] Dwrctyq5103-38-86 08:13:00Identifier [id number]-
6 Result Time 2019 08:13:00Unknown





 Test Item  Value  Reference Range  Comments

 

 Unknown (test code = 706-2)  0.5 %  Unknown  Unknown  F



Ordering Physician [facility] Yxabfxa1377-31-96 08:13:00Identifier [id number]-
6 Result Time 2019 08:13:00Unknown





 Test Item  Value  Reference Range  Comments

 

 Unknown (test code = FUT4959)  23.2 10^3/ul  Unknown  1.5-7.7  F



Ordering Physician [facility] Uyrhcni6031-99-11 08:13:00Identifier [id number]-
6 Result Time 2019 08:13:00Unknown





 Test Item  Value  Reference Range  Comments

 

 Unknown (test code = 742-7)  2.4 10^3/ul  Unknown  0-0.8  F



Ordering Physician [facility] Terzyzo9962-08-31 08:13:00Identifier [id number]-
6 Result Time 2019 08:13:00Unknown





 Test Item  Value  Reference Range  Comments

 

 Unknown (test code = 731-0)  1.5 10^3/ul  Unknown  1.0-4.8  F



Ordering Physician [facility] Arqmnwo4647-79-53 08:13:00Identifier [id number]-
6 Result Time 2019 08:13:00Unknown





 Test Item  Value  Reference Range  Comments

 

 Unknown (test code = 711-2)  0.2 10^3/ul  Unknown  0-0.6  F



Ordering Physician [facility] Imsvewr6663-62-52 08:13:00Identifier [id number]-
6 Result Time 2019 08:13:00Unknown





 Test Item  Value  Reference Range  Comments

 

 Unknown (test code = 704-7)  0.1 10^3/ul  Unknown  0-0.2  F



Ordering Physician [facility] Pgewxgl3496-98-58 06:46:00Identifier [id number]-
6 Result Time 2019 06:46:00Unknown





 Test Item  Value  Reference Range  Comments

 

 Unknown (test code = 51082-1)  0.05 ng/mL  Unknown  Unknown  F



Ordering Physician [facility] Bbowyso3821-62-84 06:46:00Identifier [id number]-
6 Result Time 2019 06:46:00Unknown





 Test Item  Value  Reference Range  Comments

 

 Unknown (test code = 2777-1)  2.8 mg/dL  Unknown  2.5-5.0  F



Ordering Physician [facility] Mmepvfa6108-71-15 06:46:00Identifier [id number]-
6 Result Time 2019 06:46:00Unknown





 Test Item  Value  Reference Range  Comments

 

 Unknown (test code = 37596-4)  2.5 mg/dL  Unknown  1.9-2.7  F



Ordering Physician [facility] Studies2019-04-15 10:48:00Identifier [id number]-
6 Result Time 2019-04-15 10:48:00Unknown





 Test Item  Value  Reference Range  Comments

 

 Unknown (test code = NullTestCode)  6.0   Unknown  5-9  F



Ordering Physician [facility] Studies2019-04-15 10:48:00Identifier [id number]-
6 Result Time 2019-04-15 10:48:00Unknown





 Test Item  Value  Reference Range  Comments

 

 Unknown (test code = 39755-3)  1.013   Unknown  1.010-1.030  F



Ordering Physician [facility] Studies2019-04-15 10:15:00Identifier [id number]-
6 Result Time 2019-04-15 10:15:00Unknown





 Test Item  Value  Reference Range  Comments

 

 Unknown (test code = 3016-3)  3.38 mcIU/mL  Unknown  0.34-5.60  F



Ordering Physician [facility] Studies2019-04-15 10:15:00Identifier [id number]-
6 Result Time 2019-04-15 10:15:00Unknown





 Test Item  Value  Reference Range  Comments

 

 Unknown (test code = 53714-1)  1.14   Unknown  0.77-1.02  F



Ordering Physician [facility] Studies2019-04-15 10:15:00Identifier [id number]-
6 Result Time 2019-04-15 10:15:00Unknown





 Test Item  Value  Reference Range  Comments

 

 Unknown (test code = 79865-7)  365 pg/mL  Unknown  Unknown  F



Ordering Physician [facility] Studies2019-04-15 10:15:00Identifier [id number]-
6 Result Time 2019-04-15 10:15:00Unknown





 Test Item  Value  Reference Range  Comments

 

 Unknown (test code = 2885-2)  8.9 g/dL  Unknown  6.4-8.9  F



Ordering Physician [facility] Studies2019-04-15 10:15:00Identifier [id number]-
6 Result Time 2019-04-15 10:15:00Unknown





 Test Item  Value  Reference Range  Comments

 

 Unknown (test code = 1975-2)  0.60 mg/dL  Unknown  0.2-1.0  F



Ordering Physician [facility] Studies2019-04-15 10:15:00Identifier [id number]-
6 Result Time 2019-04-15 10:15:00Unknown





 Test Item  Value  Reference Range  Comments

 

 Unknown (test code = NullTestCode)  4.9 g/dL  Unknown  2-4  F



Ordering Physician [facility] Studies2019-04-15 10:15:00Identifier [id number]-
6 Result Time 2019-04-15 10:15:00Unknown





 Test Item  Value  Reference Range  Comments

 

 Unknown (test code = 1988-5)  11.17 mg/L  Unknown  0-8.00  F



Ordering Physician [facility] Studies2019-04-15 10:15:00Identifier [id number]-
6 Result Time 2019-04-15 10:15:00Unknown





 Test Item  Value  Reference Range  Comments

 

 Unknown (test code = 1920-8)  25 U/L  Unknown  13-39  F



Ordering Physician [facility] Studies2019-04-15 10:15:00Identifier [id number]-
6 Result Time 2019-04-15 10:15:00Unknown





 Test Item  Value  Reference Range  Comments

 

 Unknown (test code = 6768-6)  153 U/L  Unknown    F



Ordering Physician [facility] Studies2019-04-15 10:15:00Identifier [id number]-
6 Result Time 2019-04-15 10:15:00Unknown





 Test Item  Value  Reference Range  Comments

 

 Unknown (test code = 1759-0)  0.8   Unknown  1-3  F



Ordering Physician [facility] Studies2019-04-15 10:15:00Identifier [id number]-
6 Result Time 2019-04-15 10:15:00Unknown





 Test Item  Value  Reference Range  Comments

 

 Unknown (test code = 76731-9)  4.0 g/dL  Unknown  3.2-5.2  F



Ordering Physician [facility] Studies2019-04-15 10:15:00Identifier [id number]-
6 Result Time 2019-04-15 10:15:00Unknown





 Test Item  Value  Reference Range  Comments

 

 Unknown (test code = 1742-6)  13 U/L  Unknown  7-52  F



Ordering Physician [facility] Studies2019-04-15 10:15:00Identifier [id number]-
6 Result Time 2019-04-15 10:15:00Unknown





 Test Item  Value  Reference Range  Comments

 

 Unknown (test code = 2524-7)  0.9 mmol/L  Unknown  0.5-2.0  F



Ordering Physician Unknown

## 2020-04-24 NOTE — XMS REPORT
Patient Summary Document

 Created on:2020



Patient:BERTHA SOLIS

Sex:Male

:1933

External Reference #:[id number]





Demographics







 Address  [address] [place] [address]

 

 Home Phone  [phone]

 

 Preferred Language  English

 

 Marital Status  Unknown

 

 Gnosticist Affiliation  Unknown

 

 Race  Unknown

 

 Ethnic Group  Unknown









Author







 Organization  [facility]









Support







 Name  Relationship  Address  Phone

 

 IRAIS SOLIS, Unknown Name  Suha  [address] [facility]  [phone]



     [place] [address]  









Care Team Providers







 Name  Role  Phone

 

 Unavailable  Unavailable  Unavailable









Problems

This patient has no known problems.



Allergies, Adverse Reactions, Alerts







 Allergy  Allergy  Status  Severity  Reaction(s)  Onset  Inactive  Treating  
Comments



 Name  Type        Date  Date  Clinician  

 

 Uncoded  Unknown  Active  Unknown  Reaction  2019    Interface  



 free-text        Unknown  -18      



 allergy                







Medications







 Ordered  Filled  Start  Stop  Current  Ordering  Indication  Dosage  Frequency
  Signature  Comments  Components



 Medication  Medication  Date  Date  Medication?  Clinician        (SIG)    



 Name  Name                    

 

 Multivitami  Multivitami  2012    No  Unknown    Unknown  Unknown      



 ns/Minerals  ns/Minerals  -                  



 Tab*  Tab*                    

 

 amLODIPine  amLODIPine      No  Unknown    Unknown  Unknown      



 5 mg tablet  5 mg tablet  4-15                  

 

 acetaminoph  acetaminoph      No  Unknown    Unknown  Unknown      



 en 500 mg  en 500 mg  4-15                  



 tablet  tablet                    

 

 magnesium  magnesium      No  Unknown    Unknown  Unknown      



 oxide 400  oxide 400  4-15                  



 mg (241.3  mg (241.3                    



 mg  mg                    



 magnesium)  magnesium)                    



 tablet  tablet                    

 

 omeprazole  omeprazole      No  Unknown    Unknown  Unknown      



 40 mg  40 mg  4-15                  



 capsule,del  capsule,del                    



 ayed  ayed                    



 release  release                    

 

 verapamil  verapamil      No  Unknown    Unknown  Unknown      



  mg   mg  4-15                  



 capsule,ext  capsule,ext                    



 ended  ended                    



 release  release                    

 

 Calcium  Calcium      No  Unknown    Unknown  Unknown      



 Carbonate/V  Carbonate/V  4-15                  



 itamin D3  itamin D3                    

 

 cefdinir  cefdinir      No  Unknown    Unknown  Unknown      



 300 mg  300 mg  4-17                  



 capsule  capsule                    







Vital Signs







 Vital Name  Observation Time  Observation Value  Comments

 

 SYSTOLIC mm[Hg]  2019 18:05:21  135 mm[Hg] mm[Hg]  Method: Sit

 

 DIASTOLIC mm[Hg]  2019 18:05:21  48 mm[Hg] mm[Hg]  Method: Sit

 

 PULSE  2019 18:05:21  63 /min /min  

 

 RESP RATE  2019 18:05:21  16 /min /min  

 

 TEMP  2019 18:05:21  97.4 [degF]  







Procedures

This patient has no known procedures.



Results







 Test Description  Test Time  Test Comments  Text Results  Atomic Results  
Result Comments









 Laboratory Studies  2019 08:13:00  Identifier [id number] Result Time  
Unknown



     2019 08:13:00  









   Test Item  Value  Reference Range  Comments









 Unknown (test code = 2951-2)  136 mmol/L  Unknown  135-145  F



Ordering Physician [facility] Mdwmqmg0981-26-19 08:13:00Identifier [id number]-
6 Result Time 2019 08:13:00Unknown





 Test Item  Value  Reference Range  Comments

 

 Unknown (test code = 2823-3)  4.0 mmol/L  Unknown  3.5-5.0  F



Ordering Physician [facility] Naaameo1114-07-26 08:13:00Identifier [id number]-
6 Result Time 2019 08:13:00Unknown





 Test Item  Value  Reference Range  Comments

 

 Unknown (test code = 2345-7)  87 mg/dL  Unknown    F



Ordering Physician [facility] Nromwnc2941-78-50 08:13:00Identifier [id number]
6 Result Time 2019 08:13:00Unknown





 Test Item  Value  Reference Range  Comments

 

 Unknown (test code = 48642-3)  46.9   Unknown  Unknown  F



Ordering Physician [facility] Vvvioma6313-88-12 08:13:00Identifier [id number]
6 Result Time 2019 08:13:00Unknown





 Test Item  Value  Reference Range  Comments

 

 Unknown (test code = NullTestCode)  56.7   Unknown  Unknown  F



Ordering Physician [facility] Qqaomjw8636-53-18 08:13:00Identifier [id number]-
6 Result Time 2019 08:13:00Unknown





 Test Item  Value  Reference Range  Comments

 

 Unknown (test code = 2160-0)  1.43 mg/dL  Unknown  0.67-1.17  F



Ordering Physician [facility] Wakfbjn9141-51-59 08:13:00Identifier [id number]-
6 Result Time 2019 08:13:00Unknown





 Test Item  Value  Reference Range  Comments

 

 Unknown (test code = 2075-0)  104 mmol/L  Unknown  101-111  F



Ordering Physician [facility] Tziqnqj8280-07-96 08:13:00Identifier [id number]
6 Result Time 2019 08:13:00Unknown





 Test Item  Value  Reference Range  Comments

 

 Unknown (test code = 2028-9)  24 mmol/L  Unknown  22-32  F



Ordering Physician [facility] Bhcnbcg8033-05-09 08:13:00Identifier [id number]
6 Result Time 2019 08:13:00Unknown





 Test Item  Value  Reference Range  Comments

 

 Unknown (test code = 06474-6)  9.0 mg/dL  Unknown  8.6-10.3  F



Ordering Physician [facility] Rkzvcea2690-97-86 08:13:00Identifier [id number]
6 Result Time 2019 08:13:00Unknown





 Test Item  Value  Reference Range  Comments

 

 Unknown (test code = 3094-0)  24 mg/dL  Unknown  6-24  F



Ordering Physician [facility] Vwpkxfs7391-66-00 08:13:00Identifier [id number]-
6 Result Time 2019 08:13:00Unknown





 Test Item  Value  Reference Range  Comments

 

 Unknown (test code = 3097-3)  16.8   Unknown  8-20  F



Ordering Physician [facility] Egbbqyq0865-51-06 08:13:00Identifier [id number]-
6 Result Time 2019 08:13:00Unknown





 Test Item  Value  Reference Range  Comments

 

 Unknown (test code = 33037-3)  8 mmol/L  Unknown  2-11  F



Ordering Physician [facility] Ckiftny2344-10-42 08:13:00Identifier [id number]-
6 Result Time 2019 08:13:00Unknown





 Test Item  Value  Reference Range  Comments

 

 Unknown (test code = 94946-9)  27.4 10^3/uL  Unknown  3.5-10.8  F



Ordering Physician [facility] Fvyieou3288-70-12 08:13:00Identifier [id number]-
6 Result Time 2019 08:13:00Unknown





 Test Item  Value  Reference Range  Comments

 

 Unknown (test code = 788-0)  24 %  Unknown  10.5-15  F



Ordering Physician [facility] Vqbvsqe4393-53-16 08:13:00Identifier [id number]-
6 Result Time 2019 08:13:00Unknown





 Test Item  Value  Reference Range  Comments

 

 Unknown (test code = 789-8)  5.19 10^6 /uL  Unknown  4.18-5.48  F



Ordering Physician [facility] Vxexoaq8603-42-08 08:13:00Identifier [id number]-
6 Result Time 2019 08:13:00Unknown





 Test Item  Value  Reference Range  Comments

 

 Unknown (test code = 777-3)  330 10^3/uL  Unknown  150-450  F



Ordering Physician [facility] Teywvup2716-19-71 08:13:00Identifier [id number]-
6 Result Time 2019 08:13:00Unknown





 Test Item  Value  Reference Range  Comments

 

 Unknown (test code = 15141-7)  0   Unknown  Unknown  F



Ordering Physician [facility] Xmwxgiv4079-44-99 08:13:00Identifier [id number]-
6 Result Time 2019 08:13:00Unknown





 Test Item  Value  Reference Range  Comments

 

 Unknown (test code = 771-6)  0 10^3/ul  Unknown  Unknown  F



Ordering Physician [facility] Nwmszmi6467-45-18 08:13:00Identifier [id number]-
6 Result Time 2019 08:13:00Unknown





 Test Item  Value  Reference Range  Comments

 

 Unknown (test code = 770-8)  84.7 %  Unknown  Unknown  F



Ordering Physician [facility] Hmdhowy1900-53-38 08:13:00Identifier [id number]-
6 Result Time 2019 08:13:00Unknown





 Test Item  Value  Reference Range  Comments

 

 Unknown (test code = 5905-5)  8.6 %  Unknown  Unknown  F



Ordering Physician [facility] Pakfygz9357-74-28 08:13:00Identifier [id number]-
6 Result Time 2019 08:13:00Unknown





 Test Item  Value  Reference Range  Comments

 

 Unknown (test code = 44229-9)  8.6 fL  Unknown  7.4-10.4  F



Ordering Physician [facility] Kswuihw0342-94-62 08:13:00Identifier [id number]-
6 Result Time 2019 08:13:00Unknown





 Test Item  Value  Reference Range  Comments

 

 Unknown (test code = 787-2)  78 fL  Unknown  80-94  F



Ordering Physician [facility] Pvpiiwn0977-04-82 08:13:00Identifier [id number]-
6 Result Time 2019 08:13:00Unknown





 Test Item  Value  Reference Range  Comments

 

 Unknown (test code = 786-4)  31 g/dL  Unknown  31-36  F



Ordering Physician [facility] Spcfooz1981-36-42 08:13:00Identifier [id number]-
6 Result Time 2019 08:13:00Unknown





 Test Item  Value  Reference Range  Comments

 

 Unknown (test code = 785-6)  24 pg  Unknown  27-31  F



Ordering Physician [facility] Weypgaq5312-23-82 08:13:00Identifier [id number]-
6 Result Time 2019 08:13:00Unknown





 Test Item  Value  Reference Range  Comments

 

 Unknown (test code = 736-9)  5.4 %  Unknown  Unknown  F



Ordering Physician [facility] Bzpjilv1453-80-24 08:13:00Identifier [id number]-
6 Result Time 2019 08:13:00Unknown





 Test Item  Value  Reference Range  Comments

 

 Unknown (test code = 718-7)  12.3 g/dL  Unknown  14.0-18.0  F



Ordering Physician [facility] Aqyafkr4835-79-71 08:13:00Identifier [id number]-
6 Result Time 2019 08:13:00Unknown





 Test Item  Value  Reference Range  Comments

 

 Unknown (test code = 4544-3)  40 %  Unknown  36-46  F



Ordering Physician [facility] Gioeahm7360-50-65 08:13:00Identifier [id number]-
6 Result Time 2019 08:13:00Unknown





 Test Item  Value  Reference Range  Comments

 

 Unknown (test code = 713-8)  0.8 %  Unknown  Unknown  F



Ordering Physician [facility] Nerpxla0301-40-55 08:13:00Identifier [id number]-
6 Result Time 2019 08:13:00Unknown





 Test Item  Value  Reference Range  Comments

 

 Unknown (test code = 706-2)  0.5 %  Unknown  Unknown  F



Ordering Physician [facility] Dvmqrml0120-46-52 08:13:00Identifier [id number]-
6 Result Time 2019 08:13:00Unknown





 Test Item  Value  Reference Range  Comments

 

 Unknown (test code = YHC5031)  23.2 10^3/ul  Unknown  1.5-7.7  F



Ordering Physician [facility] Ugtecqh5143-92-36 08:13:00Identifier [id number]-
6 Result Time 2019 08:13:00Unknown





 Test Item  Value  Reference Range  Comments

 

 Unknown (test code = 742-7)  2.4 10^3/ul  Unknown  0-0.8  F



Ordering Physician [facility] Nhfwdvt5078-18-64 08:13:00Identifier [id number]-
6 Result Time 2019 08:13:00Unknown





 Test Item  Value  Reference Range  Comments

 

 Unknown (test code = 731-0)  1.5 10^3/ul  Unknown  1.0-4.8  F



Ordering Physician [facility] Etrmbxq4502-40-64 08:13:00Identifier [id number]-
6 Result Time 2019 08:13:00Unknown





 Test Item  Value  Reference Range  Comments

 

 Unknown (test code = 711-2)  0.2 10^3/ul  Unknown  0-0.6  F



Ordering Physician [facility] Wrdzxtz3296-80-06 08:13:00Identifier [id number]-
6 Result Time 2019 08:13:00Unknown





 Test Item  Value  Reference Range  Comments

 

 Unknown (test code = 704-7)  0.1 10^3/ul  Unknown  0-0.2  F



Ordering Physician [facility] Ftfrghr8692-31-17 06:46:00Identifier [id number]-
6 Result Time 2019 06:46:00Unknown





 Test Item  Value  Reference Range  Comments

 

 Unknown (test code = 13605-5)  0.05 ng/mL  Unknown  Unknown  F



Ordering Physician [facility] Mdhbjqg5907-58-42 06:46:00Identifier [id number]-
6 Result Time 2019 06:46:00Unknown





 Test Item  Value  Reference Range  Comments

 

 Unknown (test code = 2777-1)  2.8 mg/dL  Unknown  2.5-5.0  F



Ordering Physician [facility] Ksixkuo6840-61-44 06:46:00Identifier [id number]-
6 Result Time 2019 06:46:00Unknown





 Test Item  Value  Reference Range  Comments

 

 Unknown (test code = 33206-8)  2.5 mg/dL  Unknown  1.9-2.7  F



Ordering Physician [facility] Studies2019-04-15 10:48:00Identifier [id number]-
6 Result Time 2019-04-15 10:48:00Unknown





 Test Item  Value  Reference Range  Comments

 

 Unknown (test code = NullTestCode)  6.0   Unknown  5-9  F



Ordering Physician [facility] Studies2019-04-15 10:48:00Identifier [id number]-
6 Result Time 2019-04-15 10:48:00Unknown





 Test Item  Value  Reference Range  Comments

 

 Unknown (test code = 17485-2)  1.013   Unknown  1.010-1.030  F



Ordering Physician [facility] Studies2019-04-15 10:15:00Identifier [id number]-
6 Result Time 2019-04-15 10:15:00Unknown





 Test Item  Value  Reference Range  Comments

 

 Unknown (test code = 3016-3)  3.38 mcIU/mL  Unknown  0.34-5.60  F



Ordering Physician [facility] Studies2019-04-15 10:15:00Identifier [id number]-
6 Result Time 2019-04-15 10:15:00Unknown





 Test Item  Value  Reference Range  Comments

 

 Unknown (test code = 14477-9)  1.14   Unknown  0.77-1.02  F



Ordering Physician [facility] Studies2019-04-15 10:15:00Identifier [id number]-
6 Result Time 2019-04-15 10:15:00Unknown





 Test Item  Value  Reference Range  Comments

 

 Unknown (test code = 08526-0)  365 pg/mL  Unknown  Unknown  F



Ordering Physician [facility] Studies2019-04-15 10:15:00Identifier [id number]-
6 Result Time 2019-04-15 10:15:00Unknown





 Test Item  Value  Reference Range  Comments

 

 Unknown (test code = 2885-2)  8.9 g/dL  Unknown  6.4-8.9  F



Ordering Physician [facility] Studies2019-04-15 10:15:00Identifier [id number]-
6 Result Time 2019-04-15 10:15:00Unknown





 Test Item  Value  Reference Range  Comments

 

 Unknown (test code = 1975-2)  0.60 mg/dL  Unknown  0.2-1.0  F



Ordering Physician [facility] Studies2019-04-15 10:15:00Identifier [id number]-
6 Result Time 2019-04-15 10:15:00Unknown





 Test Item  Value  Reference Range  Comments

 

 Unknown (test code = NullTestCode)  4.9 g/dL  Unknown  2-4  F



Ordering Physician [facility] Studies2019-04-15 10:15:00Identifier [id number]
6 Result Time 2019-04-15 10:15:00Unknown





 Test Item  Value  Reference Range  Comments

 

 Unknown (test code = 1988-5)  11.17 mg/L  Unknown  0-8.00  F



Ordering Physician [facility] Studies2019-04-15 10:15:00Identifier [id number]-
6 Result Time 2019-04-15 10:15:00Unknown





 Test Item  Value  Reference Range  Comments

 

 Unknown (test code = 1920-8)  25 U/L  Unknown  13-39  F



Ordering Physician [facility] Studies2019-04-15 10:15:00Identifier [id number]-
6 Result Time 2019-04-15 10:15:00Unknown





 Test Item  Value  Reference Range  Comments

 

 Unknown (test code = 6768-6)  153 U/L  Unknown    F



Ordering Physician [facility] Studies2019-04-15 10:15:00Identifier [id number]-
6 Result Time 2019-04-15 10:15:00Unknown





 Test Item  Value  Reference Range  Comments

 

 Unknown (test code = 1759-0)  0.8   Unknown  1-3  F



Ordering Physician [facility] Studies2019-04-15 10:15:00Identifier [id number]-
6 Result Time 2019-04-15 10:15:00Unknown





 Test Item  Value  Reference Range  Comments

 

 Unknown (test code = 71531-2)  4.0 g/dL  Unknown  3.2-5.2  F



Ordering Physician [facility] Studies2019-04-15 10:15:00Identifier [id number]-
6 Result Time 2019-04-15 10:15:00Unknown





 Test Item  Value  Reference Range  Comments

 

 Unknown (test code = 1742-6)  13 U/L  Unknown  7-52  F



Ordering Physician [facility] Studies2019-04-15 10:15:00Identifier [id number]-
6 Result Time 2019-04-15 10:15:00Unknown





 Test Item  Value  Reference Range  Comments

 

 Unknown (test code = 2524-7)  0.9 mmol/L  Unknown  0.5-2.0  F



Ordering Physician Unknown

## 2020-04-24 NOTE — XMS REPORT
Patient Summary Document

 Created on:2020



Patient:BERTHA SOLIS

Sex:Male

:1933

External Reference #:[id number]





Demographics







 Address  [address] [place] [id number]

 

 Home Phone  [phone]

 

 Preferred Language  English

 

 Marital Status  Unknown

 

 Buddhist Affiliation  Unknown

 

 Race  Unknown

 

 Ethnic Group  Unknown









Author







 Organization  [facility]









Support







 Name  Relationship  Address  Phone

 

 IRAIS SOLIS, Unknown Name  Suha  [address]  [phone]



     [place] [address]  









Care Team Providers







 Name  Role  Phone

 

 Unavailable  Unavailable  Unavailable









Problems

This patient has no known problems.



Allergies, Adverse Reactions, Alerts







 Allergy  Allergy  Status  Severity  Reaction(s)  Onset  Inactive  Treating  
Comments



 Name  Type        Date  Date  Clinician  

 

 Uncoded  Unknown  Active  Unknown  Reaction  2019    Interface  



 free-text        Unknown  -18      



 allergy                







Medications







 Ordered  Filled  Start  Stop  Current  Ordering  Indication  Dosage  Frequency
  Signature  Comments  Components



 Medication  Medication  Date  Date  Medication?  Clinician        (SIG)    



 Name  Name                    

 

 Multivitami  Multivitami  2012    No  Unknown    Unknown  Unknown      



 ns/Minerals  ns/Minerals  -                  



 Tab*  Tab*                    

 

 amLODIPine  amLODIPine      No  Unknown    Unknown  Unknown      



 5 mg tablet  5 mg tablet  4-15                  

 

 acetaminoph  acetaminoph      No  Unknown    Unknown  Unknown      



 en 500 mg  en 500 mg  4-15                  



 tablet  tablet                    

 

 magnesium  magnesium      No  Unknown    Unknown  Unknown      



 oxide 400  oxide 400  4-15                  



 mg (241.3  mg (241.3                    



 mg  mg                    



 magnesium)  magnesium)                    



 tablet  tablet                    

 

 omeprazole  omeprazole      No  Unknown    Unknown  Unknown      



 40 mg  40 mg  4-15                  



 capsule,del  capsule,del                    



 ayed  ayed                    



 release  release                    

 

 verapamil  verapamil      No  Unknown    Unknown  Unknown      



  mg   mg  4-15                  



 capsule,ext  capsule,ext                    



 ended  ended                    



 release  release                    

 

 Calcium  Calcium      No  Unknown    Unknown  Unknown      



 Carbonate/V  Carbonate/V  4-15                  



 itamin D3  itamin D3                    

 

 cefdinir  cefdinir      No  Unknown    Unknown  Unknown      



 300 mg  300 mg  4-17                  



 capsule  capsule                    







Vital Signs







 Vital Name  Observation Time  Observation Value  Comments

 

 SYSTOLIC mm[Hg]  2019 18:05:21  135 mm[Hg] mm[Hg]  Method: Sit

 

 DIASTOLIC mm[Hg]  2019 18:05:21  48 mm[Hg] mm[Hg]  Method: Sit

 

 PULSE  2019 18:05:21  63 /min /min  

 

 RESP RATE  2019 18:05:21  16 /min /min  

 

 TEMP  2019 18:05:21  97.4 [degF]  







Procedures

This patient has no known procedures.



Results







 Test Description  Test Time  Test Comments  Text Results  Atomic Results  
Result Comments









 Laboratory Studies  2019 08:13:00  Identifier [id number] Result Time  
Unknown



     2019 08:13:00  









   Test Item  Value  Reference Range  Comments









 Unknown (test code = 2951-2)  136 mmol/L  Unknown  135-145  F



Ordering Physician [facility] Fnfpldy5065-07-00 08:13:00Identifier [id number]-
6 Result Time 2019 08:13:00Unknown





 Test Item  Value  Reference Range  Comments

 

 Unknown (test code = 2823-3)  4.0 mmol/L  Unknown  3.5-5.0  F



Ordering Physician [facility] Bxxyjcu7665-09-17 08:13:00Identifier [id number]-
6 Result Time 2019 08:13:00Unknown





 Test Item  Value  Reference Range  Comments

 

 Unknown (test code = 2345-7)  87 mg/dL  Unknown    F



Ordering Physician [facility] Rkgygsj6785-03-55 08:13:00Identifier [id number]-
6 Result Time 2019 08:13:00Unknown





 Test Item  Value  Reference Range  Comments

 

 Unknown (test code = 48642-3)  46.9   Unknown  Unknown  F



Ordering Physician [facility] Lrpzpfz7744-99-37 08:13:00Identifier [id number]-
6 Result Time 2019 08:13:00Unknown





 Test Item  Value  Reference Range  Comments

 

 Unknown (test code = NullTestCode)  56.7   Unknown  Unknown  F



Ordering Physician [facility] Crmzevv8088-02-70 08:13:00Identifier [id number]
6 Result Time 2019 08:13:00Unknown





 Test Item  Value  Reference Range  Comments

 

 Unknown (test code = 2160-0)  1.43 mg/dL  Unknown  0.67-1.17  F



Ordering Physician [facility] Wlcvboc9490-73-77 08:13:00Identifier [id number]-
6 Result Time 2019 08:13:00Unknown





 Test Item  Value  Reference Range  Comments

 

 Unknown (test code = 2075-0)  104 mmol/L  Unknown  101-111  F



Ordering Physician [facility] Pdqwhhn7429-44-61 08:13:00Identifier [id number]-
6 Result Time 2019 08:13:00Unknown





 Test Item  Value  Reference Range  Comments

 

 Unknown (test code = 2028-9)  24 mmol/L  Unknown  22-32  F



Ordering Physician [facility] Hsywocq1225-43-06 08:13:00Identifier [id number]
6 Result Time 2019 08:13:00Unknown





 Test Item  Value  Reference Range  Comments

 

 Unknown (test code = 56044-9)  9.0 mg/dL  Unknown  8.6-10.3  F



Ordering Physician [facility] Bavgwdc7787-35-45 08:13:00Identifier [id number]-
6 Result Time 2019 08:13:00Unknown





 Test Item  Value  Reference Range  Comments

 

 Unknown (test code = 3094-0)  24 mg/dL  Unknown  6-24  F



Ordering Physician [facility] Fuizkuy9241-95-16 08:13:00Identifier [id number]-
6 Result Time 2019 08:13:00Unknown





 Test Item  Value  Reference Range  Comments

 

 Unknown (test code = 3097-3)  16.8   Unknown  8-20  F



Ordering Physician [facility] Fspwlxv9032-45-39 08:13:00Identifier [id number]-
6 Result Time 2019 08:13:00Unknown





 Test Item  Value  Reference Range  Comments

 

 Unknown (test code = 33037-3)  8 mmol/L  Unknown  2-11  F



Ordering Physician [facility] Mirlkff2462-61-75 08:13:00Identifier [id number]-
6 Result Time 2019 08:13:00Unknown





 Test Item  Value  Reference Range  Comments

 

 Unknown (test code = 51254-0)  27.4 10^3/uL  Unknown  3.5-10.8  F



Ordering Physician [facility] Ayfsiny6066-85-93 08:13:00Identifier [id number]-
6 Result Time 2019 08:13:00Unknown





 Test Item  Value  Reference Range  Comments

 

 Unknown (test code = 788-0)  24 %  Unknown  10.5-15  F



Ordering Physician [facility] Vfbangq0256-41-23 08:13:00Identifier [id number]-
6 Result Time 2019 08:13:00Unknown





 Test Item  Value  Reference Range  Comments

 

 Unknown (test code = 789-8)  5.19 10^6 /uL  Unknown  4.18-5.48  F



Ordering Physician [facility] Cqtjpmj2533-44-40 08:13:00Identifier [id number]-
6 Result Time 2019 08:13:00Unknown





 Test Item  Value  Reference Range  Comments

 

 Unknown (test code = 777-3)  330 10^3/uL  Unknown  150-450  F



Ordering Physician [facility] Fnofvxa2285-84-74 08:13:00Identifier [id number]-
6 Result Time 2019 08:13:00Unknown





 Test Item  Value  Reference Range  Comments

 

 Unknown (test code = 16290-5)  0   Unknown  Unknown  F



Ordering Physician [facility] Tppexud9327-93-67 08:13:00Identifier [id number]-
6 Result Time 2019 08:13:00Unknown





 Test Item  Value  Reference Range  Comments

 

 Unknown (test code = 771-6)  0 10^3/ul  Unknown  Unknown  F



Ordering Physician [facility] Tfoolnb9292-75-45 08:13:00Identifier [id number]-
6 Result Time 2019 08:13:00Unknown





 Test Item  Value  Reference Range  Comments

 

 Unknown (test code = 770-8)  84.7 %  Unknown  Unknown  F



Ordering Physician [facility] Tffcmtw8094-66-09 08:13:00Identifier [id number]-
6 Result Time 2019 08:13:00Unknown





 Test Item  Value  Reference Range  Comments

 

 Unknown (test code = 5905-5)  8.6 %  Unknown  Unknown  F



Ordering Physician [facility] Wnjjrrp7028-15-99 08:13:00Identifier [id number]-
6 Result Time 2019 08:13:00Unknown





 Test Item  Value  Reference Range  Comments

 

 Unknown (test code = 35902-4)  8.6 fL  Unknown  7.4-10.4  F



Ordering Physician [facility] Ktabiou3997-54-64 08:13:00Identifier [id number]-
6 Result Time 2019 08:13:00Unknown





 Test Item  Value  Reference Range  Comments

 

 Unknown (test code = 787-2)  78 fL  Unknown  80-94  F



Ordering Physician [facility] Vmevauz8004-35-46 08:13:00Identifier [id number]-
6 Result Time 2019 08:13:00Unknown





 Test Item  Value  Reference Range  Comments

 

 Unknown (test code = 786-4)  31 g/dL  Unknown  31-36  F



Ordering Physician [facility] Fanuyim8895-53-84 08:13:00Identifier [id number]-
6 Result Time 2019 08:13:00Unknown





 Test Item  Value  Reference Range  Comments

 

 Unknown (test code = 785-6)  24 pg  Unknown  27-31  F



Ordering Physician [facility] Vpmpkxo2934-73-81 08:13:00Identifier [id number]-
6 Result Time 2019 08:13:00Unknown





 Test Item  Value  Reference Range  Comments

 

 Unknown (test code = 736-9)  5.4 %  Unknown  Unknown  F



Ordering Physician [facility] Qgpeill5567-43-93 08:13:00Identifier [id number]-
6 Result Time 2019 08:13:00Unknown





 Test Item  Value  Reference Range  Comments

 

 Unknown (test code = 718-7)  12.3 g/dL  Unknown  14.0-18.0  F



Ordering Physician [facility] Vudrbfm0875-38-17 08:13:00Identifier [id number]-
6 Result Time 2019 08:13:00Unknown





 Test Item  Value  Reference Range  Comments

 

 Unknown (test code = 4544-3)  40 %  Unknown  36-46  F



Ordering Physician [facility] Yxgikqi0087-59-19 08:13:00Identifier [id number]-
6 Result Time 2019 08:13:00Unknown





 Test Item  Value  Reference Range  Comments

 

 Unknown (test code = 713-8)  0.8 %  Unknown  Unknown  F



Ordering Physician [facility] Ybwiuol5858-13-74 08:13:00Identifier [id number]-
6 Result Time 2019 08:13:00Unknown





 Test Item  Value  Reference Range  Comments

 

 Unknown (test code = 706-2)  0.5 %  Unknown  Unknown  F



Ordering Physician [facility] Lljstsz0211-95-33 08:13:00Identifier [id number]-
6 Result Time 2019 08:13:00Unknown





 Test Item  Value  Reference Range  Comments

 

 Unknown (test code = HBX4200)  23.2 10^3/ul  Unknown  1.5-7.7  F



Ordering Physician [facility] Gytffve9084-65-58 08:13:00Identifier [id number]-
6 Result Time 2019 08:13:00Unknown





 Test Item  Value  Reference Range  Comments

 

 Unknown (test code = 742-7)  2.4 10^3/ul  Unknown  0-0.8  F



Ordering Physician [facility] Fkdulex3512-32-48 08:13:00Identifier [id number]-
6 Result Time 2019 08:13:00Unknown





 Test Item  Value  Reference Range  Comments

 

 Unknown (test code = 731-0)  1.5 10^3/ul  Unknown  1.0-4.8  F



Ordering Physician [facility] Xjnowbx8250-47-93 08:13:00Identifier [id number]-
6 Result Time 2019 08:13:00Unknown





 Test Item  Value  Reference Range  Comments

 

 Unknown (test code = 711-2)  0.2 10^3/ul  Unknown  0-0.6  F



Ordering Physician [facility] Oyopywb9086-36-49 08:13:00Identifier [id number]-
6 Result Time 2019 08:13:00Unknown





 Test Item  Value  Reference Range  Comments

 

 Unknown (test code = 704-7)  0.1 10^3/ul  Unknown  0-0.2  F



Ordering Physician [facility] Coxywjy8053-90-33 06:46:00Identifier [id number]-
6 Result Time 2019 06:46:00Unknown





 Test Item  Value  Reference Range  Comments

 

 Unknown (test code = 56517-2)  0.05 ng/mL  Unknown  Unknown  F



Ordering Physician [facility] Dkhmcfz3553-31-22 06:46:00Identifier [id number]-
6 Result Time 2019 06:46:00Unknown





 Test Item  Value  Reference Range  Comments

 

 Unknown (test code = 2777-1)  2.8 mg/dL  Unknown  2.5-5.0  F



Ordering Physician [facility] Ifbnkes8080-77-22 06:46:00Identifier [id number]-
6 Result Time 2019 06:46:00Unknown





 Test Item  Value  Reference Range  Comments

 

 Unknown (test code = 45627-7)  2.5 mg/dL  Unknown  1.9-2.7  F



Ordering Physician [facility] Studies2019-04-15 10:48:00Identifier [id number]-
6 Result Time 2019-04-15 10:48:00Unknown





 Test Item  Value  Reference Range  Comments

 

 Unknown (test code = NullTestCode)  6.0   Unknown  5-9  F



Ordering Physician [facility] Studies2019-04-15 10:48:00Identifier [id number]
6 Result Time 2019-04-15 10:48:00Unknown





 Test Item  Value  Reference Range  Comments

 

 Unknown (test code = 37786-2)  1.013   Unknown  1.010-1.030  F



Ordering Physician [facility] Studies2019-04-15 10:15:00Identifier [id number]-
6 Result Time 2019-04-15 10:15:00Unknown





 Test Item  Value  Reference Range  Comments

 

 Unknown (test code = 3016-3)  3.38 mcIU/mL  Unknown  0.34-5.60  F



Ordering Physician [facility] Studies2019-04-15 10:15:00Identifier [id number]-
6 Result Time 2019-04-15 10:15:00Unknown





 Test Item  Value  Reference Range  Comments

 

 Unknown (test code = 76399-7)  1.14   Unknown  0.77-1.02  F



Ordering Physician [facility] Studies2019-04-15 10:15:00Identifier [id number]
6 Result Time 2019-04-15 10:15:00Unknown





 Test Item  Value  Reference Range  Comments

 

 Unknown (test code = 45842-2)  365 pg/mL  Unknown  Unknown  F



Ordering Physician [facility] Studies2019-04-15 10:15:00Identifier [id number]-
6 Result Time 2019-04-15 10:15:00Unknown





 Test Item  Value  Reference Range  Comments

 

 Unknown (test code = 2885-2)  8.9 g/dL  Unknown  6.4-8.9  F



Ordering Physician [facility] Studies2019-04-15 10:15:00Identifier [id number]-
6 Result Time 2019-04-15 10:15:00Unknown





 Test Item  Value  Reference Range  Comments

 

 Unknown (test code = 1975-2)  0.60 mg/dL  Unknown  0.2-1.0  F



Ordering Physician [facility] Studies2019-04-15 10:15:00Identifier [id number]-
6 Result Time 2019-04-15 10:15:00Unknown





 Test Item  Value  Reference Range  Comments

 

 Unknown (test code = NullTestCode)  4.9 g/dL  Unknown  2-4  F



Ordering Physician [facility] Studies2019-04-15 10:15:00Identifier [id number]
6 Result Time 2019-04-15 10:15:00Unknown





 Test Item  Value  Reference Range  Comments

 

 Unknown (test code = 1988-5)  11.17 mg/L  Unknown  0-8.00  F



Ordering Physician [facility] Studies2019-04-15 10:15:00Identifier [id number]-
6 Result Time 2019-04-15 10:15:00Unknown





 Test Item  Value  Reference Range  Comments

 

 Unknown (test code = 1920-8)  25 U/L  Unknown  13-39  F



Ordering Physician [facility] Studies2019-04-15 10:15:00Identifier [id number]-
6 Result Time 2019-04-15 10:15:00Unknown





 Test Item  Value  Reference Range  Comments

 

 Unknown (test code = 6768-6)  153 U/L  Unknown    F



Ordering Physician [facility] Studies2019-04-15 10:15:00Identifier [id number]
6 Result Time 2019-04-15 10:15:00Unknown





 Test Item  Value  Reference Range  Comments

 

 Unknown (test code = 1759-0)  0.8   Unknown  1-3  F



Ordering Physician [facility] Studies2019-04-15 10:15:00Identifier [id number]-
6 Result Time 2019-04-15 10:15:00Unknown





 Test Item  Value  Reference Range  Comments

 

 Unknown (test code = 06759-9)  4.0 g/dL  Unknown  3.2-5.2  F



Ordering Physician [facility] Studies2019-04-15 10:15:00Identifier [id number]-
6 Result Time 2019-04-15 10:15:00Unknown





 Test Item  Value  Reference Range  Comments

 

 Unknown (test code = 1742-6)  13 U/L  Unknown  7-52  F



Ordering Physician [facility] Studies2019-04-15 10:15:00Identifier [id number]
6 Result Time 2019-04-15 10:15:00Unknown





 Test Item  Value  Reference Range  Comments

 

 Unknown (test code = 2524-7)  0.9 mmol/L  Unknown  0.5-2.0  F



Ordering Physician Unknown

## 2020-04-24 NOTE — XMS REPORT
Patient Summary Document

 Created on:2020



Patient:BERTHA SOLIS

Sex:Male

:1933

External Reference #:[id number]





Demographics







 Address  [address] [place] [place] [address]

 

 Home Phone  [phone]

 

 Preferred Language  English

 

 Marital Status  Unknown

 

 Confucianism Affiliation  Unknown

 

 Race  Unknown

 

 Ethnic Group  Unknown









Author







 Organization  [facility]









Support







 Name  Relationship  Address  Phone

 

 IRAIS SOLIS, Unknown Name  Suha  [address] [facility]  [phone]



     [place] [address]  









Care Team Providers







 Name  Role  Phone

 

 Unavailable  Unavailable  Unavailable









Problems

This patient has no known problems.



Allergies, Adverse Reactions, Alerts







 Allergy  Allergy  Status  Severity  Reaction(s)  Onset  Inactive  Treating  
Comments



 Name  Type        Date  Date  Clinician  

 

 Uncoded  Unknown  Active  Unknown  Reaction  2019    Interface  



 free-text        Unknown  -18      



 allergy                







Medications







 Ordered  Filled  Start  Stop  Current  Ordering  Indication  Dosage  Frequency
  Signature  Comments  Components



 Medication  Medication  Date  Date  Medication?  Clinician        (SIG)    



 Name  Name                    

 

 Multivitami  Multivitami  2012    No  Unknown    Unknown  Unknown      



 ns/Minerals  ns/Minerals  -                  



 Tab*  Tab*                    

 

 amLODIPine  amLODIPine      No  Unknown    Unknown  Unknown      



 5 mg tablet  5 mg tablet  4-15                  

 

 acetaminoph  acetaminoph      No  Unknown    Unknown  Unknown      



 en 500 mg  en 500 mg  4-15                  



 tablet  tablet                    

 

 magnesium  magnesium      No  Unknown    Unknown  Unknown      



 oxide 400  oxide 400  4-15                  



 mg (241.3  mg (241.3                    



 mg  mg                    



 magnesium)  magnesium)                    



 tablet  tablet                    

 

 omeprazole  omeprazole      No  Unknown    Unknown  Unknown      



 40 mg  40 mg  4-15                  



 capsule,del  capsule,del                    



 ayed  ayed                    



 release  release                    

 

 verapamil  verapamil      No  Unknown    Unknown  Unknown      



  mg   mg  4-15                  



 capsule,ext  capsule,ext                    



 ended  ended                    



 release  release                    

 

 Calcium  Calcium      No  Unknown    Unknown  Unknown      



 Carbonate/V  Carbonate/V  4-15                  



 itamin D3  itamin D3                    

 

 cefdinir  cefdinir      No  Unknown    Unknown  Unknown      



 300 mg  300 mg  4-17                  



 capsule  capsule                    







Vital Signs







 Vital Name  Observation Time  Observation Value  Comments

 

 SYSTOLIC mm[Hg]  2019 18:05:21  135 mm[Hg] mm[Hg]  Method: Sit

 

 DIASTOLIC mm[Hg]  2019 18:05:21  48 mm[Hg] mm[Hg]  Method: Sit

 

 PULSE  2019 18:05:21  63 /min /min  

 

 RESP RATE  2019 18:05:21  16 /min /min  

 

 TEMP  2019 18:05:21  97.4 [degF]  







Procedures

This patient has no known procedures.



Results







 Test Description  Test Time  Test Comments  Text Results  Atomic Results  
Result Comments









 Laboratory Studies  2019 08:13:00  Identifier [id number] Result Time  
Unknown



     2019 08:13:00  









   Test Item  Value  Reference Range  Comments









 Unknown (test code = 2951-2)  136 mmol/L  Unknown  135-145  F



Ordering Physician [facility] Ogauqtb7579-87-64 08:13:00Identifier [id number]
6 Result Time 2019 08:13:00Unknown





 Test Item  Value  Reference Range  Comments

 

 Unknown (test code = 2823-3)  4.0 mmol/L  Unknown  3.5-5.0  F



Ordering Physician [facility] Pmtskuc6325-88-54 08:13:00Identifier [id number]
6 Result Time 2019 08:13:00Unknown





 Test Item  Value  Reference Range  Comments

 

 Unknown (test code = 2345-7)  87 mg/dL  Unknown    F



Ordering Physician [facility] Ydkuhcs4226-75-60 08:13:00Identifier [id number]
6 Result Time 2019 08:13:00Unknown





 Test Item  Value  Reference Range  Comments

 

 Unknown (test code = 48642-3)  46.9   Unknown  Unknown  F



Ordering Physician [facility] Grcvzvj1082-21-57 08:13:00Identifier [id number]
6 Result Time 2019 08:13:00Unknown





 Test Item  Value  Reference Range  Comments

 

 Unknown (test code = NullTestCode)  56.7   Unknown  Unknown  F



Ordering Physician [facility] Wojuiaj6636-45-75 08:13:00Identifier [id number]
6 Result Time 2019 08:13:00Unknown





 Test Item  Value  Reference Range  Comments

 

 Unknown (test code = 2160-0)  1.43 mg/dL  Unknown  0.67-1.17  F



Ordering Physician [facility] Urlkjys6359-40-31 08:13:00Identifier [id number]-
6 Result Time 2019 08:13:00Unknown





 Test Item  Value  Reference Range  Comments

 

 Unknown (test code = 2075-0)  104 mmol/L  Unknown  101-111  F



Ordering Physician [facility] Mzunyos2915-90-49 08:13:00Identifier [id number]-
6 Result Time 2019 08:13:00Unknown





 Test Item  Value  Reference Range  Comments

 

 Unknown (test code = 2028-9)  24 mmol/L  Unknown  22-32  F



Ordering Physician [facility] Mugpndc0770-93-74 08:13:00Identifier [id number]-
6 Result Time 2019 08:13:00Unknown





 Test Item  Value  Reference Range  Comments

 

 Unknown (test code = 38599-6)  9.0 mg/dL  Unknown  8.6-10.3  F



Ordering Physician [facility] Ouiajak8897-65-06 08:13:00Identifier [id number]-
6 Result Time 2019 08:13:00Unknown





 Test Item  Value  Reference Range  Comments

 

 Unknown (test code = 3094-0)  24 mg/dL  Unknown  6-24  F



Ordering Physician [facility] Imccreu7066-98-70 08:13:00Identifier [id number]
6 Result Time 2019 08:13:00Unknown





 Test Item  Value  Reference Range  Comments

 

 Unknown (test code = 3097-3)  16.8   Unknown  8-20  F



Ordering Physician [facility] Dnmldtb1446-48-77 08:13:00Identifier [id number]-
6 Result Time 2019 08:13:00Unknown





 Test Item  Value  Reference Range  Comments

 

 Unknown (test code = 33037-3)  8 mmol/L  Unknown  2-11  F



Ordering Physician [facility] Wwthnkm3545-45-19 08:13:00Identifier [id number]-
6 Result Time 2019 08:13:00Unknown





 Test Item  Value  Reference Range  Comments

 

 Unknown (test code = 76955-2)  27.4 10^3/uL  Unknown  3.5-10.8  F



Ordering Physician [facility] Xrresdo8861-91-75 08:13:00Identifier [id number]-
6 Result Time 2019 08:13:00Unknown





 Test Item  Value  Reference Range  Comments

 

 Unknown (test code = 788-0)  24 %  Unknown  10.5-15  F



Ordering Physician [facility] Ogakxwj6106-49-45 08:13:00Identifier [id number]-
6 Result Time 2019 08:13:00Unknown





 Test Item  Value  Reference Range  Comments

 

 Unknown (test code = 789-8)  5.19 10^6 /uL  Unknown  4.18-5.48  F



Ordering Physician [facility] Rttgymz2845-80-06 08:13:00Identifier [id number]-
6 Result Time 2019 08:13:00Unknown





 Test Item  Value  Reference Range  Comments

 

 Unknown (test code = 777-3)  330 10^3/uL  Unknown  150-450  F



Ordering Physician [facility] Jtflyqs9623-00-87 08:13:00Identifier [id number]-
6 Result Time 2019 08:13:00Unknown





 Test Item  Value  Reference Range  Comments

 

 Unknown (test code = 14020-2)  0   Unknown  Unknown  F



Ordering Physician [facility] Wtyrttt6335-69-02 08:13:00Identifier [id number]-
6 Result Time 2019 08:13:00Unknown





 Test Item  Value  Reference Range  Comments

 

 Unknown (test code = 771-6)  0 10^3/ul  Unknown  Unknown  F



Ordering Physician [facility] Dqbgifv5297-47-10 08:13:00Identifier [id number]
6 Result Time 2019 08:13:00Unknown





 Test Item  Value  Reference Range  Comments

 

 Unknown (test code = 770-8)  84.7 %  Unknown  Unknown  F



Ordering Physician [facility] Fywxuqy3692-40-39 08:13:00Identifier [id number]-
6 Result Time 2019 08:13:00Unknown





 Test Item  Value  Reference Range  Comments

 

 Unknown (test code = 5905-5)  8.6 %  Unknown  Unknown  F



Ordering Physician [facility] Evewpek4419-46-72 08:13:00Identifier [id number]-
6 Result Time 2019 08:13:00Unknown





 Test Item  Value  Reference Range  Comments

 

 Unknown (test code = 25717-0)  8.6 fL  Unknown  7.4-10.4  F



Ordering Physician [facility] Wtwhozc8556-27-37 08:13:00Identifier [id number]-
6 Result Time 2019 08:13:00Unknown





 Test Item  Value  Reference Range  Comments

 

 Unknown (test code = 787-2)  78 fL  Unknown  80-94  F



Ordering Physician [facility] Uwrfsxm4511-45-03 08:13:00Identifier [id number]-
6 Result Time 2019 08:13:00Unknown





 Test Item  Value  Reference Range  Comments

 

 Unknown (test code = 786-4)  31 g/dL  Unknown  31-36  F



Ordering Physician [facility] Grddurm8942-97-43 08:13:00Identifier [id number]-
6 Result Time 2019 08:13:00Unknown





 Test Item  Value  Reference Range  Comments

 

 Unknown (test code = 785-6)  24 pg  Unknown  27-31  F



Ordering Physician [facility] Nhnitmi1826-67-57 08:13:00Identifier [id number]-
6 Result Time 2019 08:13:00Unknown





 Test Item  Value  Reference Range  Comments

 

 Unknown (test code = 736-9)  5.4 %  Unknown  Unknown  F



Ordering Physician [facility] Igqlgzu6032-51-25 08:13:00Identifier [id number]-
6 Result Time 2019 08:13:00Unknown





 Test Item  Value  Reference Range  Comments

 

 Unknown (test code = 718-7)  12.3 g/dL  Unknown  14.0-18.0  F



Ordering Physician [facility] Yscsmfu6643-07-64 08:13:00Identifier [id number]-
6 Result Time 2019 08:13:00Unknown





 Test Item  Value  Reference Range  Comments

 

 Unknown (test code = 4544-3)  40 %  Unknown  36-46  F



Ordering Physician [facility] Rltwfpc1691-06-91 08:13:00Identifier [id number]
6 Result Time 2019 08:13:00Unknown





 Test Item  Value  Reference Range  Comments

 

 Unknown (test code = 713-8)  0.8 %  Unknown  Unknown  F



Ordering Physician [facility] Glywhhs8541-89-67 08:13:00Identifier [id number]-
6 Result Time 2019 08:13:00Unknown





 Test Item  Value  Reference Range  Comments

 

 Unknown (test code = 706-2)  0.5 %  Unknown  Unknown  F



Ordering Physician [facility] Dzbovih8942-05-92 08:13:00Identifier [id number]-
6 Result Time 2019 08:13:00Unknown





 Test Item  Value  Reference Range  Comments

 

 Unknown (test code = PYE0883)  23.2 10^3/ul  Unknown  1.5-7.7  F



Ordering Physician [facility] Yrnsnya8683-16-77 08:13:00Identifier [id number]-
6 Result Time 2019 08:13:00Unknown





 Test Item  Value  Reference Range  Comments

 

 Unknown (test code = 742-7)  2.4 10^3/ul  Unknown  0-0.8  F



Ordering Physician [facility] Ckmfold1072-81-60 08:13:00Identifier [id number]-
6 Result Time 2019 08:13:00Unknown





 Test Item  Value  Reference Range  Comments

 

 Unknown (test code = 731-0)  1.5 10^3/ul  Unknown  1.0-4.8  F



Ordering Physician [facility] Plmchqm2379-45-19 08:13:00Identifier [id number]-
6 Result Time 2019 08:13:00Unknown





 Test Item  Value  Reference Range  Comments

 

 Unknown (test code = 711-2)  0.2 10^3/ul  Unknown  0-0.6  F



Ordering Physician [facility] Bgwphyz4209-29-12 08:13:00Identifier [id number]-
6 Result Time 2019 08:13:00Unknown





 Test Item  Value  Reference Range  Comments

 

 Unknown (test code = 704-7)  0.1 10^3/ul  Unknown  0-0.2  F



Ordering Physician [facility] Iwokzdf1654-92-77 06:46:00Identifier [id number]-
6 Result Time 2019 06:46:00Unknown





 Test Item  Value  Reference Range  Comments

 

 Unknown (test code = 22635-6)  0.05 ng/mL  Unknown  Unknown  F



Ordering Physician [facility] Abwjofz4743-80-12 06:46:00Identifier [id number]-
6 Result Time 2019 06:46:00Unknown





 Test Item  Value  Reference Range  Comments

 

 Unknown (test code = 2777-1)  2.8 mg/dL  Unknown  2.5-5.0  F



Ordering Physician [facility] Afbgfdn3800-83-17 06:46:00Identifier [id number]-
6 Result Time 2019 06:46:00Unknown





 Test Item  Value  Reference Range  Comments

 

 Unknown (test code = 92059-0)  2.5 mg/dL  Unknown  1.9-2.7  F



Ordering Physician [facility] Studies2019-04-15 10:48:00Identifier [id number]
6 Result Time 2019-04-15 10:48:00Unknown





 Test Item  Value  Reference Range  Comments

 

 Unknown (test code = NullTestCode)  6.0   Unknown  5-9  F



Ordering Physician [facility] Studies2019-04-15 10:48:00Identifier [id number]-
6 Result Time 2019-04-15 10:48:00Unknown





 Test Item  Value  Reference Range  Comments

 

 Unknown (test code = 13043-8)  1.013   Unknown  1.010-1.030  F



Ordering Physician [facility] Studies2019-04-15 10:15:00Identifier [id number]-
6 Result Time 2019-04-15 10:15:00Unknown





 Test Item  Value  Reference Range  Comments

 

 Unknown (test code = 3016-3)  3.38 mcIU/mL  Unknown  0.34-5.60  F



Ordering Physician [facility] Studies2019-04-15 10:15:00Identifier [id number]-
6 Result Time 2019-04-15 10:15:00Unknown





 Test Item  Value  Reference Range  Comments

 

 Unknown (test code = 62833-9)  1.14   Unknown  0.77-1.02  F



Ordering Physician [facility] Studies2019-04-15 10:15:00Identifier [id number]-
6 Result Time 2019-04-15 10:15:00Unknown





 Test Item  Value  Reference Range  Comments

 

 Unknown (test code = 40481-7)  365 pg/mL  Unknown  Unknown  F



Ordering Physician [facility] Studies2019-04-15 10:15:00Identifier [id number]
6 Result Time 2019-04-15 10:15:00Unknown





 Test Item  Value  Reference Range  Comments

 

 Unknown (test code = 2885-2)  8.9 g/dL  Unknown  6.4-8.9  F



Ordering Physician [facility] Studies2019-04-15 10:15:00Identifier [id number]
6 Result Time 2019-04-15 10:15:00Unknown





 Test Item  Value  Reference Range  Comments

 

 Unknown (test code = 1975-2)  0.60 mg/dL  Unknown  0.2-1.0  F



Ordering Physician [facility] Studies2019-04-15 10:15:00Identifier [id number]
6 Result Time 2019-04-15 10:15:00Unknown





 Test Item  Value  Reference Range  Comments

 

 Unknown (test code = NullTestCode)  4.9 g/dL  Unknown  2-4  F



Ordering Physician [facility] Studies2019-04-15 10:15:00Identifier [id number]-
6 Result Time 2019-04-15 10:15:00Unknown





 Test Item  Value  Reference Range  Comments

 

 Unknown (test code = 1988-5)  11.17 mg/L  Unknown  0-8.00  F



Ordering Physician [facility] Studies2019-04-15 10:15:00Identifier [id number]
6 Result Time 2019-04-15 10:15:00Unknown





 Test Item  Value  Reference Range  Comments

 

 Unknown (test code = 1920-8)  25 U/L  Unknown  13-39  F



Ordering Physician [facility] Studies2019-04-15 10:15:00Identifier [id number]-
6 Result Time 2019-04-15 10:15:00Unknown





 Test Item  Value  Reference Range  Comments

 

 Unknown (test code = 6768-6)  153 U/L  Unknown    F



Ordering Physician [facility] Studies2019-04-15 10:15:00Identifier [id number]-
6 Result Time 2019-04-15 10:15:00Unknown





 Test Item  Value  Reference Range  Comments

 

 Unknown (test code = 1759-0)  0.8   Unknown  1-3  F



Ordering Physician [facility] Studies2019-04-15 10:15:00Identifier [id number]
6 Result Time 2019-04-15 10:15:00Unknown





 Test Item  Value  Reference Range  Comments

 

 Unknown (test code = 63893-2)  4.0 g/dL  Unknown  3.2-5.2  F



Ordering Physician [facility] Studies2019-04-15 10:15:00Identifier [id number]-
6 Result Time 2019-04-15 10:15:00Unknown





 Test Item  Value  Reference Range  Comments

 

 Unknown (test code = 1742-6)  13 U/L  Unknown  7-52  F



Ordering Physician [facility] Studies2019-04-15 10:15:00Identifier [id number]
6 Result Time 2019-04-15 10:15:00Unknown





 Test Item  Value  Reference Range  Comments

 

 Unknown (test code = 2524-7)  0.9 mmol/L  Unknown  0.5-2.0  F



Ordering Physician Unknown

## 2020-04-24 NOTE — XMS REPORT
Patient Summary Document

 Created on:March 10, 2020



Patient:BERTHA SOLIS

Sex:Male

:1933

External Reference #:[id number]





Demographics







 Address  [address] [place] [id number]

 

 Home Phone  [phone]

 

 Preferred Language  English

 

 Marital Status  Unknown

 

 Baptism Affiliation  Unknown

 

 Race  Unknown

 

 Ethnic Group  Unknown









Author







 Organization  [facility]









Support







 Name  Relationship  Address  Phone

 

 IRAIS SOLIS, Unknown Name  Suha  [address] [facility]  [phone]



     [place] [address]  









Care Team Providers







 Name  Role  Phone

 

 Unavailable  Unavailable  Unavailable









Problems

This patient has no known problems.



Allergies, Adverse Reactions, Alerts







 Allergy  Allergy  Status  Severity  Reaction(s)  Onset  Inactive  Treating  
Comments



 Name  Type        Date  Date  Clinician  

 

 Uncoded  Unknown  Active  Unknown  Reaction  2019    Interface  



 free-text        Unknown  -18      



 allergy                







Medications







 Ordered  Filled  Start  Stop  Current  Ordering  Indication  Dosage  Frequency
  Signature  Comments  Components



 Medication  Medication  Date  Date  Medication?  Clinician        (SIG)    



 Name  Name                    

 

 Multivitami  Multivitami  2012    No  Unknown    Unknown  Unknown      



 ns/Minerals  ns/Minerals  -                  



 Tab*  Tab*                    

 

 amLODIPine  amLODIPine      No  Unknown    Unknown  Unknown      



 5 mg tablet  5 mg tablet  4-15                  

 

 acetaminoph  acetaminoph      No  Unknown    Unknown  Unknown      



 en 500 mg  en 500 mg  4-15                  



 tablet  tablet                    

 

 magnesium  magnesium      No  Unknown    Unknown  Unknown      



 oxide 400  oxide 400  4-15                  



 mg (241.3  mg (241.3                    



 mg  mg                    



 magnesium)  magnesium)                    



 tablet  tablet                    

 

 omeprazole  omeprazole      No  Unknown    Unknown  Unknown      



 40 mg  40 mg  4-15                  



 capsule,del  capsule,del                    



 ayed  ayed                    



 release  release                    

 

 verapamil  verapamil      No  Unknown    Unknown  Unknown      



  mg   mg  4-15                  



 capsule,ext  capsule,ext                    



 ended  ended                    



 release  release                    

 

 Calcium  Calcium      No  Unknown    Unknown  Unknown      



 Carbonate/V  Carbonate/V  4-15                  



 itamin D3  itamin D3                    

 

 cefdinir  cefdinir      No  Unknown    Unknown  Unknown      



 300 mg  300 mg  4-17                  



 capsule  capsule                    







Vital Signs







 Vital Name  Observation Time  Observation Value  Comments

 

 SYSTOLIC mm[Hg]  2019 18:05:21  135 mm[Hg] mm[Hg]  Method: Sit

 

 DIASTOLIC mm[Hg]  2019 18:05:21  48 mm[Hg] mm[Hg]  Method: Sit

 

 PULSE  2019 18:05:21  63 /min /min  

 

 RESP RATE  2019 18:05:21  16 /min /min  

 

 TEMP  2019 18:05:21  97.4 [degF]  







Procedures

This patient has no known procedures.



Results







 Test Description  Test Time  Test Comments  Text Results  Atomic Results  
Result Comments









 Laboratory Studies  2019 08:13:00  Identifier [id number] Result Time  
Unknown



     2019 08:13:00  









   Test Item  Value  Reference Range  Comments









 Unknown (test code = 2951-2)  136 mmol/L  Unknown  135-145  F



Ordering Physician [facility] Cgcnstj4465-38-77 08:13:00Identifier [id number]
6 Result Time 2019 08:13:00Unknown





 Test Item  Value  Reference Range  Comments

 

 Unknown (test code = 2823-3)  4.0 mmol/L  Unknown  3.5-5.0  F



Ordering Physician [facility] Fmhkjgc8879-12-18 08:13:00Identifier [id number]
6 Result Time 2019 08:13:00Unknown





 Test Item  Value  Reference Range  Comments

 

 Unknown (test code = 2345-7)  87 mg/dL  Unknown    F



Ordering Physician [facility] Rsxdjna9654-42-65 08:13:00Identifier [id number]
6 Result Time 2019 08:13:00Unknown





 Test Item  Value  Reference Range  Comments

 

 Unknown (test code = 48642-3)  46.9   Unknown  Unknown  F



Ordering Physician [facility] Ykotujt0347-39-72 08:13:00Identifier [id number]-
6 Result Time 2019 08:13:00Unknown





 Test Item  Value  Reference Range  Comments

 

 Unknown (test code = NullTestCode)  56.7   Unknown  Unknown  F



Ordering Physician [facility] Olgyjmg5428-47-76 08:13:00Identifier [id number]
6 Result Time 2019 08:13:00Unknown





 Test Item  Value  Reference Range  Comments

 

 Unknown (test code = 2160-0)  1.43 mg/dL  Unknown  0.67-1.17  F



Ordering Physician [facility] Ooylgia5178-74-70 08:13:00Identifier [id number]
6 Result Time 2019 08:13:00Unknown





 Test Item  Value  Reference Range  Comments

 

 Unknown (test code = 2075-0)  104 mmol/L  Unknown  101-111  F



Ordering Physician [facility] Xmqeujt7318-46-51 08:13:00Identifier [id number]
6 Result Time 2019 08:13:00Unknown





 Test Item  Value  Reference Range  Comments

 

 Unknown (test code = 2028-9)  24 mmol/L  Unknown  22-32  F



Ordering Physician [facility] Qvxdxdz7060-93-53 08:13:00Identifier [id number]-
6 Result Time 2019 08:13:00Unknown





 Test Item  Value  Reference Range  Comments

 

 Unknown (test code = 87165-3)  9.0 mg/dL  Unknown  8.6-10.3  F



Ordering Physician [facility] Erdfzbi1503-26-00 08:13:00Identifier [id number]
6 Result Time 2019 08:13:00Unknown





 Test Item  Value  Reference Range  Comments

 

 Unknown (test code = 3094-0)  24 mg/dL  Unknown  6-24  F



Ordering Physician [facility] Rfuqcpk9934-99-01 08:13:00Identifier [id number]-
6 Result Time 2019 08:13:00Unknown





 Test Item  Value  Reference Range  Comments

 

 Unknown (test code = 3097-3)  16.8   Unknown  8-20  F



Ordering Physician [facility] Fwckfyy8494-50-33 08:13:00Identifier [id number]-
6 Result Time 2019 08:13:00Unknown





 Test Item  Value  Reference Range  Comments

 

 Unknown (test code = 33037-3)  8 mmol/L  Unknown  2-11  F



Ordering Physician [facility] Luegszs9557-37-86 08:13:00Identifier [id number]-
6 Result Time 2019 08:13:00Unknown





 Test Item  Value  Reference Range  Comments

 

 Unknown (test code = 52603-3)  27.4 10^3/uL  Unknown  3.5-10.8  F



Ordering Physician [facility] Yhfobyx1007-99-37 08:13:00Identifier [id number]-
6 Result Time 2019 08:13:00Unknown





 Test Item  Value  Reference Range  Comments

 

 Unknown (test code = 788-0)  24 %  Unknown  10.5-15  F



Ordering Physician [facility] Litqoql4084-64-74 08:13:00Identifier [id number]-
6 Result Time 2019 08:13:00Unknown





 Test Item  Value  Reference Range  Comments

 

 Unknown (test code = 789-8)  5.19 10^6 /uL  Unknown  4.18-5.48  F



Ordering Physician [facility] Wxtguzl4108-89-67 08:13:00Identifier [id number]-
6 Result Time 2019 08:13:00Unknown





 Test Item  Value  Reference Range  Comments

 

 Unknown (test code = 777-3)  330 10^3/uL  Unknown  150-450  F



Ordering Physician [facility] Zfoqpxd0035-34-71 08:13:00Identifier [id number]-
6 Result Time 2019 08:13:00Unknown





 Test Item  Value  Reference Range  Comments

 

 Unknown (test code = 86733-4)  0   Unknown  Unknown  F



Ordering Physician [facility] Swynday5247-08-67 08:13:00Identifier [id number]-
6 Result Time 2019 08:13:00Unknown





 Test Item  Value  Reference Range  Comments

 

 Unknown (test code = 771-6)  0 10^3/ul  Unknown  Unknown  F



Ordering Physician [facility] Jvsorbi7962-56-40 08:13:00Identifier [id number]-
6 Result Time 2019 08:13:00Unknown





 Test Item  Value  Reference Range  Comments

 

 Unknown (test code = 770-8)  84.7 %  Unknown  Unknown  F



Ordering Physician [facility] Karttcd0010-52-29 08:13:00Identifier [id number]-
6 Result Time 2019 08:13:00Unknown





 Test Item  Value  Reference Range  Comments

 

 Unknown (test code = 5905-5)  8.6 %  Unknown  Unknown  F



Ordering Physician [facility] Jasfhxm1667-42-76 08:13:00Identifier [id number]-
6 Result Time 2019 08:13:00Unknown





 Test Item  Value  Reference Range  Comments

 

 Unknown (test code = 19995-5)  8.6 fL  Unknown  7.4-10.4  F



Ordering Physician [facility] Moxkznk4651-54-81 08:13:00Identifier [id number]-
6 Result Time 2019 08:13:00Unknown





 Test Item  Value  Reference Range  Comments

 

 Unknown (test code = 787-2)  78 fL  Unknown  80-94  F



Ordering Physician [facility] Ofcands1982-12-98 08:13:00Identifier [id number]-
6 Result Time 2019 08:13:00Unknown





 Test Item  Value  Reference Range  Comments

 

 Unknown (test code = 786-4)  31 g/dL  Unknown  31-36  F



Ordering Physician [facility] Ufssavu0346-16-16 08:13:00Identifier [id number]-
6 Result Time 2019 08:13:00Unknown





 Test Item  Value  Reference Range  Comments

 

 Unknown (test code = 785-6)  24 pg  Unknown  27-31  F



Ordering Physician [facility] Wgxvvmt7700-65-74 08:13:00Identifier [id number]-
6 Result Time 2019 08:13:00Unknown





 Test Item  Value  Reference Range  Comments

 

 Unknown (test code = 736-9)  5.4 %  Unknown  Unknown  F



Ordering Physician [facility] Yfzzfxk2446-39-06 08:13:00Identifier [id number]-
6 Result Time 2019 08:13:00Unknown





 Test Item  Value  Reference Range  Comments

 

 Unknown (test code = 718-7)  12.3 g/dL  Unknown  14.0-18.0  F



Ordering Physician [facility] Nibvclp9209-41-05 08:13:00Identifier [id number]-
6 Result Time 2019 08:13:00Unknown





 Test Item  Value  Reference Range  Comments

 

 Unknown (test code = 4544-3)  40 %  Unknown  36-46  F



Ordering Physician [facility] Ixjytun7668-54-98 08:13:00Identifier [id number]-
6 Result Time 2019 08:13:00Unknown





 Test Item  Value  Reference Range  Comments

 

 Unknown (test code = 713-8)  0.8 %  Unknown  Unknown  F



Ordering Physician [facility] Pkfhcnw1092-39-90 08:13:00Identifier [id number]-
6 Result Time 2019 08:13:00Unknown





 Test Item  Value  Reference Range  Comments

 

 Unknown (test code = 706-2)  0.5 %  Unknown  Unknown  F



Ordering Physician [facility] Dsisjmc7262-25-19 08:13:00Identifier [id number]-
6 Result Time 2019 08:13:00Unknown





 Test Item  Value  Reference Range  Comments

 

 Unknown (test code = LGP6173)  23.2 10^3/ul  Unknown  1.5-7.7  F



Ordering Physician [facility] Uotbxga2539-67-89 08:13:00Identifier [id number]-
6 Result Time 2019 08:13:00Unknown





 Test Item  Value  Reference Range  Comments

 

 Unknown (test code = 742-7)  2.4 10^3/ul  Unknown  0-0.8  F



Ordering Physician [facility] Yaflhbk2010-50-17 08:13:00Identifier [id number]-
6 Result Time 2019 08:13:00Unknown





 Test Item  Value  Reference Range  Comments

 

 Unknown (test code = 731-0)  1.5 10^3/ul  Unknown  1.0-4.8  F



Ordering Physician [facility] Kdlgnal6672-89-33 08:13:00Identifier [id number]-
6 Result Time 2019 08:13:00Unknown





 Test Item  Value  Reference Range  Comments

 

 Unknown (test code = 711-2)  0.2 10^3/ul  Unknown  0-0.6  F



Ordering Physician [facility] Dqqtvuh1199-20-24 08:13:00Identifier [id number]-
6 Result Time 2019 08:13:00Unknown





 Test Item  Value  Reference Range  Comments

 

 Unknown (test code = 704-7)  0.1 10^3/ul  Unknown  0-0.2  F



Ordering Physician [facility] Nmkadkm9367-41-74 06:46:00Identifier [id number]-
6 Result Time 2019 06:46:00Unknown





 Test Item  Value  Reference Range  Comments

 

 Unknown (test code = 28944-2)  0.05 ng/mL  Unknown  Unknown  F



Ordering Physician [facility] Daonpct5420-16-78 06:46:00Identifier [id number]-
6 Result Time 2019 06:46:00Unknown





 Test Item  Value  Reference Range  Comments

 

 Unknown (test code = 2777-1)  2.8 mg/dL  Unknown  2.5-5.0  F



Ordering Physician [facility] Yscnjyp4968-30-43 06:46:00Identifier [id number]-
6 Result Time 2019 06:46:00Unknown





 Test Item  Value  Reference Range  Comments

 

 Unknown (test code = 64814-3)  2.5 mg/dL  Unknown  1.9-2.7  F



Ordering Physician [facility] Studies2019-04-15 10:48:00Identifier [id number]
6 Result Time 2019-04-15 10:48:00Unknown





 Test Item  Value  Reference Range  Comments

 

 Unknown (test code = NullTestCode)  6.0   Unknown  5-9  F



Ordering Physician [facility] Studies2019-04-15 10:48:00Identifier [id number]-
6 Result Time 2019-04-15 10:48:00Unknown





 Test Item  Value  Reference Range  Comments

 

 Unknown (test code = 84729-2)  1.013   Unknown  1.010-1.030  F



Ordering Physician [facility] Studies2019-04-15 10:15:00Identifier [id number]
6 Result Time 2019-04-15 10:15:00Unknown





 Test Item  Value  Reference Range  Comments

 

 Unknown (test code = 3016-3)  3.38 mcIU/mL  Unknown  0.34-5.60  F



Ordering Physician [facility] Studies2019-04-15 10:15:00Identifier [id number]
6 Result Time 2019-04-15 10:15:00Unknown





 Test Item  Value  Reference Range  Comments

 

 Unknown (test code = 65480-4)  1.14   Unknown  0.77-1.02  F



Ordering Physician [facility] Studies2019-04-15 10:15:00Identifier [id number]-
6 Result Time 2019-04-15 10:15:00Unknown





 Test Item  Value  Reference Range  Comments

 

 Unknown (test code = 93109-8)  365 pg/mL  Unknown  Unknown  F



Ordering Physician [facility] Studies2019-04-15 10:15:00Identifier [id number]-
6 Result Time 2019-04-15 10:15:00Unknown





 Test Item  Value  Reference Range  Comments

 

 Unknown (test code = 2885-2)  8.9 g/dL  Unknown  6.4-8.9  F



Ordering Physician [facility] Studies2019-04-15 10:15:00Identifier [id number]-
6 Result Time 2019-04-15 10:15:00Unknown





 Test Item  Value  Reference Range  Comments

 

 Unknown (test code = 1975-2)  0.60 mg/dL  Unknown  0.2-1.0  F



Ordering Physician [facility] Studies2019-04-15 10:15:00Identifier [id number]-
6 Result Time 2019-04-15 10:15:00Unknown





 Test Item  Value  Reference Range  Comments

 

 Unknown (test code = NullTestCode)  4.9 g/dL  Unknown  2-4  F



Ordering Physician [facility] Studies2019-04-15 10:15:00Identifier [id number]
6 Result Time 2019-04-15 10:15:00Unknown





 Test Item  Value  Reference Range  Comments

 

 Unknown (test code = 1988-5)  11.17 mg/L  Unknown  0-8.00  F



Ordering Physician [facility] Studies2019-04-15 10:15:00Identifier [id number]-
6 Result Time 2019-04-15 10:15:00Unknown





 Test Item  Value  Reference Range  Comments

 

 Unknown (test code = 1920-8)  25 U/L  Unknown  13-39  F



Ordering Physician [facility] Studies2019-04-15 10:15:00Identifier [id number]-
6 Result Time 2019-04-15 10:15:00Unknown





 Test Item  Value  Reference Range  Comments

 

 Unknown (test code = 6768-6)  153 U/L  Unknown    F



Ordering Physician [facility] Studies2019-04-15 10:15:00Identifier [id number]-
6 Result Time 2019-04-15 10:15:00Unknown





 Test Item  Value  Reference Range  Comments

 

 Unknown (test code = 1759-0)  0.8   Unknown  1-3  F



Ordering Physician [facility] Studies2019-04-15 10:15:00Identifier [id number]
6 Result Time 2019-04-15 10:15:00Unknown





 Test Item  Value  Reference Range  Comments

 

 Unknown (test code = 28340-7)  4.0 g/dL  Unknown  3.2-5.2  F



Ordering Physician [facility] Studies2019-04-15 10:15:00Identifier [id number]-
6 Result Time 2019-04-15 10:15:00Unknown





 Test Item  Value  Reference Range  Comments

 

 Unknown (test code = 1742-6)  13 U/L  Unknown  7-52  F



Ordering Physician [facility] Studies2019-04-15 10:15:00Identifier [id number]
6 Result Time 2019-04-15 10:15:00Unknown





 Test Item  Value  Reference Range  Comments

 

 Unknown (test code = 2524-7)  0.9 mmol/L  Unknown  0.5-2.0  F



Ordering Physician Unknown

## 2021-01-19 NOTE — DCNOTE
Patient seen this morning. Says he is feeling well today. No fever or chills. 

No cough. Ambulating with walker, feels stronger and steady. 


On exam, RRR, s1 and s2 present, no m/g/r, abd soft, NTND, BS+, lungs CTA B/L, 

no w/r/r, no LE edema


Will discharge home today off of anti-HTN medications. Will discharge with a 

few additional days of ABx. Will need close PCP f/u. lmom for Nadege Setters, results in his chart